# Patient Record
Sex: MALE | Race: WHITE | NOT HISPANIC OR LATINO | Employment: FULL TIME | URBAN - METROPOLITAN AREA
[De-identification: names, ages, dates, MRNs, and addresses within clinical notes are randomized per-mention and may not be internally consistent; named-entity substitution may affect disease eponyms.]

---

## 2018-10-23 ENCOUNTER — APPOINTMENT (OUTPATIENT)
Dept: LAB | Facility: HOSPITAL | Age: 53
End: 2018-10-23
Attending: INTERNAL MEDICINE
Payer: COMMERCIAL

## 2018-10-23 ENCOUNTER — TRANSCRIBE ORDERS (OUTPATIENT)
Dept: ADMINISTRATIVE | Facility: HOSPITAL | Age: 53
End: 2018-10-23

## 2018-10-23 DIAGNOSIS — R60.9 EDEMA, UNSPECIFIED TYPE: ICD-10-CM

## 2018-10-23 DIAGNOSIS — R60.9 EDEMA, UNSPECIFIED TYPE: Primary | ICD-10-CM

## 2018-10-23 LAB
ALBUMIN SERPL BCP-MCNC: 3.9 G/DL (ref 3.5–5.7)
ALP SERPL-CCNC: 47 U/L (ref 40–150)
ALT SERPL W P-5'-P-CCNC: 37 U/L (ref 7–52)
ANION GAP SERPL CALCULATED.3IONS-SCNC: 8 MMOL/L (ref 4–13)
AST SERPL W P-5'-P-CCNC: 22 U/L (ref 13–39)
BACTERIA UR QL AUTO: ABNORMAL /HPF
BILIRUB SERPL-MCNC: 0.6 MG/DL (ref 0.2–1)
BILIRUB UR QL STRIP: NEGATIVE
BUN SERPL-MCNC: 17 MG/DL (ref 7–25)
CALCIUM SERPL-MCNC: 9.2 MG/DL (ref 8.6–10.5)
CHLORIDE SERPL-SCNC: 97 MMOL/L (ref 98–107)
CLARITY UR: CLEAR
CO2 SERPL-SCNC: 32 MMOL/L (ref 21–31)
COLOR UR: YELLOW
CREAT SERPL-MCNC: 1.34 MG/DL (ref 0.7–1.3)
CREAT UR-MCNC: 294 MG/DL
CREAT UR-MCNC: 294 MG/DL
GFR SERPL CREATININE-BSD FRML MDRD: 59 ML/MIN/1.73SQ M
GLUCOSE P FAST SERPL-MCNC: 107 MG/DL (ref 65–99)
GLUCOSE UR STRIP-MCNC: NEGATIVE MG/DL
HGB UR QL STRIP.AUTO: NEGATIVE
KETONES UR STRIP-MCNC: NEGATIVE MG/DL
LEUKOCYTE ESTERASE UR QL STRIP: NEGATIVE
MAGNESIUM SERPL-MCNC: 2 MG/DL (ref 1.9–2.7)
MICROALBUMIN UR-MCNC: 38.4 MG/L (ref 0–20)
MICROALBUMIN/CREAT 24H UR: 13 MG/G CREATININE (ref 0–30)
MUCOUS THREADS UR QL AUTO: ABNORMAL
NITRITE UR QL STRIP: NEGATIVE
NON-SQ EPI CELLS URNS QL MICRO: ABNORMAL /HPF
PH UR STRIP.AUTO: 5.5 [PH] (ref 5–8)
POTASSIUM SERPL-SCNC: 3.3 MMOL/L (ref 3.5–5.5)
PROT SERPL-MCNC: 6.7 G/DL (ref 6.4–8.9)
PROT UR STRIP-MCNC: NEGATIVE MG/DL
PROT UR-MCNC: 15 MG/DL
PROT/CREAT UR: 0.05 MG/G{CREAT} (ref 0–0.1)
RBC #/AREA URNS AUTO: ABNORMAL /HPF
SODIUM SERPL-SCNC: 137 MMOL/L (ref 134–143)
SP GR UR STRIP.AUTO: >=1.03 (ref 1–1.03)
UROBILINOGEN UR QL STRIP.AUTO: 0.2 E.U./DL
WBC #/AREA URNS AUTO: ABNORMAL /HPF

## 2018-10-23 PROCEDURE — 84156 ASSAY OF PROTEIN URINE: CPT | Performed by: INTERNAL MEDICINE

## 2018-10-23 PROCEDURE — 82570 ASSAY OF URINE CREATININE: CPT | Performed by: INTERNAL MEDICINE

## 2018-10-23 PROCEDURE — 80053 COMPREHEN METABOLIC PANEL: CPT

## 2018-10-23 PROCEDURE — 82043 UR ALBUMIN QUANTITATIVE: CPT | Performed by: INTERNAL MEDICINE

## 2018-10-23 PROCEDURE — 81001 URINALYSIS AUTO W/SCOPE: CPT | Performed by: INTERNAL MEDICINE

## 2018-10-23 PROCEDURE — 36415 COLL VENOUS BLD VENIPUNCTURE: CPT

## 2018-10-23 PROCEDURE — 83735 ASSAY OF MAGNESIUM: CPT

## 2019-03-30 ENCOUNTER — TRANSCRIBE ORDERS (OUTPATIENT)
Dept: ADMINISTRATIVE | Facility: HOSPITAL | Age: 54
End: 2019-03-30

## 2019-03-30 ENCOUNTER — APPOINTMENT (OUTPATIENT)
Dept: LAB | Facility: HOSPITAL | Age: 54
End: 2019-03-30
Attending: INTERNAL MEDICINE
Payer: COMMERCIAL

## 2019-03-30 DIAGNOSIS — N18.30 CHRONIC KIDNEY DISEASE, STAGE III (MODERATE) (HCC): Primary | ICD-10-CM

## 2019-03-30 DIAGNOSIS — R60.9 EDEMA, UNSPECIFIED TYPE: ICD-10-CM

## 2019-03-30 DIAGNOSIS — N18.30 CHRONIC KIDNEY DISEASE, STAGE III (MODERATE) (HCC): ICD-10-CM

## 2019-03-30 DIAGNOSIS — I12.9 PARENCHYMAL RENAL HYPERTENSION, STAGE 1 THROUGH STAGE 4 OR UNSPECIFIED CHRONIC KIDNEY DISEASE: ICD-10-CM

## 2019-03-30 LAB
ALBUMIN SERPL BCP-MCNC: 3.8 G/DL (ref 3.5–5.7)
ALP SERPL-CCNC: 46 U/L (ref 40–150)
ALT SERPL W P-5'-P-CCNC: 28 U/L (ref 7–52)
ANION GAP SERPL CALCULATED.3IONS-SCNC: 7 MMOL/L (ref 4–13)
AST SERPL W P-5'-P-CCNC: 19 U/L (ref 13–39)
BILIRUB SERPL-MCNC: 0.7 MG/DL (ref 0.2–1)
BILIRUB UR QL STRIP: NEGATIVE
BUN SERPL-MCNC: 13 MG/DL (ref 7–25)
CALCIUM SERPL-MCNC: 9.1 MG/DL (ref 8.6–10.5)
CHLORIDE SERPL-SCNC: 96 MMOL/L (ref 98–107)
CLARITY UR: CLEAR
CO2 SERPL-SCNC: 33 MMOL/L (ref 21–31)
COLOR UR: YELLOW
CREAT SERPL-MCNC: 1.2 MG/DL (ref 0.7–1.3)
CREAT UR-MCNC: 237 MG/DL
CREAT UR-MCNC: 237 MG/DL
GFR SERPL CREATININE-BSD FRML MDRD: 68 ML/MIN/1.73SQ M
GLUCOSE P FAST SERPL-MCNC: 127 MG/DL (ref 65–99)
GLUCOSE UR STRIP-MCNC: NEGATIVE MG/DL
HGB UR QL STRIP.AUTO: NEGATIVE
KETONES UR STRIP-MCNC: NEGATIVE MG/DL
LEUKOCYTE ESTERASE UR QL STRIP: NEGATIVE
MAGNESIUM SERPL-MCNC: 2.1 MG/DL (ref 1.9–2.7)
MICROALBUMIN UR-MCNC: 24.1 MG/L (ref 0–20)
MICROALBUMIN/CREAT 24H UR: 10 MG/G CREATININE (ref 0–30)
NITRITE UR QL STRIP: NEGATIVE
PH UR STRIP.AUTO: 6 [PH]
POTASSIUM SERPL-SCNC: 3.9 MMOL/L (ref 3.5–5.5)
PROT SERPL-MCNC: 6.8 G/DL (ref 6.4–8.9)
PROT UR STRIP-MCNC: NEGATIVE MG/DL
PROT UR-MCNC: 14 MG/DL
PROT/CREAT UR: 0.06 MG/G{CREAT} (ref 0–0.1)
SODIUM SERPL-SCNC: 136 MMOL/L (ref 134–143)
SP GR UR STRIP.AUTO: >=1.03 (ref 1–1.03)
UROBILINOGEN UR QL STRIP.AUTO: 0.2 E.U./DL

## 2019-03-30 PROCEDURE — 82570 ASSAY OF URINE CREATININE: CPT | Performed by: INTERNAL MEDICINE

## 2019-03-30 PROCEDURE — 36415 COLL VENOUS BLD VENIPUNCTURE: CPT

## 2019-03-30 PROCEDURE — 82043 UR ALBUMIN QUANTITATIVE: CPT | Performed by: INTERNAL MEDICINE

## 2019-03-30 PROCEDURE — 84156 ASSAY OF PROTEIN URINE: CPT | Performed by: INTERNAL MEDICINE

## 2019-03-30 PROCEDURE — 81003 URINALYSIS AUTO W/O SCOPE: CPT | Performed by: INTERNAL MEDICINE

## 2019-03-30 PROCEDURE — 83735 ASSAY OF MAGNESIUM: CPT

## 2019-03-30 PROCEDURE — 80053 COMPREHEN METABOLIC PANEL: CPT

## 2020-02-08 DIAGNOSIS — I10 ESSENTIAL HYPERTENSION: Primary | ICD-10-CM

## 2020-02-10 RX ORDER — LISINOPRIL AND HYDROCHLOROTHIAZIDE 20; 12.5 MG/1; MG/1
TABLET ORAL
Qty: 180 TABLET | Refills: 3 | Status: SHIPPED | OUTPATIENT
Start: 2020-02-10 | End: 2021-02-02

## 2020-03-07 DIAGNOSIS — I10 ESSENTIAL HYPERTENSION: Primary | ICD-10-CM

## 2020-03-09 RX ORDER — CARVEDILOL 12.5 MG/1
TABLET ORAL
Qty: 60 TABLET | Refills: 1 | Status: SHIPPED | OUTPATIENT
Start: 2020-03-09 | End: 2020-05-04

## 2020-05-04 ENCOUNTER — OFFICE VISIT (OUTPATIENT)
Dept: URGENT CARE | Facility: CLINIC | Age: 55
End: 2020-05-04

## 2020-05-04 VITALS
TEMPERATURE: 98.9 F | RESPIRATION RATE: 20 BRPM | SYSTOLIC BLOOD PRESSURE: 148 MMHG | BODY MASS INDEX: 42.66 KG/M2 | HEIGHT: 72 IN | HEART RATE: 89 BPM | DIASTOLIC BLOOD PRESSURE: 85 MMHG | WEIGHT: 315 LBS | OXYGEN SATURATION: 96 %

## 2020-05-04 DIAGNOSIS — M54.42 ACUTE BILATERAL LOW BACK PAIN WITH LEFT-SIDED SCIATICA: Primary | ICD-10-CM

## 2020-05-04 DIAGNOSIS — I10 ESSENTIAL HYPERTENSION: ICD-10-CM

## 2020-05-04 PROCEDURE — 99203 OFFICE O/P NEW LOW 30 MIN: CPT | Performed by: PHYSICIAN ASSISTANT

## 2020-05-04 RX ORDER — METHOCARBAMOL 500 MG/1
500 TABLET, FILM COATED ORAL 3 TIMES DAILY
Qty: 20 TABLET | Refills: 0 | Status: SHIPPED | OUTPATIENT
Start: 2020-05-04 | End: 2021-06-09

## 2020-05-04 RX ORDER — FEXOFENADINE HCL 180 MG/1
180 TABLET ORAL DAILY
COMMUNITY

## 2020-05-04 RX ORDER — PREDNISONE 10 MG/1
TABLET ORAL
Qty: 21 TABLET | Refills: 0 | Status: SHIPPED | OUTPATIENT
Start: 2020-05-04 | End: 2021-06-09

## 2020-05-04 RX ORDER — CARVEDILOL 12.5 MG/1
TABLET ORAL
Qty: 60 TABLET | Refills: 1 | Status: SHIPPED | OUTPATIENT
Start: 2020-05-04 | End: 2020-07-11

## 2020-05-05 ENCOUNTER — TELEPHONE (OUTPATIENT)
Dept: PHYSICAL THERAPY | Facility: OTHER | Age: 55
End: 2020-05-05

## 2020-05-08 ENCOUNTER — TELEPHONE (OUTPATIENT)
Dept: PHYSICAL THERAPY | Facility: OTHER | Age: 55
End: 2020-05-08

## 2020-07-11 DIAGNOSIS — I10 ESSENTIAL HYPERTENSION: ICD-10-CM

## 2020-07-11 RX ORDER — CARVEDILOL 12.5 MG/1
TABLET ORAL
Qty: 60 TABLET | Refills: 1 | Status: SHIPPED | OUTPATIENT
Start: 2020-07-11 | End: 2020-09-08

## 2020-09-07 DIAGNOSIS — I10 ESSENTIAL HYPERTENSION: ICD-10-CM

## 2020-09-08 RX ORDER — CARVEDILOL 12.5 MG/1
TABLET ORAL
Qty: 60 TABLET | Refills: 1 | Status: SHIPPED | OUTPATIENT
Start: 2020-09-08 | End: 2020-11-09

## 2020-11-08 DIAGNOSIS — I10 ESSENTIAL HYPERTENSION: ICD-10-CM

## 2020-11-09 RX ORDER — CARVEDILOL 12.5 MG/1
TABLET ORAL
Qty: 60 TABLET | Refills: 1 | Status: SHIPPED | OUTPATIENT
Start: 2020-11-09 | End: 2021-01-04

## 2021-01-04 DIAGNOSIS — I10 ESSENTIAL HYPERTENSION: ICD-10-CM

## 2021-01-04 RX ORDER — CARVEDILOL 12.5 MG/1
TABLET ORAL
Qty: 60 TABLET | Refills: 1 | Status: SHIPPED | OUTPATIENT
Start: 2021-01-04 | End: 2021-03-12

## 2021-02-02 DIAGNOSIS — I10 ESSENTIAL HYPERTENSION: ICD-10-CM

## 2021-02-02 RX ORDER — LISINOPRIL AND HYDROCHLOROTHIAZIDE 20; 12.5 MG/1; MG/1
TABLET ORAL
Qty: 180 TABLET | Refills: 3 | Status: SHIPPED | OUTPATIENT
Start: 2021-02-02 | End: 2021-10-05

## 2021-03-11 DIAGNOSIS — I10 ESSENTIAL HYPERTENSION: ICD-10-CM

## 2021-03-12 RX ORDER — CARVEDILOL 12.5 MG/1
TABLET ORAL
Qty: 60 TABLET | Refills: 1 | Status: SHIPPED | OUTPATIENT
Start: 2021-03-12 | End: 2021-05-26

## 2021-04-08 DIAGNOSIS — Z23 ENCOUNTER FOR IMMUNIZATION: ICD-10-CM

## 2021-05-01 DIAGNOSIS — I10 ESSENTIAL HYPERTENSION: ICD-10-CM

## 2021-05-04 RX ORDER — CARVEDILOL 12.5 MG/1
TABLET ORAL
Qty: 60 TABLET | Refills: 1 | OUTPATIENT
Start: 2021-05-04

## 2021-05-26 DIAGNOSIS — I10 ESSENTIAL HYPERTENSION: ICD-10-CM

## 2021-05-26 RX ORDER — CARVEDILOL 12.5 MG/1
TABLET ORAL
Qty: 60 TABLET | Refills: 1 | Status: SHIPPED | OUTPATIENT
Start: 2021-05-26 | End: 2021-07-03

## 2021-06-07 PROBLEM — Z76.89 ESTABLISHING CARE WITH NEW DOCTOR, ENCOUNTER FOR: Status: ACTIVE | Noted: 2021-06-07

## 2021-06-07 PROBLEM — Z00.00 ANNUAL PHYSICAL EXAM: Status: ACTIVE | Noted: 2021-06-07

## 2021-06-08 NOTE — PROGRESS NOTES
[facility]    NAME: Jeannette Palencia  AGE: 60 y o  SEX: adult  : 1965     DATE: 2021     Assessment and Plan:     Problem List Items Addressed This Visit        Cardiovascular and Mediastinum    Essential hypertension     · Continue coreg and lisinopril-HCTZ            Genitourinary    Stage 3a chronic kidney disease ([facility] [address] )     Lab Results   Component Value Date    EGFR 68 2019    EGFR 59 10/23/2018    CREATININE 1 20 2019    CREATININE 1 34 (H) 10/23/2018 ·   avoid nephrotoxic medications             Other    Annual physical exam - Primary     · Lifestyle modification, diet and exercise discussed  · Medications discussed  · Labs discussed  · Hepatitis C screening discussed  · Depression screening discussed  · HIV screening discussed   · BMI discussed  · Colorectal cancer screening discussed          Relevant Orders    CBC and differential    Comprehensive metabolic panel    Establishing care with new doctor, encounter for     · Lifestyle modification, diet and exercise discussed  · Medications discussed  · Labs discussed          Class 3 severe obesity due to excess calories without serious comorbidity with body mass index (BMI) of 45 0 to 49 9 in adult (HCC)     · Lifestyle modification, diet and exercise discussed         Relevant Orders    HEMOGLOBIN A1C W/ EAG ESTIMATION    Lipid panel    Microalbumin / creatinine urine ratio    Seasonal allergies     · Continue allegra         Generalized muscle ache     · Tylenol twice a day  · Continue vitamin b12         Relevant Orders    Vitamin D 25 hydroxy    Magnesium    Phosphorus      Other Visit Diagnoses     Encounter for screening colonoscopy for non-high-risk patient        Relevant Orders    Ambulatory referral for colonoscopy          Immunizations and preventive care screenings were discussed with patient today   Appropriate education was printed on patient's after visit summary  Counseling:  Alcohol/drug use: discussed moderation in alcohol intake, the recommendations for healthy alcohol use, and avoidance of illicit drug use  Dental Health: discussed importance of regular tooth brushing, flossing, and dental visits  Injury prevention: discussed safety/seat belts, safety helmets, smoke detectors, carbon dioxide detectors, and smoking near bedding or upholstery  Sexual health: discussed sexually transmitted diseases, partner selection, use of condoms, avoidance of unintended pregnancy, and contraceptive alternatives  · Exercise: the importance of regular exercise/physical activity was discussed  Recommend exercise 3-5 times per week for at least 30 minutes  BMI Counseling: Body mass index is 45 3 kg/m²  The BMI is above normal  Nutrition recommendations include decreasing portion sizes, encouraging healthy choices of fruits and vegetables, decreasing fast food intake, consuming healthier snacks, limiting drinks that contain sugar, moderation in carbohydrate intake, increasing intake of lean protein, reducing intake of saturated and trans fat and reducing intake of cholesterol  Exercise recommendations include exercising 3-5 times per week  No pharmacotherapy was ordered  Depression Screening and Follow-up Plan: Patient's depression screening was positive with a PHQ-2 score of 0  Clincally patient does not have depression  No treatment is required  Return in about 6 months (around 12/9/2021) for Recheck  Chief Complaint:     Chief Complaint   Patient presents with   [address]     pt here for a new pt appt, he has htn and takes meds, has not seen a doctor in awhile,       History of Present Illness:     Adult Annual Physical   Patient here for a comprehensive physical exam  The patient reports no problems  Diet and Physical Activity  · Diet/Nutrition: well balanced diet  · Exercise: 1-2 times a week on average        Depression Screening  PHQ-9 Depression Screening    PHQ-9:   Frequency of the following problems over the past two weeks:      Little interest or pleasure in doing things: 0 - not at all  Feeling down, depressed, or hopeless: 0 - not at all  PHQ-2 Score: 0       General Health  · Sleep: gets 7-8 hours of sleep on average  · Hearing: normal - bilateral   · Vision: wears glasses  Dental: regular dental visits  Is due for appointment  [facility]  · Symptoms include: none     Review of Systems:     Review of Systems   Constitutional: Negative for activity change, chills, fatigue and fever  HENT: Negative for rhinorrhea and sore throat  Eyes: Negative for pain  Respiratory: Negative for cough and shortness of breath  Cardiovascular: Negative for chest pain, palpitations and leg swelling  Gastrointestinal: Negative for abdominal pain, constipation, diarrhea, nausea and vomiting  Genitourinary: Negative for difficulty urinating, flank pain, frequency and urgency  Musculoskeletal: Positive for arthralgias  Negative for gait problem, joint swelling and myalgias  Skin: Negative for color change  Neurological: Negative for dizziness, weakness, light-headedness and headaches  Psychiatric/Behavioral: Negative for sleep disturbance  The patient is not nervous/anxious  All other systems reviewed and are negative       Past Medical History:     Past Medical History:   Diagnosis Date    Benign arteriolar nephrosclerosis     Essential hypertension     Hypercholesterolemia     Hyperparathyroidism due to renal insufficiency (HCC)     Legionnaire's disease ([facility] [address] )     Morbid obesity with body mass index of 40 0-44 9 in [facility])     Pneumonia     s/p VDRF       Past Surgical History:     Past Surgical History:   Procedure Laterality Date    OTHER SURGICAL HISTORY      Dialysis catheter       Family History:     Family History   Problem Relation Age of Onset    Hyperlipidemia Mother     Lung cancer Father     Stomach cancer Father     Cancer Father       Social History:        Social History     Socioeconomic History    Marital status: /Civil Union     Spouse name: None    Number of children: None    Years of education: None    Highest education level: None   Occupational History    Occupation:     Social Needs    Financial resource strain: None    Food insecurity     Worry: None     Inability: None    Transportation needs     Medical: None     Non-medical: None   Tobacco Use    Smoking status: Never Smoker    Smokeless tobacco: Never Used   Substance and Sexual Activity    Alcohol use: None     Comment: Denies alcohol use - As per [employer] Drug use: None    Sexual activity: None   Lifestyle    Physical activity     Days per week: None     Minutes per session: None    Stress: None   Relationships    Social connections     Talks on phone: None     Gets together: None     Attends Ten[employer] service: None     Active member of club or organization: None     Attends meetings of clubs or organizations: None     Relationship status: None    Intimate partner violence     Fear of current or ex partner: None     Emotionally abused: None     Physically abused: None     Forced sexual activity: None   Other Topics Concern    None   Social History Narrative    Consumes on average 3 cups of regular coffee per day       Current Medications:     Current Outpatient Medications   Medication Sig Dispense Refill    acetaminophen (TYLENOL) 325 mg tablet Take 650 mg by mouth 2 (two) times a day      carvedilol (COREG) 12 5 mg tablet take 1 tablet by mouth twice a day 60 tablet 1    fexofenadine (ALLEGRA) 180 MG tablet Take 180 mg by mouth daily      lisinopril-hydrochlorothiazide (PRINZIDE,ZESTORETIC) 20-12 5 MG per tablet take 1 tablet by mouth twice a day 180 tablet 3    Multiple Vitamins-Minerals (multivitamin with minerals) tablet Take 1 tablet by mouth daily      Turmeric 500 MG CAPS Take 500 mg by mouth daily      vitamin B-12 (VITAMIN B-12) 1,000 mcg tablet Take 2,000 mcg by mouth daily       No current facility-administered medications for this visit  Allergies:     No Known Allergies   Physical Exam:     /80   Pulse 82   Temp 97 9 °F (36 6 °C)   Ht 6' (1 829 m)   Wt (!) 152 kg (334 lb)   SpO2 92%   BMI 45 30 kg/m²     Physical Exam  Vitals signs and nursing note reviewed  Constitutional:       General: She is awake  Appearance: Normal appearance  She is well-developed and overweight  HENT:      Head: Normocephalic and atraumatic  Nose: Nose normal       Mouth/Throat:      Mouth: Mucous membranes are moist    Eyes:      Conjunctiva/sclera: Conjunctivae normal    Neck:      Musculoskeletal: Neck supple  Cardiovascular:      Rate and Rhythm: Normal rate and regular rhythm  Pulses: Normal pulses  Heart sounds: Normal heart sounds  No murmur  Pulmonary:      Effort: Pulmonary effort is normal  No respiratory distress  Breath sounds: Normal breath sounds  Abdominal:      General: Bowel sounds are normal       Palpations: Abdomen is soft  Tenderness: There is no abdominal tenderness  Musculoskeletal: Normal range of motion  Right lower leg: No edema  Left lower leg: No edema  Comments: Generalized aches and pains  Skin:     General: Skin is warm and dry  Neurological:      Mental Status: She is alert and oriented to person, place, and time  Psychiatric:         Attention and Perception: Attention normal          Mood and Affect: Mood normal          Speech: Speech normal          Behavior: Behavior normal  Behavior is cooperative  Thought Content:  Thought content normal          Cognition and Memory: Cognition normal          Judgment: Judgment normal           LAURA Hua  Formerly [facility]

## 2021-06-08 NOTE — ASSESSMENT & PLAN NOTE
· Lifestyle modification, diet and exercise discussed  · Medications discussed  · Labs discussed  · Hepatitis C screening discussed  · Depression screening discussed  · HIV screening discussed   · BMI discussed  · Colorectal cancer screening discussed

## 2021-06-08 NOTE — ASSESSMENT & PLAN NOTE
Lab Results   Component Value Date    EGFR 68 03/30/2019    EGFR 59 10/23/2018    CREATININE 1 20 03/30/2019    CREATININE 1 34 (H) 10/23/2018   · avoid nephrotoxic medications

## 2021-06-09 ENCOUNTER — OFFICE VISIT (OUTPATIENT)
Dept: INTERNAL MEDICINE CLINIC | Facility: CLINIC | Age: 56
End: 2021-06-09
Payer: COMMERCIAL

## 2021-06-09 VITALS
TEMPERATURE: 97.9 F | OXYGEN SATURATION: 92 % | HEIGHT: 72 IN | SYSTOLIC BLOOD PRESSURE: 120 MMHG | HEART RATE: 82 BPM | BODY MASS INDEX: 42.66 KG/M2 | DIASTOLIC BLOOD PRESSURE: 80 MMHG | WEIGHT: 315 LBS

## 2021-06-09 DIAGNOSIS — I10 ESSENTIAL HYPERTENSION: ICD-10-CM

## 2021-06-09 DIAGNOSIS — N18.31 STAGE 3A CHRONIC KIDNEY DISEASE (HCC): ICD-10-CM

## 2021-06-09 DIAGNOSIS — E66.01 CLASS 3 SEVERE OBESITY DUE TO EXCESS CALORIES WITHOUT SERIOUS COMORBIDITY WITH BODY MASS INDEX (BMI) OF 45.0 TO 49.9 IN ADULT (HCC): ICD-10-CM

## 2021-06-09 DIAGNOSIS — M79.10 GENERALIZED MUSCLE ACHE: ICD-10-CM

## 2021-06-09 DIAGNOSIS — Z76.89 ESTABLISHING CARE WITH NEW DOCTOR, ENCOUNTER FOR: ICD-10-CM

## 2021-06-09 DIAGNOSIS — Z12.11 ENCOUNTER FOR SCREENING COLONOSCOPY FOR NON-HIGH-RISK PATIENT: ICD-10-CM

## 2021-06-09 DIAGNOSIS — J30.2 SEASONAL ALLERGIES: ICD-10-CM

## 2021-06-09 DIAGNOSIS — Z00.00 ANNUAL PHYSICAL EXAM: Primary | ICD-10-CM

## 2021-06-09 PROCEDURE — 3725F SCREEN DEPRESSION PERFORMED: CPT | Performed by: NURSE PRACTITIONER

## 2021-06-09 PROCEDURE — 99386 PREV VISIT NEW AGE 40-64: CPT | Performed by: NURSE PRACTITIONER

## 2021-06-09 PROCEDURE — 3008F BODY MASS INDEX DOCD: CPT | Performed by: NURSE PRACTITIONER

## 2021-06-09 PROCEDURE — 1036F TOBACCO NON-USER: CPT | Performed by: NURSE PRACTITIONER

## 2021-06-09 RX ORDER — PROPRANOLOL/HYDROCHLOROTHIAZID 40 MG-25MG
500 TABLET ORAL DAILY
COMMUNITY
End: 2021-09-21

## 2021-06-09 RX ORDER — LANOLIN ALCOHOL/MO/W.PET/CERES
2000 CREAM (GRAM) TOPICAL DAILY
COMMUNITY

## 2021-06-09 RX ORDER — ACETAMINOPHEN 325 MG/1
650 TABLET ORAL 2 TIMES DAILY
COMMUNITY
End: 2021-12-13

## 2021-06-09 RX ORDER — MULTIVIT-MIN/IRON FUM/FOLIC AC 7.5 MG-4
1 TABLET ORAL DAILY
COMMUNITY

## 2021-06-09 NOTE — PATIENT INSTRUCTIONS
Heart Healthy Diet   WHAT YOU NEED TO KNOW:   A heart healthy diet is an eating plan low in unhealthy fats and sodium (salt)  The plan is high in healthy fats and fiber  A heart healthy diet helps improve your cholesterol levels and lowers your risk for heart disease and stroke  A dietitian will teach you how to read and understand food labels  DISCHARGE INSTRUCTIONS:   Heart healthy diet guidelines to follow:   · Choose foods that contain healthy fats  ? Unsaturated fats  include monounsaturated and polyunsaturated fats  Unsaturated fat is found in foods such as soybean, canola, olive, corn, and safflower oils  It is also found in soft tub margarine that is made with liquid vegetable oil  ? Omega-3 fat  is found in certain fish, such as salmon, tuna, and trout, and in walnuts and flaxseed  Eat fish high in omega-3 fats at least 2 times a week  · Get 20 to 30 grams of fiber each day  Fruits, vegetables, whole-grain foods, and legumes (cooked beans) are good sources of fiber  · Limit or do not have unhealthy fats  ? Cholesterol  is found in animal foods, such as eggs and lobster, and in dairy products made from whole milk  Limit cholesterol to less than 200 mg each day  ? Saturated fat  is found in meats, such as herring and hamburger  It is also found in chicken or turkey skin, whole milk, and butter  Limit saturated fat to less than 7% of your total daily calories  ? Trans fat  is found in packaged foods, such as potato chips and cookies  It is also in hard margarine, some fried foods, and shortening  Do not eat foods that contain trans fats  · Limit sodium as directed  You may be told to limit sodium to 2,000 to 2,300 mg each day  Choose low-sodium or no-salt-added foods  Add little or no salt to food you prepare  Use herbs and spices in place of salt         Include the following in your heart healthy plan:  Ask your dietitian or healthcare provider how many servings to have from each of the following food groups:  · Grains:      ? Whole-wheat breads, cereals, and pastas, and brown rice    ? Low-fat, low-sodium crackers and chips    · Vegetables:      ? Broccoli, green beans, green peas, and spinach    ? Collards, kale, and lima beans    ? Carrots, sweet potatoes, tomatoes, and peppers    ? Canned vegetables with no salt added    · Fruits:      ? Bananas, peaches, pears, and pineapple    ? Grapes, raisins, and dates    ? Oranges, tangerines, grapefruit, orange juice, and grapefruit juice    ? Apricots, mangoes, melons, and papaya    ? Raspberries and strawberries    ? Canned fruit with no added sugar    · Low-fat dairy:      ? Nonfat (skim) milk, 1% milk, and low-fat almond, cashew, or soy milks fortified with calcium    ? Low-fat cheese, regular or frozen yogurt, and cottage cheese    · Meats and proteins:      ? Lean cuts of beef and pork (loin, leg, round), skinless chicken and turkey    ? Legumes, soy products, egg whites, or nuts    Limit or do not include the following in your heart healthy plan:   · Unhealthy fats and oils:      ? Whole or 2% milk, cream cheese, sour cream, or cheese    ? High-fat cuts of beef (T-bone steaks, ribs), chicken or turkey with skin, and organ meats such as liver    ? Butter, stick margarine, shortening, and cooking oils such as coconut or palm oil    · Foods and liquids high in sodium:      ? Packaged foods, such as frozen dinners, cookies, macaroni and cheese, and cereals with more than 300 mg of sodium per serving    ? Vegetables with added sodium, such as instant potatoes, vegetables with added sauces, or regular canned vegetables    ? Cured or smoked meats, such as hot dogs, herring, and sausage    ? High-sodium ketchup, barbecue sauce, salad dressing, pickles, olives, soy sauce, or miso    · Foods and liquids high in sugar:      ? Candy, cake, cookies, pies, or doughnuts    ? Soft drinks (soda), sports drinks, or sweetened tea    ?  Canned or dry mixes for cakes, soups, sauces, or gravies    Other healthy heart guidelines:   · Do not smoke  Nicotine and other chemicals in cigarettes and cigars can cause lung and heart damage  Ask your healthcare provider for information if you currently smoke and need help to quit  E-cigarettes or smokeless tobacco still contain nicotine  Talk to your healthcare provider before you use these products  · Limit or do not drink alcohol as directed  Alcohol can damage your heart and raise your blood pressure  Your healthcare provider may give you specific daily and weekly limits  The general recommended limit is 1 drink a day for women 21 or older and for men 72 or older  Do not have more than 3 drinks in a day or 7 in a week  The recommended limit is 2 drinks a day for men 24to 59years of age  Do not have more than 4 drinks in a day or 14 in a week  A drink of alcohol is 12 ounces of beer, 5 ounces of wine, or 1½ ounces of liquor  · Exercise regularly  Exercise can help you maintain a healthy weight and improve your blood pressure and cholesterol levels  Regular exercise can also decrease your risk for heart problems  Ask your healthcare provider about the best exercise plan for you  Do not start an exercise program without asking your healthcare provider  Follow up with your doctor or cardiologist as directed:  Write down your questions so you remember to ask them during your visits  © Copyright [address] Information is for End User's use only and may not be sold, redistributed or otherwise used for commercial purposes  All illustrations and images included in CareNotes® are the copyrighted property of A D A M [employer]  or [address] [place] [address]  The above information is an  only  It is not intended as medical advice for individual conditions or treatments  Talk to your doctor, nurse or pharmacist before following any medical regimen to see if it is safe and effective for you

## 2021-06-18 ENCOUNTER — TELEPHONE (OUTPATIENT)
Dept: GASTROENTEROLOGY | Facility: CLINIC | Age: 56
End: 2021-06-18

## 2021-06-18 NOTE — TELEPHONE ENCOUNTER
LVM for pt to call back to schedule a consult in [place] office   With dr Adithya Haro or [facility]

## 2021-09-05 ENCOUNTER — OFFICE VISIT (OUTPATIENT)
Dept: URGENT CARE | Facility: CLINIC | Age: 56
End: 2021-09-05
Payer: COMMERCIAL

## 2021-09-05 VITALS
SYSTOLIC BLOOD PRESSURE: 132 MMHG | RESPIRATION RATE: 18 BRPM | TEMPERATURE: 98 F | WEIGHT: 315 LBS | HEIGHT: 72 IN | HEART RATE: 68 BPM | BODY MASS INDEX: 42.66 KG/M2 | OXYGEN SATURATION: 97 % | DIASTOLIC BLOOD PRESSURE: 84 MMHG

## 2021-09-05 DIAGNOSIS — M54.42 ACUTE BILATERAL LOW BACK PAIN WITH BILATERAL SCIATICA: Primary | ICD-10-CM

## 2021-09-05 DIAGNOSIS — M54.41 ACUTE BILATERAL LOW BACK PAIN WITH BILATERAL SCIATICA: Primary | ICD-10-CM

## 2021-09-05 PROCEDURE — 99214 OFFICE O/P EST MOD 30 MIN: CPT | Performed by: PHYSICIAN ASSISTANT

## 2021-09-05 RX ORDER — PREDNISONE 10 MG/1
TABLET ORAL
Qty: 21 TABLET | Refills: 0 | Status: SHIPPED | OUTPATIENT
Start: 2021-09-05 | End: 2021-09-21

## 2021-09-05 NOTE — LETTER
September 5, 2021     Patient: Marietta Khan   YOB: 1965   Date of Visit: 9/5/2021       To Whom It May Concern:    Please excuse Serafin Christine from work on 9/7/2021  If you have any questions or concerns, please don't hesitate to call  Sincerely,        Eyal Aparicio PA-C    CC: Jaspal Mcbride

## 2021-09-05 NOTE — PATIENT INSTRUCTIONS
Prednisone as prescribed (take first thing in the morning with food)  You may take tylenol for breakthrough pain  Rest (for no longer than 24 hours)  Stretching exercises  Alternate ice and heat  Follow up with comprehensive [facility]  Follow up with PCP in 3-5 days  Proceed to  ER if symptoms worsen  Sciatica   WHAT YOU NEED TO KNOW:   Sciatica is a condition that causes pain along your sciatic nerve  The sciatic nerve runs from your spine through both sides of your buttocks  It then runs down the back of your thigh, into your lower leg and foot  Your sciatic nerve may be compressed, inflamed, irritated, or stretched  DISCHARGE INSTRUCTIONS:   Medicines:   · NSAIDs:  These medicines decrease swelling and pain  NSAIDs are available without a doctor's order  Ask your healthcare provider which medicine is right for you  Ask how much to take and when to take it  Take as directed  NSAIDs can cause stomach bleeding or kidney problems if not taken correctly  · Acetaminophen: This medicine decreases pain  Acetaminophen is available without a doctor's order  Ask how much to take and when to take it  Follow directions  Acetaminophen can cause liver damage if not taken correctly  · Muscle relaxers  help decrease pain and muscle spasms  · Take your medicine as directed  Contact your healthcare provider if you think your medicine is not helping or if you have side effects  Tell him of her if you are allergic to any medicine  Keep a list of the medicines, vitamins, and herbs you take  Include the amounts, and when and why you take them  Bring the list or the pill bottles to follow-up visits  Carry your medicine list with you in case of an emergency  Follow up with your healthcare provider as directed:  Write down your questions so you remember to ask them during your visits  Manage your symptoms:   · Activity:  Decrease your activity  Do not lift heavy objects or twist your back for at least 6 weeks   Slowly return to your usual activity  · Ice:  Ice helps decrease swelling and pain  Ice may also help prevent tissue damage  Use an ice pack, or put crushed ice in a plastic bag  Cover it with a towel and place it on your low back or leg for 15 to 20 minutes every hour or as directed  · Heat:  Heat helps decrease pain and muscle spasms  Apply heat on the area for 20 to 30 minutes every 2 hours for as many days as directed  · Physical therapy:  You may need to see physical therapist to teach you exercises to help improve movement and strength, and to decrease pain  An occupational therapist teaches you skills to help with your daily activities  · Use assistive devices if directed: You may need to wear back support, such as a back brace  You may need crutches, a cane, or a walker to decrease stress on your lower back and leg muscles  Ask your healthcare provider for more information about assistive devices and how to use them correctly  Self-care:   · Avoid pressure on your back and legs:  Do not  lift heavy objects, or stand or sit for long periods of time  · Lift objects safely:  Keep your back straight and bend your knees when you  an object  Do not bend or twist your back when you lift  · Maintain a healthy weight:  Ask your healthcare provider how much you should weigh  Ask him to help you create a weight loss plan if you are overweight  · Exercise:  Ask your healthcare provider about the best stretching, warmup, and exercise plan for you  Contact your healthcare provider if:   · You have pain in your lower back at night or when resting  · You have pain in your lower back with numbness below the knee  · You have weakness in one leg only  · You have questions or concerns about your condition or care  Return to the emergency department if:   · You have trouble holding back your urine or bowel movements  · You have weakness in both legs      · You have numbness in your groin or buttocks  © Copyright [employer] 2021 Information is for End User's use only and may not be sold, redistributed or otherwise used for commercial purposes  All illustrations and images included in CareNotes® are the copyrighted property of A D A M [employer]  or Jenniffer Gandhi  The above information is an  only  It is not intended as medical advice for individual conditions or treatments  Talk to your doctor, nurse or pharmacist before following any medical regimen to see if it is safe and effective for you  Lower Back Exercises   WHAT YOU NEED TO KNOW:   Lower back exercises help heal and strengthen your back muscles to prevent another injury  Ask your healthcare provider if you need to see a physical therapist for more advanced exercises  DISCHARGE INSTRUCTIONS:   Return to the emergency department if:   · You have severe pain that prevents you from moving  Contact your healthcare provider if:   · Your pain becomes worse  · You have new pain  · You have questions or concerns about your condition or care  Do lower back exercises safely:   · Do the exercises on a mat or firm surface  (not on a bed) to support your spine and prevent low back pain  · Move slowly and smoothly  Avoid fast or jerky motions  · Breathe normally  Do not hold your breath  · Stop if you feel pain  It is normal to feel some discomfort at first  Regular exercise will help decrease your discomfort over time  Lower back exercises: Your healthcare provider may recommend that you do back exercises 10 to 30 minutes each day  He may also recommend that you do exercises 1 to 3 times each day  Ask your healthcare provider which exercises are best for you and how often to do them  · Ankle pumps:  Lie on your back  Move your foot up (with your toes pointing toward your head)  Then, move your foot down (with your toes pointing away from you)  Repeat this exercise 10 times on each side  · Heel slides:  Lie on your back  Slowly bend one leg and then straighten it  Next, bend the other leg and then straighten it  Repeat 10 times on each side  · Pelvic tilt:  Lie on your back with your knees bent and feet flat on the floor  Place your arms in a relaxed position beside your body  Tighten the muscles of your abdomen and flatten your back against the floor  Hold for 5 seconds  Repeat 5 times  · Back stretch:  Lie on your back with your hands behind your head  Bend your knees and turn the lower half of your body to one side  Hold this position for 10 seconds  Repeat 3 times on each side  · Straight leg raises:  Lie on your back with one leg straight  Bend the other knee  Tighten your abdomen and then slowly lift the straight leg up about 6 to 12 inches off the floor  Hold for 1 to 5 seconds  Lower your leg slowly  Repeat 10 times on each leg  · Knee-to-chest:  Lie on your back with your knees bent and feet flat on the floor  Pull one of your knees toward your chest and hold it there for 5 seconds  Return your leg to the starting position  Lift the other knee toward your chest and hold for 5 seconds  Do this 5 times on each side  · Cat and camel:  Place your hands and knees on the floor  Arch your back upward toward the ceiling and lower your head  Round out your spine as much as you can  Hold for 5 seconds  Lift your head upward and push your chest downward toward the floor  Hold for 5 seconds  Do 3 sets or as directed  · Wall squats:  Stand with your back against a wall  Tighten the muscles of your abdomen  Slowly lower your body until your knees are bent at a 45 degree angle  Hold this position for 5 seconds  Slowly move back up to a standing position  Repeat 10 times  · Curl up:  Lie on your back with your knees bent and feet flat on the floor  Place your hands, palms down, underneath the curve in your lower back   Next, with your elbows on the floor, lift your shoulders and chest 2 to 3 inches  Keep your head in line with your shoulders  Hold this position for 5 seconds  When you can do this exercise without pain for 10 to 15 seconds, you may add a rotation  While your shoulders and chest are lifted off the ground, turn slightly to the left and hold  Repeat on the other side  · Bird dog:  Place your hands and knees on the floor  Keep your wrists directly below your shoulders and your knees directly below your hips  Pull your belly button in toward your spine  Do not flatten or arch your back  Tighten your abdominal muscles  Raise one arm straight out so that it is aligned with your head  Next, raise the leg opposite your arm  Hold this position for 15 seconds  Lower your arm and leg slowly and change sides  Do 5 sets  © Copyright [employer] 2021 Information is for End User's use only and may not be sold, redistributed or otherwise used for commercial purposes  All illustrations and images included in CareNotes® are the copyrighted property of A D A M , Inc  or [facility] Pritesh Pederson   The above information is an  only  It is not intended as medical advice for individual conditions or treatments  Talk to your doctor, nurse or pharmacist before following any medical regimen to see if it is safe and effective for you

## 2021-09-09 ENCOUNTER — TELEPHONE (OUTPATIENT)
Dept: PHYSICAL THERAPY | Facility: OTHER | Age: 56
End: 2021-09-09

## 2021-09-13 ENCOUNTER — TELEPHONE (OUTPATIENT)
Dept: PHYSICAL THERAPY | Facility: OTHER | Age: 56
End: 2021-09-13

## 2021-09-13 NOTE — TELEPHONE ENCOUNTER
Called patient per VM left today  Voice message left for patient to call back  Phone number and hours of business provided

## 2021-09-15 ENCOUNTER — NURSE TRIAGE (OUTPATIENT)
Dept: PHYSICAL THERAPY | Facility: OTHER | Age: 56
End: 2021-09-15

## 2021-09-15 DIAGNOSIS — G89.29 ACUTE EXACERBATION OF CHRONIC LOW BACK PAIN: Primary | ICD-10-CM

## 2021-09-15 DIAGNOSIS — M54.50 ACUTE EXACERBATION OF CHRONIC LOW BACK PAIN: Primary | ICD-10-CM

## 2021-09-15 NOTE — TELEPHONE ENCOUNTER
Additional Information   Negative: Is this related to a work injury?  Negative: Is this related to an MVA?  Negative: Are you currently recieving homecare services?  Negative: Has the patient had unexplained weight loss?  Negative: Is the patient experiencing blood in sputum?  Negative: Does the patient have a fever?  Negative: Is the patient experiencing urine retention?  Negative: Is the patient experiencing acute drop foot or paralysis?  Negative: Has the patient experienced major trauma? (fall from height, high speed collision, direct blow to spine) and is also experiencing nausea, light-headedness, or loss of consciousness?  Negative: Is this a chronic condition? Background - Initial Assessment  Clinical complaint: bilateral lower back pain L > R  States it will radiate down the left leg at times  States he has had back for about 3 yrs  States it has gotten worse over 1-2 weeks ago  States he has not seen anyone for the back pain in the past   Date of onset: 1-2 weeks  Frequency of pain: constant  Quality of pain: aching and stabbing    Protocols used: [facility] AMB COMPREHENSIVE SPINE PROGRAM PROTOCOL    This RN did review in detail the [facility] and what we can provide for their back pain  Patient is agreeable to being triaged by this RN and would like to proceed with Physical Therapy  Referral was placed for Physical Therapy at the [place] site  Patients information was sent to the  to make evaluation appointment  Patient made aware that the PT office  will be calling to schedule the appointment  Patient was provided with the phone number to the PT office  No further questions and/or concerns were voiced by the patient at this time  Patient states understanding of the referral that was placed

## 2021-09-16 ENCOUNTER — OFFICE VISIT (OUTPATIENT)
Dept: INTERNAL MEDICINE CLINIC | Facility: CLINIC | Age: 56
End: 2021-09-16
Payer: COMMERCIAL

## 2021-09-16 ENCOUNTER — APPOINTMENT (OUTPATIENT)
Dept: RADIOLOGY | Facility: CLINIC | Age: 56
End: 2021-09-16
Payer: COMMERCIAL

## 2021-09-16 VITALS
SYSTOLIC BLOOD PRESSURE: 130 MMHG | BODY MASS INDEX: 42.66 KG/M2 | RESPIRATION RATE: 14 BRPM | HEIGHT: 72 IN | TEMPERATURE: 99.4 F | OXYGEN SATURATION: 94 % | WEIGHT: 315 LBS | HEART RATE: 86 BPM | DIASTOLIC BLOOD PRESSURE: 80 MMHG

## 2021-09-16 DIAGNOSIS — Z23 FLU VACCINE NEED: ICD-10-CM

## 2021-09-16 DIAGNOSIS — M54.42 ACUTE BILATERAL LOW BACK PAIN WITH LEFT-SIDED SCIATICA: Primary | ICD-10-CM

## 2021-09-16 DIAGNOSIS — Z12.11 COLON CANCER SCREENING: ICD-10-CM

## 2021-09-16 DIAGNOSIS — M54.42 ACUTE BILATERAL LOW BACK PAIN WITH LEFT-SIDED SCIATICA: ICD-10-CM

## 2021-09-16 DIAGNOSIS — Z23 INFLUENZA VACCINE ADMINISTERED: ICD-10-CM

## 2021-09-16 PROBLEM — M54.50 ACUTE BILATERAL LOW BACK PAIN WITHOUT SCIATICA: Status: ACTIVE | Noted: 2021-09-16

## 2021-09-16 PROCEDURE — 99214 OFFICE O/P EST MOD 30 MIN: CPT | Performed by: NURSE PRACTITIONER

## 2021-09-16 PROCEDURE — 90682 RIV4 VACC RECOMBINANT DNA IM: CPT | Performed by: NURSE PRACTITIONER

## 2021-09-16 PROCEDURE — 90471 IMMUNIZATION ADMIN: CPT | Performed by: NURSE PRACTITIONER

## 2021-09-16 PROCEDURE — 72110 X-RAY EXAM L-2 SPINE 4/>VWS: CPT

## 2021-09-16 RX ORDER — METHOCARBAMOL 500 MG/1
500 TABLET, FILM COATED ORAL 4 TIMES DAILY
Qty: 120 TABLET | Refills: 0 | Status: SHIPPED | OUTPATIENT
Start: 2021-09-16 | End: 2021-10-19

## 2021-09-16 RX ORDER — PREDNISONE 10 MG/1
TABLET ORAL
Qty: 63 TABLET | Refills: 0 | Status: SHIPPED | OUTPATIENT
Start: 2021-09-16 | End: 2021-10-01

## 2021-09-16 NOTE — LETTER
September 16, 2021     Patient: Yobany Sandoval   YOB: 1965   Date of Visit: 9/16/2021       To Whom it May Concern:    Altaf Mar is under my professional care  He was seen in my office on 9/16/2021  He may return to work on 9/21/2021 if after his virtual visit on 9/20/2021 he has improved  He is also waiting to hear back from the [facility] providers  If he has not improved, I will issue another note for work  If you have any questions or concerns, please don't hesitate to call           Sincerely,          LAURA Adams        CC: No Recipients

## 2021-09-16 NOTE — ASSESSMENT & PLAN NOTE
· 9/5/2021 to [facility] with Severe [facility]   · This is a chronic problem  Episode onset: 2 days worsening  The pain is present in the lumbar spine  The quality of the pain is described as shooting  Radiates to: thighs and across back  The pain is moderate  The symptoms are aggravated by position (sitting, lifting legs)  Pertinent negatives include no abdominal pain, bladder incontinence, bowel incontinence, chest pain, dysuria, fever, headaches, numbness, tingling or weakness  Treatments tried: tylenol  · Patient stated left hip involvement  · Patient stated began on 9/3/2021 and became progressively worse  · Strength test: 5/5  · Rx for Prednisone   · Referral to [facility]   · Called 9/11/2021 w/ c/o severe back pain - appointment made w/ PCP  · [facility] Contacted patient:   · Clinical complaint: bilateral lower back pain L > R  States it will radiate down the left leg at times  States he has had back for about 3 yrs  States it has gotten worse over 1-2 weeks ago  States he has not seen anyone for the back pain in the past   Date of onset: 1-2 weeks  Frequency of pain: constant  Quality of pain: aching and stabbing  · Protocols used: [facility] AMB COMPREHENSIVE SPINE PROGRAM PROTOCOL  · This RN did review in detail the [facility] and what we can provide for their back pain  · Patient is agreeable to being triaged by this RN and would like to proceed with Physical Therapy  · Referral was placed for Physical Therapy at the [facility]  Patients information was sent to the  to make evaluation appointment  Patient made aware that the PT office  will be calling to schedule the appointment  · Patient was provided with the phone number to the PT office  · No further questions and/or concerns were voiced by the patient at this time  Patient states understanding of the referral that was placed    · 9/16/2021:   · On vacation with increased walking at [facility] · 2 weeks ago sitting at football game when he stood up the pain was awful - the next day could hardly stand up and walk  · nothing will now take the pain away fully, Tylenol will just take the edge off  · Both Ice and Heat do help some  · Doing exercises from PT - 9/10/2021: doing side to side motion with legs, when he went to the left side he heard something crack and had subsequent tingling sensation that resolved - he stopped due to the pain  · Unable to bend, squat, turn, twist, bend, reach  · Has issues with sitting long periods, standing long periods, sleeping (sleeps on his side with a pillow between his legs - some relief)  · Has issues with standing long periods  · Has not done any exercises since 9/10/2021  · Was working ()  · Last day working 9/14/2021 - will give note   · Due to chronicity will order lumbar spine xray and a Lumbar MRI  · May be having some bowel and bladder incontinence issues - having urgency   · When these urgent issues arrive, his back pain is somewhat relieved    · Had 6 days of prednisone from 9/10/2021 to 9/16/2021  · Has severe muscle spasms of his lower back  · No spasms down into his left hip and left thigh - this usually just constant pains  · Will order prednisone  · Will order robaxin

## 2021-09-16 NOTE — PROGRESS NOTES
Assessment/Plan:    Problem List Items Addressed This Visit        Nervous and Auditory    Acute bilateral low back pain with left-sided sciatica - Primary     · [facility]   · This is a chronic problem  Episode onset: 2 days worsening  The pain is present in the lumbar spine  The quality of the pain is described as shooting  Radiates to: thighs and across back  The pain is moderate  The symptoms are aggravated by position (sitting, lifting legs)  Pertinent negatives include no abdominal pain, bladder incontinence, bowel incontinence, chest pain, dysuria, fever, headaches, numbness, tingling or weakness  Treatments tried: tylenol  · Patient stated left hip involvement  · Patient stated began on 9/3/2021 and became progressively worse  · Strength test: 5/5  · Rx for Prednisone   · [facility]   · Called 9/11/2021 w/ c/o severe back pain - appointment made w/ PCP  · [facility] Contacted patient:   · Clinical complaint: bilateral lower back pain L > R  States it will radiate down the left leg at times  States he has had back for about 3 yrs  States it has gotten worse over 1-2 weeks ago  States he has not seen anyone for the back pain in the past   Date of onset: 1-2 weeks  Frequency of pain: constant  Quality of pain: aching and stabbing  · Protocols used: SL AMB COMPREHENSIVE SPINE PROGRAM PROTOCOL  · This RN did review in detail the [facility] and what we can provide for their back pain  · Patient is agreeable to being triaged by this RN and would like to proceed with Physical Therapy  · Referral was placed for Physical Therapy at the [facility]  Patients information was sent to the  to make evaluation appointment  Patient made aware that the PT office  will be calling to schedule the appointment  · Patient was provided with the phone number to the PT office    · No further questions and/or concerns were voiced by the patient at this time  Patient states understanding of the referral that was placed  · 9/16/2021:   · On vacation with increased walking at [facility]   · 2 weeks ago sitting at football game when he stood up the pain was awful - the next day could hardly stand up and walk  · nothing will now take the pain away fully, Tylenol will just take the edge off  · Both Ice and Heat do help some  · Doing exercises from PT - 9/10/2021: doing side to side motion with legs, when he went to the left side he heard something crack and had subsequent tingling sensation that resolved - he stopped due to the pain  · Unable to bend, squat, turn, twist, bend, reach  · Has issues with sitting long periods, standing long periods, sleeping (sleeps on his side with a pillow between his legs - some relief)  · Has issues with standing long periods  · Has not done any exercises since 9/10/2021  · Was working ()  · Last day working 9/14/2021 - will give note   · Due to chronicity will order lumbar spine xray and a Lumbar MRI            Other    Influenza vaccine administered    Colon cancer screening    Flu vaccine need         Diagnoses and all orders for this visit:    Acute bilateral low back pain with left-sided sciatica  -     XR spine lumbar minimum 4 views non injury; Future    Colon cancer screening  -     Ambulatory referral for colonoscopy; Future    Flu vaccine need  -     influenza vaccine, quadrivalent, recombinant, PF, 0 5 mL, for patients 18 yr+ (FLUBLOK)    Influenza vaccine administered     Acute bilateral low back pain with left-sided sciatica  · [facility]   · This is a chronic problem  Episode onset: 2 days worsening  The pain is present in the lumbar spine  The quality of the pain is described as shooting  Radiates to: thighs and across back  The pain is moderate  The symptoms are aggravated by position (sitting, lifting legs)   Pertinent negatives include no abdominal pain, bladder incontinence, bowel incontinence, chest pain, dysuria, fever, headaches, numbness, tingling or weakness  Treatments tried: tylenol  · Patient stated left hip involvement  · Patient stated began on 9/3/2021 and became progressively worse  · Strength test: 5/5  · Rx for Prednisone   · [facility] to [facility]   · Called 9/11/2021 w/ c/o severe back pain - appointment made w/ PCP  · [facility] Contacted patient:   · Clinical complaint: bilateral lower back pain L > R  States it will radiate down the left leg at times  States he has had back for about 3 yrs  States it has gotten worse over 1-2 weeks ago  States he has not seen anyone for the back pain in the past   Date of onset: 1-2 weeks  Frequency of pain: constant  Quality of pain: aching and stabbing  · Protocols used: SL AMB COMPREHENSIVE SPINE PROGRAM PROTOCOL  · This RN did review in detail the [facility] and what we can provide for their back pain  · Patient is agreeable to being triaged by this RN and would like to proceed with Physical Therapy  · Referral was placed for Physical Therapy at the [facility]  Patients information was sent to the  to make evaluation appointment  Patient made aware that the PT office  will be calling to schedule the appointment  · Patient was provided with the phone number to the PT office  · No further questions and/or concerns were voiced by the patient at this time  Patient states understanding of the referral that was placed  · 9/16/2021:   · On vacation with increased walking at [facility]   · 2 weeks ago sitting at football game when he stood up the pain was awful - the next day could hardly stand up and walk  · nothing will now take the pain away fully, Tylenol will just take the edge off    · Both Ice and Heat do help some  · Doing exercises from PT - 9/10/2021: doing side to side motion with legs, when he went to the left side he heard something crack and had subsequent tingling sensation that resolved - he stopped due to the pain  · Unable to bend, squat, turn, twist, bend, reach  · Has issues with sitting long periods, standing long periods, sleeping (sleeps on his side with a pillow between his legs - some relief)  · Has issues with standing long periods  · Has not done any exercises since 9/10/2021  · Was working ()  · Last day working 9/14/2021 - will give note   · Due to chronicity will order lumbar spine xray and a Lumbar MRI      Subjective:      Patient ID: Ramon Jimenez is a 60 y o  adult  The patient is here to have his acute on chronic low back pain assessed  Patient presents for evaluation of low back problems  Symptoms have been present for several years, but the last 2-3 weeks with worsening issues and include: Unable to bend, squat, turn, twist, bend, reach  Has issues with sitting long periods, standing long periods, sleeping (sleeps on his side with a pillow between his legs - some relief)  Has issues with standing long periods  Initial inciting event:On vacation with increased walking at [facility]  2 weeks ago sitting at football game when he stood up the pain was awful - the next day could hardly stand up and walk, nothing will now take the pain away fully, Tylenol will just take the edge off, Doing exercises from PT - 9/10/2021: doing side to side motion with legs, when he went to the left side he heard something crack and had subsequent tingling sensation that resolved - he stopped due to the pain  Symptoms are worse all day long  Alleviating factors identifiable by the patient are none  Treatments initiated by the patient: none  Previous lower back problems: chronic  Previous work up: none  Previous treatments: none      Last day working 9/14/2021 - will give note   Due to chronicity will order lumbar spine xray and a Lumbar MRI  May be having some bowel and bladder incontinence issues - having urgency When these urgent issues arrive, his back pain is somewhat relieved  Had 6 days of prednisone from 9/10/2021 to 9/16/2021  Has severe muscle spasms of his lower back  No spasms down into his left hip and left thigh - this usually just constant pains  Will order prednisone  Will order robaxin        The following portions of the patient's history were reviewed and updated as appropriate:   Past Medical History:  She has a past medical history of Benign arteriolar nephrosclerosis, Essential hypertension, Hypercholesterolemia, Hyperparathyroidism due to renal insufficiency ([facility] [address] ), [facility] Morbid obesity with body mass index of 40 0-44 9 in [facility]), and Pneumonia  ,  _______________________________________________________________________  Medical Problems:  does not have any pertinent problems on file ,  _______________________________________________________________________  Past Surgical History:   has a past surgical history that includes Other surgical history  ,  _______________________________________________________________________  Family History:  family history includes Cancer in her father; Hyperlipidemia in her mother; Lung cancer in her father; Stomach cancer in her father ,  _______________________________________________________________________  Social History:   reports that she has never smoked  She has never used smokeless tobacco  No history on file for alcohol use and drug use ,  _______________________________________________________________________  Allergies:  has No Known Allergies     _______________________________________________________________________  Current Outpatient Medications   Medication Sig Dispense Refill    acetaminophen (TYLENOL) 325 mg tablet Take 650 mg by mouth 2 (two) times a day      carvedilol (COREG) 12 5 mg tablet take 1 tablet by mouth twice a day 60 tablet 3    fexofenadine (ALLEGRA) 180 MG tablet Take 180 mg by mouth daily      lisinopril-hydrochlorothiazide (PRINZIDE,ZESTORETIC) 20-12 5 MG per tablet take 1 tablet by mouth twice a day 180 tablet 3    Multiple Vitamins-Minerals (multivitamin with minerals) tablet Take 1 tablet by mouth daily      vitamin B-12 (VITAMIN B-12) 1,000 mcg tablet Take 2,000 mcg by mouth daily      predniSONE 10 mg tablet Take 6 pills day one, 5 pills day 2, 4 pills day 3, 3 pills day 4, 2 pills day 5, and 1 pill day 6  (Patient not taking: Reported on 9/16/2021) 21 tablet 0    Turmeric 500 MG CAPS Take 500 mg by mouth daily (Patient not taking: Reported on 9/16/2021)       No current facility-administered medications for this visit      _______________________________________________________________________  Review of Systems   Constitutional: Negative for activity change, chills, fatigue and fever  HENT: Negative for rhinorrhea and sore throat  Eyes: Negative for pain  Respiratory: Negative for cough and shortness of breath  Cardiovascular: Negative for chest pain, palpitations and leg swelling  Gastrointestinal: Negative for abdominal pain, constipation, diarrhea, nausea and vomiting  Genitourinary: Negative for difficulty urinating, flank pain, frequency and urgency  Musculoskeletal: Positive for back pain and gait problem  Negative for joint swelling and myalgias  Skin: Negative for color change  Neurological: Negative for dizziness, weakness, light-headedness and headaches  Psychiatric/Behavioral: Negative for sleep disturbance  The patient is not nervous/anxious  All other systems reviewed and are negative  Objective:  Vitals:    09/16/21 0809   BP: 130/80   BP Location: Left arm   Patient Position: Sitting   Cuff Size: Standard   Pulse: 86   Resp: 14   Temp: 99 4 °F (37 4 °C)   SpO2: 94%   Weight: (!) 153 kg (336 lb 9 6 oz)   Height: 6' (1 829 m)     Body mass index is 45 65 kg/m²  Physical Exam  Vitals reviewed  Constitutional:       General: She is awake  Appearance: Normal appearance  She is well-developed and normal weight  HENT:      Head: Normocephalic and atraumatic  Nose: Nose normal       Mouth/Throat:      Mouth: Mucous membranes are moist    Eyes:      Extraocular Movements: Extraocular movements intact  Cardiovascular:      Rate and Rhythm: Normal rate and regular rhythm  Pulses: Normal pulses  Heart sounds: Normal heart sounds  Pulmonary:      Effort: Pulmonary effort is normal       Breath sounds: Normal breath sounds  Abdominal:      General: Bowel sounds are normal       Palpations: Abdomen is soft  Musculoskeletal:      Cervical back: Normal range of motion  Lumbar back: Tenderness present  Decreased range of motion  Right lower leg: No edema  Left lower leg: No edema  Skin:     General: Skin is warm and dry  Neurological:      Mental Status: She is alert and oriented to person, place, and time  Psychiatric:         Attention and Perception: Attention normal          Mood and Affect: Mood normal          Speech: Speech normal          Behavior: Behavior normal  Behavior is cooperative             PHQ-9 Depression Screening    PHQ-9:   Frequency of the following problems over the past two weeks:

## 2021-09-19 NOTE — ASSESSMENT & PLAN NOTE
· 9/5/2021 to [facility] with Severe [facility]   ? This is a chronic problem  Episode onset: 2 days worsening  The pain is present in the lumbar spine  The quality of the pain is described as shooting  Radiates to: thighs and across back  The pain is moderate  The symptoms are aggravated by position (sitting, lifting legs)  Pertinent negatives include no abdominal pain, bladder incontinence, bowel incontinence, chest pain, dysuria, fever, headaches, numbness, tingling or weakness  Treatments tried: tylenol    ? Patient stated left hip involvement  ? Patient stated began on 9/3/2021 and became progressively worse  ? Strength test: 5/5  ? Rx for Prednisone   ? [facility] to [facility]   · Called 9/11/2021 w/ c/o severe back pain - appointment made w/ PCP  · [facility] Contacted patient:   ? Clinical complaint: bilateral lower back pain L > R  States it will radiate down the left leg at times  States he has had back for about 3 yrs  States it has gotten worse over 1-2 weeks ago  States he has not seen anyone for the back pain in the past   Date of onset: 1-2 weeks  Frequency of pain: constant  Quality of pain: aching and stabbing  ? Protocols used: SL AMB COMPREHENSIVE SPINE PROGRAM PROTOCOL  ? This RN did review in detail the [facility] and what we can provide for their back pain  ? Patient is agreeable to being triaged by this RN and would like to proceed with Physical Therapy  ? Referral was placed for Physical Therapy at the [facility]  Patients information was sent to the  to make evaluation appointment  Patient made aware that the PT office  will be calling to schedule the appointment  ? Patient was provided with the phone number to the PT office  ? No further questions and/or concerns were voiced by the patient at this time  Patient states understanding of the referral that was placed    · 9/16/2021:   · On vacation with increased walking at [facility] · 2 weeks ago sitting at football game when he stood up the pain was awful - the next day could hardly stand up and walk  · nothing will now take the pain away fully, Tylenol will just take the edge off  · Both Ice and Heat do help some  · Doing exercises from PT - 9/10/2021: doing side to side motion with legs, when he went to the left side he heard something crack and had subsequent tingling sensation that resolved - he stopped due to the pain  · Unable to bend, squat, turn, twist, bend, reach  · Has issues with sitting long periods, standing long periods, sleeping (sleeps on his side with a pillow between his legs - some relief)  · Has issues with standing long periods  · Has not done any exercises since 9/10/2021  · Was working ()  · Last day working 9/14/2021 - will give note   · Due to chronicity will order lumbar spine xray and a Lumbar MRI  · May be having some bowel and bladder incontinence issues - having urgency   · When these urgent issues arrive, his back pain is somewhat relieved    · Had 6 days of prednisone from 9/10/2021 to 9/16/2021  · Has severe muscle spasms of his lower back  · No spasms down into his left hip and left thigh - this usually just constant pains  · Will order prednisone  · Will order robaxin  · 9/21/2021  · Patient states that he has been relaxing more in the last 5 days and has less muscle spasm than previously  · His muscle spasms used to actually tighten up and grabbed his back muscles as he was sitting up to stand  · He has tried both the Robaxin and the prednisone without any relief  · He does admit to sleeping in different positions - which he believes may cause some discomfort when he wakes  · He does state he still has continued bilateral low back pain that does radiate into his left hip and left thigh  · He has seen physical therapy in the past, but since doing 1 exercise to help him stretch that caused a popping sensation in his left lower back and caused his left hip and left thigh numbness at that time, he has stopped doing those exercises  · He did have a lumbar x-ray on 09/16/2021, however there is no final read on that film, we will call and have that read  · He does have an MRI scheduled for 09/30/2021  · During our phone conversation we did discuss different pain medications for his back  · After I am able to have the final read of his lumbar x-ray, I will call the patient back with a decision for pain medications  · He was called by the nurse through [facility] and provided all of his intake information, he has not received a follow-up call  · I will write a note for this patient to continue to be off of work, it will be available to him in his my chart

## 2021-09-19 NOTE — PROGRESS NOTES
Virtual Regular Visit    Verification of patient location:    Patient is located in the following state in which I hold an active license [place]      Assessment/Plan:    Problem List Items Addressed This Visit        Nervous and Auditory    Acute bilateral low back pain with left-sided sciatica - Primary     · [facility] to [facility] with [facility]   ? This is a chronic problem  Episode onset: 2 days worsening  The pain is present in the lumbar spine  The quality of the pain is described as shooting  Radiates to: thighs and across back  The pain is moderate  The symptoms are aggravated by position (sitting, lifting legs)  Pertinent negatives include no abdominal pain, bladder incontinence, bowel incontinence, chest pain, dysuria, fever, headaches, numbness, tingling or weakness  Treatments tried: tylenol    ? Patient stated left hip involvement  ? Patient stated began on 9/3/2021 and became progressively worse  ? Strength test: 5/5  ? Rx for Prednisone   ? Referral to [facility]   · Called 9/11/2021 w/ c/o severe back pain - appointment made w/ PCP  · [facility] Contacted patient:   ? Clinical complaint: bilateral lower back pain L > R  States it will radiate down the left leg at times  States he has had back for about 3 yrs  States it has gotten worse over 1-2 weeks ago  States he has not seen anyone for the back pain in the past   Date of onset: 1-2 weeks  Frequency of pain: constant  Quality of pain: aching and stabbing  ? Protocols used: [facility] PRO[facility] PROTOCOL  ? This RN did review in detail the [facility] and what we can provide for their back pain  ? Patient is agreeable to being triaged by this RN and would like to proceed with Physical Therapy  ? Referral was placed for Physical Therapy at the [facility]  Patients information was sent to the  to make evaluation appointment   Patient made aware that the PT office  will be calling to schedule the appointment  ? Patient was provided with the phone number to the PT office  ? No further questions and/or concerns were voiced by the patient at this time  Patient states understanding of the referral that was placed  · 9/16/2021:   · On vacation with increased walking at [facility]   · 2 weeks ago sitting at football game when he stood up the pain was awful - the next day could hardly stand up and walk  · nothing will now take the pain away fully, Tylenol will just take the edge off  · Both Ice and Heat do help some  · Doing exercises from PT - 9/10/2021: doing side to side motion with legs, when he went to the left side he heard something crack and had subsequent tingling sensation that resolved - he stopped due to the pain  · Unable to bend, squat, turn, twist, bend, reach  · Has issues with sitting long periods, standing long periods, sleeping (sleeps on his side with a pillow between his legs - some relief)  · Has issues with standing long periods  · Has not done any exercises since 9/10/2021  · Was working ()  · Last day working 9/14/2021 - will give note   · Due to chronicity will order lumbar spine xray and a Lumbar MRI  · May be having some bowel and bladder incontinence issues - having urgency   · When these urgent issues arrive, his back pain is somewhat relieved    · Had 6 days of prednisone from 9/10/2021 to 9/16/2021  · Has severe muscle spasms of his lower back  · No spasms down into his left hip and left thigh - this usually just constant pains  · Will order prednisone  · Will order robaxin  · 9/21/2021  · Patient states that he has been relaxing more in the last 5 days and has less muscle spasm than previously  · His muscle spasms used to actually tighten up and grabbed his back muscles as he was sitting up to stand  · He has tried both the Robaxin and the prednisone without any relief  · He does admit to sleeping in different positions - which he believes may cause some discomfort when he wakes  · He does state he still has continued bilateral low back pain that does radiate into his left hip and left thigh  · He has seen physical therapy in the past, but since doing 1 exercise to help him stretch that caused a popping sensation in his left lower back and caused his left hip and left thigh numbness at that time, he has stopped doing those exercises  · He did have a lumbar x-ray on 09/16/2021, however there is no final read on that film, we will call and have that read  · He does have an MRI scheduled for 09/30/2021  · During our phone conversation we did discuss different pain medications for his back  · After I am able to have the final read of his lumbar x-ray, I will call the patient back with a decision for pain medications  · He was called by the nurse through [facility] and provided all of his intake information, he has not received a follow-up call  Reason for visit is   Chief Complaint   Patient presents with    Follow-up     phone call [phone]       5 day f/u for pain in lower back c/o still having the pain not sharp pain  review xray if results are in        Encounter provider Ami Vincent, [facility]    Provider located at [address] [address]      Recent Visits  Date Type Provider Dept   09/16/21 Office Visit Ami Vincent [address]  [facility]  recent visits within past 7 days and meeting all other requirements  Today's Visits  Date Type Provider Dept   09/21/21 Telemedicine LAURA Espino Pg today's visits and meeting all other requirements  Future Appointments  No visits were found meeting these conditions  Showing future appointments within next 150 days and meeting all other requirements       The patient was identified by name and date of birth   Jaqueline Spears was informed that this is a telemedicine visit and that the visit is being conducted through [employer] and patient was informed that this is not a secure, HIPAA-compliant platform  He agrees to proceed     My office door was closed  No one else was in the room  He acknowledged consent and understanding of privacy and security of the video platform  The patient has agreed to participate and understands they can discontinue the visit at any time  Patient is aware this is a billable service  Subjective  Marietta Khan is a 60 y o  male     The patent returns for a virtual visit for his low back pain  He is still having discomfort  9/16/2021:   The patient is here to have his acute on chronic low back pain assessed  Patient presents for evaluation of low back problems  Symptoms have been present for several years, but the last 2-3 weeks with worsening issues and include: Unable to bend, squat, turn, twist, bend, reach  Has issues with sitting long periods, standing long periods, sleeping (sleeps on his side with a pillow between his legs - some relief)  Has issues with standing long periods  Initial inciting event:On vacation with increased walking at [facility]  2 weeks ago sitting at football game when he stood up the pain was awful - the next day could hardly stand up and walk, nothing will now take the pain away fully, Tylenol will just take the edge off, Doing exercises from PT - 9/10/2021: doing side to side motion with legs, when he went to the left side he heard something crack and had subsequent tingling sensation that resolved - he stopped due to the pain  Symptoms are worse all day long  Alleviating factors identifiable by the patient are none  Treatments initiated by the patient: none  Previous lower back problems: chronic  Previous work up: none   Previous treatments: none      Last day working 9/14/2021 - will give note   Due to chronicity will order lumbar spine xray and a Lumbar MRI  May be having some bowel and bladder incontinence issues - having urgency   When these urgent issues arrive, his back pain is somewhat relieved  Had 6 days of prednisone from 9/10/2021 to 9/16/2021  Has severe muscle spasms of his lower back  No spasms down into his left hip and left thigh - this usually just constant pains  Will order prednisone  Will order robaxin    9/21/2021  Patient states that he has been relaxing more in the last 5 days and has less muscle spasm than previously  His muscle spasms used to actually tighten up and grabbed his back muscles as he was sitting up to stand  He has tried both the Robaxin and the prednisone without any relief  He does admit to sleeping in different positions - which he believes may cause some discomfort when he wakes  He does state he still has continued bilateral low back pain that does radiate into his left hip and left thigh  He has seen physical therapy in the past, but since doing 1 exercise to help him stretch that caused a popping sensation in his left lower back and caused his left hip and left thigh numbness at that time, he has stopped doing those exercises  He did have a lumbar x-ray on 09/16/2021, however there is no final read on that film, we will call and have that read  He does have an MRI scheduled for 09/30/2021  During our phone conversation we did discuss different pain medications for his back  After I am able to have the final read of his lumbar x-ray, I will call the patient back with a decision for pain medications  He was called by the nurse through [facility] and provided all of his intake information, he has not received a follow-up call  I will write a note for this patient to continue to be off of work, it will be available to him in his my chart           Past Medical History:   Diagnosis Date    Benign arteriolar nephrosclerosis     Essential hypertension     Hypercholesterolemia     Hyperparathyroidism due to renal insufficiency (HCC)     [facility]'s disease [facility] )     Morbid obesity with body mass index of 40 0-44 9 in adult ([facility] )     Pneumonia     s/p VDRF        Past Surgical History:   Procedure Laterality Date    OTHER SURGICAL HISTORY      Dialysis catheter        Current Outpatient Medications   Medication Sig Dispense Refill    acetaminophen (TYLENOL) 325 mg tablet Take 650 mg by mouth 2 (two) times a day      carvedilol (COREG) 12 5 mg tablet take 1 tablet by mouth twice a day 60 tablet 3    fexofenadine (ALLEGRA) 180 MG tablet Take 180 mg by mouth daily      lisinopril-hydrochlorothiazide (PRINZIDE,ZESTORETIC) 20-12 5 MG per tablet take 1 tablet by mouth twice a day 180 tablet 3    methocarbamol (ROBAXIN) 500 mg tablet Take 1 tablet (500 mg total) by mouth 4 (four) times a day 120 tablet 0    Multiple Vitamins-Minerals (multivitamin with minerals) tablet Take 1 tablet by mouth daily      predniSONE 10 mg tablet Take 4 tablets (40 mg total) by mouth 2 (two) times a day with meals for 3 days, THEN 3 tablets (30 mg total) 2 (two) times a day with meals for 3 days, THEN 2 tablets (20 mg total) 2 (two) times a day with meals for 3 days, THEN 1 tablet (10 mg total) 2 (two) times a day with meals for 3 days, THEN 1 tablet (10 mg total) daily for 3 days  63 tablet 0    vitamin B-12 (VITAMIN B-12) 1,000 mcg tablet Take 2,000 mcg by mouth daily       No current facility-administered medications for this visit  No Known Allergies    Review of Systems   Constitutional: Negative for activity change, appetite change, chills, fatigue and fever  HENT: Negative for congestion, ear pain, postnasal drip, rhinorrhea, sinus pressure, sinus pain, sore throat and trouble swallowing  Eyes: Negative for pain  Respiratory: Negative for cough, chest tightness and shortness of breath  Cardiovascular: Negative for chest pain, palpitations and leg swelling     Gastrointestinal: Negative for abdominal pain, constipation, diarrhea, nausea and vomiting  Genitourinary: Negative for difficulty urinating, flank pain, frequency and urgency  Musculoskeletal: Positive for arthralgias, back pain, gait problem and myalgias  Negative for joint swelling  Skin: Negative for color change  Neurological: Negative for dizziness, weakness, light-headedness and headaches  Psychiatric/Behavioral: Negative for sleep disturbance  The patient is not nervous/anxious  Video Exam    Vitals:    09/21/21 0837   Weight: (!) 152 kg (336 lb)   Height: 6' (1 829 m)       Physical Exam  Constitutional:       General: He is awake  Appearance: Normal appearance  He is well-developed  HENT:      Head: Normocephalic  Nose: Nose normal       Mouth/Throat:      Mouth: Mucous membranes are moist    Eyes:      Extraocular Movements: Extraocular movements intact  Cardiovascular:      Rate and Rhythm: Normal rate  Pulmonary:      Effort: Pulmonary effort is normal    Abdominal:      Palpations: Abdomen is soft  Musculoskeletal:      Cervical back: Normal range of motion  Lumbar back: Spasms and tenderness present  Decreased range of motion  Left lower leg: Tenderness present  Comments: Patient complains of bilateral lower back pain with radiation   Skin:     General: Skin is warm and dry  Neurological:      Mental Status: He is alert and oriented to person, place, and time  Psychiatric:         Attention and Perception: Attention normal          Mood and Affect: Mood normal          Speech: Speech normal          Behavior: Behavior normal  Behavior is cooperative  I spent 20 minutes with patient today in which greater than 50% of the time was spent in counseling/coordination of care regarding Current signs and symptoms of his disease process, follow-up care, medications, x-rays, follow-up radiology procedures  VIRTUAL VISIT DISCLAIMER      Ramon Jimenez verbally agrees to participate in [employer]   Pt is aware that Virtual Care Services could be limited without vital signs or the ability to perform a full hands-on physical Regine Pulse understands he or the provider may request at any time to terminate the video visit and request the patient to seek care or treatment in person

## 2021-09-21 ENCOUNTER — TELEMEDICINE (OUTPATIENT)
Dept: INTERNAL MEDICINE CLINIC | Facility: CLINIC | Age: 56
End: 2021-09-21
Payer: COMMERCIAL

## 2021-09-21 VITALS — HEIGHT: 72 IN | WEIGHT: 315 LBS | BODY MASS INDEX: 42.66 KG/M2

## 2021-09-21 DIAGNOSIS — M54.42 ACUTE BILATERAL LOW BACK PAIN WITH LEFT-SIDED SCIATICA: Primary | ICD-10-CM

## 2021-09-21 PROCEDURE — 1036F TOBACCO NON-USER: CPT | Performed by: NURSE PRACTITIONER

## 2021-09-21 PROCEDURE — 3008F BODY MASS INDEX DOCD: CPT | Performed by: NURSE PRACTITIONER

## 2021-09-21 PROCEDURE — 99214 OFFICE O/P EST MOD 30 MIN: CPT | Performed by: NURSE PRACTITIONER

## 2021-09-21 NOTE — PATIENT INSTRUCTIONS
Back Pain   WHAT YOU NEED TO KNOW:   What do I need to know about back pain? Back pain is common  You may have back pain and muscle spasms  You may feel sore or stiff on one or both sides of your back  The pain may spread to your lower body  Conditions that affect the spine, joints, or muscles can cause back pain  These may include arthritis, spinal stenosis (narrowing of the spinal column), muscle tension, or breakdown of the spinal discs  What increases my risk for back pain? · Repeated bending, lifting, or twisting, or lifting heavy items    · Injury from a fall or accident    · Lack of regular physical activity     · Obesity or pregnancy     · Smoking    · Aging    · Driving, sitting, or standing for long periods    · Bad posture while sitting or standing    How is back pain diagnosed? Your healthcare provider will ask if you have any medical conditions  He or she may ask if you have a history of back pain and how it started  He or she may watch you stand and walk, and check your range of motion  Show him or her where you feel pain and what makes it better or worse  Describe the pain, how bad it is, and how long it lasts  Tell your provider if your pain worsens at night or when you lie on your back  How is back pain treated? · Medicines:      ? NSAIDs , such as ibuprofen, help decrease swelling, pain, and fever  This medicine is available with or without a doctor's order  NSAIDs may be given as a pill or as a cream that is applied to your back  NSAIDs can cause stomach bleeding or kidney problems in certain people  If you take blood thinner medicine, always ask your healthcare provider if NSAIDs are safe for you  Always read the medicine label and follow directions  ? Acetaminophen  decreases pain and fever  It is available without a doctor's order  Ask how much to take and how often to take it  Follow directions   Read the labels of all other medicines you are using to see if they also contain acetaminophen, or ask your doctor or pharmacist  Acetaminophen can cause liver damage if not taken correctly  Do not use more than 4 grams (4,000 milligrams) total of acetaminophen in one day  ? Muscle relaxers  help decrease muscle spasms and back pain  · Acupressure  may be recommended to decrease pain and improve movement  Acupressure is pressure or localized massage to the area of your back pain  · A transcutaneous electrical nerve stimulation (TENS) unit  is a portable, pocket-sized, battery-powered device that attaches to your skin  It is usually placed over the area of pain  It uses mild, safe electrical signals to help control pain  How do I manage my back pain? · Apply ice  on your back for 15 to 20 minutes every hour or as directed  Use an ice pack, or put crushed ice in a plastic bag  Cover it with a towel before you apply it to your skin  Ice helps prevent tissue damage and decreases pain  · Apply heat  on your back for 20 to 30 minutes every 2 hours for as many days as directed  Heat helps decrease pain and muscle spasms  · Stay active  as much as you can without causing more pain  Bed rest could make your back pain worse  Avoid heavy lifting until your pain is gone  · Go to physical therapy as directed  A physical therapist can teach you exercises to help improve movement and strength, and to decrease pain  Call your local emergency number (911 in the [facility] [facility]) if:   · You have severe back pain with chest pain  · You cannot control your urine or bowel movements  · Your pain becomes so severe that you cannot walk  When should I seek immediate care? · You have pain, numbness, or weakness in one or both legs  · You have severe back pain, nausea, and vomiting  · You have severe back pain that spreads to your side or genital area  When should I call my doctor? · You have back pain that does not get better with rest and pain medicine      · You have a fever     · You have pain that worsens when you are on your back or when you rest     · You have pain that worsens when you cough or sneeze  · You lose weight without trying  · You have questions or concerns about your condition or care  CARE AGREEMENT:   You have the right to help plan your care  Learn about your health condition and how it may be treated  Discuss treatment options with your healthcare providers to decide what care you want to receive  You always have the right to refuse treatment  The above information is an  only  It is not intended as medical advice for individual conditions or treatments  Talk to your doctor, nurse or pharmacist before following any medical regimen to see if it is safe and effective for you  © Copyright [employer] 2021 Information is for End User's use only and may not be sold, redistributed or otherwise used for commercial purposes   All illustrations and images included in CareNotes® are the copyrighted property of [employer]  or [address] [place], [place] [address]

## 2021-09-21 NOTE — LETTER
September 21, 2021     Patient: Jaqueline Spears   YOB: 1965   Date of Visit: 9/21/2021       To Whom it May Concern:    Fawn Olson is under my professional care  He was seen as a virtual appointment in my office on 9/21/2021  He is continuing to have issues secondary to his disease process  We are awaiting verification via Radiology exam that has been performed  Once we have the radiology documentation, I will be able to verify when patient would be able to return to work  He is also waiting for a return phone call from the [facility] provider as he has already provided all of the intake information  He also does have an MRI of his lumbar spine ordered and scheduled for 09/30/2021  Therefore, at this time patient will remain out of work, with the potential return to work on 09/27/2021  If you have any questions or concerns, please don't hesitate to call           Sincerely,          LAURA Jansen        CC: No Recipients

## 2021-09-22 ENCOUNTER — APPOINTMENT (OUTPATIENT)
Dept: RADIOLOGY | Facility: CLINIC | Age: 56
End: 2021-09-22
Payer: COMMERCIAL

## 2021-09-22 DIAGNOSIS — M54.42 ACUTE BILATERAL LOW BACK PAIN WITH LEFT-SIDED SCIATICA: Primary | ICD-10-CM

## 2021-09-22 DIAGNOSIS — M54.42 ACUTE BILATERAL LOW BACK PAIN WITH LEFT-SIDED SCIATICA: ICD-10-CM

## 2021-09-22 PROCEDURE — 73523 X-RAY EXAM HIPS BI 5/> VIEWS: CPT

## 2021-10-05 ENCOUNTER — HOSPITAL ENCOUNTER (OUTPATIENT)
Dept: MRI IMAGING | Facility: HOSPITAL | Age: 56
Discharge: HOME | End: 2021-10-05
Payer: COMMERCIAL

## 2021-10-05 DIAGNOSIS — I10 ESSENTIAL HYPERTENSION: ICD-10-CM

## 2021-10-05 DIAGNOSIS — M54.42 ACUTE BILATERAL LOW BACK PAIN WITH LEFT-SIDED SCIATICA: ICD-10-CM

## 2021-10-05 PROCEDURE — G1004 CDSM NDSC: HCPCS

## 2021-10-05 PROCEDURE — 3066F NEPHROPATHY DOC TX: CPT | Performed by: NURSE PRACTITIONER

## 2021-10-05 PROCEDURE — 72148 MRI LUMBAR SPINE W/O DYE: CPT

## 2021-10-05 RX ORDER — LISINOPRIL AND HYDROCHLOROTHIAZIDE 20; 12.5 MG/1; MG/1
TABLET ORAL
Qty: 180 TABLET | Refills: 3 | Status: SHIPPED | OUTPATIENT
Start: 2021-10-05

## 2021-10-07 DIAGNOSIS — M54.42 ACUTE BILATERAL LOW BACK PAIN WITH LEFT-SIDED SCIATICA: Primary | ICD-10-CM

## 2021-10-11 ENCOUNTER — TELEPHONE (OUTPATIENT)
Dept: INTERNAL MEDICINE CLINIC | Facility: CLINIC | Age: 56
End: 2021-10-11

## 2021-10-12 ENCOUNTER — CONSULT (OUTPATIENT)
Dept: NEUROSURGERY | Facility: CLINIC | Age: 56
End: 2021-10-12
Payer: COMMERCIAL

## 2021-10-12 VITALS
DIASTOLIC BLOOD PRESSURE: 90 MMHG | BODY MASS INDEX: 42.66 KG/M2 | HEIGHT: 72 IN | SYSTOLIC BLOOD PRESSURE: 140 MMHG | WEIGHT: 315 LBS

## 2021-10-12 DIAGNOSIS — M54.42 ACUTE BILATERAL LOW BACK PAIN WITH LEFT-SIDED SCIATICA: ICD-10-CM

## 2021-10-12 PROCEDURE — 99243 OFF/OP CNSLTJ NEW/EST LOW 30: CPT | Performed by: STUDENT IN AN ORGANIZED HEALTH CARE EDUCATION/TRAINING PROGRAM

## 2021-10-14 RX ORDER — CEFAZOLIN SODIUM 2 G/50ML
2000 SOLUTION INTRAVENOUS ONCE
Status: CANCELLED | OUTPATIENT
Start: 2021-10-14 | End: 2021-10-14

## 2021-10-14 RX ORDER — SODIUM CHLORIDE 9 MG/ML
125 INJECTION, SOLUTION INTRAVENOUS CONTINUOUS
Status: CANCELLED | OUTPATIENT
Start: 2021-10-14

## 2021-10-16 ENCOUNTER — APPOINTMENT (OUTPATIENT)
Dept: LAB | Facility: HOSPITAL | Age: 56
End: 2021-10-16
Attending: STUDENT IN AN ORGANIZED HEALTH CARE EDUCATION/TRAINING PROGRAM
Payer: COMMERCIAL

## 2021-10-16 DIAGNOSIS — M54.42 ACUTE BILATERAL LOW BACK PAIN WITH LEFT-SIDED SCIATICA: ICD-10-CM

## 2021-10-16 LAB
ALBUMIN SERPL BCP-MCNC: 4.2 G/DL (ref 3.5–5.7)
ALP SERPL-CCNC: 44 U/L (ref 40–150)
ALT SERPL W P-5'-P-CCNC: 51 U/L (ref 7–52)
ANION GAP SERPL CALCULATED.3IONS-SCNC: 10 MMOL/L (ref 4–13)
APTT PPP: 26 SECONDS (ref 23–37)
AST SERPL W P-5'-P-CCNC: 35 U/L (ref 13–39)
BASOPHILS # BLD AUTO: 0.1 THOUSANDS/ΜL (ref 0–0.1)
BASOPHILS NFR BLD AUTO: 1 % (ref 0–2)
BILIRUB SERPL-MCNC: 0.7 MG/DL (ref 0.2–1)
BILIRUB UR QL STRIP: NEGATIVE
BUN SERPL-MCNC: 22 MG/DL (ref 7–25)
CALCIUM SERPL-MCNC: 9.3 MG/DL (ref 8.6–10.5)
CHLORIDE SERPL-SCNC: 93 MMOL/L (ref 98–107)
CLARITY UR: CLEAR
CO2 SERPL-SCNC: 32 MMOL/L (ref 21–31)
COLOR UR: YELLOW
CREAT SERPL-MCNC: 1.21 MG/DL (ref 0.7–1.3)
EOSINOPHIL # BLD AUTO: 0.3 THOUSAND/ΜL (ref 0–0.61)
EOSINOPHIL NFR BLD AUTO: 4 % (ref 0–5)
ERYTHROCYTE [DISTWIDTH] IN BLOOD BY AUTOMATED COUNT: 13.8 % (ref 11.5–14.5)
EST. AVERAGE GLUCOSE BLD GHB EST-MCNC: 183 MG/DL
GFR SERPL CREATININE-BSD FRML MDRD: 66 ML/MIN/1.73SQ M
GLUCOSE P FAST SERPL-MCNC: 139 MG/DL (ref 65–99)
GLUCOSE UR STRIP-MCNC: NEGATIVE MG/DL
HBA1C MFR BLD: 8 %
HCT VFR BLD AUTO: 44.2 % (ref 42–47)
HGB BLD-MCNC: 14.9 G/DL (ref 14–18)
HGB UR QL STRIP.AUTO: NEGATIVE
INR PPP: 0.91 (ref 0.84–1.19)
KETONES UR STRIP-MCNC: NEGATIVE MG/DL
LEUKOCYTE ESTERASE UR QL STRIP: NEGATIVE
LYMPHOCYTES # BLD AUTO: 2.5 THOUSANDS/ΜL (ref 0.6–4.47)
LYMPHOCYTES NFR BLD AUTO: 32 % (ref 21–51)
MCH RBC QN AUTO: 29.1 PG (ref 26–34)
MCHC RBC AUTO-ENTMCNC: 33.6 G/DL (ref 31–37)
MCV RBC AUTO: 87 FL (ref 81–99)
MONOCYTES # BLD AUTO: 0.6 THOUSAND/ΜL (ref 0.17–1.22)
MONOCYTES NFR BLD AUTO: 7 % (ref 2–12)
NEUTROPHILS # BLD AUTO: 4.4 THOUSANDS/ΜL (ref 1.4–6.5)
NEUTS SEG NFR BLD AUTO: 57 % (ref 42–75)
NITRITE UR QL STRIP: NEGATIVE
PH UR STRIP.AUTO: 5.5 [PH]
PLATELET # BLD AUTO: 277 THOUSANDS/UL (ref 149–390)
PMV BLD AUTO: 7.6 FL (ref 8.6–11.7)
POTASSIUM SERPL-SCNC: 3.8 MMOL/L (ref 3.5–5.5)
PROT SERPL-MCNC: 7.2 G/DL (ref 6.4–8.9)
PROT UR STRIP-MCNC: NEGATIVE MG/DL
PROTHROMBIN TIME: 12.4 SECONDS (ref 11.6–14.5)
RBC # BLD AUTO: 5.11 MILLION/UL (ref 4.3–5.9)
SODIUM SERPL-SCNC: 135 MMOL/L (ref 134–143)
SP GR UR STRIP.AUTO: 1.02 (ref 1–1.03)
UROBILINOGEN UR QL STRIP.AUTO: 0.2 E.U./DL
WBC # BLD AUTO: 7.7 THOUSAND/UL (ref 4.8–10.8)

## 2021-10-16 PROCEDURE — 83036 HEMOGLOBIN GLYCOSYLATED A1C: CPT

## 2021-10-16 PROCEDURE — 80053 COMPREHEN METABOLIC PANEL: CPT

## 2021-10-16 PROCEDURE — 85025 COMPLETE CBC W/AUTO DIFF WBC: CPT

## 2021-10-16 PROCEDURE — 85730 THROMBOPLASTIN TIME PARTIAL: CPT

## 2021-10-16 PROCEDURE — 81003 URINALYSIS AUTO W/O SCOPE: CPT | Performed by: STUDENT IN AN ORGANIZED HEALTH CARE EDUCATION/TRAINING PROGRAM

## 2021-10-16 PROCEDURE — 36415 COLL VENOUS BLD VENIPUNCTURE: CPT

## 2021-10-16 PROCEDURE — 3052F HG A1C>EQUAL 8.0%<EQUAL 9.0%: CPT | Performed by: NURSE PRACTITIONER

## 2021-10-16 PROCEDURE — 85610 PROTHROMBIN TIME: CPT

## 2021-10-19 ENCOUNTER — CONSULT (OUTPATIENT)
Dept: INTERNAL MEDICINE CLINIC | Facility: CLINIC | Age: 56
End: 2021-10-19
Payer: COMMERCIAL

## 2021-10-19 ENCOUNTER — APPOINTMENT (OUTPATIENT)
Dept: PREADMISSION TESTING | Facility: HOSPITAL | Age: 56
End: 2021-10-19
Payer: COMMERCIAL

## 2021-10-19 VITALS
RESPIRATION RATE: 14 BRPM | BODY MASS INDEX: 42.66 KG/M2 | TEMPERATURE: 99.3 F | OXYGEN SATURATION: 94 % | SYSTOLIC BLOOD PRESSURE: 115 MMHG | DIASTOLIC BLOOD PRESSURE: 76 MMHG | HEART RATE: 84 BPM | WEIGHT: 315 LBS | HEIGHT: 72 IN

## 2021-10-19 DIAGNOSIS — N18.2 STAGE 2 CHRONIC KIDNEY DISEASE: ICD-10-CM

## 2021-10-19 DIAGNOSIS — M51.26 HERNIATION OF INTERVERTEBRAL DISC OF LUMBAR SPINE DUE TO DEGENERATION: ICD-10-CM

## 2021-10-19 DIAGNOSIS — Z01.818 PRE-OP EXAMINATION: Primary | ICD-10-CM

## 2021-10-19 DIAGNOSIS — E11.22 TYPE 2 DIABETES MELLITUS WITH STAGE 2 CHRONIC KIDNEY DISEASE, WITHOUT LONG-TERM CURRENT USE OF INSULIN (HCC): ICD-10-CM

## 2021-10-19 DIAGNOSIS — I10 ESSENTIAL HYPERTENSION: ICD-10-CM

## 2021-10-19 DIAGNOSIS — M54.42 ACUTE BILATERAL LOW BACK PAIN WITH LEFT-SIDED SCIATICA: ICD-10-CM

## 2021-10-19 DIAGNOSIS — E66.01 CLASS 3 SEVERE OBESITY DUE TO EXCESS CALORIES WITHOUT SERIOUS COMORBIDITY WITH BODY MASS INDEX (BMI) OF 45.0 TO 49.9 IN ADULT (HCC): ICD-10-CM

## 2021-10-19 DIAGNOSIS — N18.2 TYPE 2 DIABETES MELLITUS WITH STAGE 2 CHRONIC KIDNEY DISEASE, WITHOUT LONG-TERM CURRENT USE OF INSULIN (HCC): ICD-10-CM

## 2021-10-19 DIAGNOSIS — M51.36 HERNIATION OF INTERVERTEBRAL DISC OF LUMBAR SPINE DUE TO DEGENERATION: ICD-10-CM

## 2021-10-19 PROBLEM — M51.369 HERNIATION OF INTERVERTEBRAL DISC OF LUMBAR SPINE DUE TO DEGENERATION: Status: ACTIVE | Noted: 2021-10-19

## 2021-10-19 PROBLEM — M54.41 CHRONIC BILATERAL LOW BACK PAIN WITH SCIATICA: Status: ACTIVE | Noted: 2021-10-19

## 2021-10-19 PROBLEM — G89.29 CHRONIC BILATERAL LOW BACK PAIN WITH SCIATICA: Status: ACTIVE | Noted: 2021-10-19

## 2021-10-19 PROBLEM — M54.40 CHRONIC BILATERAL LOW BACK PAIN WITH SCIATICA: Status: ACTIVE | Noted: 2021-10-19

## 2021-10-19 PROCEDURE — 99243 OFF/OP CNSLTJ NEW/EST LOW 30: CPT | Performed by: NURSE PRACTITIONER

## 2021-10-19 PROCEDURE — 3008F BODY MASS INDEX DOCD: CPT | Performed by: NURSE PRACTITIONER

## 2021-10-19 PROCEDURE — 1036F TOBACCO NON-USER: CPT | Performed by: NURSE PRACTITIONER

## 2021-10-19 PROCEDURE — 3074F SYST BP LT 130 MM HG: CPT | Performed by: NURSE PRACTITIONER

## 2021-10-19 PROCEDURE — 3078F DIAST BP <80 MM HG: CPT | Performed by: NURSE PRACTITIONER

## 2021-10-19 RX ORDER — METHOCARBAMOL 500 MG/1
500 TABLET, FILM COATED ORAL 4 TIMES DAILY
Qty: 120 TABLET | Refills: 0 | Status: SHIPPED | OUTPATIENT
Start: 2021-10-19 | End: 2021-12-13

## 2021-11-01 ENCOUNTER — CONSULT (OUTPATIENT)
Dept: ENDOCRINOLOGY | Facility: CLINIC | Age: 56
End: 2021-11-01
Payer: COMMERCIAL

## 2021-11-01 VITALS
HEIGHT: 72 IN | HEART RATE: 85 BPM | SYSTOLIC BLOOD PRESSURE: 140 MMHG | WEIGHT: 315 LBS | BODY MASS INDEX: 42.66 KG/M2 | DIASTOLIC BLOOD PRESSURE: 96 MMHG

## 2021-11-01 DIAGNOSIS — N18.2 STAGE 2 CHRONIC KIDNEY DISEASE: ICD-10-CM

## 2021-11-01 DIAGNOSIS — E11.22 TYPE 2 DIABETES MELLITUS WITH STAGE 2 CHRONIC KIDNEY DISEASE, WITHOUT LONG-TERM CURRENT USE OF INSULIN (HCC): ICD-10-CM

## 2021-11-01 DIAGNOSIS — E66.01 CLASS 3 SEVERE OBESITY DUE TO EXCESS CALORIES WITHOUT SERIOUS COMORBIDITY WITH BODY MASS INDEX (BMI) OF 45.0 TO 49.9 IN ADULT (HCC): ICD-10-CM

## 2021-11-01 DIAGNOSIS — N18.2 TYPE 2 DIABETES MELLITUS WITH STAGE 2 CHRONIC KIDNEY DISEASE, WITHOUT LONG-TERM CURRENT USE OF INSULIN (HCC): ICD-10-CM

## 2021-11-01 DIAGNOSIS — E66.01 CLASS 3 SEVERE OBESITY DUE TO EXCESS CALORIES WITH SERIOUS COMORBIDITY AND BODY MASS INDEX (BMI) OF 45.0 TO 49.9 IN ADULT (HCC): Primary | ICD-10-CM

## 2021-11-01 PROCEDURE — 99244 OFF/OP CNSLTJ NEW/EST MOD 40: CPT | Performed by: STUDENT IN AN ORGANIZED HEALTH CARE EDUCATION/TRAINING PROGRAM

## 2021-11-01 PROCEDURE — 3077F SYST BP >= 140 MM HG: CPT | Performed by: STUDENT IN AN ORGANIZED HEALTH CARE EDUCATION/TRAINING PROGRAM

## 2021-11-01 PROCEDURE — 1036F TOBACCO NON-USER: CPT | Performed by: STUDENT IN AN ORGANIZED HEALTH CARE EDUCATION/TRAINING PROGRAM

## 2021-11-01 PROCEDURE — 3066F NEPHROPATHY DOC TX: CPT | Performed by: STUDENT IN AN ORGANIZED HEALTH CARE EDUCATION/TRAINING PROGRAM

## 2021-11-01 PROCEDURE — 3080F DIAST BP >= 90 MM HG: CPT | Performed by: STUDENT IN AN ORGANIZED HEALTH CARE EDUCATION/TRAINING PROGRAM

## 2021-11-01 RX ORDER — METFORMIN HYDROCHLORIDE 500 MG/1
500 TABLET, EXTENDED RELEASE ORAL 2 TIMES DAILY WITH MEALS
Qty: 60 TABLET | Refills: 1 | Status: SHIPPED | OUTPATIENT
Start: 2021-11-01 | End: 2021-12-13

## 2021-11-02 DIAGNOSIS — E11.22 TYPE 2 DIABETES MELLITUS WITH STAGE 2 CHRONIC KIDNEY DISEASE, WITHOUT LONG-TERM CURRENT USE OF INSULIN (HCC): Primary | ICD-10-CM

## 2021-11-02 DIAGNOSIS — N18.2 TYPE 2 DIABETES MELLITUS WITH STAGE 2 CHRONIC KIDNEY DISEASE, WITHOUT LONG-TERM CURRENT USE OF INSULIN (HCC): Primary | ICD-10-CM

## 2021-11-02 RX ORDER — LANCETS
EACH MISCELLANEOUS
Qty: 120 EACH | Refills: 2 | Status: SHIPPED | OUTPATIENT
Start: 2021-11-02 | End: 2021-12-13

## 2021-11-02 RX ORDER — BLOOD-GLUCOSE METER
EACH MISCELLANEOUS
Qty: 1 KIT | Refills: 1 | Status: SHIPPED | OUTPATIENT
Start: 2021-11-02 | End: 2021-12-13

## 2021-11-02 RX ORDER — BLOOD SUGAR DIAGNOSTIC
STRIP MISCELLANEOUS
Qty: 100 EACH | Refills: 3 | Status: SHIPPED | OUTPATIENT
Start: 2021-11-02 | End: 2021-12-13

## 2021-11-07 DIAGNOSIS — I10 ESSENTIAL HYPERTENSION: ICD-10-CM

## 2021-11-08 ENCOUNTER — ANESTHESIA EVENT (OUTPATIENT)
Dept: PERIOP | Facility: HOSPITAL | Age: 56
End: 2021-11-08
Payer: COMMERCIAL

## 2021-11-08 RX ORDER — CARVEDILOL 12.5 MG/1
TABLET ORAL
Qty: 60 TABLET | Refills: 3 | Status: SHIPPED | OUTPATIENT
Start: 2021-11-08 | End: 2022-03-13

## 2021-11-12 ENCOUNTER — DOCUMENTATION (OUTPATIENT)
Dept: NEUROSURGERY | Facility: CLINIC | Age: 56
End: 2021-11-12

## 2021-11-15 ENCOUNTER — ANESTHESIA (OUTPATIENT)
Dept: PERIOP | Facility: HOSPITAL | Age: 56
End: 2021-11-15
Payer: COMMERCIAL

## 2021-11-15 ENCOUNTER — HOSPITAL ENCOUNTER (OUTPATIENT)
Facility: HOSPITAL | Age: 56
Setting detail: OUTPATIENT SURGERY
Discharge: HOME | End: 2021-11-15
Attending: STUDENT IN AN ORGANIZED HEALTH CARE EDUCATION/TRAINING PROGRAM | Admitting: STUDENT IN AN ORGANIZED HEALTH CARE EDUCATION/TRAINING PROGRAM
Payer: COMMERCIAL

## 2021-11-15 ENCOUNTER — APPOINTMENT (OUTPATIENT)
Dept: RADIOLOGY | Facility: HOSPITAL | Age: 56
End: 2021-11-15
Payer: COMMERCIAL

## 2021-11-15 VITALS
TEMPERATURE: 98.5 F | SYSTOLIC BLOOD PRESSURE: 147 MMHG | HEIGHT: 72 IN | RESPIRATION RATE: 18 BRPM | DIASTOLIC BLOOD PRESSURE: 82 MMHG | BODY MASS INDEX: 42.66 KG/M2 | WEIGHT: 315 LBS | HEART RATE: 93 BPM | OXYGEN SATURATION: 94 %

## 2021-11-15 DIAGNOSIS — N18.2 TYPE 2 DIABETES MELLITUS WITH STAGE 2 CHRONIC KIDNEY DISEASE, WITHOUT LONG-TERM CURRENT USE OF INSULIN (HCC): Primary | ICD-10-CM

## 2021-11-15 DIAGNOSIS — M51.26 HERNIATION OF INTERVERTEBRAL DISC OF LUMBAR SPINE DUE TO DEGENERATION: ICD-10-CM

## 2021-11-15 DIAGNOSIS — E11.22 TYPE 2 DIABETES MELLITUS WITH STAGE 2 CHRONIC KIDNEY DISEASE, WITHOUT LONG-TERM CURRENT USE OF INSULIN (HCC): Primary | ICD-10-CM

## 2021-11-15 DIAGNOSIS — M51.36 HERNIATION OF INTERVERTEBRAL DISC OF LUMBAR SPINE DUE TO DEGENERATION: ICD-10-CM

## 2021-11-15 LAB
GLUCOSE SERPL-MCNC: 158 MG/DL (ref 65–140)
GLUCOSE SERPL-MCNC: 159 MG/DL (ref 65–140)

## 2021-11-15 PROCEDURE — 63030 LAMOT DCMPRN NRV RT 1 LMBR: CPT | Performed by: PHYSICIAN ASSISTANT

## 2021-11-15 PROCEDURE — NC001 PR NO CHARGE: Performed by: STUDENT IN AN ORGANIZED HEALTH CARE EDUCATION/TRAINING PROGRAM

## 2021-11-15 PROCEDURE — 93005 ELECTROCARDIOGRAM TRACING: CPT

## 2021-11-15 PROCEDURE — 63030 LAMOT DCMPRN NRV RT 1 LMBR: CPT | Performed by: STUDENT IN AN ORGANIZED HEALTH CARE EDUCATION/TRAINING PROGRAM

## 2021-11-15 PROCEDURE — 72020 X-RAY EXAM OF SPINE 1 VIEW: CPT

## 2021-11-15 PROCEDURE — 82948 REAGENT STRIP/BLOOD GLUCOSE: CPT

## 2021-11-15 RX ORDER — ROCURONIUM BROMIDE 10 MG/ML
INJECTION, SOLUTION INTRAVENOUS AS NEEDED
Status: DISCONTINUED | OUTPATIENT
Start: 2021-11-15 | End: 2021-11-15

## 2021-11-15 RX ORDER — LIDOCAINE HYDROCHLORIDE 10 MG/ML
0.5 INJECTION, SOLUTION EPIDURAL; INFILTRATION; INTRACAUDAL; PERINEURAL ONCE AS NEEDED
Status: DISCONTINUED | OUTPATIENT
Start: 2021-11-15 | End: 2021-11-15 | Stop reason: HOSPADM

## 2021-11-15 RX ORDER — OXYCODONE HYDROCHLORIDE 5 MG/1
5 TABLET ORAL EVERY 4 HOURS PRN
Status: COMPLETED | OUTPATIENT
Start: 2021-11-15 | End: 2021-11-15

## 2021-11-15 RX ORDER — CEFAZOLIN SODIUM 2 G/50ML
2000 SOLUTION INTRAVENOUS ONCE
Status: COMPLETED | OUTPATIENT
Start: 2021-11-15 | End: 2021-11-15

## 2021-11-15 RX ORDER — MIDAZOLAM HYDROCHLORIDE 2 MG/2ML
INJECTION, SOLUTION INTRAMUSCULAR; INTRAVENOUS AS NEEDED
Status: DISCONTINUED | OUTPATIENT
Start: 2021-11-15 | End: 2021-11-15

## 2021-11-15 RX ORDER — PROPOFOL 10 MG/ML
INJECTION, EMULSION INTRAVENOUS AS NEEDED
Status: DISCONTINUED | OUTPATIENT
Start: 2021-11-15 | End: 2021-11-15

## 2021-11-15 RX ORDER — SUCCINYLCHOLINE/SOD CL,ISO/PF 100 MG/5ML
SYRINGE (ML) INTRAVENOUS AS NEEDED
Status: DISCONTINUED | OUTPATIENT
Start: 2021-11-15 | End: 2021-11-15

## 2021-11-15 RX ORDER — ONDANSETRON 2 MG/ML
4 INJECTION INTRAMUSCULAR; INTRAVENOUS ONCE AS NEEDED
Status: DISCONTINUED | OUTPATIENT
Start: 2021-11-15 | End: 2021-11-15 | Stop reason: HOSPADM

## 2021-11-15 RX ORDER — BUPIVACAINE HYDROCHLORIDE 2.5 MG/ML
INJECTION, SOLUTION EPIDURAL; INFILTRATION; INTRACAUDAL AS NEEDED
Status: DISCONTINUED | OUTPATIENT
Start: 2021-11-15 | End: 2021-11-15 | Stop reason: HOSPADM

## 2021-11-15 RX ORDER — DEXAMETHASONE SODIUM PHOSPHATE 10 MG/ML
INJECTION, SOLUTION INTRAMUSCULAR; INTRAVENOUS AS NEEDED
Status: DISCONTINUED | OUTPATIENT
Start: 2021-11-15 | End: 2021-11-15

## 2021-11-15 RX ORDER — LIDOCAINE HYDROCHLORIDE 10 MG/ML
INJECTION, SOLUTION EPIDURAL; INFILTRATION; INTRACAUDAL; PERINEURAL AS NEEDED
Status: DISCONTINUED | OUTPATIENT
Start: 2021-11-15 | End: 2021-11-15

## 2021-11-15 RX ORDER — SODIUM CHLORIDE 9 MG/ML
125 INJECTION, SOLUTION INTRAVENOUS CONTINUOUS
Status: DISCONTINUED | OUTPATIENT
Start: 2021-11-15 | End: 2021-11-15 | Stop reason: HOSPADM

## 2021-11-15 RX ORDER — LIDOCAINE HYDROCHLORIDE AND EPINEPHRINE 10; 10 MG/ML; UG/ML
INJECTION, SOLUTION INFILTRATION; PERINEURAL AS NEEDED
Status: DISCONTINUED | OUTPATIENT
Start: 2021-11-15 | End: 2021-11-15 | Stop reason: HOSPADM

## 2021-11-15 RX ORDER — SODIUM CHLORIDE, SODIUM LACTATE, POTASSIUM CHLORIDE, CALCIUM CHLORIDE 600; 310; 30; 20 MG/100ML; MG/100ML; MG/100ML; MG/100ML
125 INJECTION, SOLUTION INTRAVENOUS CONTINUOUS
Status: DISCONTINUED | OUTPATIENT
Start: 2021-11-15 | End: 2021-11-15 | Stop reason: HOSPADM

## 2021-11-15 RX ORDER — FENTANYL CITRATE/PF 50 MCG/ML
25 SYRINGE (ML) INJECTION
Status: DISCONTINUED | OUTPATIENT
Start: 2021-11-15 | End: 2021-11-15 | Stop reason: HOSPADM

## 2021-11-15 RX ORDER — ONDANSETRON 2 MG/ML
INJECTION INTRAMUSCULAR; INTRAVENOUS AS NEEDED
Status: DISCONTINUED | OUTPATIENT
Start: 2021-11-15 | End: 2021-11-15

## 2021-11-15 RX ORDER — FENTANYL CITRATE 50 UG/ML
INJECTION, SOLUTION INTRAMUSCULAR; INTRAVENOUS AS NEEDED
Status: DISCONTINUED | OUTPATIENT
Start: 2021-11-15 | End: 2021-11-15

## 2021-11-15 RX ORDER — METHYLPREDNISOLONE ACETATE 40 MG/ML
INJECTION, SUSPENSION INTRA-ARTICULAR; INTRALESIONAL; INTRAMUSCULAR; SOFT TISSUE AS NEEDED
Status: DISCONTINUED | OUTPATIENT
Start: 2021-11-15 | End: 2021-11-15 | Stop reason: HOSPADM

## 2021-11-15 RX ORDER — OXYCODONE HYDROCHLORIDE 5 MG/1
5 TABLET ORAL EVERY 6 HOURS PRN
Qty: 15 TABLET | Refills: 0 | Status: SHIPPED | OUTPATIENT
Start: 2021-11-15 | End: 2021-11-25

## 2021-11-15 RX ORDER — CHLORHEXIDINE GLUCONATE 0.12 MG/ML
15 RINSE ORAL ONCE
Status: COMPLETED | OUTPATIENT
Start: 2021-11-15 | End: 2021-11-15

## 2021-11-15 RX ADMIN — SUGAMMADEX 200 MG: 100 INJECTION, SOLUTION INTRAVENOUS at 10:29

## 2021-11-15 RX ADMIN — PHENYLEPHRINE HYDROCHLORIDE 40 MCG/MIN: 10 INJECTION INTRAVENOUS at 09:14

## 2021-11-15 RX ADMIN — PROPOFOL 300 MG: 10 INJECTION, EMULSION INTRAVENOUS at 08:31

## 2021-11-15 RX ADMIN — MIDAZOLAM 2 MG: 1 INJECTION INTRAMUSCULAR; INTRAVENOUS at 08:25

## 2021-11-15 RX ADMIN — FENTANYL CITRATE 50 MCG: 50 INJECTION, SOLUTION INTRAMUSCULAR; INTRAVENOUS at 09:59

## 2021-11-15 RX ADMIN — ONDANSETRON 4 MG: 2 INJECTION INTRAMUSCULAR; INTRAVENOUS at 10:12

## 2021-11-15 RX ADMIN — SODIUM CHLORIDE, SODIUM LACTATE, POTASSIUM CHLORIDE, AND CALCIUM CHLORIDE 125 ML/HR: .6; .31; .03; .02 INJECTION, SOLUTION INTRAVENOUS at 07:26

## 2021-11-15 RX ADMIN — LIDOCAINE HYDROCHLORIDE 50 MG: 10 INJECTION, SOLUTION EPIDURAL; INFILTRATION; INTRACAUDAL; PERINEURAL at 08:31

## 2021-11-15 RX ADMIN — CHLORHEXIDINE GLUCONATE 0.12% ORAL RINSE 15 ML: 1.2 LIQUID ORAL at 07:25

## 2021-11-15 RX ADMIN — DEXAMETHASONE SODIUM PHOSPHATE 4 MG: 10 INJECTION, SOLUTION INTRAMUSCULAR; INTRAVENOUS at 08:31

## 2021-11-15 RX ADMIN — SODIUM CHLORIDE, SODIUM LACTATE, POTASSIUM CHLORIDE, AND CALCIUM CHLORIDE: .6; .31; .03; .02 INJECTION, SOLUTION INTRAVENOUS at 09:59

## 2021-11-15 RX ADMIN — OXYCODONE HYDROCHLORIDE 5 MG: 5 TABLET ORAL at 11:47

## 2021-11-15 RX ADMIN — FENTANYL CITRATE 50 MCG: 50 INJECTION, SOLUTION INTRAMUSCULAR; INTRAVENOUS at 09:06

## 2021-11-15 RX ADMIN — Medication 150 MG: at 08:31

## 2021-11-15 RX ADMIN — ROCURONIUM BROMIDE 50 MG: 10 INJECTION, SOLUTION INTRAVENOUS at 08:32

## 2021-11-15 RX ADMIN — CEFAZOLIN SODIUM 1000 MG: 2 SOLUTION INTRAVENOUS at 08:36

## 2021-11-15 RX ADMIN — CEFAZOLIN SODIUM 2000 MG: 2 SOLUTION INTRAVENOUS at 08:25

## 2021-11-15 RX ADMIN — ROCURONIUM BROMIDE 50 MG: 10 INJECTION, SOLUTION INTRAVENOUS at 08:36

## 2021-11-16 ENCOUNTER — TELEPHONE (OUTPATIENT)
Dept: NEUROSURGERY | Facility: CLINIC | Age: 56
End: 2021-11-16

## 2021-11-16 LAB
ATRIAL RATE: 85 BPM
P AXIS: 22 DEGREES
PR INTERVAL: 174 MS
QRS AXIS: -27 DEGREES
QRSD INTERVAL: 98 MS
QT INTERVAL: 388 MS
QTC INTERVAL: 461 MS
T WAVE AXIS: 28 DEGREES
VENTRICULAR RATE: 85 BPM

## 2021-11-16 PROCEDURE — 93010 ELECTROCARDIOGRAM REPORT: CPT | Performed by: INTERNAL MEDICINE

## 2021-11-18 ENCOUNTER — TELEPHONE (OUTPATIENT)
Dept: DIABETES SERVICES | Facility: CLINIC | Age: 56
End: 2021-11-18

## 2021-11-19 ENCOUNTER — TELEPHONE (OUTPATIENT)
Dept: DIABETES SERVICES | Facility: CLINIC | Age: 56
End: 2021-11-19

## 2021-11-22 ENCOUNTER — TELEPHONE (OUTPATIENT)
Dept: DIABETES SERVICES | Facility: CLINIC | Age: 56
End: 2021-11-22

## 2021-11-29 ENCOUNTER — CLINICAL SUPPORT (OUTPATIENT)
Dept: NEUROSURGERY | Facility: CLINIC | Age: 56
End: 2021-11-29

## 2021-11-29 VITALS
SYSTOLIC BLOOD PRESSURE: 132 MMHG | HEIGHT: 72 IN | WEIGHT: 315 LBS | BODY MASS INDEX: 42.66 KG/M2 | DIASTOLIC BLOOD PRESSURE: 86 MMHG | TEMPERATURE: 98.6 F

## 2021-11-29 DIAGNOSIS — Z48.89 AFTERCARE FOLLOWING SURGERY FOR INJURY AND TRAUMA: Primary | ICD-10-CM

## 2021-11-29 PROCEDURE — 3008F BODY MASS INDEX DOCD: CPT | Performed by: STUDENT IN AN ORGANIZED HEALTH CARE EDUCATION/TRAINING PROGRAM

## 2021-11-29 PROCEDURE — 99024 POSTOP FOLLOW-UP VISIT: CPT

## 2021-12-13 ENCOUNTER — OFFICE VISIT (OUTPATIENT)
Dept: INTERNAL MEDICINE CLINIC | Facility: CLINIC | Age: 56
End: 2021-12-13
Payer: COMMERCIAL

## 2021-12-13 VITALS
SYSTOLIC BLOOD PRESSURE: 130 MMHG | DIASTOLIC BLOOD PRESSURE: 82 MMHG | TEMPERATURE: 99.7 F | RESPIRATION RATE: 14 BRPM | HEIGHT: 72 IN | HEART RATE: 89 BPM | BODY MASS INDEX: 42.66 KG/M2 | OXYGEN SATURATION: 98 % | WEIGHT: 315 LBS

## 2021-12-13 DIAGNOSIS — E11.22 TYPE 2 DIABETES MELLITUS WITH STAGE 2 CHRONIC KIDNEY DISEASE, WITHOUT LONG-TERM CURRENT USE OF INSULIN (HCC): ICD-10-CM

## 2021-12-13 DIAGNOSIS — E11.9 DIABETIC EYE EXAM (HCC): ICD-10-CM

## 2021-12-13 DIAGNOSIS — Z01.00 DIABETIC EYE EXAM (HCC): ICD-10-CM

## 2021-12-13 DIAGNOSIS — M54.42 ACUTE BILATERAL LOW BACK PAIN WITH LEFT-SIDED SCIATICA: Primary | ICD-10-CM

## 2021-12-13 DIAGNOSIS — N18.2 TYPE 2 DIABETES MELLITUS WITH STAGE 2 CHRONIC KIDNEY DISEASE, WITHOUT LONG-TERM CURRENT USE OF INSULIN (HCC): ICD-10-CM

## 2021-12-13 PROCEDURE — 99214 OFFICE O/P EST MOD 30 MIN: CPT | Performed by: NURSE PRACTITIONER

## 2021-12-13 PROCEDURE — 3066F NEPHROPATHY DOC TX: CPT | Performed by: NURSE PRACTITIONER

## 2021-12-13 PROCEDURE — 1036F TOBACCO NON-USER: CPT | Performed by: NURSE PRACTITIONER

## 2021-12-29 ENCOUNTER — OFFICE VISIT (OUTPATIENT)
Dept: NEUROSURGERY | Facility: CLINIC | Age: 56
End: 2021-12-29

## 2021-12-29 VITALS
HEART RATE: 78 BPM | WEIGHT: 315 LBS | SYSTOLIC BLOOD PRESSURE: 146 MMHG | RESPIRATION RATE: 18 BRPM | HEIGHT: 72 IN | TEMPERATURE: 97.5 F | BODY MASS INDEX: 42.66 KG/M2 | DIASTOLIC BLOOD PRESSURE: 92 MMHG

## 2021-12-29 DIAGNOSIS — Z09 FOLLOW-UP EXAMINATION, FOLLOWING OTHER SURGERY: ICD-10-CM

## 2021-12-29 DIAGNOSIS — M54.16 LUMBAR RADICULOPATHY: Primary | ICD-10-CM

## 2021-12-29 PROCEDURE — 99024 POSTOP FOLLOW-UP VISIT: CPT | Performed by: STUDENT IN AN ORGANIZED HEALTH CARE EDUCATION/TRAINING PROGRAM

## 2021-12-29 PROCEDURE — 3008F BODY MASS INDEX DOCD: CPT | Performed by: NURSE PRACTITIONER

## 2022-03-13 DIAGNOSIS — I10 ESSENTIAL HYPERTENSION: ICD-10-CM

## 2022-03-13 RX ORDER — CARVEDILOL 12.5 MG/1
TABLET ORAL
Qty: 60 TABLET | Refills: 3 | Status: SHIPPED | OUTPATIENT
Start: 2022-03-13 | End: 2022-03-14 | Stop reason: SDUPTHER

## 2022-03-14 DIAGNOSIS — I10 ESSENTIAL HYPERTENSION: ICD-10-CM

## 2022-03-14 RX ORDER — CARVEDILOL 12.5 MG/1
12.5 TABLET ORAL 2 TIMES DAILY
Qty: 180 TABLET | Refills: 1 | Status: SHIPPED | OUTPATIENT
Start: 2022-03-14 | End: 2022-05-28 | Stop reason: SDUPTHER

## 2022-05-28 DIAGNOSIS — I10 ESSENTIAL HYPERTENSION: ICD-10-CM

## 2022-05-28 RX ORDER — CARVEDILOL 12.5 MG/1
12.5 TABLET ORAL 2 TIMES DAILY
Qty: 180 TABLET | Refills: 1 | Status: SHIPPED | OUTPATIENT
Start: 2022-05-28 | End: 2022-08-26

## 2022-08-23 ENCOUNTER — OFFICE VISIT (OUTPATIENT)
Dept: INTERNAL MEDICINE CLINIC | Facility: CLINIC | Age: 57
End: 2022-08-23
Payer: COMMERCIAL

## 2022-08-23 VITALS
TEMPERATURE: 98.9 F | HEART RATE: 82 BPM | DIASTOLIC BLOOD PRESSURE: 88 MMHG | WEIGHT: 315 LBS | BODY MASS INDEX: 42.66 KG/M2 | HEIGHT: 72 IN | SYSTOLIC BLOOD PRESSURE: 138 MMHG | OXYGEN SATURATION: 94 %

## 2022-08-23 DIAGNOSIS — E66.01 CLASS 3 SEVERE OBESITY DUE TO EXCESS CALORIES WITHOUT SERIOUS COMORBIDITY WITH BODY MASS INDEX (BMI) OF 45.0 TO 49.9 IN ADULT (HCC): ICD-10-CM

## 2022-08-23 DIAGNOSIS — M51.26 HERNIATION OF INTERVERTEBRAL DISC OF LUMBAR SPINE DUE TO DEGENERATION: ICD-10-CM

## 2022-08-23 DIAGNOSIS — N18.2 TYPE 2 DIABETES MELLITUS WITH STAGE 2 CHRONIC KIDNEY DISEASE, WITHOUT LONG-TERM CURRENT USE OF INSULIN (HCC): ICD-10-CM

## 2022-08-23 DIAGNOSIS — I10 ESSENTIAL HYPERTENSION: ICD-10-CM

## 2022-08-23 DIAGNOSIS — Z13.21 ENCOUNTER FOR VITAMIN DEFICIENCY SCREENING: ICD-10-CM

## 2022-08-23 DIAGNOSIS — M51.36 HERNIATION OF INTERVERTEBRAL DISC OF LUMBAR SPINE DUE TO DEGENERATION: ICD-10-CM

## 2022-08-23 DIAGNOSIS — Z12.11 COLON CANCER SCREENING: ICD-10-CM

## 2022-08-23 DIAGNOSIS — Z00.00 ANNUAL PHYSICAL EXAM: Primary | ICD-10-CM

## 2022-08-23 DIAGNOSIS — E11.22 TYPE 2 DIABETES MELLITUS WITH STAGE 2 CHRONIC KIDNEY DISEASE, WITHOUT LONG-TERM CURRENT USE OF INSULIN (HCC): ICD-10-CM

## 2022-08-23 DIAGNOSIS — N18.2 STAGE 2 CHRONIC KIDNEY DISEASE: ICD-10-CM

## 2022-08-23 DIAGNOSIS — Z13.220 SCREENING FOR HYPERLIPIDEMIA: ICD-10-CM

## 2022-08-23 PROBLEM — Z76.89 ESTABLISHING CARE WITH NEW DOCTOR, ENCOUNTER FOR: Status: RESOLVED | Noted: 2021-06-07 | Resolved: 2022-08-23

## 2022-08-23 LAB — SL AMB POCT HEMOGLOBIN AIC: 8.2 (ref ?–6.5)

## 2022-08-23 PROCEDURE — 3052F HG A1C>EQUAL 8.0%<EQUAL 9.0%: CPT | Performed by: NURSE PRACTITIONER

## 2022-08-23 PROCEDURE — 83036 HEMOGLOBIN GLYCOSYLATED A1C: CPT | Performed by: NURSE PRACTITIONER

## 2022-08-23 PROCEDURE — 99396 PREV VISIT EST AGE 40-64: CPT | Performed by: NURSE PRACTITIONER

## 2022-08-23 PROCEDURE — 3079F DIAST BP 80-89 MM HG: CPT | Performed by: NURSE PRACTITIONER

## 2022-08-23 PROCEDURE — 3725F SCREEN DEPRESSION PERFORMED: CPT | Performed by: NURSE PRACTITIONER

## 2022-08-23 PROCEDURE — 3075F SYST BP GE 130 - 139MM HG: CPT | Performed by: NURSE PRACTITIONER

## 2022-08-23 PROCEDURE — 3066F NEPHROPATHY DOC TX: CPT | Performed by: NURSE PRACTITIONER

## 2022-08-23 RX ORDER — GLIPIZIDE 5 MG/1
5 TABLET ORAL
Qty: 90 TABLET | Refills: 3 | Status: SHIPPED | OUTPATIENT
Start: 2022-08-23 | End: 2022-10-18

## 2022-08-23 NOTE — ASSESSMENT & PLAN NOTE
· Lifestyle modification, diet and exercise discussed   · Will start the patient on ozempic  · Will refer the patient to physical therapy

## 2022-08-23 NOTE — PATIENT INSTRUCTIONS
Problem List Items Addressed This Visit          Endocrine    Type 2 diabetes mellitus with stage 2 chronic kidney disease, without long-term current use of insulin ([facility] [address] )       Lab Results   Component Value Date    HGBA1C 8 0 (H) 10/16/2021 ·   in office HgBA1c: 8 2  · microalbumin  · 12/13/2021  · Patient will continue to check his blood sugars twice a day  · Currently not on any metformin secondary to the GI side effects  · Patient is following with endocrinology - last appointment was in 2/2022   · With increased hemoglobin A1c, and discussion with the patient, we will proceed with subcutaneous insulin  · Will attempt to have ozempic approved  · Will continue before meals and before bedtime blood sugars  · Will start the patient glipizide and monitor  · He may benefit with invokana            Relevant Medications    glipiZIDE (GLUCOTROL) 5 mg tablet    semaglutide, 1 mg/dose, (Ozempic) 4 MG/3ML SOPN injection pen    Other Relevant Orders    POCT hemoglobin A1c    Microalbumin / creatinine urine ratio       Cardiovascular and Mediastinum    Essential hypertension     · Continue coreg, lisinopril-HCTZ              Musculoskeletal and Integument    Herniation of intervertebral disc of lumbar spine due to degeneration     The patient is scheduled on 10/26/2021 for a Left sided L1-2 microdiscectomy (Left Spine Lumbar) procedure scheduled with Xiao Sommers MD  The patient is medically stable to have this procedure as planned  · 10/12/2021: Neurosurgery: This is a 68-year-old male with history of morbid obesity presenting with primarily left leg pain  MRI of his lumbar spine shows large left-sided paracentral disc herniation at L1-2 with thecal sac and nerve root compression  It appears the patient's conus ends just above the L1-2 disc space and is not affected by the disc although it is close in proximity  There is also slight segmental kyphosis at the L1-2 level with reversal of lumbar lordosis  Had a long discussion with patient about his symptoms well as imaging findings  Given the location of his herniation and how debilitated his symptoms are, I believe surgical intervention in the form of a micro disc is warranted for pain relief and also to prevent conus medullary syndrome  The issue was the patient's BMI is 45 and this places him at risk for complications perioperatively  I sat down with the patient and his wife and had an extensive discussion about the procedure including the details of the procedure as well as it's risks and benefits  Risks of the procedure include but are not limited to bleeding, infection, nerve injury which can lead to extremity weakness, numbness, tingling, paralysis, and/or bowel/bladder dysfunction  There is also risk of CSF leak which may require additional procedures to repair  There is risk of injury to the great retroperitoneal vessels which may require vascular surgery intervention  Major procedure related risks include adjacent segment disease, hardware failure, and the need for further future surgeries  There is also anesthesia related risks which include blood clots in the legs/lungs, heart attack, stroke, coma and death  The patient has changed his diet by cutting out all sweets and starches  He will need close follow up to maintain this lifestyle  · 11/15/2021 Surgery: This is a 61-year-old male with history of morbid obesity presenting with primarily left leg pain  MRI of his lumbar spine shows large left-sided paracentral disc herniation at L1-2 with thecal sac and nerve root compression  It appears the patient's conus ends just above the L1-2 disc space and is not affected by the disc although it is close in proximity  There is also slight segmental kyphosis at the L1-2 level with reversal of lumbar lordosis  Had a long discussion with patient about his symptoms well as imaging findings    Given the location of his herniation and how debilitated his symptoms are, I believe surgical intervention in the form of a micro disc is warranted for pain relief and also to prevent conus medullary syndrome  The issue was the patient's BMI is 45 and this places him at risk for complications perioperatively  I sat down with the patient and his wife and had an extensive discussion about the procedure including the details of the procedure as well as it's risks and benefits  Risks of the procedure include but are not limited to bleeding, infection, nerve injury which can lead to extremity weakness, numbness, tingling, paralysis, and/or bowel/bladder dysfunction  There is also risk of CSF leak which may require additional procedures to repair  There is risk of injury to the great retroperitoneal vessels which may require vascular surgery intervention  Major procedure related risks include adjacent segment disease, hardware failure, and the need for further future surgeries  There is also anesthesia related risks which include blood clots in the legs/lungs, heart attack, stroke, coma and death  The patient provided verbal consent to the surgical procedure and signed the consent form  Expected postoperative course, including activity restrictions, expected pain and postoperative medication were reviewed  In the meantime, the patient will obtain medical clearance from their primary care physician  The patient is to call the office with any questions  · History, physical examination and diagnostic tests were reviewed and questions answered  Diagnosis, care plan and treatment options were discussed  The patient and spouse understand instructions and will follow up as directed  Follow-up:  Scheduled for surgery    · 8/23/2022  · Patient with continued left > right low back pain and hip pains  · Will refer the patient to physical therapy  · This may help with weight loss and increasing his ability to proceed with normal activities  Relevant Orders    Ambulatory Referral to Physical Therapy       Genitourinary    Stage 2 chronic kidney disease     Lab Results   Component Value Date    EGFR 66 10/16/2021    EGFR 68 03/30/2019    EGFR 59 10/23/2018    CREATININE 1 21 10/16/2021    CREATININE 1 20 03/30/2019    CREATININE 1 34 (H) 10/23/2018 ·   will check labs             Other    Annual physical exam - Primary      Lifestyle modification, diet and exercise discussed   Medications discussed and refilled appropriately   Labs discussed and ordered    Hepatitis C screening discussed   HIV screening discussed   Depression screening performed   Anxiety screening performed   Urinary Incontinence screening performed    BMI discussed   DM foot exam performed   DM eye exam ordered   Colorectal cancer screening discussed and ordered    Fall screening performed           Relevant Orders    CBC and differential    Comprehensive metabolic panel    Class 3 severe obesity due to excess calories without serious comorbidity with body mass index (BMI) of 45 0 to 49 9 in adult (HCC)     · Lifestyle modification, diet and exercise discussed   · Will start the patient on ozempic  · Will refer the patient to physical therapy            Colon cancer screening    Relevant Orders    Ambulatory referral for colonoscopy    Encounter for vitamin deficiency screening     · Will check vitamin D and B12  · Continue supplementations           Relevant Orders    Vitamin D 25 hydroxy    Vitamin B12    Screening for hyperlipidemia     · Will check labs  · Patient should be on medication due to diabetes           Relevant Orders    Lipid panel           Type 2 Diabetes in Adults: New Diagnosis   AMBULATORY CARE:   Type 2 diabetes  is a disease that affects how your body uses glucose (sugar)  Normally, when the blood sugar level increases, the pancreas makes more insulin  Insulin helps move sugar out of the blood so it can be used for energy   Type 2 diabetes develops because either the body cannot make enough insulin, or it cannot use the insulin correctly  Type 2 diabetes can be controlled to prevent damage to your heart, blood vessels, and other organs  Common symptoms include the following:   · Numbness in your fingers or toes    · Blurred vision    · Frequent urination    · More hunger or thirst than usual    Have someone call your local emergency number (911 in the [address] [facility]) if:   · You have any of the following signs of a stroke:      ? Numbness or drooping on one side of your face     ? Weakness in an arm or leg    ? Confusion or difficulty speaking    ? Dizziness, a severe headache, or vision loss    · You have any of the following signs of a heart attack:      ? Squeezing, pressure, or pain in your chest    ? You may  also have any of the following:     § Discomfort or pain in your back, neck, jaw, stomach, or arm    § Shortness of breath    § Nausea or vomiting    § Lightheadedness or a sudden cold sweat    · You have trouble breathing  Seek care immediately if:   · You have severe abdominal pain  · You vomit for more than 2 hours  · You have trouble staying awake or focusing  · You are shaking or sweating  · You feel weak or more tired than usual     · You have blurred or double vision  · Your breath has a fruity, sweet smell  Call your doctor or diabetes care team if:   · Your arms and legs are swollen  · You have an upset stomach and cannot eat the foods on your meal plan  · You feel dizzy, have headaches, or are easily irritated  · Your skin is red, warm, dry, or swollen  · You have a wound that does not heal     · You have numbness in your arms or legs  · You have trouble coping with your illness, or you feel anxious or depressed  · You have questions or concerns about your condition or care  Treatment for type 2 diabetes  helps prevent or delay complications, including heart and kidney disease   You must eat healthy meals and do regular physical activity  You may  need any of the following:  · Diabetes medicines or insulin  may be given to decrease the amount of sugar in your blood  · Blood pressure medicine  may be given to lower your blood pressure  · Cholesterol-lowering medicine  may be given to prevent heart disease  · Antiplatelets , such as aspirin, help prevent blood clots  Take your antiplatelet medicine exactly as directed  These medicines make it more likely for you to bleed or bruise  If you are told to take aspirin, do not take acetaminophen or ibuprofen instead  · Take your medicine as directed  Contact your healthcare provider if you think your medicine is not helping or if you have side effects  Tell him or her if you are allergic to any medicine  Keep a list of the medicines, vitamins, and herbs you take  Include the amounts, and when and why you take them  Bring the list or the pill bottles to follow-up visits  Carry your medicine list with you in case of an emergency  Diabetes education:  Diabetes education will start right away  Diabetes education may also happen later to refresh your memory  Your diabetes care team may include physicians, nurse practitioners, and physician assistants  It may also include nurses, dietitians, exercise specialists, pharmacists, dentists, and podiatrists  Family members, or others who are close to you, may also be part of the team  You and your team will make goals and plans to manage diabetes and other health problems  The plans and goals will be specific to your needs  Members of your diabetes care team teach you the following:  · About nutrition:  A dietitian will help you make a meal plan to keep your blood sugar level steady  You will learn how food affects your blood sugar levels  You will also learn to keep track of sugar and starchy foods (carbohydrates)  Do not skip meals   Your blood sugar level may drop too low if you have taken diabetes medicine and do not eat  You may be taught to use the plate method for portion control  With the plate method, ½ of your plate contains vegetables  The other half is divided so that ¼ contains protein or meat, and ¼ contains starches, such as potatoes  · About physical activity and diabetes: You will learn why physical activity, such as walking, is important  You and your care team provider will make a plan for your activity  Your care team provider will tell you what a healthy weight is for you  He or she will help you make a plan to get to that weight and stay there  Maintain a healthy weight to help delay or prevent complications of diabetes  · About your blood sugar level: You will learn what your blood sugar level should be  You will be given information on when and how to check your blood sugar level  You may need to check by testing a drop of blood in a glucose monitor  You may instead be given a continuous glucose monitoring (CGM) device  A sensor is placed in your abdomen or on your arm  You put a transmitter on the sensor to get a reading that shows up on the monitor  You will learn what to do if your level is too high or too low  Write down the times of your checks and your levels  Take them to all follow-up appointments  · About diabetes medicine: You may need oral diabetes medicine, insulin, or both to help control your blood sugar levels  Your healthcare provider will teach you how and when to take oral diabetes medicine  You will also be taught about side effects oral diabetes medicine can cause  Insulin may be added if oral diabetes medicine becomes less effective over time  Insulin may be injected, or given through a pump or pen  You and your care team will discuss which method is best for you  ? An insulin pump  is an implanted device that gives your insulin 24 hours a day  An insulin pump prevents the need for multiple insulin injections in a day  ?  An insulin pen  is a device prefilled with the right amount of insulin  ? You and your family members will be taught how to draw up and give insulin  if this is the best method for you  Your education team will also teach you how to dispose of needles and syringes  ? You will learn how much insulin you need  and when to give it  You will be taught when not to give insulin  You will also be taught what to do if your blood sugar level drops too low  This may happen if you take insulin and do not eat the right amount of carbohydrates  Other ways to help manage type 2 diabetes:   · Talk to your care team if you have increased stress about your diagnosis  When you feel stressed about your diagnosis, it can cause you not to take care of yourself properly  Your care team can help by offering tips about self-care  Your care team may suggest you talk to a mental health provider  The provider can listen and offer help with self-care issues  · Check your feet each day for sores  Wear shoes and socks that fit correctly  Do not trim your toenails  Go to a podiatrist  Ask your care team for more information about foot care  · Do not smoke  Nicotine and other chemicals in cigarettes and cigars can cause lung damage and make diabetes harder to manage  Ask your care team provider for information if you currently smoke and need help to quit  E-cigarettes or smokeless tobacco still contain nicotine  Talk to your care team provider before you use these products  · Drink water instead of sugary drinks such as soft drinks and fruit juices  Sugary drinks cause high blood sugar and cause an increase in your weight  Your healthcare provider may tell you that diet drinks do not help with weight loss  · Know the risks if you choose to drink alcohol  Alcohol can cause your blood sugar levels to be low if you use insulin  Alcohol can cause high blood sugar levels and weight gain if you drink too much   A drink of alcohol is 12 ounces of beer, 5 ounces of wine, or 1½ ounces of liquor  Your care team can tell you how many drinks are okay to have in 24 hours and in 1 week  · Check your blood pressure as directed  If you have high blood pressure (BP), talk to your care team about your BP goals  Together you can create a plan to lower your BP if needed and keep it in a healthy range  The plan may include lifestyle changes or medicines  A normal BP is 119/79 or lower  A normal blood pressure can help prevent or delay certain complications from diabetes  Examples include retinopathy (eye damage) and kidney damage  · Wear medical alert identification  Wear medical alert jewelry or carry a card that says you have diabetes  Ask your care team provider where to get these items  · Ask about vaccines you may need  You have a higher risk for serious illness if you get the flu, pneumonia, COVID-19, or hepatitis  Ask your provider if you should get a flu, pneumonia, or hepatitis B vaccine, and when to get the COVID-19 vaccine  Risks of type 2 diabetes: It is important to learn to keep your diabetes in control  Uncontrolled diabetes can damage your nerves, veins, and arteries  Your risk for dementia increases faster the longer your diabetes is not controlled  High blood sugar levels may damage other body tissues and organs over time  Damage to arteries may increase your risk for heart attack and stroke  Nerve damage may also lead to other heart, stomach, and nerve problems  Diabetes can become life-threatening if it is not controlled  Follow up with your care team providers as directed: You may need to return to have your A1c checked every 3 months  You will need to have your feet checked during at least 1 visit each year  You will need an eye exam 1 time each year to check for retinopathy  You will also need urine tests every year to check for kidney problems   You may need tests to monitor for heart disease, such as an EKG, stress test, blood pressure monitoring, and blood tests  Write down your questions so you remember to ask them during your visits  © Copyright [employer] 2022 Information is for End User's use only and may not be sold, redistributed or otherwise used for commercial purposes  All illustrations and images included in CareNotes® are the copyrighted property of [employer]  or Jenniffer Gandhi  The above information is an  only  It is not intended as medical advice for individual conditions or treatments  Talk to your doctor, nurse or pharmacist before following any medical regimen to see if it is safe and effective for you

## 2022-08-23 NOTE — ASSESSMENT & PLAN NOTE
 Lifestyle modification, diet and exercise discussed   Medications discussed and refilled appropriately   Labs discussed and ordered    Hepatitis C screening discussed   HIV screening discussed   Depression screening performed   Anxiety screening performed   Urinary Incontinence screening performed    BMI discussed   DM foot exam performed   DM eye exam ordered   Colorectal cancer screening discussed and ordered    Fall screening performed

## 2022-08-23 NOTE — PROGRESS NOTES
[facility]    NAME: Marty Lewis  AGE: 59 y o  SEX: male  : 1965     DATE: 2022     Assessment and Plan:     Problem List Items Addressed This Visit        Endocrine    Type 2 diabetes mellitus with stage 2 chronic kidney disease, without long-term current use of insulin ([facility] [address] )       Lab Results   Component Value Date    HGBA1C 8 0 (H) 10/16/2021   in office HgBA1c: 8 2  microalbumin  2021  Patient will continue to check his blood sugars twice a day  Currently not on any metformin secondary to the GI side effects  Patient is following with endocrinology - last appointment was in 2022   With increased hemoglobin A1c, and discussion with the patient, we will proceed with subcutaneous insulin  Will attempt to have ozempic approved  Will continue before meals and before bedtime blood sugars  Will start the patient glipizide and monitor  He may benefit with invokana            Relevant Medications    glipiZIDE (GLUCOTROL) 5 mg tablet    semaglutide, 1 mg/dose, (Ozempic) 4 MG/3ML SOPN injection pen    Other Relevant Orders    POCT hemoglobin A1c    Microalbumin / creatinine urine ratio       Cardiovascular and Mediastinum    Essential hypertension     Continue coreg, lisinopril-HCTZ            Musculoskeletal and Integument    Herniation of intervertebral disc of lumbar spine due to degeneration     The patient is scheduled on 10/26/2021 for a Left sided L1-2 microdiscectomy (Left Spine Lumbar) procedure scheduled with Melba Kaufman MD  The patient is medically stable to have this procedure as planned       10/12/2021: Neurosurgery: This is a 49-year-old male with history of morbid obesity presenting with primarily left leg pain  MRI of his lumbar spine shows large left-sided paracentral disc herniation at L1-2 with thecal sac and nerve root compression    It appears the patient's conus ends just above the L1-2 disc space and is not affected by the disc although it is close in proximity  There is also slight segmental kyphosis at the L1-2 level with reversal of lumbar lordosis  Had a long discussion with patient about his symptoms well as imaging findings  Given the location of his herniation and how debilitated his symptoms are, I believe surgical intervention in the form of a micro disc is warranted for pain relief and also to prevent conus medullary syndrome  The issue was the patient's BMI is 45 and this places him at risk for complications perioperatively  I sat down with the patient and his wife and had an extensive discussion about the procedure including the details of the procedure as well as it's risks and benefits  Risks of the procedure include but are not limited to bleeding, infection, nerve injury which can lead to extremity weakness, numbness, tingling, paralysis, and/or bowel/bladder dysfunction  There is also risk of CSF leak which may require additional procedures to repair  There is risk of injury to the great retroperitoneal vessels which may require vascular surgery intervention  Major procedure related risks include adjacent segment disease, hardware failure, and the need for further future surgeries  There is also anesthesia related risks which include blood clots in the legs/lungs, heart attack, stroke, coma and death      The patient has changed his diet by cutting out all sweets and starches  He will need close follow up to maintain this lifestyle  11/15/2021 Surgery: This is a 42-year-old male with history of morbid obesity presenting with primarily left leg pain  MRI of his lumbar spine shows large left-sided paracentral disc herniation at L1-2 with thecal sac and nerve root compression  It appears the patient's conus ends just above the L1-2 disc space and is not affected by the disc although it is close in proximity    There is also slight segmental kyphosis at the L1-2 level with reversal of lumbar lordosis  Had a long discussion with patient about his symptoms well as imaging findings  Given the location of his herniation and how debilitated his symptoms are, I believe surgical intervention in the form of a micro disc is warranted for pain relief and also to prevent conus medullary syndrome  The issue was the patient's BMI is 39 and this places him at risk for complications perioperatively      I sat down with the patient and his wife and had an extensive discussion about the procedure including the details of the procedure as well as it's risks and benefits      Risks of the procedure include but are not limited to bleeding, infection, nerve injury which can lead to extremity weakness, numbness, tingling, paralysis, and/or bowel/bladder dysfunction  There is also risk of CSF leak which may require additional procedures to repair  There is risk of injury to the great retroperitoneal vessels which may require vascular surgery intervention  Major procedure related risks include adjacent segment disease, hardware failure, and the need for further future surgeries  There is also anesthesia related risks which include blood clots in the legs/lungs, heart attack, stroke, coma and death      The patient provided verbal consent to the surgical procedure and signed the consent form       Expected postoperative course, including activity restrictions, expected pain and postoperative medication were reviewed      In the meantime, the patient will obtain medical clearance from their primary care physician  The patient is to call the office with any questions         History, physical examination and diagnostic tests were reviewed and questions answered  Diagnosis, care plan and treatment options were discussed   The patient and spouse understand instructions and will follow up as directed      Follow-up:  Scheduled for surgery    8/23/2022  Patient with continued left > right low back pain and hip pains  Will refer the patient to physical therapy  This may help with weight loss and increasing his ability to proceed with normal activities  Relevant Orders    Ambulatory Referral to Physical Therapy       Genitourinary    Stage 2 chronic kidney disease     Lab Results   Component Value Date    EGFR 66 10/16/2021    EGFR 68 03/30/2019    EGFR 59 10/23/2018    CREATININE 1 21 10/16/2021    CREATININE 1 20 03/30/2019    CREATININE 1 34 (H) 10/23/2018   will check labs             Other    Annual physical exam - Primary     Lifestyle modification, diet and exercise discussed  Medications discussed and refilled appropriately  Labs discussed and ordered   Hepatitis C screening discussed  HIV screening discussed  Depression screening performed  Anxiety screening performed  Urinary Incontinence screening performed   BMI discussed  DM foot exam performed  DM eye exam ordered  Colorectal cancer screening discussed and ordered   Fall screening performed         Relevant Orders    CBC and differential    Comprehensive metabolic panel    Class 3 severe obesity due to excess calories without serious comorbidity with body mass index (BMI) of 45 0 to 49 9 in adult ([facility] [address] )     Lifestyle modification, diet and exercise discussed   Will start the patient on ozempic  Will refer the patient to physical therapy          Colon cancer screening    Relevant Orders    Ambulatory referral for colonoscopy    Encounter for vitamin deficiency screening     Will check vitamin D and B12  Continue supplementations         Relevant Orders    Vitamin D 25 hydroxy    Vitamin B12    Screening for hyperlipidemia     Will check labs  Patient should be on medication due to diabetes         Relevant Orders    Lipid panel          Immunizations and preventive care screenings were discussed with patient today  Appropriate education was printed on patient's after visit summary      Counseling:  Alcohol/drug use: discussed moderation in alcohol intake, the recommendations for healthy alcohol use, and avoidance of illicit drug use  Dental Health: discussed importance of regular tooth brushing, flossing, and dental visits  Injury prevention: discussed safety/seat belts, safety helmets, smoke detectors, carbon dioxide detectors, and smoking near bedding or upholstery  Sexual health: discussed sexually transmitted diseases, partner selection, use of condoms, avoidance of unintended pregnancy, and contraceptive alternatives  Exercise: the importance of regular exercise/physical activity was discussed  Recommend exercise 3-5 times per week for at least 30 minutes  BMI Counseling: Body mass index is 46 22 kg/m²  The BMI is above normal  Nutrition recommendations include decreasing portion sizes, encouraging healthy choices of fruits and vegetables, decreasing fast food intake, consuming healthier snacks, limiting drinks that contain sugar, moderation in carbohydrate intake, increasing intake of lean protein, reducing intake of saturated and trans fat and reducing intake of cholesterol  Exercise recommendations include exercising 3-5 times per week  No pharmacotherapy was ordered  Rationale for BMI follow-up plan is due to patient being overweight or obese  Depression Screening and Follow-up Plan: Patient was screened for depression during today's encounter  They screened negative with a PHQ-2 score of 0  Return in about 4 weeks (around 9/20/2022) for Recheck medications, treatment plan - 40 min  Chief Complaint:     Chief Complaint   Patient presents with    Annual Exam     Colonoscopy, DS, rtn labs, a1c in office, microalbumin,   Pt c/o stilling having trouble/pain in hips,  had back surge last sept,  wants to discuss weight loss,       History of Present Illness:     Adult Annual Physical   Patient here for a comprehensive physical exam  The patient reports problems - as discussed       Diet and Physical Activity  Diet/Nutrition: well balanced diet and diabetic diet  Exercise: no formal exercise  Depression Screening  PHQ-2/9 Depression Screening    Little interest or pleasure in doing things: 0 - not at all  Feeling down, depressed, or hopeless: 0 - not at all  PHQ-2 Score: 0  PHQ-2 Interpretation: Negative depression screen       General Health  Sleep: sleeps well  Hearing: normal - bilateral   Vision: wears glasses  Dental: regular dental visits   Health  Symptoms include: none     Review of Systems:     Review of Systems   Constitutional: Negative for activity change, chills, fatigue and fever  HENT: Negative for rhinorrhea and sore throat  Eyes: Negative for pain  Respiratory: Negative for cough and shortness of breath  Cardiovascular: Negative for chest pain, palpitations and leg swelling  Gastrointestinal: Negative for abdominal pain, constipation, diarrhea, nausea and vomiting  Genitourinary: Negative for difficulty urinating, flank pain, frequency and urgency  Musculoskeletal: Positive for arthralgias, back pain, gait problem and myalgias  Negative for joint swelling  Skin: Negative for color change  Neurological: Negative for dizziness, weakness, light-headedness and headaches  Psychiatric/Behavioral: Negative for sleep disturbance  The patient is not nervous/anxious  All other systems reviewed and are negative       Past Medical History:     Past Medical History:   Diagnosis Date    Benign arteriolar nephrosclerosis     Essential hypertension     History of transfusion 2013    Hyperparathyroidism due to renal insufficiency ([facility] [address] )     Legionnaire's disease ([facility] [address] )     Pneumonia     s/p VDRF       Past Surgical History:     Past Surgical History:   Procedure Laterality Date    COLONOSCOPY      OTHER SURGICAL HISTORY      Dialysis catheter     WA LAMNOTMY INCL W/DCMPRSN NRV ROOT 1 INTRSPC LUMBR Left 11/15/2021    Procedure: Left sided L1-2 microdiscectomy;  Surgeon: Willian Sanchez MD; Location:  MAIN OR;  Service: Neurosurgery      Family History:     Family History   Problem Relation Age of Onset    Hyperlipidemia Mother     Lung cancer Father     Stomach cancer Father     Cancer Father     Diabetes type II Father     Diabetes type II Brother       Social History:     Social History     Socioeconomic History    Marital status: /Civil Union     Spouse name: None    Number of children: None    Years of education: None    Highest education level: None   Occupational History    Occupation:     Tobacco Use    Smoking status: Never Smoker    Smokeless tobacco: Never Used   Vaping Use    Vaping Use: Never used   Substance and Sexual Activity    Alcohol use: Not Currently     Comment: Denies alcohol use - As per [employer] Drug use: Never    Sexual activity: None   Other Topics Concern    None   Social History Narrative    Consumes on average 3 cups of regular coffee per day      Social Determinants of Health     Financial Resource Strain: Not on file   Food Insecurity: Not on file   Transportation Needs: Not on file   Physical Activity: Not on file   Stress: Not on file   Social Connections: Not on file   Intimate Partner Violence: Not on file   Housing Stability: Not on file      Current Medications:     Current Outpatient Medications   Medication Sig Dispense Refill    carvedilol (COREG) 12 5 mg tablet Take 1 tablet (12 5 mg total) by mouth 2 (two) times a day 180 tablet 1    fexofenadine (ALLEGRA) 180 MG tablet Take 180 mg by mouth daily      glipiZIDE (GLUCOTROL) 5 mg tablet Take 1 tablet (5 mg total) by mouth daily with breakfast 90 tablet 3    lisinopril-hydrochlorothiazide (PRINZIDE,ZESTORETIC) 20-12 5 MG per tablet take 1 tablet by mouth twice a day 180 tablet 3    Multiple Vitamins-Minerals (multivitamin with minerals) tablet Take 1 tablet by mouth daily      semaglutide, 1 mg/dose, (Ozempic) 4 MG/3ML SOPN injection pen Inject 0 75 mL (1 mg total) under the skin once a week 15 mL 3     No current facility-administered medications for this visit  Allergies:     No Known Allergies   Physical Exam:     /88 (BP Location: Left arm, Patient Position: Sitting, Cuff Size: Standard)   Pulse 82   Temp 98 9 °F (37 2 °C)   Ht 6' (1 829 m)   Wt (!) 155 kg (340 lb 12 8 oz)   SpO2 94%   BMI 46 22 kg/m²     Physical Exam  Vitals and nursing note reviewed  Constitutional:       General: He is awake  Appearance: Normal appearance  He is well-developed and overweight  HENT:      Head: Normocephalic and atraumatic  Nose: Nose normal       Mouth/Throat:      Mouth: Mucous membranes are moist    Eyes:      Conjunctiva/sclera: Conjunctivae normal    Cardiovascular:      Rate and Rhythm: Normal rate and regular rhythm  Pulses: Normal pulses  Heart sounds: Normal heart sounds  No murmur heard  Pulmonary:      Effort: Pulmonary effort is normal  No respiratory distress  Breath sounds: Normal breath sounds  Abdominal:      General: Bowel sounds are normal       Palpations: Abdomen is soft  Tenderness: There is no abdominal tenderness  Musculoskeletal:      Cervical back: Neck supple  Right lower leg: No edema  Left lower leg: No edema  Comments: Chronic back and hip pain, left > right   Skin:     General: Skin is warm and dry  Neurological:      Mental Status: He is alert and oriented to person, place, and time  Psychiatric:         Attention and Perception: Attention normal          Mood and Affect: Mood normal          Speech: Speech normal          Behavior: Behavior normal  Behavior is cooperative            Carilyn Holstein, [address] [facility]

## 2022-08-23 NOTE — ASSESSMENT & PLAN NOTE
Lab Results   Component Value Date    EGFR 66 10/16/2021    EGFR 68 03/30/2019    EGFR 59 10/23/2018    CREATININE 1 21 10/16/2021    CREATININE 1 20 03/30/2019    CREATININE 1 34 (H) 10/23/2018   · will check labs

## 2022-08-23 NOTE — ASSESSMENT & PLAN NOTE
Lab Results   Component Value Date    HGBA1C 8 0 (H) 10/16/2021   · in office HgBA1c: 8 2  · microalbumin  · 12/13/2021  · Patient will continue to check his blood sugars twice a day  · Currently not on any metformin secondary to the GI side effects  · Patient is following with endocrinology - last appointment was in 2/2022   · With increased hemoglobin A1c, and discussion with the patient, we will proceed with subcutaneous insulin  · Will attempt to have ozempic approved  · Will continue before meals and before bedtime blood sugars     · Will start the patient glipizide and monitor  · He may benefit with invokana

## 2022-08-23 NOTE — ASSESSMENT & PLAN NOTE
The patient is scheduled on 10/26/2021 for a Left sided L1-2 microdiscectomy (Left Spine Lumbar) procedure scheduled with Twyla Delgado MD  The patient is medically stable to have this procedure as planned       · 10/12/2021: Neurosurgery: This is a 47-year-old male with history of morbid obesity presenting with primarily left leg pain  MRI of his lumbar spine shows large left-sided paracentral disc herniation at L1-2 with thecal sac and nerve root compression  It appears the patient's conus ends just above the L1-2 disc space and is not affected by the disc although it is close in proximity  There is also slight segmental kyphosis at the L1-2 level with reversal of lumbar lordosis  Had a long discussion with patient about his symptoms well as imaging findings  Given the location of his herniation and how debilitated his symptoms are, I believe surgical intervention in the form of a micro disc is warranted for pain relief and also to prevent conus medullary syndrome  The issue was the patient's BMI is 45 and this places him at risk for complications perioperatively  I sat down with the patient and his wife and had an extensive discussion about the procedure including the details of the procedure as well as it's risks and benefits  Risks of the procedure include but are not limited to bleeding, infection, nerve injury which can lead to extremity weakness, numbness, tingling, paralysis, and/or bowel/bladder dysfunction  There is also risk of CSF leak which may require additional procedures to repair  There is risk of injury to the great retroperitoneal vessels which may require vascular surgery intervention  Major procedure related risks include adjacent segment disease, hardware failure, and the need for further future surgeries   There is also anesthesia related risks which include blood clots in the legs/lungs, heart attack, stroke, coma and death      The patient has changed his diet by cutting out all sweets and starches  He will need close follow up to maintain this lifestyle  · 11/15/2021 Surgery: This is a 58-year-old male with history of morbid obesity presenting with primarily left leg pain  MRI of his lumbar spine shows large left-sided paracentral disc herniation at L1-2 with thecal sac and nerve root compression  It appears the patient's conus ends just above the L1-2 disc space and is not affected by the disc although it is close in proximity  There is also slight segmental kyphosis at the L1-2 level with reversal of lumbar lordosis  Had a long discussion with patient about his symptoms well as imaging findings  Given the location of his herniation and how debilitated his symptoms are, I believe surgical intervention in the form of a micro disc is warranted for pain relief and also to prevent conus medullary syndrome  The issue was the patient's BMI is 39 and this places him at risk for complications perioperatively      I sat down with the patient and his wife and had an extensive discussion about the procedure including the details of the procedure as well as it's risks and benefits      Risks of the procedure include but are not limited to bleeding, infection, nerve injury which can lead to extremity weakness, numbness, tingling, paralysis, and/or bowel/bladder dysfunction  There is also risk of CSF leak which may require additional procedures to repair  There is risk of injury to the great retroperitoneal vessels which may require vascular surgery intervention  Major procedure related risks include adjacent segment disease, hardware failure, and the need for further future surgeries   There is also anesthesia related risks which include blood clots in the legs/lungs, heart attack, stroke, coma and death      The patient provided verbal consent to the surgical procedure and signed the consent form       Expected postoperative course, including activity restrictions, expected pain and postoperative medication were reviewed      In the meantime, the patient will obtain medical clearance from their primary care physician  The patient is to call the office with any questions         · History, physical examination and diagnostic tests were reviewed and questions answered  Diagnosis, care plan and treatment options were discussed  The patient and spouse understand instructions and will follow up as directed      Follow-up:  Scheduled for surgery    · 8/23/2022  · Patient with continued left > right low back pain and hip pains  · Will refer the patient to physical therapy  · This may help with weight loss and increasing his ability to proceed with normal activities

## 2022-08-24 ENCOUNTER — APPOINTMENT (OUTPATIENT)
Dept: LAB | Facility: CLINIC | Age: 57
End: 2022-08-24
Payer: COMMERCIAL

## 2022-08-24 DIAGNOSIS — Z00.00 ANNUAL PHYSICAL EXAM: ICD-10-CM

## 2022-08-24 DIAGNOSIS — Z13.21 ENCOUNTER FOR VITAMIN DEFICIENCY SCREENING: ICD-10-CM

## 2022-08-24 DIAGNOSIS — Z13.220 SCREENING FOR HYPERLIPIDEMIA: ICD-10-CM

## 2022-08-24 LAB
ALBUMIN SERPL BCP-MCNC: 3.6 G/DL (ref 3.5–5)
ALP SERPL-CCNC: 60 U/L (ref 46–116)
ALT SERPL W P-5'-P-CCNC: 60 U/L (ref 12–78)
ANION GAP SERPL CALCULATED.3IONS-SCNC: 6 MMOL/L (ref 4–13)
AST SERPL W P-5'-P-CCNC: 30 U/L (ref 5–45)
BASOPHILS # BLD AUTO: 0.06 THOUSANDS/ΜL (ref 0–0.1)
BASOPHILS NFR BLD AUTO: 1 % (ref 0–1)
BILIRUB SERPL-MCNC: 0.85 MG/DL (ref 0.2–1)
BUN SERPL-MCNC: 17 MG/DL (ref 5–25)
CALCIUM SERPL-MCNC: 9.4 MG/DL (ref 8.3–10.1)
CHLORIDE SERPL-SCNC: 96 MMOL/L (ref 96–108)
CHOLEST SERPL-MCNC: 287 MG/DL
CO2 SERPL-SCNC: 32 MMOL/L (ref 21–32)
CREAT SERPL-MCNC: 1.18 MG/DL (ref 0.6–1.3)
CREAT UR-MCNC: 198 MG/DL
EOSINOPHIL # BLD AUTO: 0.3 THOUSAND/ΜL (ref 0–0.61)
EOSINOPHIL NFR BLD AUTO: 3 % (ref 0–6)
ERYTHROCYTE [DISTWIDTH] IN BLOOD BY AUTOMATED COUNT: 13.4 % (ref 11.6–15.1)
GFR SERPL CREATININE-BSD FRML MDRD: 67 ML/MIN/1.73SQ M
GLUCOSE P FAST SERPL-MCNC: 167 MG/DL (ref 65–99)
HCT VFR BLD AUTO: 46.6 % (ref 36.5–49.3)
HDLC SERPL-MCNC: 34 MG/DL
HGB BLD-MCNC: 15.4 G/DL (ref 12–17)
IMM GRANULOCYTES # BLD AUTO: 0.05 THOUSAND/UL (ref 0–0.2)
IMM GRANULOCYTES NFR BLD AUTO: 1 % (ref 0–2)
LDLC SERPL CALC-MCNC: 176 MG/DL (ref 0–100)
LYMPHOCYTES # BLD AUTO: 2.19 THOUSANDS/ΜL (ref 0.6–4.47)
LYMPHOCYTES NFR BLD AUTO: 24 % (ref 14–44)
MCH RBC QN AUTO: 29 PG (ref 26.8–34.3)
MCHC RBC AUTO-ENTMCNC: 33 G/DL (ref 31.4–37.4)
MCV RBC AUTO: 88 FL (ref 82–98)
MICROALBUMIN UR-MCNC: 71 MG/L (ref 0–20)
MICROALBUMIN/CREAT 24H UR: 36 MG/G CREATININE (ref 0–30)
MONOCYTES # BLD AUTO: 0.6 THOUSAND/ΜL (ref 0.17–1.22)
MONOCYTES NFR BLD AUTO: 7 % (ref 4–12)
NEUTROPHILS # BLD AUTO: 5.88 THOUSANDS/ΜL (ref 1.85–7.62)
NEUTS SEG NFR BLD AUTO: 64 % (ref 43–75)
NONHDLC SERPL-MCNC: 253 MG/DL
NRBC BLD AUTO-RTO: 0 /100 WBCS
PLATELET # BLD AUTO: 282 THOUSANDS/UL (ref 149–390)
PMV BLD AUTO: 9.6 FL (ref 8.9–12.7)
POTASSIUM SERPL-SCNC: 4.2 MMOL/L (ref 3.5–5.3)
PROT SERPL-MCNC: 7.6 G/DL (ref 6.4–8.4)
RBC # BLD AUTO: 5.31 MILLION/UL (ref 3.88–5.62)
SODIUM SERPL-SCNC: 134 MMOL/L (ref 135–147)
TRIGL SERPL-MCNC: 387 MG/DL
VIT B12 SERPL-MCNC: 1042 PG/ML (ref 100–900)
WBC # BLD AUTO: 9.08 THOUSAND/UL (ref 4.31–10.16)

## 2022-08-24 PROCEDURE — 82043 UR ALBUMIN QUANTITATIVE: CPT | Performed by: NURSE PRACTITIONER

## 2022-08-24 PROCEDURE — 85025 COMPLETE CBC W/AUTO DIFF WBC: CPT

## 2022-08-24 PROCEDURE — 36415 COLL VENOUS BLD VENIPUNCTURE: CPT

## 2022-08-24 PROCEDURE — 82607 VITAMIN B-12: CPT

## 2022-08-24 PROCEDURE — 82306 VITAMIN D 25 HYDROXY: CPT

## 2022-08-24 PROCEDURE — 3060F POS MICROALBUMINURIA REV: CPT | Performed by: NURSE PRACTITIONER

## 2022-08-24 PROCEDURE — 80061 LIPID PANEL: CPT

## 2022-08-24 PROCEDURE — 82570 ASSAY OF URINE CREATININE: CPT | Performed by: NURSE PRACTITIONER

## 2022-08-24 PROCEDURE — 80053 COMPREHEN METABOLIC PANEL: CPT

## 2022-08-25 LAB — 25(OH)D3 SERPL-MCNC: 28.2 NG/ML (ref 30–100)

## 2022-09-28 ENCOUNTER — RA CDI HCC (OUTPATIENT)
Dept: OTHER | Facility: HOSPITAL | Age: 57
End: 2022-09-28

## 2022-09-28 NOTE — PROGRESS NOTES
[facility]  coding [facility]          Chart Reviewed number of suggestions sent to Provider: 1   [id number]    Patients Insurance        Commercial Insurance: [employer]

## 2022-10-05 ENCOUNTER — APPOINTMENT (EMERGENCY)
Dept: CT IMAGING | Facility: HOSPITAL | Age: 57
DRG: 393 | End: 2022-10-05
Payer: COMMERCIAL

## 2022-10-05 ENCOUNTER — HOSPITAL ENCOUNTER (INPATIENT)
Facility: HOSPITAL | Age: 57
LOS: 2 days | Discharge: HOME | DRG: 393 | End: 2022-10-07
Attending: EMERGENCY MEDICINE | Admitting: INTERNAL MEDICINE
Payer: COMMERCIAL

## 2022-10-05 ENCOUNTER — OFFICE VISIT (OUTPATIENT)
Dept: INTERNAL MEDICINE CLINIC | Facility: CLINIC | Age: 57
End: 2022-10-05
Payer: COMMERCIAL

## 2022-10-05 VITALS
OXYGEN SATURATION: 95 % | SYSTOLIC BLOOD PRESSURE: 116 MMHG | DIASTOLIC BLOOD PRESSURE: 72 MMHG | BODY MASS INDEX: 42.39 KG/M2 | HEART RATE: 99 BPM | WEIGHT: 313 LBS | TEMPERATURE: 97.2 F | HEIGHT: 72 IN

## 2022-10-05 DIAGNOSIS — N17.9 AKI (ACUTE KIDNEY INJURY) (HCC): ICD-10-CM

## 2022-10-05 DIAGNOSIS — K56.609 SBO (SMALL BOWEL OBSTRUCTION) (HCC): ICD-10-CM

## 2022-10-05 DIAGNOSIS — R19.7 DIARRHEA OF PRESUMED INFECTIOUS ORIGIN: Primary | ICD-10-CM

## 2022-10-05 DIAGNOSIS — E87.8 ELECTROLYTE ABNORMALITY: ICD-10-CM

## 2022-10-05 PROBLEM — K59.1 FUNCTIONAL DIARRHEA: Status: ACTIVE | Noted: 2022-10-05

## 2022-10-05 LAB
2HR DELTA HS TROPONIN: 1 NG/L
4HR DELTA HS TROPONIN: -1 NG/L
ALBUMIN SERPL BCP-MCNC: 4.4 G/DL (ref 3.5–5)
ALP SERPL-CCNC: 68 U/L (ref 34–104)
ALT SERPL W P-5'-P-CCNC: 31 U/L (ref 7–52)
ANION GAP SERPL CALCULATED.3IONS-SCNC: 19 MMOL/L (ref 4–13)
APTT PPP: 26 SECONDS (ref 23–37)
AST SERPL W P-5'-P-CCNC: 20 U/L (ref 13–39)
ATRIAL RATE: 90 BPM
ATRIAL RATE: 91 BPM
BASOPHILS # BLD AUTO: 0.05 THOUSANDS/ÂΜL (ref 0–0.1)
BASOPHILS NFR BLD AUTO: 0 % (ref 0–1)
BILIRUB SERPL-MCNC: 1.03 MG/DL (ref 0.2–1)
BUN SERPL-MCNC: 42 MG/DL (ref 5–25)
CALCIUM SERPL-MCNC: 9.1 MG/DL (ref 8.4–10.2)
CARDIAC TROPONIN I PNL SERPL HS: 5 NG/L
CARDIAC TROPONIN I PNL SERPL HS: 6 NG/L
CARDIAC TROPONIN I PNL SERPL HS: 7 NG/L
CHLORIDE SERPL-SCNC: 91 MMOL/L (ref 96–108)
CO2 SERPL-SCNC: 20 MMOL/L (ref 21–32)
CREAT SERPL-MCNC: 4.44 MG/DL (ref 0.6–1.3)
EOSINOPHIL # BLD AUTO: 0.04 THOUSAND/ÂΜL (ref 0–0.61)
EOSINOPHIL NFR BLD AUTO: 0 % (ref 0–6)
ERYTHROCYTE [DISTWIDTH] IN BLOOD BY AUTOMATED COUNT: 12.8 % (ref 11.6–15.1)
GFR SERPL CREATININE-BSD FRML MDRD: 13 ML/MIN/1.73SQ M
GLUCOSE SERPL-MCNC: 169 MG/DL (ref 65–140)
HCT VFR BLD AUTO: 47.7 % (ref 36.5–49.3)
HGB BLD-MCNC: 16.3 G/DL (ref 12–17)
IMM GRANULOCYTES # BLD AUTO: 0.09 THOUSAND/UL (ref 0–0.2)
IMM GRANULOCYTES NFR BLD AUTO: 1 % (ref 0–2)
INR PPP: 1.05 (ref 0.84–1.19)
LACTATE SERPL-SCNC: 1.6 MMOL/L (ref 0.5–2)
LIPASE SERPL-CCNC: 61 U/L (ref 11–82)
LYMPHOCYTES # BLD AUTO: 1.83 THOUSANDS/ÂΜL (ref 0.6–4.47)
LYMPHOCYTES NFR BLD AUTO: 11 % (ref 14–44)
MAGNESIUM SERPL-MCNC: 1.9 MG/DL (ref 1.9–2.7)
MCH RBC QN AUTO: 29.2 PG (ref 26.8–34.3)
MCHC RBC AUTO-ENTMCNC: 34.2 G/DL (ref 31.4–37.4)
MCV RBC AUTO: 85 FL (ref 82–98)
MONOCYTES # BLD AUTO: 1.02 THOUSAND/ÂΜL (ref 0.17–1.22)
MONOCYTES NFR BLD AUTO: 6 % (ref 4–12)
NEUTROPHILS # BLD AUTO: 13.14 THOUSANDS/ÂΜL (ref 1.85–7.62)
NEUTS SEG NFR BLD AUTO: 82 % (ref 43–75)
NRBC BLD AUTO-RTO: 0 /100 WBCS
P AXIS: 47 DEGREES
P AXIS: 57 DEGREES
PLATELET # BLD AUTO: 432 THOUSANDS/UL (ref 149–390)
PMV BLD AUTO: 9.2 FL (ref 8.9–12.7)
POTASSIUM SERPL-SCNC: 2.9 MMOL/L (ref 3.5–5.3)
PR INTERVAL: 166 MS
PR INTERVAL: 176 MS
PROCALCITONIN SERPL-MCNC: 0.57 NG/ML
PROT SERPL-MCNC: 8 G/DL (ref 6.4–8.4)
PROTHROMBIN TIME: 13.7 SECONDS (ref 11.6–14.5)
QRS AXIS: -33 DEGREES
QRS AXIS: -47 DEGREES
QRSD INTERVAL: 92 MS
QRSD INTERVAL: 96 MS
QT INTERVAL: 406 MS
QT INTERVAL: 410 MS
QTC INTERVAL: 496 MS
QTC INTERVAL: 504 MS
RBC # BLD AUTO: 5.59 MILLION/UL (ref 3.88–5.62)
SODIUM SERPL-SCNC: 130 MMOL/L (ref 135–147)
T WAVE AXIS: 43 DEGREES
T WAVE AXIS: 58 DEGREES
VENTRICULAR RATE: 90 BPM
VENTRICULAR RATE: 91 BPM
WBC # BLD AUTO: 16.17 THOUSAND/UL (ref 4.31–10.16)

## 2022-10-05 PROCEDURE — 99291 CRITICAL CARE FIRST HOUR: CPT | Performed by: EMERGENCY MEDICINE

## 2022-10-05 PROCEDURE — 85730 THROMBOPLASTIN TIME PARTIAL: CPT | Performed by: EMERGENCY MEDICINE

## 2022-10-05 PROCEDURE — 84484 ASSAY OF TROPONIN QUANT: CPT | Performed by: EMERGENCY MEDICINE

## 2022-10-05 PROCEDURE — 85610 PROTHROMBIN TIME: CPT | Performed by: EMERGENCY MEDICINE

## 2022-10-05 PROCEDURE — 83690 ASSAY OF LIPASE: CPT | Performed by: EMERGENCY MEDICINE

## 2022-10-05 PROCEDURE — 87205 SMEAR GRAM STAIN: CPT | Performed by: EMERGENCY MEDICINE

## 2022-10-05 PROCEDURE — 99214 OFFICE O/P EST MOD 30 MIN: CPT | Performed by: NURSE PRACTITIONER

## 2022-10-05 PROCEDURE — 96365 THER/PROPH/DIAG IV INF INIT: CPT

## 2022-10-05 PROCEDURE — 93010 ELECTROCARDIOGRAM REPORT: CPT | Performed by: INTERNAL MEDICINE

## 2022-10-05 PROCEDURE — 99222 1ST HOSP IP/OBS MODERATE 55: CPT

## 2022-10-05 PROCEDURE — 85025 COMPLETE CBC W/AUTO DIFF WBC: CPT | Performed by: EMERGENCY MEDICINE

## 2022-10-05 PROCEDURE — 83605 ASSAY OF LACTIC ACID: CPT | Performed by: EMERGENCY MEDICINE

## 2022-10-05 PROCEDURE — 99223 1ST HOSP IP/OBS HIGH 75: CPT | Performed by: INTERNAL MEDICINE

## 2022-10-05 PROCEDURE — 87209 SMEAR COMPLEX STAIN: CPT | Performed by: EMERGENCY MEDICINE

## 2022-10-05 PROCEDURE — 84145 PROCALCITONIN (PCT): CPT | Performed by: EMERGENCY MEDICINE

## 2022-10-05 PROCEDURE — 36415 COLL VENOUS BLD VENIPUNCTURE: CPT | Performed by: EMERGENCY MEDICINE

## 2022-10-05 PROCEDURE — 83930 ASSAY OF BLOOD OSMOLALITY: CPT | Performed by: EMERGENCY MEDICINE

## 2022-10-05 PROCEDURE — 87040 BLOOD CULTURE FOR BACTERIA: CPT | Performed by: EMERGENCY MEDICINE

## 2022-10-05 PROCEDURE — 83735 ASSAY OF MAGNESIUM: CPT | Performed by: EMERGENCY MEDICINE

## 2022-10-05 PROCEDURE — 93005 ELECTROCARDIOGRAM TRACING: CPT

## 2022-10-05 PROCEDURE — 99284 EMERGENCY DEPT VISIT MOD MDM: CPT

## 2022-10-05 PROCEDURE — 74176 CT ABD & PELVIS W/O CONTRAST: CPT

## 2022-10-05 PROCEDURE — 87505 NFCT AGENT DETECTION GI: CPT | Performed by: EMERGENCY MEDICINE

## 2022-10-05 PROCEDURE — 87493 C DIFF AMPLIFIED PROBE: CPT | Performed by: INTERNAL MEDICINE

## 2022-10-05 PROCEDURE — 84484 ASSAY OF TROPONIN QUANT: CPT | Performed by: INTERNAL MEDICINE

## 2022-10-05 PROCEDURE — 87329 GIARDIA AG IA: CPT | Performed by: EMERGENCY MEDICINE

## 2022-10-05 PROCEDURE — 87177 OVA AND PARASITES SMEARS: CPT | Performed by: EMERGENCY MEDICINE

## 2022-10-05 PROCEDURE — 80053 COMPREHEN METABOLIC PANEL: CPT | Performed by: EMERGENCY MEDICINE

## 2022-10-05 PROCEDURE — 87425 ROTAVIRUS AG IA: CPT | Performed by: INTERNAL MEDICINE

## 2022-10-05 PROCEDURE — G1004 CDSM NDSC: HCPCS

## 2022-10-05 RX ORDER — SODIUM CHLORIDE, SODIUM GLUCONATE, SODIUM ACETATE, POTASSIUM CHLORIDE, MAGNESIUM CHLORIDE, SODIUM PHOSPHATE, DIBASIC, AND POTASSIUM PHOSPHATE .53; .5; .37; .037; .03; .012; .00082 G/100ML; G/100ML; G/100ML; G/100ML; G/100ML; G/100ML; G/100ML
125 INJECTION, SOLUTION INTRAVENOUS CONTINUOUS
Status: DISCONTINUED | OUTPATIENT
Start: 2022-10-05 | End: 2022-10-07

## 2022-10-05 RX ORDER — HEPARIN SODIUM 5000 [USP'U]/ML
7500 INJECTION, SOLUTION INTRAVENOUS; SUBCUTANEOUS EVERY 8 HOURS SCHEDULED
Status: DISCONTINUED | OUTPATIENT
Start: 2022-10-05 | End: 2022-10-07 | Stop reason: HOSPADM

## 2022-10-05 RX ORDER — LOPERAMIDE HYDROCHLORIDE 2 MG/1
2 CAPSULE ORAL 4 TIMES DAILY PRN
Status: DISCONTINUED | OUTPATIENT
Start: 2022-10-05 | End: 2022-10-07 | Stop reason: HOSPADM

## 2022-10-05 RX ORDER — POTASSIUM CHLORIDE 14.9 MG/ML
20 INJECTION INTRAVENOUS ONCE
Status: COMPLETED | OUTPATIENT
Start: 2022-10-05 | End: 2022-10-05

## 2022-10-05 RX ORDER — POTASSIUM CHLORIDE 20 MEQ/1
40 TABLET, EXTENDED RELEASE ORAL ONCE
Status: DISCONTINUED | OUTPATIENT
Start: 2022-10-05 | End: 2022-10-05

## 2022-10-05 RX ORDER — CARVEDILOL 12.5 MG/1
12.5 TABLET ORAL 2 TIMES DAILY
Status: DISCONTINUED | OUTPATIENT
Start: 2022-10-05 | End: 2022-10-07 | Stop reason: HOSPADM

## 2022-10-05 RX ORDER — CIPROFLOXACIN 2 MG/ML
400 INJECTION, SOLUTION INTRAVENOUS ONCE
Status: COMPLETED | OUTPATIENT
Start: 2022-10-05 | End: 2022-10-05

## 2022-10-05 RX ORDER — ONDANSETRON 2 MG/ML
4 INJECTION INTRAMUSCULAR; INTRAVENOUS EVERY 6 HOURS PRN
Status: DISCONTINUED | OUTPATIENT
Start: 2022-10-05 | End: 2022-10-07 | Stop reason: HOSPADM

## 2022-10-05 RX ORDER — ACETAMINOPHEN 325 MG/1
650 TABLET ORAL EVERY 4 HOURS PRN
Status: DISCONTINUED | OUTPATIENT
Start: 2022-10-05 | End: 2022-10-07 | Stop reason: HOSPADM

## 2022-10-05 RX ORDER — METRONIDAZOLE 500 MG/1
500 TABLET ORAL EVERY 8 HOURS SCHEDULED
Qty: 60 TABLET | Refills: 0 | Status: SHIPPED | OUTPATIENT
Start: 2022-10-05 | End: 2022-10-07

## 2022-10-05 RX ORDER — SODIUM CHLORIDE, SODIUM GLUCONATE, SODIUM ACETATE, POTASSIUM CHLORIDE, MAGNESIUM CHLORIDE, SODIUM PHOSPHATE, DIBASIC, AND POTASSIUM PHOSPHATE .53; .5; .37; .037; .03; .012; .00082 G/100ML; G/100ML; G/100ML; G/100ML; G/100ML; G/100ML; G/100ML
1000 INJECTION, SOLUTION INTRAVENOUS ONCE
Status: COMPLETED | OUTPATIENT
Start: 2022-10-05 | End: 2022-10-05

## 2022-10-05 RX ORDER — HEPARIN SODIUM 5000 [USP'U]/ML
5000 INJECTION, SOLUTION INTRAVENOUS; SUBCUTANEOUS EVERY 8 HOURS SCHEDULED
Status: DISCONTINUED | OUTPATIENT
Start: 2022-10-05 | End: 2022-10-05

## 2022-10-05 RX ORDER — METRONIDAZOLE 500 MG/100ML
500 INJECTION, SOLUTION INTRAVENOUS EVERY 8 HOURS
Status: DISCONTINUED | OUTPATIENT
Start: 2022-10-05 | End: 2022-10-06

## 2022-10-05 RX ADMIN — METRONIDAZOLE 500 MG: 500 INJECTION, SOLUTION INTRAVENOUS at 15:58

## 2022-10-05 RX ADMIN — POTASSIUM CHLORIDE 20 MEQ: 14.9 INJECTION, SOLUTION INTRAVENOUS at 16:39

## 2022-10-05 RX ADMIN — HEPARIN SODIUM 7500 UNITS: 5000 INJECTION INTRAVENOUS; SUBCUTANEOUS at 20:57

## 2022-10-05 RX ADMIN — SODIUM CHLORIDE, SODIUM GLUCONATE, SODIUM ACETATE, POTASSIUM CHLORIDE AND MAGNESIUM CHLORIDE 125 ML/HR: 526; 502; 368; 37; 30 INJECTION, SOLUTION INTRAVENOUS at 18:20

## 2022-10-05 RX ADMIN — SODIUM CHLORIDE, SODIUM GLUCONATE, SODIUM ACETATE, POTASSIUM CHLORIDE, MAGNESIUM CHLORIDE, SODIUM PHOSPHATE, DIBASIC, AND POTASSIUM PHOSPHATE 1000 ML: .53; .5; .37; .037; .03; .012; .00082 INJECTION, SOLUTION INTRAVENOUS at 16:39

## 2022-10-05 RX ADMIN — METRONIDAZOLE 500 MG: 500 INJECTION, SOLUTION INTRAVENOUS at 20:57

## 2022-10-05 RX ADMIN — SODIUM CHLORIDE, SODIUM GLUCONATE, SODIUM ACETATE, POTASSIUM CHLORIDE, MAGNESIUM CHLORIDE, SODIUM PHOSPHATE, DIBASIC, AND POTASSIUM PHOSPHATE 1000 ML: .53; .5; .37; .037; .03; .012; .00082 INJECTION, SOLUTION INTRAVENOUS at 12:25

## 2022-10-05 RX ADMIN — POTASSIUM CHLORIDE 20 MEQ: 14.9 INJECTION, SOLUTION INTRAVENOUS at 14:35

## 2022-10-05 RX ADMIN — CIPROFLOXACIN 400 MG: 2 INJECTION, SOLUTION INTRAVENOUS at 16:39

## 2022-10-05 NOTE — ED NOTES
Cleared by attending buddy for patient to get NSS with Potassium       Delphine Prieto RN  10/05/22 [id number]

## 2022-10-05 NOTE — ASSESSMENT & PLAN NOTE
· 10/5/2022  · Had a head cold, sneezing and constant post nasal drip  · Since 10/2/2022  · No fevers  · No one else in the household are sick  · He states at least 10 plus episodes daily - up to 20 episodes  · He took imodium and it was found in the toilet within minutes  · He has lost approximately 30 pounds since 8/23/2022  · He has vomited twice and it was all undigested food  · Blood sugar 10/4/2022 - was 160  · He has not taken any of his medications since Monday  · Will order stool samples  · Will refer to GI   · Patient has not urinated in 24 hours  · Will send the patient to the Emergency Department for at minimum IV fluids  · I will call to the ER and let them know he is going to him

## 2022-10-05 NOTE — ASSESSMENT & PLAN NOTE
· Target blood sugar for the hospital is 140-180  ·  Oral hypoglycemics remain on hold  · Continue Accu-Cheks AC and HS with sliding scale coverage  · Will add basal Lantus if needed

## 2022-10-05 NOTE — CONSULTS
Consultation - General Surgery   Gladis Butt 59 y o  male MRN: [id number]  Unit/Bed#: ED 13 Encounter: [id number]    Assessment:  59year-old male with past medical history significant for HTN, DM2, hyperparathyroidism, remote history of legionnaire's presenting with two day history of diarrhea  Associated with nausea and two episodes of emesis  Vitals signs stable on arrival to ED  Leukocytosis of 16 17  CTAP with findings of early/partial SBO vs ileus/eneteritis  -AVSS on room air   -Leukocytosis, WBC 16 17  -Hgb stable at 16 3, likely some component of concentration secondary to dehydration   -Cr 4 44, previous baseline August 2022 1 18  -CTAP 10/05/2022 with " Findings suggesting early/partial small bowel obstruction, although ileus and enteritis are also possible "  -benign abdominal exam     Plan:  No surgical intervention necessary at this time, appears to be more enteritis than SBO  Admitted to internal medicine service   Serial abdominal exams  General surgery will continue to follow   Discussed with attending     History of Present Illness   Chief Complaint: diarrhea    HPI:  Gladis Butt is a 59 y o  male with past medical history significant for HTN, DM2, hyperparathyroidism, remote history of legionnaire's who initially presented to [facility] ED on 10/05/2022 with complaints of two day history of diarrhea  Increased frequency of episodes in the last day  Stool is watery, nonbloody, and foul smelling  Associated with nausea and two episodes of emesis  Unable to tolerate oral diet  Has not had anything to eat/drink since last night  Of note, patient also has not voided since yesterday afternoon  Upon arrival to ED, vitals stable but with leukocytosis of 16 17 and Cr 4 44  CT with evidence of early  partial SBO vs ileus/enteritis  General surgery consulted for evaluation of possible SBO  Patient otherwise denies fever, chills  Denies chest pain, palpitations, shortness of breath   Notes improvement in lower extremity edema even without taking Lasix  Past surgical history significant for dialysis catheter palcement, and L1-2 microdiscectomy  Deneis reaction to anesthesia  Review of Systems   Constitutional: Positive for appetite change  Negative for activity change, chills and fever  HENT: Negative  Respiratory: Negative for cough and shortness of breath  Cardiovascular: Negative for chest pain, palpitations and leg swelling  Gastrointestinal: Positive for diarrhea, nausea and vomiting  Negative for abdominal distention and abdominal pain  Endocrine: Negative for polydipsia, polyphagia and polyuria  Genitourinary: Positive for decreased urine volume  Negative for difficulty urinating, dysuria, frequency and urgency  Musculoskeletal: Negative for arthralgias and myalgias  Skin: Negative for color change, rash and wound  Neurological: Negative for dizziness, facial asymmetry, weakness, light-headedness and headaches         Historical Information   Past Medical History:   Diagnosis Date   • Benign arteriolar nephrosclerosis    • Essential hypertension    • History of transfusion 2013   • Hyperparathyroidism due to renal insufficiency (HCC)    • Legionnaire's disease ([facility] [address] )    • Pneumonia     s/p VDRF      Past Surgical History:   Procedure Laterality Date   • COLONOSCOPY     • OTHER SURGICAL HISTORY      Dialysis catheter    • NH LAMNOTMY INCL W/DCMPRSN NRV ROOT 1 INTRSPC LUMBR Left 11/15/2021    Procedure: Left sided L1-2 microdiscectomy;  Surgeon: Xiao Sommers MD;  Location: [facility];  Service: Neurosurgery     Social History   Social History     Substance and Sexual Activity   Alcohol Use Not Currently    Comment: Denies alcohol use - As per [employer]      Social History     Substance and Sexual Activity   Drug Use Never     E-Cigarette/Vaping   • E-Cigarette Use Never User      E-Cigarette/Vaping Substances   • Nicotine No    • THC No    • CBD No    • Flavoring No    • Other No    • Unknown No      Social History     Tobacco Use   Smoking Status Never Smoker   Smokeless Tobacco Never Used     Family History:   Family History   Problem Relation Age of Onset   • Hyperlipidemia Mother    • Lung cancer Father    • Stomach cancer Father    • Cancer Father    • Diabetes type II Father    • Diabetes type II Brother        Meds/Allergies   all current active meds have been reviewed and PTA meds:   Prior to Admission Medications   Prescriptions Last Dose Informant Patient Reported? Taking?    Multiple Vitamins-Minerals (multivitamin with minerals) tablet   Yes No   Sig: Take 1 tablet by mouth daily   carvedilol (COREG) 12 5 mg tablet   No No   Sig: Take 1 tablet (12 5 mg total) by mouth 2 (two) times a day   fexofenadine (ALLEGRA) 180 MG tablet  Self Yes No   Sig: Take 180 mg by mouth daily   glipiZIDE (GLUCOTROL) 5 mg tablet   No No   Sig: Take 1 tablet (5 mg total) by mouth daily with breakfast   lisinopril-hydrochlorothiazide (PRINZIDE,ZESTORETIC) 20-12 5 MG per tablet   No No   Sig: take 1 tablet by mouth twice a day   metroNIDAZOLE (FLAGYL) 500 mg tablet   No No   Sig: Take 1 tablet (500 mg total) by mouth every 8 (eight) hours for 20 days   semaglutide, 1 mg/dose, (Ozempic) 4 MG/3ML SOPN injection pen   No No   Sig: Inject 0 75 mL (1 mg total) under the skin once a week      Facility-Administered Medications: None     No Known Allergies    Objective   First Vitals:   Blood Pressure: 123/72 (10/05/22 1145)  Pulse: 101 (10/05/22 1145)  Temperature: 97 6 °F (36 4 °C) (10/05/22 1145)  Temp Source: Tympanic (10/05/22 1145)  Respirations: 18 (10/05/22 1145)  Height: 6' (182 9 cm) (10/05/22 1145)  Weight - Scale: (!) 142 kg (313 lb) (10/05/22 1145)  SpO2: 95 % (10/05/22 1145)    Current Vitals:   Blood Pressure: 123/72 (10/05/22 1145)  Pulse: 101 (10/05/22 1145)  Temperature: 97 6 °F (36 4 °C) (10/05/22 1145)  Temp Source: Tympanic (10/05/22 1145)  Respirations: 18 (10/05/22 1145)  Height: 6' (182 9 cm) (10/05/22 1145)  Weight - Scale: (!) 142 kg (313 lb) (10/05/22 1145)  SpO2: 95 % (10/05/22 1145)      Intake/Output Summary (Last 24 hours) at 10/5/2022 1412  Last data filed at 10/5/2022 1325  Gross per 24 hour   Intake 1000 ml   Output --   Net 1000 ml       Invasive Devices  Report    Peripheral Intravenous Line  Duration           Peripheral IV 10/05/22 Left Forearm <1 day                Physical Exam  Vitals and nursing note reviewed  Constitutional:       General: He is not in acute distress  Appearance: Normal appearance  He is obese  He is not ill-appearing or toxic-appearing  HENT:      Head: Normocephalic and atraumatic  Cardiovascular:      Rate and Rhythm: Normal rate and regular rhythm  Pulses: Normal pulses  Heart sounds: Normal heart sounds  No murmur heard  No friction rub  No gallop  Pulmonary:      Effort: Pulmonary effort is normal  No respiratory distress  Breath sounds: Normal breath sounds  No wheezing, rhonchi or rales  Abdominal:      General: Abdomen is protuberant  Bowel sounds are normal  There is no distension  Palpations: Abdomen is soft  Tenderness: There is no abdominal tenderness  There is no guarding or rebound  Musculoskeletal:         General: Normal range of motion  Right lower leg: No edema  Left lower leg: No edema  Skin:     General: Skin is warm and dry  Capillary Refill: Capillary refill takes less than 2 seconds  Neurological:      General: No focal deficit present  Mental Status: He is alert and oriented to person, place, and time  Psychiatric:         Mood and Affect: Mood normal          Behavior: Behavior normal  Behavior is cooperative  Thought Content: Thought content normal        Lab Results:   I have personally reviewed pertinent lab results      CBC:   Lab Results   Component Value Date    WBC 16 17 (H) 10/05/2022    HGB 16 3 10/05/2022    HCT 47 7 10/05/2022    MCV 85 10/05/2022    PLT 432 (H) 10/05/2022    MCH 29 2 10/05/2022    MCHC 34 2 10/05/2022    RDW 12 8 10/05/2022    MPV 9 2 10/05/2022    NRBC 0 10/05/2022     CMP:   Lab Results   Component Value Date    SODIUM 130 (L) 10/05/2022    K 2 9 (L) 10/05/2022    CL 91 (L) 10/05/2022    CO2 20 (L) 10/05/2022    BUN 42 (H) 10/05/2022    CREATININE 4 44 (H) 10/05/2022    CALCIUM 9 1 10/05/2022    AST 20 10/05/2022    ALT 31 10/05/2022    ALKPHOS 68 10/05/2022    EGFR 13 10/05/2022     Lipase:   Lab Results   Component Value Date    LIPASE 61 10/05/2022     Imaging: I have personally reviewed pertinent reports  EKG, Pathology, and Other Studies: I have personally reviewed pertinent reports  Code Status: Level 1 - Full Code  Advance Directive and Living Will:      Power of :    POLST:      Counseling / Coordination of Care  Total floor / unit time spent today 25 minutes  Greater than 50% of total time was spent with the patient and / or family counseling and / or coordination of care  A description of the counseling / coordination of care: reviewing eprtinetn diagnsotic images and laboratory studies, evaluation of patient, discussion with attending       Cassidy López PA-C

## 2022-10-05 NOTE — LETTER
October 5, 2022     Patient: Jodi Newell  YOB: 1965  Date of Visit: 10/5/2022      To Whom it May Concern:    Renetta Massey is under my professional care  Sunday Brenner was seen in my office on 10/5/2022  Sunday Elidia will be required to proceed directly to the Emergency Department for continued treatment  He will be accompanied by his wife Michela Rahman  If you have any questions or concerns, please don't hesitate to call           Sincerely,          LAURA Valdez        CC: No Recipients

## 2022-10-05 NOTE — ED PROVIDER NOTES
History  Chief Complaint   Patient presents with   • Diarrhea     Since Sunday  Vomited 2x since Sunday  • Abdominal Pain     Mid-abdominal pain with nausea  68-year-old male presents to the ED for approximately 3-4 days of severe watery diarrhea with 10-20 episodes daily along with a few episodes of vomiting yesterday  Patient states that decreased appetite just because it immediately goes right through him  States he is hungry he just does not want to eat  Denies fevers, sore throat, cough, chest pain, shortness of breath, dysuria, hematuria  Patient does state that approximately 910 years ago he was admitted to [facility] crest ICU and placed on ECMO and dialysis for legionnaires disease  Tried Imodium last night with no relief  Evaluated by his primary care doctor earlier today who started him on Flagyl however patient did not take the dose and came here at recommendation of his primary care doctor prior to picking up the medication  No obvious sick contacts  Prior to Admission Medications   Prescriptions Last Dose Informant Patient Reported? Taking?    Multiple Vitamins-Minerals (multivitamin with minerals) tablet 10/4/2022 at Unknown time  Yes Yes   Sig: Take 1 tablet by mouth daily   carvedilol (COREG) 12 5 mg tablet 10/4/2022 at Unknown time  No Yes   Sig: Take 1 tablet (12 5 mg total) by mouth 2 (two) times a day   fexofenadine (ALLEGRA) 180 MG tablet 10/4/2022 at Unknown time Self Yes Yes   Sig: Take 180 mg by mouth daily   glipiZIDE (GLUCOTROL) 5 mg tablet 10/4/2022 at Unknown time  No Yes   Sig: Take 1 tablet (5 mg total) by mouth daily with breakfast   lisinopril-hydrochlorothiazide (PRINZIDE,ZESTORETIC) 20-12 5 MG per tablet 10/4/2022 at Unknown time  No Yes   Sig: take 1 tablet by mouth twice a day   metroNIDAZOLE (FLAGYL) 500 mg tablet Not Taking at Unknown time  No No   Sig: Take 1 tablet (500 mg total) by mouth every 8 (eight) hours for 20 days   Patient not taking: Reported on 10/5/2022   semaglutide, 1 mg/dose, (Ozempic) 4 MG/3ML SOPN injection pen Past Week at Unknown time  No Yes   Sig: Inject 0 75 mL (1 mg total) under the skin once a week      Facility-Administered Medications: None       Past Medical History:   Diagnosis Date   • Benign arteriolar nephrosclerosis    • Essential hypertension    • History of transfusion 2013   • Hyperparathyroidism due to renal insufficiency (HCC)    • Legionnaire's disease ([facility] [address] )    • Pneumonia     s/p VDRF        Past Surgical History:   Procedure Laterality Date   • COLONOSCOPY     • OTHER SURGICAL HISTORY      Dialysis catheter    • MO LAMNOTMY INCL W/DCMPRSN NRV ROOT 1 INTRSPC LUMBR Left 11/15/2021    Procedure: Left sided L1-2 microdiscectomy;  Surgeon: Craig Alva MD;  Location:  MAIN OR;  Service: Neurosurgery       Family History   Problem Relation Age of Onset   • Hyperlipidemia Mother    • Lung cancer Father    • Stomach cancer Father    • Cancer Father    • Diabetes type II Father    • Diabetes type II Brother      I have reviewed and agree with the history as documented  E-Cigarette/Vaping   • E-Cigarette Use Never User      E-Cigarette/Vaping Substances   • Nicotine No    • THC No    • CBD No    • Flavoring No    • Other No    • Unknown No      Social History     Tobacco Use   • Smoking status: Never Smoker   • Smokeless tobacco: Never Used   Vaping Use   • Vaping Use: Never used   Substance Use Topics   • Alcohol use: Not Currently     Comment: Denies alcohol use - As per [employer]    • Drug use: Never       Review of Systems   Constitutional: Positive for fatigue  Gastrointestinal: Positive for diarrhea and nausea  Neurological: Positive for weakness  All other systems reviewed and are negative  Physical Exam  Physical Exam  Vitals and nursing note reviewed  Constitutional:       General: He is not in acute distress  Appearance: He is well-developed   He is not ill-appearing, toxic-appearing or diaphoretic  HENT:      Head: Normocephalic and atraumatic  Right Ear: External ear normal       Left Ear: External ear normal       Nose: Nose normal    Eyes:      General: No scleral icterus  Right eye: No discharge  Left eye: No discharge  Conjunctiva/sclera: Conjunctivae normal    Cardiovascular:      Rate and Rhythm: Normal rate and regular rhythm  Heart sounds: Normal heart sounds  No murmur heard  No friction rub  No gallop  Pulmonary:      Effort: Pulmonary effort is normal  No respiratory distress  Breath sounds: Normal breath sounds  No wheezing or rales  Abdominal:      General: Bowel sounds are normal  There is no distension  Palpations: Abdomen is soft  There is no mass  Tenderness: There is no abdominal tenderness  There is no right CVA tenderness, left CVA tenderness, guarding or rebound  Negative signs include Amaro's sign, Rovsing's sign and McBurney's sign  Musculoskeletal:         General: No tenderness or deformity  Normal range of motion  Cervical back: Normal range of motion and neck supple  Skin:     General: Skin is warm and dry  Coloration: Skin is not pale  Findings: No erythema or rash  Neurological:      Mental Status: He is alert and oriented to person, place, and time  Psychiatric:         Behavior: Behavior normal          Thought Content:  Thought content normal          Judgment: Judgment normal          Vital Signs  ED Triage Vitals [10/05/22 1145]   Temperature Pulse Respirations Blood Pressure SpO2   97 6 °F (36 4 °C) 101 18 123/72 95 %      Temp Source Heart Rate Source Patient Position - Orthostatic VS BP Location FiO2 (%)   Tympanic Monitor Sitting Left arm --      Pain Score       4           Vitals:    10/05/22 1145   BP: 123/72   Pulse: 101   Patient Position - Orthostatic VS: Sitting         Visual Acuity      ED Medications  Medications   ciprofloxacin (CIPRO) IVPB (premix in 5% dextrose) 400 mg 200 mL (has no administration in time range)   metroNIDAZOLE (FLAGYL) IVPB (premix) 500 mg 100 mL (has no administration in time range)   multi-electrolyte (ISOLYTE-S PH 7 4) bolus 1,000 mL (has no administration in time range)   potassium chloride 20 mEq IVPB (premix) (20 mEq Intravenous New Bag 10/5/22 1435)     Followed by   potassium chloride 20 mEq IVPB (premix) (has no administration in time range)   carvedilol (COREG) tablet 12 5 mg (has no administration in time range)   acetaminophen (TYLENOL) tablet 650 mg (650 mg Oral Not Given 10/5/22 1437)   ondansetron (ZOFRAN) injection 4 mg (4 mg Intravenous Not Given 10/5/22 1436)   multi-electrolyte (PLASMALYTE-A/ISOLYTE-S PH 7 4) IV solution (has no administration in time range)   loperamide (IMODIUM) capsule 2 mg (2 mg Oral Not Given 10/5/22 1437)   heparin (porcine) subcutaneous injection 7,500 Units (has no administration in time range)   multi-electrolyte (ISOLYTE-S PH 7 4) bolus 1,000 mL (0 mL Intravenous Stopped 10/5/22 1325)       Diagnostic Studies  Results Reviewed     Procedure Component Value Units Date/Time    Rotavirus antigen, stool [[id number]] Collected: 10/05/22 1405    Lab Status: In process Specimen: Stool from Rectum Updated: 10/05/22 1450    Procalcitonin [[id number]]  (Abnormal) Collected: 10/05/22 1401    Lab Status: Final result Specimen: Blood from Arm, Right Updated: 10/05/22 1441     Procalcitonin 0 57 ng/ml     Lactic acid [[id number]]  (Normal) Collected: 10/05/22 1401    Lab Status: Final result Specimen: Blood from Arm, Right Updated: 10/05/22 1431     LACTIC ACID 1 6 mmol/L     Narrative:      Result may be elevated if tourniquet was used during collection      APTT [[id number]]  (Normal) Collected: 10/05/22 1401    Lab Status: Final result Specimen: Blood from Arm, Right Updated: 10/05/22 1426     PTT 26 seconds     Protime-INR [[id number]]  (Normal) Collected: 10/05/22 1401    Lab Status: Final result Specimen: Blood from Arm, Right Updated: 10/05/22 1426     Protime 13 7 seconds      INR 1 05    HS Troponin I 4hr [[id number]]     Lab Status: No result Specimen: Blood     Clostridium difficile toxin by PCR with EIA [[id number]] Collected: 10/05/22 1405    Lab Status: In process Specimen: Stool from Per Rectum Updated: 10/05/22 1419    Giardia lamblia, EIA and Ova and Parasites Examination [[id number]] Collected: 10/05/22 1401    Lab Status: In process Specimen: Stool from Rectum Updated: 10/05/22 1419    Stool Enteric Bacterial Panel by PCR [[id number]] Collected: 10/05/22 1401    Lab Status: In process Specimen: Stool from Rectum Updated: 10/05/22 1419    White Blood Cells, Stool by Gram Stain [[id number]] Collected: 10/05/22 1401    Lab Status: In process Specimen: Stool from Rectum Updated: 10/05/22 1419    Platelet count [[id number]]     Lab Status: No result Specimen: Blood     Osmolality-"If this is regarding a toxic alcohol, please STOP and consult medical  for further guidance " [[id number]] Collected: 10/05/22 1401    Lab Status: In process Specimen: Blood from Arm, Right Updated: 10/05/22 1411    Blood culture #2 [[id number]] Collected: 10/05/22 1401    Lab Status: In process Specimen: Blood from Arm, Right Updated: 10/05/22 1410    Blood culture #1 [[id number]] Collected: 10/05/22 1401    Lab Status:  In process Specimen: Blood from Arm, Left Updated: 10/05/22 1410    Legionella culture [[id number]]     Lab Status: No result     Yersinia culture, stool [[id number]]     Lab Status: No result Specimen: Stool     Vibrio culture, stool [[id number]]     Lab Status: No result Specimen: Stool     Osmolality, urine [[id number]]     Lab Status: No result Specimen: Urine     Creatinine, urine, random [[id number]]     Lab Status: No result Specimen: Urine     Sodium, urine, random [[id number]]     Lab Status: No result Specimen: Urine     HS Troponin 0hr (reflex protocol) [[id number]]  (Normal) Collected: 10/05/22 1220    Lab Status: Final result Specimen: Blood from Arm, Left Updated: 10/05/22 1252     hs TnI 0hr 6 ng/L     HS Troponin I 2hr [[id number]]     Lab Status: No result Specimen: Blood     Comprehensive metabolic panel [[id number]]  (Abnormal) Collected: 10/05/22 1220    Lab Status: Final result Specimen: Blood from Arm, Left Updated: 10/05/22 1245     Sodium 130 mmol/L      Potassium 2 9 mmol/L      Chloride 91 mmol/L      CO2 20 mmol/L      ANION GAP 19 mmol/L      BUN 42 mg/dL      Creatinine 4 44 mg/dL      Glucose 169 mg/dL      Calcium 9 1 mg/dL      AST 20 U/L      ALT 31 U/L      Alkaline Phosphatase 68 U/L      Total Protein 8 0 g/dL      Albumin 4 4 g/dL      Total Bilirubin 1 03 mg/dL      eGFR 13 ml/min/1 73sq m     Narrative:      [place] guidelines for Chronic Kidney Disease (CKD):   •  Stage 1 with normal or high GFR (GFR > 90 mL/min/1 73 square meters)  •  Stage 2 Mild CKD (GFR = 60-89 mL/min/1 73 square meters)  •  Stage 3A Moderate CKD (GFR = 45-59 mL/min/1 73 square meters)  •  Stage 3B Moderate CKD (GFR = 30-44 mL/min/1 73 square meters)  •  Stage 4 Severe CKD (GFR = 15-29 mL/min/1 73 square meters)  •  Stage 5 End Stage CKD (GFR <15 mL/min/1 73 square meters)  Note: GFR calculation is accurate only with a steady state creatinine    Lipase [[id number]]  (Normal) Collected: 10/05/22 1220    Lab Status: Final result Specimen: Blood from Arm, Left Updated: 10/05/22 1245     Lipase 61 u/L     Magnesium [[id number]]  (Normal) Collected: 10/05/22 1220    Lab Status: Final result Specimen: Blood from Arm, Left Updated: 10/05/22 1244     Magnesium 1 9 mg/dL     CBC and differential [[id number]]  (Abnormal) Collected: 10/05/22 1220    Lab Status: Final result Specimen: Blood from Arm, Left Updated: 10/05/22 1228     WBC 16 17 Thousand/uL      RBC 5 59 Million/uL      Hemoglobin 16 3 g/dL      Hematocrit 47 7 %      MCV 85 fL      MCH 29 2 pg      MCHC 34 2 g/dL      RDW 12 8 %      MPV 9 2 fL      Platelets 780 Thousands/uL      nRBC 0 /100 WBCs      Neutrophils Relative 82 %      Immat GRANS % 1 %      Lymphocytes Relative 11 %      Monocytes Relative 6 %      Eosinophils Relative 0 %      Basophils Relative 0 %      Neutrophils Absolute 13 14 Thousands/µL      Immature Grans Absolute 0 09 Thousand/uL      Lymphocytes Absolute 1 83 Thousands/µL      Monocytes Absolute 1 02 Thousand/µL      Eosinophils Absolute 0 04 Thousand/µL      Basophils Absolute 0 05 Thousands/µL                  CT abdomen pelvis wo contrast   Final Result by Coleman Boles MD (10/05 1328)      Findings suggesting early/partial small bowel obstruction, although ileus and enteritis are also possible  The study was marked in [facility] for immediate notification        Workstation performed: [id number]                    Procedures  ECG 12 Lead Documentation Only    Date/Time: 10/5/2022 3:38 PM  Performed by: Kelly Paulino DO  Authorized by: Kelly Paulino DO     Interpretation:     Interpretation: abnormal    Rate:     ECG rate:  91    ECG rate assessment: normal    Rhythm:     Rhythm: sinus rhythm    Ectopy:     Ectopy: PVCs      PVCs:  Infrequent and unifocal  QRS:     QRS axis:  Left    QRS intervals:  Normal  Conduction:     Conduction: normal    ST segments:     ST segments:  Normal  T waves:     T waves: normal    CriticalCare Time  Performed by: Kelly Paulino DO  Authorized by: Kelly Paulino DO     Critical care provider statement:     Critical care time (minutes):  45    Critical care time was exclusive of:  Separately billable procedures and treating other patients    Critical care was necessary to treat or prevent imminent or life-threatening deterioration of the following conditions:  Renal failure, dehydration and sepsis    Critical care was time spent personally by me on the following activities:  Blood draw for specimens, obtaining history from patient or surrogate, development of treatment plan with patient or surrogate, discussions with consultants, evaluation of patient's response to treatment, examination of patient, review of old charts, re-evaluation of patient's condition, ordering and review of radiographic studies, ordering and review of laboratory studies, ordering and performing treatments and interventions and interpretation of cardiac output measurements    I assumed direction of critical care for this patient from another provider in my specialty: no               ED Course             HEART Risk Score    Flowsheet Row Most Recent Value   Heart Score Risk Calculator    History --   ECG 0 Filed at: 10/05/2022 1541   Age 1 Filed at: 10/05/2022 1541   Risk Factors 2 Filed at: 10/05/2022 1541   Troponin 0 Filed at: 10/05/2022 1541   HEART Score --                                      MDM  Number of Diagnoses or Management Options  Diagnosis management comments: 70-year-old male presenting for persistent diarrhea with no obvious sick contacts  With also having generalized weakness and fatigue  Initial blood work showing significant LIAM with concern for acute renal failure  Discussed case with Nephrology and administered IV fluids  Sent stool cultures  Replete electrolytes and started patient on Cipro Flagyl        Disposition  Final diagnoses:   Diarrhea of presumed infectious origin   SBO (small bowel obstruction) ([facility] [address] )     Time reflects when diagnosis was documented in both MDM as applicable and the Disposition within this note     Time User Action Codes Description Comment    10/5/2022  2:04 PM Chris Banks [R19 [id number]] Diarrhea of presumed infectious origin     10/5/2022  2:12 PM Chris Banks [[id number]] SBO (small bowel obstruction) [facility])       ED Disposition     None      Follow-up Information    None         Current Discharge Medication List      CONTINUE these medications which have NOT CHANGED    Details   carvedilol (COREG) 12 5 mg tablet Take 1 tablet (12 5 mg total) by mouth 2 (two) times a day  Qty: 180 tablet, Refills: 1    Associated Diagnoses: Essential hypertension      fexofenadine (ALLEGRA) 180 MG tablet Take 180 mg by mouth daily      glipiZIDE (GLUCOTROL) 5 mg tablet Take 1 tablet (5 mg total) by mouth daily with breakfast  Qty: 90 tablet, Refills: 3    Associated Diagnoses: Type 2 diabetes mellitus with stage 2 chronic kidney disease, without long-term current use of insulin ([facility])      lisinopril-hydrochlorothiazide (PRINZIDE,ZESTORETIC) 20-12 5 MG per tablet take 1 tablet by mouth twice a day  Qty: 180 tablet, Refills: 3    Associated Diagnoses: Essential hypertension      Multiple Vitamins-Minerals (multivitamin with minerals) tablet Take 1 tablet by mouth daily      semaglutide, 1 mg/dose, (Ozempic) 4 MG/3ML SOPN injection pen Inject 0 75 mL (1 mg total) under the skin once a week  Qty: 15 mL, Refills: 3    Associated Diagnoses: Type 2 diabetes mellitus with stage 2 chronic kidney disease, without long-term current use of insulin ([facility])      metroNIDAZOLE (FLAGYL) 500 mg tablet Take 1 tablet (500 mg total) by mouth every 8 (eight) hours for 20 days  Qty: 60 tablet, Refills: 0    Associated Diagnoses: Diarrhea of presumed infectious origin             No discharge procedures on file      PDMP Review     None          ED Provider  Electronically Signed by           Nora Steele DO  10/05/22 0990

## 2022-10-05 NOTE — ASSESSMENT & PLAN NOTE
· Currently resolved  · Patient has had no recurrent bowel movements for over 12 hours  · Testing for rotavirus, Clostridium difficile, Salmonella, Shigella, Campylobacter, and shiga toxin is negative  Fecal leukocytes are negative    Giardia lamblia, and ova and parasites examination is pending  · Blood cultures x2 sets are pending  · Appreciate GI input  · Mild bump in procalcitonin noted, however will hold off on further antibiotics  · White blood cell count has improved, 16 17 at time of arrival, currently 12 91  · Continue close monitoring

## 2022-10-05 NOTE — PROGRESS NOTES
Assessment/Plan:     Problem List Items Addressed This Visit        Digestive    Diarrhea of presumed infectious origin - Primary     10/5/2022  Had a head cold, sneezing and constant post nasal drip  Since 10/2/2022  No fevers  No one else in the household are sick  He states at least 10 plus episodes daily - up to 20 episodes  He took imodium and it was found in the toilet within minutes  He has lost approximately 30 pounds since 8/23/2022  He has vomited twice and it was all undigested food  Blood sugar 10/4/2022 - was 160  He has not taken any of his medications since Monday  Will order stool samples  Will refer to GI   Patient has not urinated in 24 hours  Will send the patient to the Emergency Department for at minimum IV fluids  I will call to the ER and let them know he is going to him  Relevant Medications    metroNIDAZOLE (FLAGYL) 500 mg tablet    Other Relevant Orders    Stool Enteric Bacterial Panel by PCR    Stool culture    White Blood Cells, Stool by Gram Stain    Clostridium difficile toxin by PCR    Giardia antigen    H  pylori antigen, stool    Rotavirus antigen, stool    Vibrio culture, stool    Yersinia culture, stool    Camylobacter Culture    CBC and differential    Comprehensive metabolic panel    Giardia, EIA, Ova/Parasite    Ambulatory Referral to Gastroenterology          Subjective:      Patient ID: Marty Lewis is a 59 y o  male  The patient is here today to discuss his diarrhea  Please continue to the [facility] section of the note for details of today's visit          The following portions of the patient's history were reviewed and updated as appropriate:     Past Medical History:  He has a past medical history of Benign arteriolar nephrosclerosis, Essential hypertension, History of transfusion (2013), Hyperparathyroidism due to renal insufficiency ([facility] [address] Legionnaire's disease ([facility] [address] ), and Pneumonia  ,  _______________________________________________________________________  Medical Problems:  does not have any pertinent problems on file ,  _______________________________________________________________________  Past Surgical History:   has a past surgical history that includes Other surgical history; Colonoscopy; and pr lamnotmy incl w/dcmprsn nrv root 1 intrspc lumbr (Left, 11/15/2021)  ,  _______________________________________________________________________  Family History:  family history includes Cancer in his father; Diabetes type II in his brother and father; Hyperlipidemia in his mother; Lung cancer in his father; Stomach cancer in his father ,  _______________________________________________________________________  Social History:   reports that he has never smoked  He has never used smokeless tobacco  He reports previous alcohol use  He reports that he does not use drugs  ,  _______________________________________________________________________  Allergies:  has No Known Allergies     _______________________________________________________________________  Current Outpatient Medications   Medication Sig Dispense Refill    carvedilol (COREG) 12 5 mg tablet Take 1 tablet (12 5 mg total) by mouth 2 (two) times a day 180 tablet 1    fexofenadine (ALLEGRA) 180 MG tablet Take 180 mg by mouth daily      glipiZIDE (GLUCOTROL) 5 mg tablet Take 1 tablet (5 mg total) by mouth daily with breakfast 90 tablet 3    lisinopril-hydrochlorothiazide (PRINZIDE,ZESTORETIC) 20-12 5 MG per tablet take 1 tablet by mouth twice a day 180 tablet 3    metroNIDAZOLE (FLAGYL) 500 mg tablet Take 1 tablet (500 mg total) by mouth every 8 (eight) hours for 20 days 60 tablet 0    Multiple Vitamins-Minerals (multivitamin with minerals) tablet Take 1 tablet by mouth daily      semaglutide, 1 mg/dose, (Ozempic) 4 MG/3ML SOPN injection pen Inject 0 75 mL (1 mg total) under the skin once a week 15 mL 3     No current facility-administered medications for this visit      _______________________________________________________________________      Review of Systems   Gastrointestinal: Positive for diarrhea  Objective:  Vitals:    10/05/22 1031   BP: 116/72   Pulse: 99   Temp: (!) 97 2 °F (36 2 °C)   SpO2: 95%   Weight: (!) 142 kg (313 lb)   Height: 6' (1 829 m)     Body mass index is 42 45 kg/m²  Physical Exam  Abdominal:      General: Bowel sounds are increased        Comments: Severe diarrhea

## 2022-10-05 NOTE — ASSESSMENT & PLAN NOTE
· Acute kidney injury was present on admission   · Baseline creatinine approximately 1 2  · Arrival creatinine 4 44, status post treatment with IV fluids, current creatinine 4 28  · Probably multifactorial, pre renal in the setting of losing a lot of volume because of extensive history of recent diarrhea, as well as the possibility of an acute tubular necrosis in the setting of having infectious enteritis  · Continue IV fluids - Plasmalyte 125 cc an hour  · Await Nephrology input  · Will check a renal ultrasound  · Hyponatremia-most likely a hypovolemic hyponatremia which is improving with IV fluids, continue the same  · Hypokalemia:-has improved, will further replete IV and recheck  · Repeat BMP in the a m   used

## 2022-10-05 NOTE — H&P
[address]  H&P- Edilson Arriaga 1965, 59 y o  male MRN: [id number]  Unit/Bed#: ED 13 Encounter: [id number]  Primary Care Provider: LAURA Marcelino   Date and time admitted to hospital: 10/5/2022 11:48 AM    * LIAM (acute kidney injury) [facility])  Assessment & Plan  · Present on admission as evidenced by creatinine of 4 44 up from baseline of approximately 1 2  · Likely secondary to profuse diarrhea resulting in prerenal azotemia  · IV fluid hydration with Isolyte at 125 cc/hour  · Status post 2 boluses of IV fluids in the ED  · Nephrology consult ; appreciate recommendations  · Trend BMP  · Avoid nephrotoxic agents  · Urinary retention protocol  · Strict intake and output  · Daily standing weights    Diarrhea of presumed infectious origin  Assessment & Plan  · Has been experiencing severe diarrhea of up to 10 episodes daily  · Has a history of legionnaires disease with diffuse diarrhea  · Resulting in acute kidney injury and dehydration  · Started on ciprofloxacin and Flagyl in the ED  · Stool studies pending  · No other sick contacts  · Imodium seems to be ineffective however will trial while inpatient  · Will consult Gastroenterology    Essential hypertension  Assessment & Plan  · Blood pressures are stable  · Continue home carvedilol  · Hold home HCTZ and lisinopril in the setting of acute kidney injury  · Monitor blood pressures    Type 2 diabetes mellitus with stage 2 chronic kidney disease, without long-term current use of insulin ([facility] )  Assessment & Plan  · Fairly well controlled on oral hypoglycemic agents  · Sliding scale insulin while inpatient with Accu-Cheks  · Diabetic diet    Electrolyte abnormality  Assessment & Plan  · Present on admission likely secondary to profuse diarrhea  · Trend and replete electrolytes as needed    SBO (small bowel obstruction) ([facility] )  Assessment & Plan  · Possible partial small-bowel obstruction seen on imaging  · General surgery consultation ; appreciate recommendations    VTE Prophylaxis:  Heparin  Code Status:  Level 1 full code    Anticipated Length of Stay:  Patient will be admitted on an Inpatient basis with an anticipated length of stay of  2 midnights  Justification for Hospital Stay: LIAM    Total Time for Visit, including Counseling / Coordination of Care: 60 minutes  Greater than 50% of this total time spent on direct patient counseling and coordination of care  Chief Complaint:  Diarrhea      History of Present Illness:    Tate Traylor is a 59 y o  male with a past medical history significant for hypertension, type 2 diabetes mellitus, chronic diarrhea who presents with complaints of persistent nonbloody diarrhea  He was seen earlier today in the outpatient setting and was strongly encouraged to present to the ED for further evaluation  In the ED, the patient was found to have an acute kidney injury of 4 4 for up from a baseline of approximately 1 2  He is otherwise hemodynamically stable and saturating well on room air  Review of Systems:    Review of Systems   Constitutional: Negative for chills and fever  HENT: Negative for ear pain and sore throat  Eyes: Negative for pain and visual disturbance  Respiratory: Negative for cough and shortness of breath  Cardiovascular: Negative for chest pain and palpitations  Gastrointestinal: Positive for diarrhea  Negative for abdominal pain and vomiting  Genitourinary: Negative for dysuria and hematuria  Musculoskeletal: Negative for arthralgias and back pain  Skin: Negative for color change and rash  Neurological: Negative for seizures and syncope  All other systems reviewed and are negative        Past Medical and Surgical History:     Past Medical History:   Diagnosis Date   • Benign arteriolar nephrosclerosis    • Essential hypertension    • History of transfusion 2013   • Hyperparathyroidism due to renal insufficiency ([facility] )    • Legionnaire's disease ([facility] )    • Pneumonia     s/p VDRF        Past Surgical History:   Procedure Laterality Date   • COLONOSCOPY     • OTHER SURGICAL HISTORY      Dialysis catheter    • AL LAMNOTMY INCL W/DCMPRSN NRV ROOT 1 INTRSPC LUMBR Left 11/15/2021    Procedure: Left sided L1-2 microdiscectomy;  Surgeon: Lexie Osman MD;  Location:  MAIN OR;  Service: Neurosurgery       Meds/Allergies:    Prior to Admission medications    Medication Sig Start Date End Date Taking?  Authorizing Provider   carvedilol (COREG) 12 5 mg tablet Take 1 tablet (12 5 mg total) by mouth 2 (two) times a day 5/28/22 10/5/22  LAURA Chau   fexofenadine (ALLEGRA) 180 MG tablet Take 180 mg by mouth daily    Historical Provider, MD   glipiZIDE (GLUCOTROL) 5 mg tablet Take 1 tablet (5 mg total) by mouth daily with breakfast 8/23/22   LAURA Low   lisinopril-hydrochlorothiazide (PRINZIDE,ZESTORETIC) 20-12 5 MG per tablet take 1 tablet by mouth twice a day 10/5/21   Patel Ramirez DO   metroNIDAZOLE (FLAGYL) 500 mg tablet Take 1 tablet (500 mg total) by mouth every 8 (eight) hours for 20 days 10/5/22 10/25/22  LAURA Heath   Multiple Vitamins-Minerals (multivitamin with minerals) tablet Take 1 tablet by mouth daily    Historical Provider, MD   semaglutide, 1 mg/dose, (Ozempic) 4 MG/3ML SOPN injection pen Inject 0 75 mL (1 mg total) under the skin once a week 8/23/22   LAURA Low       Allergies: No Known Allergies    Social History:     Marital Status: /Civil Union   Substance Use History:   Social History     Substance and Sexual Activity   Alcohol Use Not Currently    Comment: Denies alcohol use - As per [employer]      Social History     Tobacco Use   Smoking Status Never Smoker   Smokeless Tobacco Never Used     Social History     Substance and Sexual Activity   Drug Use Never       Family History:    Pertinent family history reviewed    Physical Exam:     Vitals:   Blood Pressure: 123/72 (10/05/22 1145)  Pulse: 101 (10/05/22 1145)  Temperature: 97 6 °F (36 4 °C) (10/05/22 1145)  Temp Source: Tympanic (10/05/22 1145)  Respirations: 18 (10/05/22 1145)  Height: 6' (182 9 cm) (10/05/22 1145)  Weight - Scale: (!) 142 kg (313 lb) (10/05/22 1145)  SpO2: 95 % (10/05/22 1145)    Physical Exam  Vitals and nursing note reviewed  Constitutional:       Appearance: He is well-developed  He is obese  HENT:      Head: Normocephalic and atraumatic  Eyes:      Conjunctiva/sclera: Conjunctivae normal    Cardiovascular:      Rate and Rhythm: Normal rate and regular rhythm  Heart sounds: No murmur heard  Pulmonary:      Effort: Pulmonary effort is normal  No respiratory distress  Breath sounds: Normal breath sounds  Abdominal:      Palpations: Abdomen is soft  Tenderness: There is no abdominal tenderness  Musculoskeletal:      Cervical back: Neck supple  Skin:     General: Skin is warm and dry  Neurological:      Mental Status: He is alert  Additional Data:     Lab Results: I have reviewed pertinent results     Results from last 7 days   Lab Units 10/05/22  1220   WBC Thousand/uL 16 17*   HEMOGLOBIN g/dL 16 3   HEMATOCRIT % 47 7   PLATELETS Thousands/uL 432*   NEUTROS PCT % 82*   LYMPHS PCT % 11*   MONOS PCT % 6   EOS PCT % 0     Results from last 7 days   Lab Units 10/05/22  1220   SODIUM mmol/L 130*   POTASSIUM mmol/L 2 9*   CHLORIDE mmol/L 91*   CO2 mmol/L 20*   BUN mg/dL 42*   CREATININE mg/dL 4 44*   ANION GAP mmol/L 19*   CALCIUM mg/dL 9 1   ALBUMIN g/dL 4 4   TOTAL BILIRUBIN mg/dL 1 03*   ALK PHOS U/L 68   ALT U/L 31   AST U/L 20   GLUCOSE RANDOM mg/dL 169*                       Imaging: I have reviewed pertinent imaging     CT abdomen pelvis wo contrast   Final Result by Parish Potter MD (10/05 1328)      Findings suggesting early/partial small bowel obstruction, although ileus and enteritis are also possible  The study was marked in [facility] for immediate notification        Workstation performed: [id number]             EKG, Pathology, and Other Studies Reviewed on Admission:   · EKG: Reviewed     Allscripts / Epic Records Reviewed    ** Please Note: This note has been constructed using a voice recognition system   **

## 2022-10-05 NOTE — ASSESSMENT & PLAN NOTE
· Present on admission likely secondary to profuse diarrhea  · Trend and replete electrolytes as needed

## 2022-10-05 NOTE — ASSESSMENT & PLAN NOTE
· Blood pressures are stable  · Continue home carvedilol  · Hold home HCTZ and lisinopril in the setting of acute kidney injury  · Monitor blood pressures

## 2022-10-05 NOTE — ASSESSMENT & PLAN NOTE
· Fairly well controlled on oral hypoglycemic agents  · Sliding scale insulin while inpatient with Accu-Cheks  · Diabetic diet

## 2022-10-05 NOTE — PATIENT INSTRUCTIONS
Problem List Items Addressed This Visit          Digestive    Diarrhea of presumed infectious origin - Primary     10/5/2022  Had a head cold, sneezing and constant post nasal drip  Since 10/2/2022  No fevers  No one else in the household are sick  He states at least 10 plus episodes daily - up to 20 episodes  He took imodium and it was found in the toilet within minutes  He has lost approximately 30 pounds since 8/23/2022  He has vomited twice and it was all undigested food  Blood sugar 10/4/2022 - was 160  He has not taken any of his medications since Monday  Will order stool samples  Will refer to GI   Patient has not urinated in 24 hours  Will send the patient to the Emergency Department for at minimum IV fluids  I will call to the ER and let them know he is going to him

## 2022-10-05 NOTE — ASSESSMENT & PLAN NOTE
· Status post a general surgical consultation  · Patient has been cleared from this standpoint  · General surgery has signed off  · Continue close monitoring

## 2022-10-05 NOTE — NURSING NOTE
Pt admitted to room 212 from the er, new iv site started, antibiotics given, fluid bolus given, pt in bed at this time in no acute distress watching tv

## 2022-10-05 NOTE — ASSESSMENT & PLAN NOTE
· Present on admission as evidenced by creatinine of 4 44 up from baseline of approximately 1 2  · Likely secondary to profuse diarrhea resulting in prerenal azotemia  · IV fluid hydration with Isolyte at 125 cc/hour  · Status post 2 boluses of IV fluids in the ED  · Nephrology consult ; appreciate recommendations  · Trend BMP  · Avoid nephrotoxic agents  · Urinary retention protocol  · Strict intake and output  · Daily standing weights

## 2022-10-05 NOTE — ASSESSMENT & PLAN NOTE
· Blood pressures are stable  · Continue home carvedilol, lisinopril, hydrochlorothiazide  · Monitor blood pressures

## 2022-10-05 NOTE — ASSESSMENT & PLAN NOTE
· Has been experiencing severe diarrhea of up to 10 episodes daily  · Has a history of legionnaires disease with diffuse diarrhea  · Resulting in acute kidney injury and dehydration  · Started on ciprofloxacin and Flagyl in the ED  · Stool studies pending  · No other sick contacts  · Imodium seems to be ineffective however will trial while inpatient  · Will consult Gastroenterology

## 2022-10-05 NOTE — PROGRESS NOTES
Attempted to call to the [place] [id number] Emergency Department, but my phone call was never answered

## 2022-10-06 ENCOUNTER — APPOINTMENT (OUTPATIENT)
Dept: ULTRASOUND IMAGING | Facility: HOSPITAL | Age: 57
DRG: 393 | End: 2022-10-06
Payer: COMMERCIAL

## 2022-10-06 LAB
ANION GAP SERPL CALCULATED.3IONS-SCNC: 15 MMOL/L (ref 4–13)
ATRIAL RATE: 95 BPM
ATRIAL RATE: 96 BPM
BASOPHILS # BLD AUTO: 0.04 THOUSANDS/ÂΜL (ref 0–0.1)
BASOPHILS NFR BLD AUTO: 0 % (ref 0–1)
BUN SERPL-MCNC: 52 MG/DL (ref 5–25)
C DIFF TOX GENS STL QL NAA+PROBE: NEGATIVE
CALCIUM SERPL-MCNC: 7.7 MG/DL (ref 8.4–10.2)
CAMPYLOBACTER DNA SPEC NAA+PROBE: NORMAL
CHLORIDE SERPL-SCNC: 95 MMOL/L (ref 96–108)
CO2 SERPL-SCNC: 23 MMOL/L (ref 21–32)
CREAT SERPL-MCNC: 4.28 MG/DL (ref 0.6–1.3)
CREAT UR-MCNC: 348 MG/DL
EOSINOPHIL # BLD AUTO: 0.13 THOUSAND/ÂΜL (ref 0–0.61)
EOSINOPHIL NFR BLD AUTO: 1 % (ref 0–6)
ERYTHROCYTE [DISTWIDTH] IN BLOOD BY AUTOMATED COUNT: 13 % (ref 11.6–15.1)
GFR SERPL CREATININE-BSD FRML MDRD: 14 ML/MIN/1.73SQ M
GLUCOSE SERPL-MCNC: 113 MG/DL (ref 65–140)
GLUCOSE SERPL-MCNC: 117 MG/DL (ref 65–140)
GLUCOSE SERPL-MCNC: 126 MG/DL (ref 65–140)
HCT VFR BLD AUTO: 40.9 % (ref 36.5–49.3)
HGB BLD-MCNC: 13.9 G/DL (ref 12–17)
IMM GRANULOCYTES # BLD AUTO: 0.06 THOUSAND/UL (ref 0–0.2)
IMM GRANULOCYTES NFR BLD AUTO: 1 % (ref 0–2)
LYMPHOCYTES # BLD AUTO: 2.12 THOUSANDS/ÂΜL (ref 0.6–4.47)
LYMPHOCYTES NFR BLD AUTO: 16 % (ref 14–44)
MCH RBC QN AUTO: 29.3 PG (ref 26.8–34.3)
MCHC RBC AUTO-ENTMCNC: 34 G/DL (ref 31.4–37.4)
MCV RBC AUTO: 86 FL (ref 82–98)
MONOCYTES # BLD AUTO: 0.91 THOUSAND/ÂΜL (ref 0.17–1.22)
MONOCYTES NFR BLD AUTO: 7 % (ref 4–12)
NEUTROPHILS # BLD AUTO: 9.65 THOUSANDS/ÂΜL (ref 1.85–7.62)
NEUTS SEG NFR BLD AUTO: 75 % (ref 43–75)
NRBC BLD AUTO-RTO: 0 /100 WBCS
OSMOLALITY UR/SERPL-RTO: 295 MMOL/KG (ref 282–298)
OSMOLALITY UR: 372 MMOL/KG
P AXIS: 49 DEGREES
P AXIS: 50 DEGREES
PLATELET # BLD AUTO: 314 THOUSANDS/UL (ref 149–390)
PMV BLD AUTO: 9 FL (ref 8.9–12.7)
POTASSIUM SERPL-SCNC: 3 MMOL/L (ref 3.5–5.3)
PR INTERVAL: 168 MS
PR INTERVAL: 172 MS
QRS AXIS: 114 DEGREES
QRS AXIS: 118 DEGREES
QRSD INTERVAL: 92 MS
QRSD INTERVAL: 94 MS
QT INTERVAL: 380 MS
QT INTERVAL: 402 MS
QTC INTERVAL: 480 MS
QTC INTERVAL: 505 MS
RBC # BLD AUTO: 4.75 MILLION/UL (ref 3.88–5.62)
RV AG STL QL: NEGATIVE
SALMONELLA DNA SPEC QL NAA+PROBE: NORMAL
SHIGA TOXIN STX GENE SPEC NAA+PROBE: NORMAL
SHIGELLA DNA SPEC QL NAA+PROBE: NORMAL
SODIUM 24H UR-SCNC: 9 MOL/L
SODIUM SERPL-SCNC: 133 MMOL/L (ref 135–147)
T WAVE AXIS: -2 DEGREES
T WAVE AXIS: 2 DEGREES
VENTRICULAR RATE: 95 BPM
VENTRICULAR RATE: 96 BPM
WBC # BLD AUTO: 12.91 THOUSAND/UL (ref 4.31–10.16)
WBC STL QL MICRO: NORMAL

## 2022-10-06 PROCEDURE — 99232 SBSQ HOSP IP/OBS MODERATE 35: CPT | Performed by: HOSPITALIST

## 2022-10-06 PROCEDURE — 99232 SBSQ HOSP IP/OBS MODERATE 35: CPT | Performed by: SURGERY

## 2022-10-06 PROCEDURE — 80048 BASIC METABOLIC PNL TOTAL CA: CPT | Performed by: INTERNAL MEDICINE

## 2022-10-06 PROCEDURE — 99222 1ST HOSP IP/OBS MODERATE 55: CPT | Performed by: INTERNAL MEDICINE

## 2022-10-06 PROCEDURE — 85025 COMPLETE CBC W/AUTO DIFF WBC: CPT | Performed by: INTERNAL MEDICINE

## 2022-10-06 PROCEDURE — 83935 ASSAY OF URINE OSMOLALITY: CPT | Performed by: INTERNAL MEDICINE

## 2022-10-06 PROCEDURE — 84300 ASSAY OF URINE SODIUM: CPT | Performed by: INTERNAL MEDICINE

## 2022-10-06 PROCEDURE — 82570 ASSAY OF URINE CREATININE: CPT | Performed by: INTERNAL MEDICINE

## 2022-10-06 PROCEDURE — 93010 ELECTROCARDIOGRAM REPORT: CPT | Performed by: INTERNAL MEDICINE

## 2022-10-06 PROCEDURE — 82948 REAGENT STRIP/BLOOD GLUCOSE: CPT

## 2022-10-06 PROCEDURE — NC001 PR NO CHARGE: Performed by: INTERNAL MEDICINE

## 2022-10-06 PROCEDURE — 76775 US EXAM ABDO BACK WALL LIM: CPT

## 2022-10-06 RX ORDER — INSULIN LISPRO 100 [IU]/ML
2-12 INJECTION, SOLUTION INTRAVENOUS; SUBCUTANEOUS
Status: DISCONTINUED | OUTPATIENT
Start: 2022-10-06 | End: 2022-10-07 | Stop reason: HOSPADM

## 2022-10-06 RX ORDER — POTASSIUM CHLORIDE 14.9 MG/ML
20 INJECTION INTRAVENOUS
Status: COMPLETED | OUTPATIENT
Start: 2022-10-06 | End: 2022-10-07

## 2022-10-06 RX ADMIN — SODIUM CHLORIDE, SODIUM GLUCONATE, SODIUM ACETATE, POTASSIUM CHLORIDE AND MAGNESIUM CHLORIDE 125 ML/HR: 526; 502; 368; 37; 30 INJECTION, SOLUTION INTRAVENOUS at 19:57

## 2022-10-06 RX ADMIN — HEPARIN SODIUM 7500 UNITS: 5000 INJECTION INTRAVENOUS; SUBCUTANEOUS at 05:52

## 2022-10-06 RX ADMIN — POTASSIUM CHLORIDE 20 MEQ: 14.9 INJECTION, SOLUTION INTRAVENOUS at 16:02

## 2022-10-06 RX ADMIN — SODIUM CHLORIDE, SODIUM GLUCONATE, SODIUM ACETATE, POTASSIUM CHLORIDE AND MAGNESIUM CHLORIDE 125 ML/HR: 526; 502; 368; 37; 30 INJECTION, SOLUTION INTRAVENOUS at 12:26

## 2022-10-06 RX ADMIN — HEPARIN SODIUM 7500 UNITS: 5000 INJECTION INTRAVENOUS; SUBCUTANEOUS at 14:33

## 2022-10-06 RX ADMIN — HEPARIN SODIUM 7500 UNITS: 5000 INJECTION INTRAVENOUS; SUBCUTANEOUS at 21:41

## 2022-10-06 RX ADMIN — METRONIDAZOLE 500 MG: 500 INJECTION, SOLUTION INTRAVENOUS at 14:34

## 2022-10-06 RX ADMIN — METRONIDAZOLE 500 MG: 500 INJECTION, SOLUTION INTRAVENOUS at 05:52

## 2022-10-06 RX ADMIN — POTASSIUM CHLORIDE 20 MEQ: 14.9 INJECTION, SOLUTION INTRAVENOUS at 18:16

## 2022-10-06 NOTE — CONSULTS
Consultation - [facility]  Marty Lewis 59 y o  male MRN: [id number]  Unit/Bed#: -01 Encounter: [id number]        Inpatient consult to gastroenterology  Consult performed by: Nahed Vivar PA-C  Consult ordered by: Clyde Gutierrez DO        Reason for Consult / Principal Problem:     "diarrhea presumed infectious origin"    ASSESSMENT AND PLAN:      60 y/o M w/ pmhx sig for DM2, CKD, HTN, obesity, who presented to ER with acute onset of n/v/d  Non-bloody in nature  Found to have leukocytosis on admit (WBC 16 17) as well as LIAM  Hyponatremic and hypokalemic on admission  Non-con CT commented on scattered dilated and fluid filled small bowel loops  Pt was started on IV fluids, anti-emetics, and empirically started on cipro/flagyl  Acute diarrheal illness, suspected infectious etiol    · Given the nature and acuity of presentation, as well as objective findings on admission to include leukocytosis, LIAM/dehydration, and suspected enteritis on CT scan, suspect patient has developed an acute infectious gastroenteritis  · Clinically at the time of my examination, there is no evidence of small-bowel obstruction  · Agree with checking infectious stool studies, they are in process and will follow-up on the results  Follow up on blood cultures  · Agree with continued IV fluid hydration, electrolyte repletion and management of metabolic derangements, as well as continuing with supportive care to include antiemetics as needed  · Would recommend holding off on antidiarrheal medications until stool studies have returned  · No strong indication for empiric antibiotic therapy from a GI standpoint  · Okay for diet as is tolerated, would start with low residue/bland diet  Personal history of colon polyps     · Discussed at bedside that patient should have eventual outpatient screening colonoscopy given his personal history  GI will continue to follow at this time  ______________________________________________________________________    HPI: Patient is a 59 y o  male with past medical history significant for DM2, CKD, HTN, obesity, seasonal allergies, remote hx of legionnaires' dz in 2013  Patient presented to the emergency department at the recommendation of his PCP, after developing intractable diarrhea for several days  He shares that he was in his usual state the early hours of 10/03/2022, when he was awoken from sleep with urgency and watery diarrhea  He initially had nausea and nonbloody emesis that time as well  Continued to have innumerable amounts of urgent watery diarrhea throughout the day  He endorses episodic nocturnal awakening to have a bowel movement  No melena or hematochezia  No fever  Had few bouts of nausea and nonbloody emesis, last episode of emesis in the evening of 10/05/2022  He is tolerating liquids  He is unsure of any precipitant, he denies any recent travel, antibiotics, change in medications (ozempic approx 5 weeks ago which he believes caused diarrhea), exposure to sick contacts, eating undercooked or ill prepared foods  He does have well water  He shares he has lost approximately 30 lb since starting on the Ozempic  At baseline he is moving his bowels daily, tends more toward constipation if anything  Patient denies any chronic heartburn issues, no dysphagia or odynophagia  No issues with chronic nausea/vomiting  He does have a history of colon polyps, and maternal grandfather had colorectal cancer  He believes his last colonoscopy was at age 44 and is overdue for repeat  On admission to the hospital, he was noted to have a leukocytosis with a WBC count of 16 17, with a neutrophil predominance, platelets 755, he was noted to have several electrolyte derangements including Na 130, K 2 9, as well as an LIAM with a BUN of 42, creatinine 4 44, AST 20, ALT 31, ALP 68, albumin 4 4, T bili 1 03, lipase 61   Blood cultures drawn  He had a non contrasted CT scan completed which commented on scattered mildly dilated fluid and air-filled loops of small bowel with air-fluid levels, relatively under distended distal small bowel loops, and liquid stool throughout the colon  Endoscopic history:  EGD: none   Colon: approx 8 years ago, pt notes personal hx of colon polyps     Review of Systems   Constitutional: Positive for appetite change, fatigue and unexpected weight change  Negative for chills and fever  Respiratory: Negative for cough and shortness of breath  Gastrointestinal: Positive for diarrhea, nausea and vomiting  Negative for abdominal distention, abdominal pain, blood in stool and constipation     Otherwise Per HPI    Historical Information   Past Medical History:   Diagnosis Date   • Benign arteriolar nephrosclerosis    • Essential hypertension    • History of transfusion 2013   • Hyperparathyroidism due to renal insufficiency (HCC)    • Legionnaire's disease ([facility] )    • Pneumonia     s/p VDRF      Past Surgical History:   Procedure Laterality Date   • COLONOSCOPY     • OTHER SURGICAL HISTORY      Dialysis catheter    • DC LAMNOTMY INCL W/DCMPRSN NRV ROOT 1 INTRSPC LUMBR Left 11/15/2021    Procedure: Left sided L1-2 microdiscectomy;  Surgeon: Kathia Christianson MD;  Location: [facility] OR;  Service: Neurosurgery     Social History   Social History     Substance and Sexual Activity   Alcohol Use Not Currently    Comment: Denies alcohol use - As per [employer]      Social History     Substance and Sexual Activity   Drug Use Never     Social History     Tobacco Use   Smoking Status Never Smoker   Smokeless Tobacco Never Used     Family History   Problem Relation Age of Onset   • Hyperlipidemia Mother    • Lung cancer Father    • Stomach cancer Father    • Cancer Father    • Diabetes type II Father    • Diabetes type II Brother        Meds/Allergies     Medications Prior to Admission   Medication   • carvedilol (COREG) 12 5 mg tablet   • fexofenadine (ALLEGRA) 180 MG tablet   • glipiZIDE (GLUCOTROL) 5 mg tablet   • lisinopril-hydrochlorothiazide (PRINZIDE,ZESTORETIC) 20-12 5 MG per tablet   • Multiple Vitamins-Minerals (multivitamin with minerals) tablet   • semaglutide, 1 mg/dose, (Ozempic) 4 MG/3ML SOPN injection pen   • metroNIDAZOLE (FLAGYL) 500 mg tablet     Current Facility-Administered Medications   Medication Dose Route Frequency   • acetaminophen (TYLENOL) tablet 650 mg  650 mg Oral Q4H PRN   • carvedilol (COREG) tablet 12 5 mg  12 5 mg Oral BID   • heparin (porcine) subcutaneous injection 7,500 Units  7,500 Units Subcutaneous Q8H [address]   • influenza vaccine, recombinant, quadrivalent (FLUBLOK) IM injection 0 5 mL  0 5 mL Intramuscular Once   • loperamide (IMODIUM) capsule 2 mg  2 mg Oral 4x Daily PRN   • metroNIDAZOLE (FLAGYL) IVPB (premix) 500 mg 100 mL  500 mg Intravenous Q8H   • multi-electrolyte (PLASMALYTE-A/ISOLYTE-S PH 7 4) IV solution  125 mL/hr Intravenous Continuous   • ondansetron (ZOFRAN) injection 4 mg  4 mg Intravenous Q6H PRN       No Known Allergies        Objective     Blood pressure 113/69, pulse 84, temperature 98 1 °F (36 7 °C), resp  rate 20, height 6' (1 829 m), weight (!) 142 kg (313 lb), SpO2 92 %  Body mass index is 42 45 kg/m²  Intake/Output Summary (Last 24 hours) at 10/6/2022 0815  Last data filed at 10/5/2022 1914  Gross per 24 hour   Intake 2500 ml   Output --   Net 2500 ml         Physical Exam  Vitals and nursing note reviewed  Constitutional:       General: He is not in acute distress  Appearance: Normal appearance  He is obese  He is not ill-appearing  HENT:      Head: Normocephalic and atraumatic  Eyes:      General: No scleral icterus  Conjunctiva/sclera: Conjunctivae normal    Cardiovascular:      Rate and Rhythm: Normal rate  Pulses: Normal pulses  Pulmonary:      Effort: Pulmonary effort is normal  No respiratory distress     Abdominal:      General: Abdomen is protuberant  Bowel sounds are decreased  There is no distension  Palpations: Abdomen is soft  Tenderness: There is no abdominal tenderness  There is no guarding or rebound  Skin:     General: Skin is warm and dry  Coloration: Skin is not jaundiced  Neurological:      General: No focal deficit present  Mental Status: He is oriented to person, place, and time     Psychiatric:         Mood and Affect: Mood normal          Behavior: Behavior normal         Lab Results:   Admission on 10/05/2022   Component Date Value   • WBC 10/05/2022 16 17 (A)   • RBC 10/05/2022 5 59    • Hemoglobin 10/05/2022 16 3    • Hematocrit 10/05/2022 47 7    • MCV 10/05/2022 85    • MCH 10/05/2022 29 2    • MCHC 10/05/2022 34 2    • RDW 10/05/2022 12 8    • MPV 10/05/2022 9 2    • Platelets 00/00/0797 432 (A)   • nRBC 10/05/2022 0    • Neutrophils Relative 10/05/2022 82 (A)   • Immat GRANS % 10/05/2022 1    • Lymphocytes Relative 10/05/2022 11 (A)   • Monocytes Relative 10/05/2022 6    • Eosinophils Relative 10/05/2022 0    • Basophils Relative 10/05/2022 0    • Neutrophils Absolute 10/05/2022 13 14 (A)   • Immature Grans Absolute 10/05/2022 0 09    • Lymphocytes Absolute 10/05/2022 1 83    • Monocytes Absolute 10/05/2022 1 02    • Eosinophils Absolute 10/05/2022 0 04    • Basophils Absolute 10/05/2022 0 05    • Sodium 10/05/2022 130 (A)   • Potassium 10/05/2022 2 9 (A)   • Chloride 10/05/2022 91 (A)   • CO2 10/05/2022 20 (A)   • ANION GAP 10/05/2022 19 (A)   • BUN 10/05/2022 42 (A)   • Creatinine 10/05/2022 4 44 (A)   • Glucose 10/05/2022 169 (A)   • Calcium 10/05/2022 9 1    • AST 10/05/2022 20    • ALT 10/05/2022 31    • Alkaline Phosphatase 10/05/2022 68    • Total Protein 10/05/2022 8 0    • Albumin 10/05/2022 4 4    • Total Bilirubin 10/05/2022 1 03 (A)   • eGFR 10/05/2022 13    • Lipase 10/05/2022 61    • Magnesium 10/05/2022 1 9    • hs TnI 0hr 10/05/2022 6    • hs TnI 2hr 10/05/2022 7    • Delta 2hr hsTnI 10/05/2022 1    • Osmolality Serum 10/05/2022 295    • Fecal Leukocytes 10/05/2022 No WBC's    • Blood Culture 10/05/2022 Received in Microbiology Lab  Culture in Progress  • Blood Culture 10/05/2022 Received in Microbiology Lab  Culture in Progress      • LACTIC ACID 10/05/2022 1 6    • Procalcitonin 10/05/2022 0 57 (A)   • PTT 10/05/2022 26    • Protime 10/05/2022 13 7    • INR 10/05/2022 1 05    • hs TnI 4hr 10/05/2022 5    • Delta 4hr hsTnI 10/05/2022 -1    • Ventricular Rate 10/05/2022 90    • Atrial Rate 10/05/2022 90    • MO Interval 10/05/2022 166    • QRSD Interval 10/05/2022 96    • QT Interval 10/05/2022 406    • QTC Interval 10/05/2022 496    • P Axis 10/05/2022 57    • QRS Axis 10/05/2022 -33    • T Wave Axis 10/05/2022 58    • Ventricular Rate 10/05/2022 91    • Atrial Rate 10/05/2022 91    • MO Interval 10/05/2022 176    • QRSD Interval 10/05/2022 92    • QT Interval 10/05/2022 410    • QTC Interval 10/05/2022 504    • P Axis 10/05/2022 47    • QRS Axis 10/05/2022 -47    • T Wave Axis 10/05/2022 43    • Sodium 10/06/2022 133 (A)   • Potassium 10/06/2022 3 0 (A)   • Chloride 10/06/2022 95 (A)   • CO2 10/06/2022 23    • ANION GAP 10/06/2022 15 (A)   • BUN 10/06/2022 52 (A)   • Creatinine 10/06/2022 4 28 (A)   • Glucose 10/06/2022 117    • Calcium 10/06/2022 7 7 (A)   • eGFR 10/06/2022 14    • WBC 10/06/2022 12 91 (A)   • RBC 10/06/2022 4 75    • Hemoglobin 10/06/2022 13 9    • Hematocrit 10/06/2022 40 9    • MCV 10/06/2022 86    • MCH 10/06/2022 29 3    • MCHC 10/06/2022 34 0    • RDW 10/06/2022 13 0    • MPV 10/06/2022 9 0    • Platelets 63/94/4933 314    • nRBC 10/06/2022 0    • Neutrophils Relative 10/06/2022 75    • Immat GRANS % 10/06/2022 1    • Lymphocytes Relative 10/06/2022 16    • Monocytes Relative 10/06/2022 7    • Eosinophils Relative 10/06/2022 1    • Basophils Relative 10/06/2022 0    • Neutrophils Absolute 10/06/2022 9 65 (A)   • Immature Grans Absolute 10/06/2022 0 06    • Lymphocytes Absolute 10/06/2022 2 12    • Monocytes Absolute 10/06/2022 0 91    • Eosinophils Absolute 10/06/2022 0 13    • Basophils Absolute 10/06/2022 0 04        Imaging Studies: I have personally reviewed pertinent imaging studies  [place]    **Please note:  Dictation voice to text software may have been used in the creation of this record  Occasional wrong word or “sound alike” substitutions may have occurred due to the inherent limitations of voice recognition software  Read the chart carefully and recognize, using context, where substitutions have occurred  **

## 2022-10-06 NOTE — PROGRESS NOTES
Progress Note - General Surgery   Elfida Curling 59 y o  male MRN: [id number]  Unit/Bed#: -01 Encounter: [id number]    Assessment:  59year-old male with past medical history significant for HTN, DM2, hyperparathyroidism, remote history of legionnaire's presenting with two day history of diarrhea  -AVSS on room air  - cc, patient reports 1 unmeasured occurrence last evening  -leukocytosis, WBC 12 91  -CR 4 28, BUN 52  -hyponatremic, hypokalemic, hypocalcemic  -patient reports continued bowel function with last loose stool at approximately 3:00 a m , does also report 1 episode of emesis last evening    Plan:  Surgically stable, no need for surgical intervention at this time  General surgery will likely sign off  Regular diet as tolerated, monitor for increase in abdominal pain, nausea, vomiting  IVF  IV Flagyl, s/p IV Cipro x1  GI consult pending, appreciate recommendations  Medical management per primary, appreciate recommendations  To be evaluated by attending    Subjective/Objective   Subjective:  No acute overnight events  Patient overall feels well this morning  Last episode of diarrhea this morning at approximately 3:00 a m   One additional episode of emesis last evening  Continues to deny abdominal pain  Tolerated breakfast this morning without increase in abdominal pain, nausea, vomiting  Denies chest pain, palpitations, shortness of breath, calf tenderness  Denies fever, chills  Objective:   Blood pressure 113/69, pulse 84, temperature 98 1 °F (36 7 °C), resp  rate 20, height 6' (1 829 m), weight (!) 142 kg (313 lb), SpO2 92 %  ,Body mass index is 42 45 kg/m²        Intake/Output Summary (Last 24 hours) at 10/6/2022 0831  Last data filed at 10/6/2022 0725  Gross per 24 hour   Intake 2500 ml   Output 200 ml   Net 2300 ml       Invasive Devices  Report    Peripheral Intravenous Line  Duration           Peripheral IV 10/05/22 Left Forearm <1 day    Peripheral IV 10/05/22 Left;Proximal;Ventral (anterior) Forearm <1 day              Physical Exam  Vitals and nursing note reviewed  Constitutional:       General: He is not in acute distress  Appearance: Normal appearance  He is obese  He is not ill-appearing or toxic-appearing  HENT:      Head: Normocephalic and atraumatic  Cardiovascular:      Rate and Rhythm: Normal rate and regular rhythm  Pulses: Normal pulses  Heart sounds: Normal heart sounds  No murmur heard  No friction rub  No gallop  Pulmonary:      Effort: Pulmonary effort is normal  No respiratory distress  Breath sounds: Normal breath sounds  No wheezing, rhonchi or rales  Abdominal:      General: Bowel sounds are decreased  There is no distension  Palpations: Abdomen is soft  Tenderness: There is no abdominal tenderness  There is no guarding or rebound  Musculoskeletal:         General: Normal range of motion  Right lower leg: No edema  Left lower leg: No edema  Skin:     General: Skin is warm and dry  Neurological:      General: No focal deficit present  Mental Status: He is alert and oriented to person, place, and time  Psychiatric:         Mood and Affect: Mood normal          Behavior: Behavior normal  Behavior is cooperative  Thought Content: Thought content normal        Lab, Imaging and other studies:  I have personally reviewed pertinent lab results      CBC:   Lab Results   Component Value Date    WBC 12 91 (H) 10/06/2022    HGB 13 9 10/06/2022    HCT 40 9 10/06/2022    MCV 86 10/06/2022     10/06/2022    MCH 29 3 10/06/2022    MCHC 34 0 10/06/2022    RDW 13 0 10/06/2022    MPV 9 0 10/06/2022    NRBC 0 10/06/2022     CMP:   Lab Results   Component Value Date    SODIUM 133 (L) 10/06/2022    K 3 0 (L) 10/06/2022    CL 95 (L) 10/06/2022    CO2 23 10/06/2022    BUN 52 (H) 10/06/2022    CREATININE 4 28 (H) 10/06/2022    CALCIUM 7 7 (L) 10/06/2022    AST 20 10/05/2022    ALT 31 10/05/2022    ALKPHOS 68 10/05/2022 EGFR 14 10/06/2022     VTE Pharmacologic Prophylaxis: Heparin  VTE Mechanical Prophylaxis: sequential compression device    Jenny Chase

## 2022-10-06 NOTE — CASE MANAGEMENT
Case Management Assessment & Discharge Planning Note    Patient name Holger Fernández  Location /-01 MRN [id number]  : 1965 Date 10/6/2022       Current Admission Date: 10/5/2022  Current Admission Diagnosis:LIAM (acute kidney injury) [facility])   Patient Active Problem List    Diagnosis Date Noted   • Diarrhea of presumed infectious origin 10/05/2022   • LIAM (acute kidney injury) ([place] [address] ) 10/05/2022   • Electrolyte abnormality 10/05/2022   • SBO (small bowel obstruction) ([place] [address] ) 10/05/2022   • Encounter for vitamin deficiency screening 2022   • Screening for hyperlipidemia 2022   • Diabetic eye exam ([place] [address] ) 2021   • Chronic bilateral low back pain with sciatica 10/19/2021   • Pre-op examination 10/19/2021   • Herniation of intervertebral disc of lumbar spine due to degeneration 10/19/2021   • Type 2 diabetes mellitus with stage 2 chronic kidney disease, without long-term current use of insulin ([place] [address] ) 10/19/2021   • Acute bilateral low back pain with left-sided sciatica 2021   • Influenza vaccine administered 2021   • Colon cancer screening 2021   • Flu vaccine need 2021   • Seasonal allergies 2021   • Generalized muscle ache 2021   • Annual physical exam 2021   • Essential hypertension    • Stage 2 chronic kidney disease    • Class 3 severe obesity due to excess calories without serious comorbidity with body mass index (BMI) of 45 0 to 49 9 in adult ([place] [address] )       LOS (days): 1  Geometric Mean LOS (GMLOS) (days):   Days to GMLOS:     OBJECTIVE:    Risk of Unplanned Readmission Score: 12 8         Current admission status: Inpatient  Referral Reason: Other (Assess for needs)    Preferred Pharmacy:   [address] [place] [address] [address], [address] [place] [address]  [address]  [address] [place] [address] Drive   DR Carlos POPE [address]  Phone: [phone] Fax: [phone]    Primary Care Provider: LAURA Winston    Primary Insurance: [facility] SHIELD  Secondary Insurance:     ASSESSMENT:  Active Health Care Proxies    There are no active Health Care Proxies on file  Advance Directives  Does patient have a [address]?: No  Was patient offered paperwork?: Yes (Declines)  Does patient currently have a Health Care decision maker?: No  Does patient have Advance Directives?: No  Was patient offered paperwork?: Yes (Declines)  Primary Contact: Melani Snyder (Spouse)   [phone] (Mobile)    Readmission Root Cause  30 Day Readmission: No    Patient Information  Admitted from[de-identified] Home  Mental Status: Alert  During Assessment patient was accompanied by: Not accompanied during assessment  Assessment information provided by[de-identified] Patient  Primary Caregiver: Self  Support Systems: Spouse/significant other  [place] of Residence: [address] do you live in?: [address] Property Manager entry access options   Select all that apply : Stairs  Number of steps to enter home : 2  Do the steps have railings?: Yes  Type of Current Residence: [place]  In the last 12 months, was there a time when you were not able to pay the mortgage or rent on time?: No  In the last 12 months, how many places have you lived?: 1  In the last 12 months, was there a time when you did not have a steady place to sleep or slept in a shelter (including now)?: No  Homeless/housing insecurity resource given?: N/A  Living Arrangements: Lives w/ Spouse/significant other  Is patient a ?: No    Activities of Daily Living Prior to Admission  Functional Status: Independent  Completes ADLs independently?: Yes  Ambulates independently?: Yes  Does patient use assisted devices?: No  Does patient currently own DME?: No  Does patient have a history of Outpatient Therapy (PT/OT)?: No  Does the patient have a history of Short-Term Rehab?: No  Does patient have a history of HHC?: No  Does patient currently have [facility]?: No    Patient Information Continued  Income Source: Employed  Does patient have prescription coverage?: Yes  Within the past 12 months, you worried that your food would run out before you got the money to buy more : Never true  Within the past 12 months, the food you bought just didn't last and you didn't have money to get more : Never true  Food insecurity resource given?: N/A  Does patient receive dialysis treatments?: No  Does patient have a history of substance abuse?: No  Does patient have a history of Mental Health Diagnosis?: No    Means of Transportation  Means of Transport to Appts[de-identified] Drives Self  In the past 12 months, has lack of transportation kept you from medical appointments or from getting medications?: No  In the past 12 months, has lack of transportation kept you from meetings, work, or from getting things needed for daily living?: No  Was application for public transport provided?: N/A    DISCHARGE DETAILS:    Discharge planning discussed with[de-identified] Patient  Freedom of Choice: Yes     CM contacted family/caregiver?: No- see comments (declines)    Contacts  Patient Contacts: Peyton Hinojosa (Spouse)   [phone] (Mobile)  Relationship to Patient[de-identified] Family  Contact Method: Phone  Phone Number: Peyton Hinojosa (Spouse)   [phone] (Mobile)  Reason/Outcome: Emergency [address]         Is the patient interested in [facility] at discharge?: No    DME Referral Provided  Referral made for DME?: No    Other Referral/Resources/Interventions Provided:  Interventions: None Indicated    Treatment Team Recommendation: Home     Additional Comments: Met with patient at bedside  Patient IPTA and works  No DC needs identified

## 2022-10-06 NOTE — UTILIZATION REVIEW
Initial Clinical Review    Admission: Date/Time/Statement:   Admission Orders (From admission, onward)     Ordered        10/05/22 1359  INPATIENT ADMISSION  Once                      Orders Placed This Encounter   Procedures   • INPATIENT ADMISSION     Standing Status:   Standing     Number of Occurrences:   1     Order Specific Question:   Level of Care     Answer:   Med Surg [16]     Order Specific Question:   Estimated length of stay     Answer:   More than 2 Midnights     Order Specific Question:   Certification     Answer:   I certify that inpatient services are medically necessary for this patient for a duration of greater than two midnights  See H&P and MD Progress Notes for additional information about the patient's course of treatment  ED Arrival Information     Expected   -    Arrival   10/5/2022 11:38    Acuity   Urgent            Means of arrival   Walk-In    Escorted by   Spouse    Service   Hospitalist    Admission type   Emergency            Arrival complaint   diarrhea           Chief Complaint   Patient presents with   • Diarrhea     Since Sunday  Vomited 2x since Sunday  • Abdominal Pain     Mid-abdominal pain with nausea  Initial Presentation: 59 y o  male to the ED from PCP with complaints of diarrhea, abdominal pain, nausea for 3-4 days  Admitted to inpatient for LIAM, diarrhea, htn  H/O  HTN, DMII, chronic diarrhea  On arrival, tachycardic  CT a/p shows possible early SBO  Gen/surg consult  Creat 4 44 on arrival with baseline 1 2  IV fluids started  S/P 2liters IV fluids in the ED  Trend BMP  Has been having about 10 episodes of diarrhea daily  Check stool studies  GI Consult  HOld Home HCTZ, lisinopril  WBCs elevated  Gen/surg: No surgical intervention at this time  Appears more to be enteritis than SBO  Abdomen soft,non tender  Date: 10/6   Day 2: Creat minimally improved  Continue with IV fluids and monitor  STool studies pending  Diarrhea improved  Gen/surg: NO need for surgical intervention at this time  REgular diet as tolerated  Continue with IV fluids, IV abx  Continue loose stool  GI consult:   Has elevated WBCS  Creat remains elevated, mildly decreased  Presentation most suspicious for infectious enteritis  Has not had diarrhea in several hours  Follow up stool studies  No abdominal tenderness  ED Triage Vitals [10/05/22 1145]   Temperature Pulse Respirations Blood Pressure SpO2   97 6 °F (36 4 °C) 101 18 123/72 95 %      Temp Source Heart Rate Source Patient Position - Orthostatic VS BP Location FiO2 (%)   Tympanic Monitor Sitting Left arm --      Pain Score       4          Wt Readings from Last 1 Encounters:   10/05/22 (!) 142 kg (313 lb)     Additional Vital Signs:   Date/Time Temp Pulse Resp BP MAP (mmHg) SpO2 O2 Device Patient Position - Orthostatic VS   10/06/22 07:25:22 98 1 °F (36 7 °C) 84 20 113/69 84 92 % -- --   10/05/22 22:44:04 97 5 °F (36 4 °C) 90 20 100/66 77 91 % None (Room air) Lying   10/05/22 2055 -- 92 -- 91/60 -- -- -- --   10/05/22 16:06:41 97 7 °F (36 5 °C) 92 20 93/65 74 92 % None (Room air) Lying   10/05/22 1145 97 6 °F (36 4 °C) 101 18 123/72 -- 95 % None (Room air)        Pertinent Labs/Diagnostic Test Results:   10/5 EKG: Sinus rhythm with occasional Premature ventricular complexes  Left axis deviation  Low voltage QRS  Prolonged QT  Abnormal ECG  When compared with ECG of 15-NOV-2021 07:18,  Premature ventricular complexes are now Present  CT abdomen pelvis wo contrast   Final Result by Bradley Cheatham MD (10/05 1328)      Findings suggesting early/partial small bowel obstruction, although ileus and enteritis are also possible  The study was marked in [facility] for immediate notification        Workstation performed: [id number]               Results from last 7 days   Lab Units 10/06/22  0530 10/05/22  1220   WBC Thousand/uL 12 91* 16 17*   HEMOGLOBIN g/dL 13 9 16 3   HEMATOCRIT % 40 9 47 7   PLATELETS Thousands/uL 314 432*   NEUTROS ABS Thousands/µL 9 65* 13 14*         Results from last 7 days   Lab Units 10/06/22  0530 10/05/22  1220   SODIUM mmol/L 133* 130*   POTASSIUM mmol/L 3 0* 2 9*   CHLORIDE mmol/L 95* 91*   CO2 mmol/L 23 20*   ANION GAP mmol/L 15* 19*   BUN mg/dL 52* 42*   CREATININE mg/dL 4 28* 4 44*   EGFR ml/min/1 73sq m 14 13   CALCIUM mg/dL 7 7* 9 1   MAGNESIUM mg/dL  --  1 9     Results from last 7 days   Lab Units 10/05/22  1220   AST U/L 20   ALT U/L 31   ALK PHOS U/L 68   TOTAL PROTEIN g/dL 8 0   ALBUMIN g/dL 4 4   TOTAL BILIRUBIN mg/dL 1 03*         Results from last 7 days   Lab Units 10/06/22  0530 10/05/22  1220   GLUCOSE RANDOM mg/dL 117 169*     Results from last 7 days   Lab Units 10/05/22  1401   OSMOLALITY, SERUM mmol/       Results from last 7 days   Lab Units 10/05/22  1732 10/05/22  1547 10/05/22  1220   HS TNI 0HR ng/L  --   --  6   HS TNI 2HR ng/L  --  7  --    HSTNI D2 ng/L  --  1  --    HS TNI 4HR ng/L 5  --   --    HSTNI D4 ng/L -1  --   --          Results from last 7 days   Lab Units 10/05/22  1401   PROTIME seconds 13 7   INR  1 05   PTT seconds 26         Results from last 7 days   Lab Units 10/05/22  1401   PROCALCITONIN ng/ml 0 57*     Results from last 7 days   Lab Units 10/05/22  1401   LACTIC ACID mmol/L 1 6       Results from last 7 days   Lab Units 10/05/22  1220   LIPASE u/L 61             Results from last 7 days   Lab Units 10/05/22  1401   OSMOLALITY, SERUM mmol/       Results from last 7 days   Lab Units 10/05/22  1401   BLOOD CULTURE  Received in Microbiology Lab  Culture in Progress  Received in Microbiology Lab  Culture in Progress         ED Treatment:   Medication Administration from 10/05/2022 1138 to 10/05/2022 1527       Date/Time Order Dose Route Action Comments     10/05/2022 1225 multi-electrolyte (ISOLYTE-S PH 7 4) bolus 1,000 mL 1,000 mL Intravenous New Bag      10/05/2022 1435 potassium chloride 20 mEq IVPB (premix) 20 mEq Intravenous New Bag         Past Medical History:   Diagnosis Date   • Benign arteriolar nephrosclerosis    • Essential hypertension    • History of transfusion 2013   • Hyperparathyroidism due to renal insufficiency ([facility])    • Legionnaire's disease ([facility] )    • Pneumonia     s/p VDRF      Present on Admission:  • [facility] )  • Diarrhea of presumed infectious origin  • Essential hypertension  • Type 2 diabetes mellitus with stage 2 chronic kidney disease, without long-term current use of insulin ([facility])  • Electrolyte abnormality  • SBO (small bowel obstruction) ([facility])      Admitting Diagnosis: Diarrhea of presumed infectious origin [R19 7]  Diarrhea [R19 7]  SBO (small bowel obstruction) ([facility]) [K56 609]  Abdominal pain [R10 9]  Age/Sex: 59 y o  male  Admission Orders:  Scheduled Medications:  carvedilol, 12 5 mg, Oral, BID  heparin (porcine), 7,500 Units, Subcutaneous, Q8H [facility]  influenza vaccine, 0 5 mL, Intramuscular, Once  metroNIDAZOLE, 500 mg, Intravenous, Q8H      Continuous IV Infusions:  multi-electrolyte, 125 mL/hr, Intravenous, Continuous      PRN Meds:  acetaminophen, 650 mg, Oral, Q4H PRN  loperamide, 2 mg, Oral, 4x Daily PRN  ondansetron, 4 mg, Intravenous, Q6H PRN        IP CONSULT TO CASE MANAGEMENT  IP CONSULT TO GASTROENTEROLOGY  IP CONSULT TO ACUTE CARE SURGERY    Network Utilization Review Department  ATTENTION: Please call with any questions or concerns to [phone] and carefully listen to the prompts so that you are directed to the right person  All voicemails are confidential   Brisa Doing all requests for admission clinical reviews, approved or denied determinations and any other requests to dedicated fax number below belonging to the campus where the patient is receiving treatment   List of dedicated fax numbers for the Facilities:  [address] DENIALS (Administrative/Medical Necessity) [phone]   [address] (Maternity/NICU/Pediatrics) [phone] [address] [phone]   [address] [place] [address] [address] [address] Tomp[address] 28 [phone] [address] [facility] [place] [address] [place] [phone]

## 2022-10-06 NOTE — PLAN OF CARE
Problem: Nutrition/Hydration-ADULT  Goal: Nutrient/Hydration intake appropriate for improving, restoring or maintaining nutritional needs  Description: Monitor and assess patient's nutrition/hydration status for malnutrition  Collaborate with interdisciplinary team and initiate plan and interventions as ordered  Monitor patient's weight and dietary intake as ordered or per policy  Utilize nutrition screening tool and intervene as necessary  Determine patient's food preferences and provide high-protein, high-caloric foods as appropriate       INTERVENTIONS:  - Monitor oral intake, urinary output, labs, and treatment plans  - Assess nutrition and hydration status and recommend course of action  - Evaluate amount of meals eaten  - Assist patient with eating if necessary   - Allow adequate time for meals  - Recommend/ encourage appropriate diets, oral nutritional supplements, and vitamin/mineral supplements  - Order, calculate, and assess calorie counts as needed  - Recommend, monitor, and adjust tube feedings and TPN/PPN based on assessed needs  - Assess need for intravenous fluids  - Provide specific nutrition/hydration education as appropriate  - Include patient/family/caregiver in decisions related to nutrition  Outcome: Progressing     Problem: PAIN - ADULT  Goal: Verbalizes/displays adequate comfort level or baseline comfort level  Description: Interventions:  - Encourage patient to monitor pain and request assistance  - Assess pain using appropriate pain scale  - Administer analgesics based on type and severity of pain and evaluate response  - Implement non-pharmacological measures as appropriate and evaluate response  - Consider cultural and social influences on pain and pain management  - Notify physician/advanced practitioner if interventions unsuccessful or patient reports new pain  Outcome: Progressing     Problem: INFECTION - ADULT  Goal: Absence or prevention of progression during hospitalization  Description: INTERVENTIONS:  - Assess and monitor for signs and symptoms of infection  - Monitor lab/diagnostic results  - Monitor all insertion sites, i e  indwelling lines, tubes, and drains  - Monitor endotracheal if appropriate and nasal secretions for changes in amount and color  - Mongo appropriate cooling/warming therapies per order  - Administer medications as ordered  - Instruct and encourage patient and family to use good hand hygiene technique  - Identify and instruct in appropriate isolation precautions for identified infection/condition  Outcome: Progressing  Goal: Absence of fever/infection during neutropenic period  Description: INTERVENTIONS:  - Monitor WBC    Outcome: Progressing

## 2022-10-06 NOTE — PROGRESS NOTES
[facility] [address]  Progress Note - Deidre Faria 1965, 59 y o  male MRN: [id number]  Unit/Bed#: -01 Encounter: [id number]  Primary Care Provider: LAURA Jordan   Date and time admitted to hospital: 10/5/2022 11:48 AM    * LIAM (acute kidney injury) ([facility] )  Assessment & Plan  · Acute kidney injury was present on admission   · Baseline creatinine approximately 1 2  · Arrival creatinine 4 44, status post treatment with IV fluids, current creatinine 4 28  · Probably multifactorial, pre renal in the setting of losing a lot of volume because of extensive history of recent diarrhea, as well as the possibility of an acute tubular necrosis in the setting of having infectious enteritis  · Continue IV fluids - Plasmalyte 125 cc an hour  · Await Nephrology input  · Will check a renal ultrasound  · Hyponatremia-most likely a hypovolemic hyponatremia which is improving with IV fluids, continue the same  · Hypokalemia:-has improved, will further replete IV and recheck  · Repeat BMP in the a m  Diarrhea of presumed infectious origin  Assessment & Plan  · Currently resolved  · Patient has had no recurrent bowel movements for over 12 hours  · Testing for rotavirus, Clostridium difficile, Salmonella, Shigella, Campylobacter, and shiga toxin is negative  Fecal leukocytes are negative    Giardia lamblia, and ova and parasites examination is pending  · Blood cultures x2 sets are pending  · Appreciate GI input  · Mild bump in procalcitonin noted, however will hold off on further antibiotics  · White blood cell count has improved, 16 17 at time of arrival, currently 12 91  · Continue close monitoring    SBO (small bowel obstruction) ([facility] )  Assessment & Plan  · Status post a general surgical consultation  · Patient has been cleared from this standpoint  · General surgery has signed off  · Continue close monitoring    Electrolyte abnormality  Assessment & Plan  · As outlined above    Essential hypertension  Assessment & Plan  · Blood pressures are stable  · Continue home carvedilol  · Hold home HCTZ and lisinopril in the setting of acute kidney injury  · Monitor blood pressures    Type 2 diabetes mellitus with stage 2 chronic kidney disease, without long-term current use of insulin (HCC)  Assessment & Plan  · Target blood sugar for the hospital is 140-180  ·  Oral hypoglycemics remain on hold  · Continue Accu-Cheks AC and HS with sliding scale coverage  · Will add basal Lantus if needed        VTE Prophylaxis:  Heparin    Patient Centered Rounds: I have performed bedside rounds with nursing staff today  Discussions with Specialists or Other Care Team Provider:  General surgery, GI, Nephrology, nursing, case management, pharmacy  Education and Discussions with Family / Patient:  Patient was brought up to par, he did not ask and or want me to call anyone    Current Length of Stay: 1 day(s)    Current Patient Status: Inpatient   Certification Statement: The patient will continue to require additional inpatient hospital stay due to The need for continued IV fluids for LIAM resolution    Discharge Plan:  Discharge planning once his kidney function is better    Code Status: Level 1 - Full Code    Subjective:   Patient seen and examined, resting in bed, reports feeling a lot better since prior to coming into the hospital    Objective:     Vitals:   Temp (24hrs), Av 9 °F (36 6 °C), Min:97 5 °F (36 4 °C), Max:98 3 °F (36 8 °C)    Temp:  [97 5 °F (36 4 °C)-98 3 °F (36 8 °C)] 98 3 °F (36 8 °C)  HR:  [84-92] 91  Resp:  [20] 20  BP: ()/(60-71) 123/71  SpO2:  [91 %-92 %] 91 %  Body mass index is 42 45 kg/m²  Input and Output Summary (last 24 hours): Intake/Output Summary (Last 24 hours) at 10/6/2022 1518  Last data filed at 10/6/2022 1448  Gross per 24 hour   Intake 1620 ml   Output 400 ml   Net 1220 ml       Physical Exam:   Physical Exam  Vitals and nursing note reviewed     Constitutional: General: He is not in acute distress  Appearance: Normal appearance  He is not ill-appearing  HENT:      Head: Normocephalic and atraumatic  Nose: Nose normal    Eyes:      Extraocular Movements: Extraocular movements intact  Pupils: Pupils are equal, round, and reactive to light  Cardiovascular:      Rate and Rhythm: Normal rate and regular rhythm  Pulses: Normal pulses  Heart sounds: Normal heart sounds  No murmur heard  No friction rub  No gallop  Pulmonary:      Effort: Pulmonary effort is normal       Breath sounds: Normal breath sounds  Abdominal:      General: There is no distension  Palpations: Abdomen is soft  There is no mass  Tenderness: There is no abdominal tenderness  There is no guarding or rebound  Musculoskeletal:         General: No swelling or tenderness  Normal range of motion  Cervical back: Normal range of motion and neck supple  No rigidity  No muscular tenderness  Right lower leg: No edema  Left lower leg: No edema  Skin:     General: Skin is warm  Capillary Refill: Capillary refill takes less than 2 seconds  Findings: No erythema or rash  Neurological:      General: No focal deficit present  Mental Status: He is alert and oriented to person, place, and time  Mental status is at baseline     Psychiatric:         Mood and Affect: Mood normal          Behavior: Behavior normal          Additional Data:     Labs:    Results from last 7 days   Lab Units 10/06/22  0530   WBC Thousand/uL 12 91*   HEMOGLOBIN g/dL 13 9   HEMATOCRIT % 40 9   PLATELETS Thousands/uL 314   NEUTROS PCT % 75   LYMPHS PCT % 16   MONOS PCT % 7   EOS PCT % 1     Results from last 7 days   Lab Units 10/06/22  0530 10/05/22  1220   SODIUM mmol/L 133* 130*   POTASSIUM mmol/L 3 0* 2 9*   CHLORIDE mmol/L 95* 91*   CO2 mmol/L 23 20*   BUN mg/dL 52* 42*   CREATININE mg/dL 4 28* 4 44*   CALCIUM mg/dL 7 7* 9 1   ALK PHOS U/L  --  68   ALT U/L  --  31   AST U/L  --  20     Results from last 7 days   Lab Units 10/05/22  1401   INR  1 05               * I Have Reviewed All Lab Data Listed Above  * Additional Pertinent Lab Tests Reviewed: [facility] Admission  Reviewed    Imaging:  Imaging Reports Reviewed Today Include:  None    Recent Cultures (last 7 days):     Results from last 7 days   Lab Units 10/05/22  1405 10/05/22  1401   BLOOD CULTURE   --  Received in Microbiology Lab  Culture in Progress  Received in Microbiology Lab  Culture in Progress  C DIFF TOXIN B BY PCR  Negative  --        Last 24 Hours Medication List:   Current Facility-Administered Medications   Medication Dose Route Frequency Provider Last Rate   • acetaminophen  650 mg Oral Q4H PRN Marval Hudson, DO     • carvedilol  12 5 mg Oral BID Marval Hudson, DO     • heparin (porcine)  7,500 Units Subcutaneous [facility] Marval Hudson, DO     • influenza vaccine  0 5 mL Intramuscular Once Marval Hudson, DO     • insulin lispro  2-12 Units Subcutaneous TID [facility] Angela Hollis MD     • insulin lispro  2-12 Units Subcutaneous HS Angela Hollis MD     • loperamide  2 mg Oral 4x Daily PRN Marval Hudson, DO     • multi-electrolyte  125 mL/hr Intravenous Continuous Marval Hudson,  mL/hr (10/06/22 1226)   • ondansetron  4 mg Intravenous Q6H PRN Marval Hudson, DO     • potassium chloride  40 mEq Intravenous Once Angela Hollis MD          Today, Patient Was Seen By: Angela Hollis    ** Please Note: Dictation voice to text software may have been used in the creation of this document   **

## 2022-10-07 ENCOUNTER — TELEPHONE (OUTPATIENT)
Dept: OTHER | Facility: HOSPITAL | Age: 57
End: 2022-10-07

## 2022-10-07 VITALS
SYSTOLIC BLOOD PRESSURE: 96 MMHG | HEIGHT: 72 IN | DIASTOLIC BLOOD PRESSURE: 65 MMHG | OXYGEN SATURATION: 95 % | TEMPERATURE: 98.4 F | RESPIRATION RATE: 18 BRPM | HEART RATE: 79 BPM | WEIGHT: 313 LBS | BODY MASS INDEX: 42.39 KG/M2

## 2022-10-07 LAB
ALBUMIN SERPL BCP-MCNC: 3.5 G/DL (ref 3.5–5)
ALP SERPL-CCNC: 52 U/L (ref 34–104)
ALT SERPL W P-5'-P-CCNC: 23 U/L (ref 7–52)
ANION GAP SERPL CALCULATED.3IONS-SCNC: 10 MMOL/L (ref 4–13)
ANION GAP SERPL CALCULATED.3IONS-SCNC: 8 MMOL/L (ref 4–13)
AST SERPL W P-5'-P-CCNC: 21 U/L (ref 13–39)
BASOPHILS # BLD AUTO: 0.05 THOUSANDS/ÂΜL (ref 0–0.1)
BASOPHILS NFR BLD AUTO: 1 % (ref 0–1)
BILIRUB SERPL-MCNC: 0.65 MG/DL (ref 0.2–1)
BUN SERPL-MCNC: 23 MG/DL (ref 5–25)
BUN SERPL-MCNC: 34 MG/DL (ref 5–25)
CA-I BLD-SCNC: 1 MMOL/L (ref 1.12–1.32)
CALCIUM SERPL-MCNC: 7.8 MG/DL (ref 8.4–10.2)
CALCIUM SERPL-MCNC: 8 MG/DL (ref 8.4–10.2)
CHLORIDE SERPL-SCNC: 100 MMOL/L (ref 96–108)
CHLORIDE SERPL-SCNC: 98 MMOL/L (ref 96–108)
CO2 SERPL-SCNC: 26 MMOL/L (ref 21–32)
CO2 SERPL-SCNC: 30 MMOL/L (ref 21–32)
CREAT SERPL-MCNC: 1.41 MG/DL (ref 0.6–1.3)
CREAT SERPL-MCNC: 1.56 MG/DL (ref 0.6–1.3)
EOSINOPHIL # BLD AUTO: 0.38 THOUSAND/ÂΜL (ref 0–0.61)
EOSINOPHIL NFR BLD AUTO: 4 % (ref 0–6)
ERYTHROCYTE [DISTWIDTH] IN BLOOD BY AUTOMATED COUNT: 13 % (ref 11.6–15.1)
GFR SERPL CREATININE-BSD FRML MDRD: 47 ML/MIN/1.73SQ M
GFR SERPL CREATININE-BSD FRML MDRD: 54 ML/MIN/1.73SQ M
GLUCOSE SERPL-MCNC: 106 MG/DL (ref 65–140)
GLUCOSE SERPL-MCNC: 107 MG/DL (ref 65–140)
GLUCOSE SERPL-MCNC: 113 MG/DL (ref 65–140)
GLUCOSE SERPL-MCNC: 120 MG/DL (ref 65–140)
GLUCOSE SERPL-MCNC: 124 MG/DL (ref 65–140)
HCT VFR BLD AUTO: 39.7 % (ref 36.5–49.3)
HGB BLD-MCNC: 13.2 G/DL (ref 12–17)
IMM GRANULOCYTES # BLD AUTO: 0.1 THOUSAND/UL (ref 0–0.2)
IMM GRANULOCYTES NFR BLD AUTO: 1 % (ref 0–2)
LYMPHOCYTES # BLD AUTO: 1.97 THOUSANDS/ÂΜL (ref 0.6–4.47)
LYMPHOCYTES NFR BLD AUTO: 20 % (ref 14–44)
MCH RBC QN AUTO: 28.8 PG (ref 26.8–34.3)
MCHC RBC AUTO-ENTMCNC: 33.2 G/DL (ref 31.4–37.4)
MCV RBC AUTO: 87 FL (ref 82–98)
MONOCYTES # BLD AUTO: 0.67 THOUSAND/ÂΜL (ref 0.17–1.22)
MONOCYTES NFR BLD AUTO: 7 % (ref 4–12)
NEUTROPHILS # BLD AUTO: 6.69 THOUSANDS/ÂΜL (ref 1.85–7.62)
NEUTS SEG NFR BLD AUTO: 67 % (ref 43–75)
NRBC BLD AUTO-RTO: 0 /100 WBCS
PHOSPHATE SERPL-MCNC: 2.3 MG/DL (ref 2.7–4.5)
PLATELET # BLD AUTO: 290 THOUSANDS/UL (ref 149–390)
PMV BLD AUTO: 8.9 FL (ref 8.9–12.7)
POTASSIUM SERPL-SCNC: 2.8 MMOL/L (ref 3.5–5.3)
POTASSIUM SERPL-SCNC: 3.1 MMOL/L (ref 3.5–5.3)
PROT SERPL-MCNC: 5.9 G/DL (ref 6.4–8.4)
RBC # BLD AUTO: 4.58 MILLION/UL (ref 3.88–5.62)
SODIUM SERPL-SCNC: 136 MMOL/L (ref 135–147)
SODIUM SERPL-SCNC: 136 MMOL/L (ref 135–147)
WBC # BLD AUTO: 9.86 THOUSAND/UL (ref 4.31–10.16)

## 2022-10-07 PROCEDURE — 99223 1ST HOSP IP/OBS HIGH 75: CPT | Performed by: INTERNAL MEDICINE

## 2022-10-07 PROCEDURE — 99232 SBSQ HOSP IP/OBS MODERATE 35: CPT | Performed by: INTERNAL MEDICINE

## 2022-10-07 PROCEDURE — 80053 COMPREHEN METABOLIC PANEL: CPT | Performed by: HOSPITALIST

## 2022-10-07 PROCEDURE — 82948 REAGENT STRIP/BLOOD GLUCOSE: CPT

## 2022-10-07 PROCEDURE — 99239 HOSP IP/OBS DSCHRG MGMT >30: CPT | Performed by: HOSPITALIST

## 2022-10-07 PROCEDURE — 80048 BASIC METABOLIC PNL TOTAL CA: CPT | Performed by: INTERNAL MEDICINE

## 2022-10-07 PROCEDURE — 84100 ASSAY OF PHOSPHORUS: CPT | Performed by: INTERNAL MEDICINE

## 2022-10-07 PROCEDURE — 85025 COMPLETE CBC W/AUTO DIFF WBC: CPT | Performed by: HOSPITALIST

## 2022-10-07 PROCEDURE — 82330 ASSAY OF CALCIUM: CPT | Performed by: INTERNAL MEDICINE

## 2022-10-07 RX ORDER — POTASSIUM CHLORIDE 20 MEQ/1
20 TABLET, EXTENDED RELEASE ORAL DAILY
Qty: 6 TABLET | Refills: 0 | Status: SHIPPED | OUTPATIENT
Start: 2022-10-07 | End: 2022-10-26

## 2022-10-07 RX ORDER — POTASSIUM CHLORIDE 20 MEQ/1
40 TABLET, EXTENDED RELEASE ORAL 2 TIMES DAILY
Status: DISCONTINUED | OUTPATIENT
Start: 2022-10-07 | End: 2022-10-07

## 2022-10-07 RX ORDER — POTASSIUM CHLORIDE 14.9 MG/ML
20 INJECTION INTRAVENOUS ONCE
Status: DISCONTINUED | OUTPATIENT
Start: 2022-10-07 | End: 2022-10-07

## 2022-10-07 RX ORDER — POTASSIUM CHLORIDE 29.8 MG/ML
40 INJECTION INTRAVENOUS ONCE
Status: DISCONTINUED | OUTPATIENT
Start: 2022-10-07 | End: 2022-10-07

## 2022-10-07 RX ORDER — POTASSIUM CHLORIDE 20 MEQ/1
40 TABLET, EXTENDED RELEASE ORAL ONCE
Status: COMPLETED | OUTPATIENT
Start: 2022-10-07 | End: 2022-10-07

## 2022-10-07 RX ORDER — CALCITRIOL 0.25 UG/1
0.25 CAPSULE, LIQUID FILLED ORAL EVERY OTHER DAY
Status: DISCONTINUED | OUTPATIENT
Start: 2022-10-07 | End: 2022-10-07 | Stop reason: HOSPADM

## 2022-10-07 RX ORDER — POTASSIUM CHLORIDE 20 MEQ/1
40 TABLET, EXTENDED RELEASE ORAL DAILY
Qty: 6 TABLET | Refills: 0 | Status: SHIPPED | OUTPATIENT
Start: 2022-10-07 | End: 2022-10-07 | Stop reason: SDUPTHER

## 2022-10-07 RX ADMIN — HEPARIN SODIUM 7500 UNITS: 5000 INJECTION INTRAVENOUS; SUBCUTANEOUS at 05:03

## 2022-10-07 RX ADMIN — CARVEDILOL 12.5 MG: 12.5 TABLET, FILM COATED ORAL at 10:08

## 2022-10-07 RX ADMIN — HEPARIN SODIUM 7500 UNITS: 5000 INJECTION INTRAVENOUS; SUBCUTANEOUS at 13:21

## 2022-10-07 RX ADMIN — CALCITRIOL CAPSULES 0.25 MCG 0.25 MCG: 0.25 CAPSULE ORAL at 10:57

## 2022-10-07 RX ADMIN — SODIUM CHLORIDE, SODIUM GLUCONATE, SODIUM ACETATE, POTASSIUM CHLORIDE AND MAGNESIUM CHLORIDE 125 ML/HR: 526; 502; 368; 37; 30 INJECTION, SOLUTION INTRAVENOUS at 03:23

## 2022-10-07 RX ADMIN — POTASSIUM CHLORIDE 40 MEQ: 1500 TABLET, EXTENDED RELEASE ORAL at 16:43

## 2022-10-07 RX ADMIN — POTASSIUM CHLORIDE 40 MEQ: 1500 TABLET, EXTENDED RELEASE ORAL at 13:20

## 2022-10-07 NOTE — ASSESSMENT & PLAN NOTE
· Blood pressures are stable  · Continue home carvedilol  · No further HCTZ and her lisinopril until cleared by Nephrology in the outpatient setting

## 2022-10-07 NOTE — DISCHARGE SUMMARY
[facility] 43  Discharge- Jhony Neff 1965, 59 y o  male MRN: [id number]  Unit/Bed#: -Mary Encounter: [id number]  Primary Care Provider: LAURA Mixon   Date and time admitted to hospital: 10/5/2022 11:48 AM    * LIAM (acute kidney injury) [facility])  Assessment & Plan  · Acute kidney injury was present on admission   · Baseline creatinine approximately 1 2  · Arrival creatinine 4 44, status post treatment with IV fluids, discharge creatinine 1 56  · Probably multifactorial, pre renal in the setting of losing a lot of volume because of extensive history of recent diarrhea, as well as the possibility of an acute tubular necrosis in the setting of having infectious enteritis  · Status post treatment with IV fluids  · Renal ultrasound is grossly within normal limits  · Hypokalemia:-patient refusing IV potassium, will receive a total of 80 mEq of p o  potassium today, followed by a prescription for 40 mg of p o  potassium daily for 3 days  Patient will be seen by Nephrology in the near future, and they will be ordering repeat blood work for the outpatient setting for early next week  · DC home today    Diarrhea of presumed infectious origin  Assessment & Plan  · Currently resolved  · Patient has had no recurrent bowel movements for over 24 hours  · Testing for rotavirus, Clostridium difficile, Salmonella, Shigella, Campylobacter, and shiga toxin is negative  Fecal leukocytes are negative  Giardia lamblia, and ova and parasites examination is pending  · Blood cultures x2 sets are negative today  · Appreciate GI input  · Mild bump in procalcitonin noted, however will hold off on further antibiotics  · Leukocytosis has resolved  · In retrospect, the diarrhea might be secondary to the recent initiation of Ozempic in the outpatient setting, patient himself feels that this is the cause of his diarrhea    Have recommended that [facility] be discontinued at this time, and that the patient will follow-up with his PCP for alternative diabetic medications  SBO (small bowel obstruction) ([facility])  Assessment & Plan  · Status post a general surgical consultation  · Patient has been cleared from this standpoint  · General surgery has signed off  · Okay for DC    Electrolyte abnormality  Assessment & Plan  · As outlined above    Essential hypertension  Assessment & Plan  · Blood pressures are stable  · Continue home carvedilol  · No further HCTZ and her lisinopril until cleared by Nephrology in the outpatient setting    Type 2 diabetes mellitus with stage 2 chronic kidney disease, without long-term current use of insulin ([facility] )  Assessment & Plan  · Target blood sugar for the hospital is 140-180  · DC home on pre-admit meds at pre-admit doses minus the Ozempic      Morbid obesity-BMI 42 45, dietary, weight loss, and lifestyle modification counseling was provided prior to discharge    Discharging Physician / Practitioner: Fuad Nava  PCP: Giovanny Santillan, [address] [place] [address]  Admission Date:   Admission Orders (From admission, onward)     Ordered        10/05/22 1359  INPATIENT ADMISSION  Once                      Discharge Date: 10/07/22    Medical Problems             Resolved Problems  Date Reviewed: 10/5/2022   None                 Consultations During Hospital Stay:  · Gastroenterology  · General surgery  · Nephrology    Procedures Performed:   · None    Significant Findings / Test Results:   · Ultrasound kidney bladder-unremarkable exam  · CT abdomen pelvis-Findings suggesting early/partial small bowel obstruction, although ileus and enteritis are also possible  Incidental Findings:   · None     Test Results Pending at Discharge (will require follow up):    · None     Outpatient Tests Requested:  · Repeat BMP testing via Nephrology for early in the week of 88/55/3029    Complications:    • None    Reason for Admission:  Acute renal failure, diarrhea    Hospital Course:     Jamia Jones is a 59 y o  male patient who originally presented to the hospital on 10/5/2022 due to diarrhea  Please refer to the initial history and physical examination completed by marcy Wheeler for the initial presenting features and complaints  In brief, the patient is a 53-year-old male, who presented to the emergency room with a chief complaint of diarrhea  In the emergency room he was diagnosed with an acute renal failure, and there was also suspicion for an early/partial small-bowel obstruction as evidenced by the CT scan of the abdomen and pelvis  Patient was admitted to [facility]  He was started on IV fluids  A general surgical evaluation was sought for the suspected obstruction, however he was cleared from General surgery standpoint, and did not have an obstruction  Patient had complained of diarrhea prior to arrival   Extensive stool studies were performed, all infectious workup was negative  Patient was found to be in an acute kidney injury state that was present on admission, he was started on IV fluids, and a nephrology consultation was obtained  By day of discharge, his creatinine came back down to 1 56  He was deemed medically stable for discharge  Renal ultrasound testing was also within normal limits  At time of discharge, lisinopril and hydrochlorothiazide were put on hold until the patient has been seen by Nephrology early next week  Nephrology will also be repeating blood work in the outpatient setting  Patient was also told to hold further doses of Ozempic, since he feels that his diarrhea started ever since this medication was initiated in the outpatient setting  He was otherwise stable at time of discharge  Please refer to the assessment/plan portion of this discharge summary as outlined above for the remainder of the details in regard to his stay  Please see above list of diagnoses and related plan for additional information       Condition at Discharge: good     Discharge Day Visit / Exam: Subjective:  Patient seen and examined, no complaints, no distress  Vitals: Blood Pressure: 144/89 (10/07/22 0723)  Pulse: 82 (10/07/22 0723)  Temperature: 98 °F (36 7 °C) (10/07/22 0723)  Temp Source: Temporal (10/06/22 2239)  Respirations: 20 (10/07/22 0723)  Height: 6' (182 9 cm) (10/05/22 1145)  Weight - Scale: (!) 142 kg (313 lb) (10/05/22 1145)  SpO2: 91 % (10/07/22 0723)  Exam:   Physical Exam  Vitals and nursing note reviewed  Constitutional:       General: He is not in acute distress  Appearance: Normal appearance  He is not ill-appearing  HENT:      Head: Normocephalic and atraumatic  Nose: Nose normal    Eyes:      Extraocular Movements: Extraocular movements intact  Pupils: Pupils are equal, round, and reactive to light  Cardiovascular:      Rate and Rhythm: Normal rate and regular rhythm  Pulses: Normal pulses  Heart sounds: Normal heart sounds  No murmur heard  No friction rub  No gallop  Pulmonary:      Effort: Pulmonary effort is normal       Breath sounds: Normal breath sounds  Abdominal:      General: There is no distension  Palpations: Abdomen is soft  There is no mass  Tenderness: There is no abdominal tenderness  There is no guarding or rebound  Musculoskeletal:         General: No swelling or tenderness  Normal range of motion  Cervical back: Normal range of motion and neck supple  No rigidity  No muscular tenderness  Right lower leg: No edema  Left lower leg: No edema  Skin:     General: Skin is warm  Capillary Refill: Capillary refill takes less than 2 seconds  Findings: No erythema or rash  Neurological:      General: No focal deficit present  Mental Status: He is alert and oriented to person, place, and time  Mental status is at baseline     Psychiatric:         Mood and Affect: Mood normal          Behavior: Behavior normal          Discussion with Family:  No family members were present at the time of my discharge evaluation, nor did the patient ask and or want me to call anyone    Discharge instructions/Information to patient and family:   See after visit summary for information provided to patient and family  Provisions for Follow-Up Care:  See after visit summary for information related to follow-up care and any pertinent home health orders  Disposition:     Home      Planned Readmission:   • None     Discharge Statement:  I spent 45 minutes discharging the patient  This time was spent on the day of discharge  I had direct contact with the patient on the day of discharge  Greater than 50% of the total time was spent examining patient, answering all patient questions, arranging and discussing plan of care with patient as well as directly providing post-discharge instructions  Additional time then spent on discharge activities  Discharge Medications:  See after visit summary for reconciled discharge medications provided to patient and family        ** Please Note: This note has been constructed using a voice recognition system **

## 2022-10-07 NOTE — ASSESSMENT & PLAN NOTE
· Acute kidney injury was present on admission   · Baseline creatinine approximately 1 2  · Arrival creatinine 4 44, status post treatment with IV fluids, discharge creatinine 1 56  · Probably multifactorial, pre renal in the setting of losing a lot of volume because of extensive history of recent diarrhea, as well as the possibility of an acute tubular necrosis in the setting of having infectious enteritis  · Status post treatment with IV fluids  · Renal ultrasound is grossly within normal limits  · Hypokalemia:-patient refusing IV potassium, will receive a total of 80 mEq of p o  potassium today, followed by a prescription for 40 mg of p o  potassium daily for 3 days  Patient will be seen by Nephrology in the near future, and they will be ordering repeat blood work for the outpatient setting for early next week    · DC home today

## 2022-10-07 NOTE — ASSESSMENT & PLAN NOTE
· Status post a general surgical consultation  · Patient has been cleared from this standpoint  · General surgery has signed off  · Okay for DC

## 2022-10-07 NOTE — PLAN OF CARE
Problem: Nutrition/Hydration-ADULT  Goal: Nutrient/Hydration intake appropriate for improving, restoring or maintaining nutritional needs  Description: Monitor and assess patient's nutrition/hydration status for malnutrition  Collaborate with interdisciplinary team and initiate plan and interventions as ordered  Monitor patient's weight and dietary intake as ordered or per policy  Utilize nutrition screening tool and intervene as necessary  Determine patient's food preferences and provide high-protein, high-caloric foods as appropriate       INTERVENTIONS:  - Monitor oral intake, urinary output, labs, and treatment plans  - Assess nutrition and hydration status and recommend course of action  - Evaluate amount of meals eaten  - Assist patient with eating if necessary   - Allow adequate time for meals  - Recommend/ encourage appropriate diets, oral nutritional supplements, and vitamin/mineral supplements  - Order, calculate, and assess calorie counts as needed  - Recommend, monitor, and adjust tube feedings and TPN/PPN based on assessed needs  - Assess need for intravenous fluids  - Provide specific nutrition/hydration education as appropriate  - Include patient/family/caregiver in decisions related to nutrition  Outcome: Progressing     Problem: PAIN - ADULT  Goal: Verbalizes/displays adequate comfort level or baseline comfort level  Description: Interventions:  - Encourage patient to monitor pain and request assistance  - Assess pain using appropriate pain scale  - Administer analgesics based on type and severity of pain and evaluate response  - Implement non-pharmacological measures as appropriate and evaluate response  - Consider cultural and social influences on pain and pain management  - Notify physician/advanced practitioner if interventions unsuccessful or patient reports new pain  Outcome: Progressing     Problem: INFECTION - ADULT  Goal: Absence or prevention of progression during hospitalization  Description: INTERVENTIONS:  - Assess and monitor for signs and symptoms of infection  - Monitor lab/diagnostic results  - Monitor all insertion sites, i e  indwelling lines, tubes, and drains  - Monitor endotracheal if appropriate and nasal secretions for changes in amount and color  - [place] appropriate cooling/warming therapies per order  - Administer medications as ordered  - Instruct and encourage patient and family to use good hand hygiene technique  - Identify and instruct in appropriate isolation precautions for identified infection/condition  Outcome: Progressing  Goal: Absence of fever/infection during neutropenic period  Description: INTERVENTIONS:  - Monitor WBC    Outcome: Progressing     Problem: SAFETY ADULT  Goal: Patient will remain free of falls  Description: INTERVENTIONS:  - Educate patient/family on patient safety including physical limitations  - Instruct patient to call for assistance with activity   - Consult OT/PT to assist with strengthening/mobility   - Keep Call bell within reach  - Keep bed low and locked with side rails adjusted as appropriate  - Keep care items and personal belongings within reach  - Initiate and maintain comfort rounds  - Make Fall Risk Sign visible to staff  - Offer Toileting every 2 Hours, in advance of need  - Initiate/Maintain bed alarm  - Obtain necessary fall risk management equipment: alarms, non-skid socks  - Apply yellow socks and bracelet for high fall risk patients  - Consider moving patient to room near nurses station  Outcome: Progressing  Goal: Maintain or return to baseline ADL function  Description: INTERVENTIONS:  -  Assess patient's ability to carry out ADLs; assess patient's baseline for ADL function and identify physical deficits which impact ability to perform ADLs (bathing, care of mouth/teeth, toileting, grooming, dressing, etc )  - Assess/evaluate cause of self-care deficits   - Assess range of motion  - Assess patient's mobility; develop plan if impaired  - Assess patient's need for assistive devices and provide as appropriate  - Encourage maximum independence but intervene and supervise when necessary  - Involve family in performance of ADLs  - Assess for home care needs following discharge   - Consider OT consult to assist with ADL evaluation and planning for discharge  - Provide patient education as appropriate  Outcome: Progressing  Goal: Maintains/Returns to pre admission functional level  Description: INTERVENTIONS:  - Perform BMAT or MOVE assessment daily    - Set and communicate daily mobility goal to care team and patient/family/caregiver  - Collaborate with rehabilitation services on mobility goals if consulted  - Perform Range of Motion  3 times a day  - Reposition patient every 2 hours    - Dangle patient 3 times a day  - Stand patient 3 times a day  - Ambulate patient 3 times a day  - Out of bed to chair 3 times a day   - Out of bed for meals 3 times a day  - Out of bed for toileting  - Record patient progress and toleration of activity level   Outcome: Progressing     Problem: DISCHARGE PLANNING  Goal: Discharge to home or other facility with appropriate resources  Description: INTERVENTIONS:  - Identify barriers to discharge w/patient and caregiver  - Arrange for needed discharge resources and transportation as appropriate  - Identify discharge learning needs (meds, wound care, etc )  - Arrange for interpretive services to assist at discharge as needed  - Refer to Case Management Department for coordinating discharge planning if the patient needs post-hospital services based on physician/advanced practitioner order or complex needs related to functional status, cognitive ability, or social support system  Outcome: Progressing     Problem: Knowledge Deficit  Goal: Patient/family/caregiver demonstrates understanding of disease process, treatment plan, medications, and discharge instructions  Description: Complete learning assessment and assess knowledge base    Interventions:  - Provide teaching at level of understanding  - Provide teaching via preferred learning methods  Outcome: Progressing     Problem: Potential for Falls  Goal: Patient will remain free of falls  Description: INTERVENTIONS:  - Educate patient/family on patient safety including physical limitations  - Instruct patient to call for assistance with activity   - Consult OT/PT to assist with strengthening/mobility   - Keep Call bell within reach  - Keep bed low and locked with side rails adjusted as appropriate  - Keep care items and personal belongings within reach  - Initiate and maintain comfort rounds  - Make Fall Risk Sign visible to staff  - Offer Toileting every 2 Hours, in advance of need  - Initiate/Maintain bed alarm  - Obtain necessary fall risk management equipment: alarms, non-skid socks  - Apply yellow socks and bracelet for high fall risk patients  - Consider moving patient to room near nurses station  Outcome: Progressing     Problem: Prexisting or High Potential for Compromised Skin Integrity  Goal: Skin integrity is maintained or improved  Description: INTERVENTIONS:  - Identify patients at risk for skin breakdown  - Assess and monitor skin integrity  - Assess and monitor nutrition and hydration status  - Monitor labs   - Assess for incontinence   - Turn and reposition patient  - Assist with mobility/ambulation  - Relieve pressure over bony prominences  - Avoid friction and shearing  - Provide appropriate hygiene as needed including keeping skin clean and dry  - Evaluate need for skin moisturizer/barrier cream  - Collaborate with interdisciplinary team   - Patient/family teaching  - Consider wound care consult   Outcome: Progressing

## 2022-10-07 NOTE — CONSULTS
Tosha Waller 59 y o  male MRN: [id number]  Unit/Bed#: -01 Encounter: [id number]        Assessment and Plan:    1  Acute kidney injury, prerenal with > 50% improvement at 48 hr   Etiology secondary to prerenal state with diuretic, diarrhea and vasomotor effect of ACE    He is tolerating his diet this point  Would stop intravenous fluids and encourage p o  intake  Remain off lisinopril and hydrochlorothiazide  He will need a BMP in 1 week at discharge  He will need to see Nephrology in 2-3 weeks at discharge  2  Hypokalemia mild to moderate due to calorie cysts with intravenous fluids and deficit due to diarrhea  Will check magnesium level today  Hold hydrochlorothiazide which can also contribute to potassium wasting  Replenish potassium to goal 3 5-4    3  Diarrhea further workup per primary  4  Hypocalcemia, due to severity of LIAM with elevated PTH likely  Will check magnesium as well and replace this if needed  Start on calcitriol 0 25 mcg every other day for now  Check PTH/ical      5  Essential hypertension, under reasonable control  Remain off lisinopril and hydrochlorothiazide with severe LIAM  Continue Coreg  HPI:    Tate Traylor is a 59 y o  male with history of L nares disease requiring HD for 1 month, presented to the emergency department on 10/05 with diarrhea and no urine output for 1 day  He reports the diarrhea was persistent and nonbloody  He was having difficulty with p o  intake  He denied any hematuria, dysuria  Denies NSAID use  Emergency department his creatinine was found to be 4 4 with a baseline of 1 2  His blood pressure was stable  He has received intravenous fluid over the past 48 hours  Creatinine down to 1 56 today with a BUN of 34 from 4 28 on 10/06  He has been having mild-to-moderate hypokalemia  His magnesium was not checked today  Renal ultrasound on 10/07 showed left kidney 13 7 cm and right kidney 11 4 cm    Normal echogenicity and contour, no masses, no hydronephrosis, no calculi  Bladder is normally distended  It was unremarkable exam   He has CT abdomen pelvis without contrast on 10/05 and was no hydronephrosis or calculi at that time  One or more sharply circumscribed subcentimeter renal hypodensities seen but no further workup was required  He is feeling well today and tolerating diet  He denies any chest pain, shortness breath, nausea, vomiting, diarrhea, lightheadedness, dizziness  He does not follow with a nephrologist at present  He was on dialysis for approximately 1 month due to lesion nurse disease with this was 9 years ago  He is on lisinopril hydrochlorothiazide as well as an outpatient  Reason for Consult: LIAM       Review of Systems:    A complete 10-point review of systems was performed  Aside from what was mentioned in the HPI, it is otherwise negative        Historical Information   Past Medical History:   Diagnosis Date   • Benign arteriolar nephrosclerosis    • Essential hypertension    • History of transfusion 2013   • Hyperparathyroidism due to renal insufficiency (HCC)    • Legionnaire's disease ([facility] [address] )    • Pneumonia     s/p VDRF      Past Surgical History:   Procedure Laterality Date   • COLONOSCOPY     • OTHER SURGICAL HISTORY      Dialysis catheter    • TX LAMNOTMY INCL W/DCMPRSN NRV ROOT 1 INTRSPC LUMBR Left 11/15/2021    Procedure: Left sided L1-2 microdiscectomy;  Surgeon: Kathia Christianson MD;  Location:  MAIN OR;  Service: Neurosurgery     Social History   Social History     Substance and Sexual Activity   Alcohol Use Not Currently    Comment: Denies alcohol use - As per [facility]      Social History     Substance and Sexual Activity   Drug Use Never     Social History     Tobacco Use   Smoking Status Never Smoker   Smokeless Tobacco Never Used       Family History:   Family History   Problem Relation Age of Onset   • Hyperlipidemia Mother    • Lung cancer Father    • Stomach cancer Father    • Cancer Father    • Diabetes type II Father    • Diabetes type II Brother        Medications:  Pertinent medications were reviewed  Current Facility-Administered Medications   Medication Dose Route Frequency Provider Last Rate   • acetaminophen  650 mg Oral Q4H PRN Madison Bronx, DO     • carvedilol  12 5 mg Oral BID Madison Jacky, DO     • heparin (porcine)  7,500 Units Subcutaneous [facility], DO     • influenza vaccine  0 5 mL Intramuscular Once Madison Jacky, DO     • insulin lispro  2-12 Units Subcutaneous TID TRISR [facility] Dion Soriano MD     • insulin lispro  2-12 Units Subcutaneous HS Dion Soriano MD     • loperamide  2 mg Oral 4x Daily PRN Madison Jacky, DO     • multi-electrolyte  125 mL/hr Intravenous Continuous Madison Jacky,  mL/hr (10/07/22 0323)   • ondansetron  4 mg Intravenous Q6H PRN Madison Bronx, DO           No Known Allergies      Vitals:   /89   Pulse 82   Temp 98 °F (36 7 °C)   Resp 20   Ht 6' (1 829 m)   Wt (!) 142 kg (313 lb)   SpO2 91%   BMI 42 45 kg/m²   Body mass index is 42 45 kg/m²    SpO2: 91 %,   SpO2 Activity: At Rest,   O2 Device: None (Room air)      Intake/Output Summary (Last 24 hours) at 10/7/2022 1026  Last data filed at 10/7/2022 5538  Gross per 24 hour   Intake 1380 ml   Output 1200 ml   Net 180 ml     Invasive Devices  Report    Peripheral Intravenous Line  Duration           Peripheral IV 10/05/22 Left Forearm 1 day    Peripheral IV 10/05/22 Left;Proximal;Ventral (anterior) Forearm 1 day                Physical Exam:  General: conscious, cooperative, in no acute distress, obese   Eyes: conjunctivae pink, anicteric sclerae  ENT: lips and mucous membranes moist  Neck: supple, no JVD, no masses  Chest: clear breath sounds bilateral, no crackles, ronchus or wheezings  CVS: distinct S1 & S2, normal rate, regular rhythm  Abdomen: soft, non-tender, non-distended, normoactive bowel sounds  Extremities: no edema of both legs  Skin: no rash  Neuro: awake, alert, oriented      Diagnostic Data:  Lab: I have personally reviewed pertinent lab results  ,   CBC:  Results from last 7 days   Lab Units 10/07/22  0448   WBC Thousand/uL 9 86   HEMOGLOBIN g/dL 13 2   HEMATOCRIT % 39 7   PLATELETS Thousands/uL 290      CMP:   Lab Results   Component Value Date    SODIUM 136 10/07/2022    K 2 8 (L) 10/07/2022     10/07/2022    CO2 26 10/07/2022    BUN 34 (H) 10/07/2022    CREATININE 1 56 (H) 10/07/2022    CALCIUM 7 8 (L) 10/07/2022    AST 21 10/07/2022    ALT 23 10/07/2022    ALKPHOS 52 10/07/2022    EGFR 47 10/07/2022   ,   PT/INR: No results found for: PT, INR,   Magnesium: No components found for: MAG,  Phosphorous: No results found for: PHOS    Microbiology:  @LABNT,(urinecx:7)@        [place]    Portions of the record may have been created with voice recognition software  Occasional wrong word or "sound a like" substitutions may have occurred due to the inherent limitations of voice recognition software  Read the chart carefully and recognize, using context, where substitutions have occurred

## 2022-10-07 NOTE — DISCHARGE INSTR - AVS FIRST PAGE
Dear Jeannette Palencia,     It was our pleasure to care for you here at [facility],  [address]  It is our hope that we were always able to exceed the expected standards for your care during your stay  You were hospitalized due to renal failure secondary to dehydration which was caused by your prolonged diarrheal illness  You were cared for on the medical/surgical floor by Kristopher Davies with the [facility] who covers for your primary care physician (PCP), Perla Caldwell, while you were hospitalized  You were additionally seen by the [facility] associates, and by GI  If you have any questions or concerns related to this hospitalization, you may contact us at [phone]  For follow up as well as any medication refills, we recommend that you follow up with your primary care physician  A registered nurse will reach out to you by phone within a few days after your discharge to answer any additional questions that you may have after going home  However, at this time we provide for you here, the most important instructions / recommendations at discharge:     Notable Medication Adjustments -   Hold further lisinopril and hydrochlorothiazide until cleared by Nephrology  No further Ozempic-suspect that this is the underlying cause of you diarrhea  Testing Required after Discharge -   Repeat blood work within 1 week  Important follow up information -   Please follow-up with providers as outlined in this discharge package  Other Instructions -   Please maintain a healthy diabetic diet  Please review new medication options for diabetes with your primary care provider  Please review this entire after visit summary as additional general instructions including medication list, appointments, activity, diet, any pertinent wound care, and other additional recommendations from your care team that may be provided for you        Sincerely,     Shai Foster [address]

## 2022-10-07 NOTE — PROGRESS NOTES
Chart review  Patient will be seen in consultation tomorrow  Etiology of ARF secondary to prerenal vs atn  Continue fluid trial    Continue to replace potassium cautiously to goal offour  Awaiting results of ultrasound  Follow daily chemistry  No emergency to Hd based on labs from today

## 2022-10-07 NOTE — ASSESSMENT & PLAN NOTE
· Currently resolved  · Patient has had no recurrent bowel movements for over 24 hours  · Testing for rotavirus, Clostridium difficile, Salmonella, Shigella, Campylobacter, and shiga toxin is negative  Fecal leukocytes are negative  Giardia lamblia, and ova and parasites examination is pending  · Blood cultures x2 sets are negative today  · Appreciate GI input  · Mild bump in procalcitonin noted, however will hold off on further antibiotics  · Leukocytosis has resolved  · In retrospect, the diarrhea might be secondary to the recent initiation of Ozempic in the outpatient setting, patient himself feels that this is the cause of his diarrhea  Have recommended that [facility] be discontinued at this time, and that the patient will follow-up with his PCP for alternative diabetic medications

## 2022-10-07 NOTE — PROGRESS NOTES
Progress Note- Salvador Avila 59 y o  male MRN: [id number]    Unit/Bed#: -01 Encounter: [id number]      Assessment and Plan:    60 y/o M w/ pmhx sig for DM2, CKD, HTN, who presented to ER with acute onset of n/v/d  Non-bloody in nature  Found to have leukocytosis on admit (WBC 16 17) as well as LIAM  Hyponatremic and hypokalemic on admission  Non-con CT commented on scattered dilated and fluid filled small bowel loops  Pt was started on IV fluids, anti-emetics, and empirically started on cipro/flagyl  Stool studies negative for c diff and enteric infections thus far  Acute diarrheal illness, infectious etiol suspected  Personal hx of colon polyps     · Pt continues to improve with IV fluids and supportive care  Clinical picture remains most c/w acute infectious enteritis  No evidence of SBO  · His stool output has markedly slowed down as of 10/07/22  Abdominal examination is benign  · Given negative stool studies thus far, okay for anti-diarrheal loperamide as needed for diarrhea  · Will follow up on remainder of stool studies still in process  · Continue with diet as is tolerated  · No clear indication for antibiotics at this time for a GI standpoint  · LIAM improving, Nephrology following and recommendations appreciated  Suspected to be secondary to prerenal state with diuretic, diarrhea, and vasomotor effect of ACE  · We discussed following with pt in the outpatient setting to set up a screening colonoscopy as he is overdue for colonoscopy for cancer surveillance purposes  GI will be available should any new issues arise  Thank you for the consultation  ______________________________________________________________________    Subjective:     Patient is a 59 y o  male w/ CKD, DM2, who presented to ER with several days of nausea, emesis, and non-bloody diarrhea  Noted to have leukocytosis on admission and significant LIAM and electrolyte abnormalities   CT commented on scattered mildly dilated fluid and air filled bowel loops and liquid stool throughout colon  Pt was started on antibiotics per primary team      Pt had stool studies completed which were negative for enteric pathogens, c diff, and rotavirus  Interval events:   No acute GI events overnight  Pt had 2 BMs in past 24 hours, stool is starting to firm up slightly  No BRBPR or melena  No abd pain  No n/v  Tolerating solids without issue  Afebrile within past 24 hours  LIAM improving       Medication Administration - last 24 hours from 10/06/2022 1000 to 10/07/2022 1000       Date/Time Order Dose Route Action Action by     10/06/2022 1434 metroNIDAZOLE (FLAGYL) IVPB (premix) 500 mg 100 mL 500 mg Intravenous New Bag Chandrika Goddard, ALBERTO     10/06/2022 1747 carvedilol (COREG) tablet 12 5 mg 12 5 mg Oral Not Given Jw Thakkar RN     10/07/2022 0323 multi-electrolyte (PLASMALYTE-A/ISOLYTE-S PH 7 4) IV solution 125 mL/hr Intravenous New Bag Jhonatan Medrano RN     10/06/2022 1957 multi-electrolyte (PLASMALYTE-A/ISOLYTE-S PH 7 4) IV solution 125 mL/hr Intravenous New Bag Jhonatan Medrano, ALBERTO     10/06/2022 1226 multi-electrolyte (PLASMALYTE-A/ISOLYTE-S PH 7 4) IV solution 125 mL/hr Intravenous New Bag Chandrika Goddard, ALBERTO     10/07/2022 0503 heparin (porcine) subcutaneous injection 7,500 Units 7,500 Units Subcutaneous Given Jhonatan Medrano RN     10/06/2022 2141 heparin (porcine) subcutaneous injection 7,500 Units 7,500 Units Subcutaneous Given Jhonatan Medrano RN     10/06/2022 1433 heparin (porcine) subcutaneous injection 7,500 Units 7,500 Units Subcutaneous Given Chandrika Goddard RN     10/06/2022 1645 insulin lispro (HumaLOG) 100 units/mL subcutaneous injection 2-12 Units 0 Units Subcutaneous Not Given Chandrika Goddard RN     10/06/2022 2138 insulin lispro (HumaLOG) 100 units/mL subcutaneous injection 2-12 Units 2 Units Subcutaneous Not Given Jhonatan Medrano RN     10/06/2022 1816 potassium chloride 20 mEq IVPB (premix) 20 mEq Intravenous New Bag Chandrika ALBERTO Goddard     10/06/2022 1602 potassium chloride 20 mEq IVPB (premix) 20 mEq Intravenous New Bag Chandrika Goddard RN        Review of Systems   Per HPI    Objective:     Vitals: Blood pressure 144/89, pulse 82, temperature 98 °F (36 7 °C), resp  rate 20, height 6' (1 829 m), weight (!) 142 kg (313 lb), SpO2 91 %  ,Body mass index is 42 45 kg/m²  Intake/Output Summary (Last 24 hours) at 10/7/2022 1000  Last data filed at 10/7/2022 0723  Gross per 24 hour   Intake 1380 ml   Output 1200 ml   Net 180 ml       Physical Exam  Vitals and nursing note reviewed  Constitutional:       General: He is not in acute distress  Appearance: Normal appearance  He is obese  He is not ill-appearing  HENT:      Head: Normocephalic and atraumatic  Eyes:      Conjunctiva/sclera: Conjunctivae normal    Cardiovascular:      Heart sounds: Normal heart sounds  Pulmonary:      Effort: Pulmonary effort is normal    Abdominal:      General: Abdomen is protuberant  Bowel sounds are normal  There is no distension  Palpations: There is no mass  Tenderness: There is no abdominal tenderness  There is no guarding or rebound  Skin:     General: Skin is warm and dry  Neurological:      General: No focal deficit present  Mental Status: He is alert and oriented to person, place, and time     Psychiatric:         Mood and Affect: Mood normal          Behavior: Behavior normal        Invasive Devices  Report    Peripheral Intravenous Line  Duration           Peripheral IV 10/05/22 Left Forearm 1 day    Peripheral IV 10/05/22 Left;Proximal;Ventral (anterior) Forearm 1 day                Lab Results:  Admission on 10/05/2022   Component Date Value   • WBC 10/05/2022 16 17 (A)   • RBC 10/05/2022 5 59    • Hemoglobin 10/05/2022 16 3    • Hematocrit 10/05/2022 47 7    • MCV 10/05/2022 85    • MCH 10/05/2022 29 2    • MCHC 10/05/2022 34 2    • RDW 10/05/2022 12 8    • MPV 10/05/2022 9 2    • Platelets 97/80/4949 432 (A)   • nRBC 10/05/2022 0    • Neutrophils Relative 10/05/2022 82 (A)   • Immat GRANS % 10/05/2022 1    • Lymphocytes Relative 10/05/2022 11 (A)   • Monocytes Relative 10/05/2022 6    • Eosinophils Relative 10/05/2022 0    • Basophils Relative 10/05/2022 0    • Neutrophils Absolute 10/05/2022 13 14 (A)   • Immature Grans Absolute 10/05/2022 0 09    • Lymphocytes Absolute 10/05/2022 1 83    • Monocytes Absolute 10/05/2022 1 02    • Eosinophils Absolute 10/05/2022 0 04    • Basophils Absolute 10/05/2022 0 05    • Sodium 10/05/2022 130 (A)   • Potassium 10/05/2022 2 9 (A)   • Chloride 10/05/2022 91 (A)   • CO2 10/05/2022 20 (A)   • ANION GAP 10/05/2022 19 (A)   • BUN 10/05/2022 42 (A)   • Creatinine 10/05/2022 4 44 (A)   • Glucose 10/05/2022 169 (A)   • Calcium 10/05/2022 9 1    • AST 10/05/2022 20    • ALT 10/05/2022 31    • Alkaline Phosphatase 10/05/2022 68    • Total Protein 10/05/2022 8 0    • Albumin 10/05/2022 4 4    • Total Bilirubin 10/05/2022 1 03 (A)   • eGFR 10/05/2022 13    • Lipase 10/05/2022 61    • Magnesium 10/05/2022 1 9    • hs TnI 0hr 10/05/2022 6    • hs TnI 2hr 10/05/2022 7    • Delta 2hr hsTnI 10/05/2022 1    • Osmolality, Ur 10/06/2022 372    • Creatinine, Ur 10/06/2022 348 0    • Sodium, Ur 10/06/2022 9    • Osmolality Serum 10/05/2022 295    • Salmonella sp PCR 10/05/2022 None Detected    • Shigella sp/Enteroinvasi* 10/05/2022 None Detected    • Campylobacter sp (jejuni* 10/05/2022 None Detected    • Shiga toxin 1/Shiga toxi* 10/05/2022 None Detected    • Rotavirus 10/05/2022 Negative    • C difficile toxin by PCR 10/05/2022 Negative    • Fecal Leukocytes 10/05/2022 No WBC's    • Blood Culture 10/05/2022 No Growth at 24 hrs  • Blood Culture 10/05/2022 No Growth at 24 hrs      • LACTIC ACID 10/05/2022 1 6    • Procalcitonin 10/05/2022 0 57 (A)   • PTT 10/05/2022 26    • Protime 10/05/2022 13 7    • INR 10/05/2022 1 05    • hs TnI 4hr 10/05/2022 5    • Delta 4hr hsTnI 10/05/2022 -1    • Ventricular Rate 10/05/2022 90    • Atrial Rate 10/05/2022 90    • NH Interval 10/05/2022 166    • QRSD Interval 10/05/2022 96    • QT Interval 10/05/2022 406    • QTC Interval 10/05/2022 496    • P Axis 10/05/2022 57    • QRS Axis 10/05/2022 -33    • T Wave Axis 10/05/2022 58    • Ventricular Rate 10/05/2022 91    • Atrial Rate 10/05/2022 91    • NH Interval 10/05/2022 176    • QRSD Interval 10/05/2022 92    • QT Interval 10/05/2022 410    • QTC Interval 10/05/2022 504    • P Axis 10/05/2022 47    • QRS Axis 10/05/2022 -47    • T Wave Axis 10/05/2022 43    • Sodium 10/06/2022 133 (A)   • Potassium 10/06/2022 3 0 (A)   • Chloride 10/06/2022 95 (A)   • CO2 10/06/2022 23    • ANION GAP 10/06/2022 15 (A)   • BUN 10/06/2022 52 (A)   • Creatinine 10/06/2022 4 28 (A)   • Glucose 10/06/2022 117    • Calcium 10/06/2022 7 7 (A)   • eGFR 10/06/2022 14    • WBC 10/06/2022 12 91 (A)   • RBC 10/06/2022 4 75    • Hemoglobin 10/06/2022 13 9    • Hematocrit 10/06/2022 40 9    • MCV 10/06/2022 86    • MCH 10/06/2022 29 3    • MCHC 10/06/2022 34 0    • RDW 10/06/2022 13 0    • MPV 10/06/2022 9 0    • Platelets 68/38/9804 314    • nRBC 10/06/2022 0    • Neutrophils Relative 10/06/2022 75    • Immat GRANS % 10/06/2022 1    • Lymphocytes Relative 10/06/2022 16    • Monocytes Relative 10/06/2022 7    • Eosinophils Relative 10/06/2022 1    • Basophils Relative 10/06/2022 0    • Neutrophils Absolute 10/06/2022 9 65 (A)   • Immature Grans Absolute 10/06/2022 0 06    • Lymphocytes Absolute 10/06/2022 2 12    • Monocytes Absolute 10/06/2022 0 91    • Eosinophils Absolute 10/06/2022 0 13    • Basophils Absolute 10/06/2022 0 04    • Ventricular Rate 10/05/2022 95    • Atrial Rate 10/05/2022 95    • NH Interval 10/05/2022 168    • QRSD Interval 10/05/2022 94    • QT Interval 10/05/2022 402    • QTC Interval 10/05/2022 505    • P Axis 10/05/2022 49    • QRS Axis 10/05/2022 118    • T Wave Axis 10/05/2022 -2    • Ventricular Rate 10/05/2022 96    • Atrial Rate 10/05/2022 96    • VA Interval 10/05/2022 172    • QRSD Interval 10/05/2022 92    • QT Interval 10/05/2022 380    • QTC Interval 10/05/2022 480    • P Axis 10/05/2022 50    • QRS Axis 10/05/2022 114    • T Wave Axis 10/05/2022 2    • POC Glucose 10/06/2022 126    • POC Glucose 10/06/2022 113    • Sodium 10/07/2022 136    • Potassium 10/07/2022 2 8 (A)   • Chloride 10/07/2022 100    • CO2 10/07/2022 26    • ANION GAP 10/07/2022 10    • BUN 10/07/2022 34 (A)   • Creatinine 10/07/2022 1 56 (A)   • Glucose 10/07/2022 120    • Calcium 10/07/2022 7 8 (A)   • AST 10/07/2022 21    • ALT 10/07/2022 23    • Alkaline Phosphatase 10/07/2022 52    • Total Protein 10/07/2022 5 9 (A)   • Albumin 10/07/2022 3 5    • Total Bilirubin 10/07/2022 0 65    • eGFR 10/07/2022 47    • WBC 10/07/2022 9 86    • RBC 10/07/2022 4 58    • Hemoglobin 10/07/2022 13 2    • Hematocrit 10/07/2022 39 7    • MCV 10/07/2022 87    • MCH 10/07/2022 28 8    • MCHC 10/07/2022 33 2    • RDW 10/07/2022 13 0    • MPV 10/07/2022 8 9    • Platelets 12/29/0872 290    • nRBC 10/07/2022 0    • Neutrophils Relative 10/07/2022 67    • Immat GRANS % 10/07/2022 1    • Lymphocytes Relative 10/07/2022 20    • Monocytes Relative 10/07/2022 7    • Eosinophils Relative 10/07/2022 4    • Basophils Relative 10/07/2022 1    • Neutrophils Absolute 10/07/2022 6 69    • Immature Grans Absolute 10/07/2022 0 10    • Lymphocytes Absolute 10/07/2022 1 97    • Monocytes Absolute 10/07/2022 0 67    • Eosinophils Absolute 10/07/2022 0 38    • Basophils Absolute 10/07/2022 0 05    • POC Glucose 10/07/2022 113      Imaging Studies: I have personally reviewed pertinent imaging studies  Shwetha Weiss    **Please note:  Dictation voice to text software may have been used in the creation of this record  Occasional wrong word or “sound alike” substitutions may have occurred due to the inherent limitations of voice recognition software    Read the chart carefully and recognize, using context, where substitutions have occurred  **

## 2022-10-10 ENCOUNTER — TRANSITIONAL CARE MANAGEMENT (OUTPATIENT)
Dept: INTERNAL MEDICINE CLINIC | Facility: CLINIC | Age: 57
End: 2022-10-10

## 2022-10-10 LAB
BACTERIA BLD CULT: NORMAL
BACTERIA BLD CULT: NORMAL
G LAMBLIA AG STL QL IA: NEGATIVE
O+P STL CONC: NORMAL

## 2022-10-10 NOTE — UTILIZATION REVIEW
Notification of Discharge   This is a Notification of Discharge from our facility [address]  Please be advised that this patient has been discharge from our facility  Below you will find the admission and discharge date and time including the patient’s disposition  UTILIZATION REVIEW CONTACT:  Myah Murrell  Utilization   Network Utilization Review Department  Phone: [phone] carefully listen to the prompts  All voicemails are confidential   Email: [email or web address]     PHYSICIAN ADVISORY SERVICES:  FOR AXRS-NL-XAVQ REVIEW - MEDICAL NECESSITY DENIAL  Phone: [phone]  Fax: [phone]  Email: [email or web address]     PRESENTATION DATE: 10/5/2022 11:48 AM  OBERVATION ADMISSION DATE:   INPATIENT ADMISSION DATE: 10/5/22  1:59 PM   DISCHARGE DATE: 10/7/2022  5:52 PM  DISPOSITION: Home/Self Care Home/Self Care      IMPORTANT INFORMATION:  Send all requests for admission clinical reviews, approved or denied determinations and any other requests to dedicated fax number below belonging to the campus where the patient is receiving treatment   List of dedicated fax numbers:  [address] [place] [address] DENIALS (Administrative/Medical Necessity) [phone]   [address] (Maternity/NICU/Pediatrics) [phone]   [facility] [phone]   [place] [address] [phone]   [address] [phone]   [address] [facility] [phone]   [address] [place] [address] [phone]   [address] [place] [address] [phone]   [facility]  [phone] [address] [facility] [phone]   [address] [facility] [address] [facility] [phone]

## 2022-10-12 PROBLEM — Z12.11 COLON CANCER SCREENING: Status: RESOLVED | Noted: 2021-09-16 | Resolved: 2022-10-12

## 2022-10-18 ENCOUNTER — OFFICE VISIT (OUTPATIENT)
Dept: INTERNAL MEDICINE CLINIC | Facility: CLINIC | Age: 57
End: 2022-10-18
Payer: COMMERCIAL

## 2022-10-18 VITALS
SYSTOLIC BLOOD PRESSURE: 160 MMHG | TEMPERATURE: 98 F | OXYGEN SATURATION: 94 % | BODY MASS INDEX: 45.05 KG/M2 | WEIGHT: 315 LBS | DIASTOLIC BLOOD PRESSURE: 100 MMHG | HEART RATE: 78 BPM

## 2022-10-18 DIAGNOSIS — R93.5 ABNORMAL CT OF THE ABDOMEN: ICD-10-CM

## 2022-10-18 DIAGNOSIS — I10 ESSENTIAL HYPERTENSION: ICD-10-CM

## 2022-10-18 DIAGNOSIS — N18.2 TYPE 2 DIABETES MELLITUS WITH STAGE 2 CHRONIC KIDNEY DISEASE, WITHOUT LONG-TERM CURRENT USE OF INSULIN (HCC): ICD-10-CM

## 2022-10-18 DIAGNOSIS — N17.9 AKI (ACUTE KIDNEY INJURY) (HCC): ICD-10-CM

## 2022-10-18 DIAGNOSIS — R19.7 DIARRHEA OF PRESUMED INFECTIOUS ORIGIN: ICD-10-CM

## 2022-10-18 DIAGNOSIS — E87.8 ELECTROLYTE ABNORMALITY: ICD-10-CM

## 2022-10-18 DIAGNOSIS — K56.609 SBO (SMALL BOWEL OBSTRUCTION) (HCC): ICD-10-CM

## 2022-10-18 DIAGNOSIS — Z09 HOSPITAL DISCHARGE FOLLOW-UP: Primary | ICD-10-CM

## 2022-10-18 DIAGNOSIS — E11.22 TYPE 2 DIABETES MELLITUS WITH STAGE 2 CHRONIC KIDNEY DISEASE, WITHOUT LONG-TERM CURRENT USE OF INSULIN (HCC): ICD-10-CM

## 2022-10-18 PROCEDURE — 99495 TRANSJ CARE MGMT MOD F2F 14D: CPT | Performed by: NURSE PRACTITIONER

## 2022-10-18 RX ORDER — HYDRALAZINE HYDROCHLORIDE 10 MG/1
10 TABLET, FILM COATED ORAL 3 TIMES DAILY
Qty: 270 TABLET | Refills: 1 | Status: SHIPPED | OUTPATIENT
Start: 2022-10-18 | End: 2022-10-26 | Stop reason: ALTCHOICE

## 2022-10-18 NOTE — ASSESSMENT & PLAN NOTE
Lab Results   Component Value Date    HGBA1C 8 2 (A) 08/23/2022   · Discharge summary  · Target blood sugar for the hospital is 140-180  · DC home on pre-admit meds at pre-admit doses minus the Ozempic  · 10/18/2022  · Off all medications   Component Ref Range & Units 8/23/22 12:09 PM 10/16/21  8:21 AM   Hemoglobin A1C 6 5 8 2 Abnormal   8 0 High  R      · Will check HgBA1c

## 2022-10-18 NOTE — ASSESSMENT & PLAN NOTE
· Discharge summary:  · Currently resolved  · Patient has had no recurrent bowel movements for over 24 hours  · Testing for rotavirus, Clostridium difficile, Salmonella, Shigella, Campylobacter, and shiga toxin is negative  Fecal leukocytes are negative  Giardia lamblia, and ova and parasites examination is pending  · Blood cultures x2 sets are negative today  · Appreciate GI input  · Mild bump in procalcitonin noted, however will hold off on further antibiotics  · Leukocytosis has resolved  · In retrospect, the diarrhea might be secondary to the recent initiation of Ozempic in the outpatient setting, patient himself feels that this is the cause of his diarrhea  Have recommended that [facility] be discontinued at this time, and that the patient will follow-up with his PCP for alternative diabetic medications

## 2022-10-18 NOTE — PATIENT INSTRUCTIONS
Problem List Items Addressed This Visit          Digestive    Diarrhea of presumed infectious origin     Discharge summary:  Currently resolved  Patient has had no recurrent bowel movements for over 24 hours  Testing for rotavirus, Clostridium difficile, Salmonella, Shigella, Campylobacter, and shiga toxin is negative  Fecal leukocytes are negative  Giardia lamblia, and ova and parasites examination is pending  Blood cultures x2 sets are negative today  Appreciate GI input  Mild bump in procalcitonin noted, however will hold off on further antibiotics  Leukocytosis has resolved  In retrospect, the diarrhea might be secondary to the recent initiation of Ozempic in the outpatient setting, patient himself feels that this is the cause of his diarrhea  Have recommended that [facility] be discontinued at this time, and that the patient will follow-up with his PCP for alternative diabetic medications               SBO (small bowel obstruction) [facility])     Discharge summary  Status post a general surgical consultation  Patient has been cleared from this standpoint  General surgery has signed off  Okay for DC                Endocrine    Type 2 diabetes mellitus with stage 2 chronic kidney disease, without long-term current use of insulin ([facility] [address] )       Lab Results   Component Value Date    HGBA1C 8 2 (A) 08/23/2022   Discharge summary  Target blood sugar for the hospital is 140-180  DC home on pre-admit meds at pre-admit doses minus the Ozempic  10/18/2022  Off all medications   Component Ref Range & Units 8/23/22 12:09 PM 10/16/21  8:21 AM   Hemoglobin A1C 6 5 8 2 Abnormal   8 0 High  R      Will check HgBA1c          Relevant Orders    HEMOGLOBIN A1C W/ EAG ESTIMATION       Cardiovascular and Mediastinum    Essential hypertension     Discharge summary  Blood pressures are stable  Continue home carvedilol  No further HCTZ and her lisinopril until cleared by Nephrology in the outpatient setting  10/18/2022  In office blood pressure: 160/100  Will start him on hydralazine and monitor           Relevant Medications    hydrALAZINE (APRESOLINE) 10 mg tablet       Genitourinary    LIAM (acute kidney injury) ([facility] [address] )     Discharge summary  Acute kidney injury was present on admission   Baseline creatinine approximately 1 2  Arrival creatinine 4 44, status post treatment with IV fluids, discharge creatinine 1 56  Probably multifactorial, pre renal in the setting of losing a lot of volume because of extensive history of recent diarrhea, as well as the possibility of an acute tubular necrosis in the setting of having infectious enteritis  Status post treatment with IV fluids  Renal ultrasound is grossly within normal limits  Hypokalemia:-patient refusing IV potassium, will receive a total of 80 mEq of p o  potassium today, followed by a prescription for 40 mg of p o  potassium daily for 3 days  Patient will be seen by Nephrology in the near future, and they will be ordering repeat blood work for the outpatient setting for early next week  DC home today  10/18/2022  Will check CMP             Relevant Orders    Comprehensive metabolic panel       Other    Electrolyte abnormality    Relevant Orders    Comprehensive metabolic panel    Hospital discharge follow-up - Primary     The patient was hospitalized from 10/5/2022 to 10/7/2022  Hospital Course:   Alejandro Shipley is a 59 y o   male patient who originally presented to the hospital on 10/5/2022 due to diarrhea  Please refer to the initial history and physical examination completed by a Dr Dave Dejesus for the initial presenting features and complaints  In brief, the patient is a 53-year-old male, who presented to the emergency room with a chief complaint of diarrhea  In the emergency room he was diagnosed with an acute renal failure, and there was also suspicion for an early/partial small-bowel obstruction as evidenced by the CT scan of the abdomen and pelvis  Patient [facility]    He was started on IV fluids  A general surgical evaluation was sought for the suspected obstruction, however he was cleared from General surgery standpoint, and did not have an obstruction  Patient had complained of diarrhea prior to arrival   Extensive stool studies were performed, all infectious workup was negative  Patient was found to be in an acute kidney injury state that was present on admission, he was started on IV fluids, and a nephrology consultation was obtained  By day of discharge, his creatinine came back down to 1 56  He was deemed medically stable for discharge  Renal ultrasound testing was also within normal limits  At time of discharge, lisinopril and hydrochlorothiazide were put on hold until the patient has been seen by Nephrology early next week  Nephrology will also be repeating blood work in the outpatient setting  Patient was also told to hold further doses of Ozempic, since he feels that his diarrhea started ever since this medication was initiated in the outpatient setting  He was otherwise stable at time of discharge  Please refer to the assessment/plan portion of this discharge summary as outlined above for the remainder of the details in regard to his stay  Relevant Orders    Comprehensive metabolic panel    Abnormal CT of the abdomen     10/5/2022 CT Scan:  FINDINGS:   ABDOMEN   LOWER CHEST:  No clinically significant abnormality identified in the visualized lower chest    LIVER/BILIARY TREE:  Liver is diffusely decreased in density consistent with fatty change  No CT evidence of suspicious hepatic mass  Normal hepatic contours  No biliary dilatation  GALLBLADDER:  There are gallstone(s) within the gallbladder, without pericholecystic inflammatory changes  SPLEEN:  Unremarkable  PANCREAS:  Unremarkable  ADRENAL GLANDS:  Unremarkable  KIDNEYS/URETERS:  No hydronephrosis or urinary tract calculus    One or more sharply circumscribed subcentimeter renal hypodensities are present, too small to accurately characterize, and statistically most likely benign findings  According to recent literature (Radiology 2019) no further workup of these findings is recommended  STOMACH AND BOWEL:  Scattered mildly dilated fluid and air-filled loops of small bowel with air-fluid levels  No discrete transition point with relatively under distended, but not totally collapsed distal small bowel loops  Liquid stool throughout the colon  APPENDIX:  No findings to suggest appendicitis  ABDOMINOPELVIC CAVITY:  No ascites  No pneumoperitoneum  No lymphadenopathy  VESSELS:  Atherosclerotic changes are present  No evidence of aneurysm  PELVIS   REPRODUCTIVE ORGANS:  Unremarkable for patient's age  URINARY BLADDER:  Unremarkable  ABDOMINAL WALL/INGUINAL REGIONS:  Unremarkable  OSSEOUS STRUCTURES:  No acute fracture or destructive osseous lesion  Spinal degenerative changes are noted  Mild bilateral hip joint osteoarthritis  IMPRESSION:   Findings suggesting early/partial small bowel obstruction, although ileus and enteritis are also possible

## 2022-10-18 NOTE — ASSESSMENT & PLAN NOTE
· Discharge summary  · Blood pressures are stable  · Continue home carvedilol  · No further HCTZ and her lisinopril until cleared by Nephrology in the outpatient setting  · 10/18/2022  · In office blood pressure: 160/100  · Will start him on hydralazine and monitor

## 2022-10-18 NOTE — ASSESSMENT & PLAN NOTE
· The patient was hospitalized from 10/5/2022 to 10/7/2022  · Hospital Course:   · Dea Thompson is a 59 y o  male patient who originally presented to the hospital on 10/5/2022 due to diarrhea  Please refer to the initial history and physical examination completed by marcy Pérez  [facility] for the initial presenting features and complaints  In brief, the patient is a 49-year-old male, who presented to the emergency room with a chief complaint of diarrhea  In the emergency room he was diagnosed with an acute renal failure, and there was also suspicion for an early/partial small-bowel obstruction as evidenced by the CT scan of the abdomen and pelvis  Patient was admitted to [facility]  He was started on IV fluids  A general surgical evaluation was sought for the suspected obstruction, however he was cleared from General surgery standpoint, and did not have an obstruction  Patient had complained of diarrhea prior to arrival   Extensive stool studies were performed, all infectious workup was negative  Patient was found to be in an acute kidney injury state that was present on admission, he was started on IV fluids, and a nephrology consultation was obtained  By day of discharge, his creatinine came back down to 1 56  He was deemed medically stable for discharge  Renal ultrasound testing was also within normal limits  At time of discharge, lisinopril and hydrochlorothiazide were put on hold until the patient has been seen by Nephrology early next week  Nephrology will also be repeating blood work in the outpatient setting  Patient was also told to hold further doses of Ozempic, since he feels that his diarrhea started ever since this medication was initiated in the outpatient setting  He was otherwise stable at time of discharge  Please refer to the assessment/plan portion of this discharge summary as outlined above for the remainder of the details in regard to his stay

## 2022-10-18 NOTE — ASSESSMENT & PLAN NOTE
· Discharge summary  · Acute kidney injury was present on admission   · Baseline creatinine approximately 1 2  · Arrival creatinine 4 44, status post treatment with IV fluids, discharge creatinine 1 56  · Probably multifactorial, pre renal in the setting of losing a lot of volume because of extensive history of recent diarrhea, as well as the possibility of an acute tubular necrosis in the setting of having infectious enteritis  · Status post treatment with IV fluids  · Renal ultrasound is grossly within normal limits  · Hypokalemia:-patient refusing IV potassium, will receive a total of 80 mEq of p o  potassium today, followed by a prescription for 40 mg of p o  potassium daily for 3 days  Patient will be seen by Nephrology in the near future, and they will be ordering repeat blood work for the outpatient setting for early next week    · DC home today  · 10/18/2022  · Will check CMP

## 2022-10-18 NOTE — PROGRESS NOTES
Assessment & Plan     1  Hospital discharge follow-up  Assessment & Plan:  The patient was hospitalized from 10/5/2022 to 10/7/2022  Hospital Course:   Gladis Butt is a 59 y o   male patient who originally presented to the hospital on 10/5/2022 due to diarrhea  Please refer to the initial history and physical examination completed by marcy Isidro for the initial presenting features and complaints  In brief, the patient is a 68-year-old male, who presented to the emergency room with a chief complaint of diarrhea  In the emergency room he was diagnosed with an acute renal failure, and there was also suspicion for an early/partial small-bowel obstruction as evidenced by the CT scan of the abdomen and pelvis  Patient was admitted to [facility]  He was started on IV fluids  A general surgical evaluation was sought for the suspected obstruction, however he was cleared from General surgery standpoint, and did not have an obstruction  Patient had complained of diarrhea prior to arrival   Extensive stool studies were performed, all infectious workup was negative  Patient was found to be in an acute kidney injury state that was present on admission, he was started on IV fluids, and a nephrology consultation was obtained  By day of discharge, his creatinine came back down to 1 56  He was deemed medically stable for discharge  Renal ultrasound testing was also within normal limits  At time of discharge, lisinopril and hydrochlorothiazide were put on hold until the patient has been seen by Nephrology early next week  Nephrology will also be repeating blood work in the outpatient setting  Patient was also told to hold further doses of Ozempic, since he feels that his diarrhea started ever since this medication was initiated in the outpatient setting  He was otherwise stable at time of discharge    Please refer to the assessment/plan portion of this discharge summary as outlined above for the remainder of the details in regard to his stay  Orders:  -     Comprehensive metabolic panel; Future    2  LIAM (acute kidney injury) [facility])  Assessment & Plan:  Discharge summary  Acute kidney injury was present on admission   Baseline creatinine approximately 1 2  Arrival creatinine 4 44, status post treatment with IV fluids, discharge creatinine 1 56  Probably multifactorial, pre renal in the setting of losing a lot of volume because of extensive history of recent diarrhea, as well as the possibility of an acute tubular necrosis in the setting of having infectious enteritis  Status post treatment with IV fluids  Renal ultrasound is grossly within normal limits  Hypokalemia:-patient refusing IV potassium, will receive a total of 80 mEq of p o  potassium today, followed by a prescription for 40 mg of p o  potassium daily for 3 days  Patient will be seen by Nephrology in the near future, and they will be ordering repeat blood work for the outpatient setting for early next week  DC home today  10/18/2022  Will check CMP      Orders:  -     Comprehensive metabolic panel; Future    3  Diarrhea of presumed infectious origin  Assessment & Plan:  Discharge summary:  Currently resolved  Patient has had no recurrent bowel movements for over 24 hours  Testing for rotavirus, Clostridium difficile, Salmonella, Shigella, Campylobacter, and shiga toxin is negative  Fecal leukocytes are negative  Giardia lamblia, and ova and parasites examination is pending  Blood cultures x2 sets are negative today  Appreciate GI input  Mild bump in procalcitonin noted, however will hold off on further antibiotics  Leukocytosis has resolved  In retrospect, the diarrhea might be secondary to the recent initiation of Ozempic in the outpatient setting, patient himself feels that this is the cause of his diarrhea    Have recommended that [facility] be discontinued at this time, and that the patient will follow-up with his PCP for alternative diabetic medications  4  SBO (small bowel obstruction) ([facility] [address] )  Assessment & Plan:  Discharge summary  Status post a general surgical consultation  Patient has been cleared from this standpoint  General surgery has signed off  Okay for DC        5  Essential hypertension  Assessment & Plan:  Discharge summary  Blood pressures are stable  Continue home carvedilol  No further HCTZ and her lisinopril until cleared by Nephrology in the outpatient setting  10/18/2022  In office blood pressure: 160/100  Will start him on hydralazine and monitor    Orders:  -     hydrALAZINE (APRESOLINE) 10 mg tablet; Take 1 tablet (10 mg total) by mouth 3 (three) times a day    6  Type 2 diabetes mellitus with stage 2 chronic kidney disease, without long-term current use of insulin ([facility] [address] )  Assessment & Plan:    Lab Results   Component Value Date    HGBA1C 8 2 (A) 08/23/2022   Discharge summary  Target blood sugar for the hospital is 140-180  DC home on pre-admit meds at pre-admit doses minus the Ozempic  10/18/2022  Off all medications   Component Ref Range & Units 8/23/22 12:09 PM 10/16/21  8:21 AM   Hemoglobin A1C 6 5 8 2 Abnormal   8 0 High  R      Will check HgBA1c     Orders:  -     HEMOGLOBIN A1C W/ EAG ESTIMATION; Future    7  Abnormal CT of the abdomen  Assessment & Plan:  10/5/2022 CT Scan:  FINDINGS:   ABDOMEN   LOWER CHEST:  No clinically significant abnormality identified in the visualized lower chest    LIVER/BILIARY TREE:  Liver is diffusely decreased in density consistent with fatty change  No CT evidence of suspicious hepatic mass  Normal hepatic contours  No biliary dilatation    GALLBLADDER:  There are gallstone(s) within the gallbladder, without pericholecystic inflammatory changes    SPLEEN:  Unremarkable    PANCREAS:  Unremarkable    ADRENAL GLANDS:  Unremarkable    KIDNEYS/URETERS:  No hydronephrosis or urinary tract calculus    One or more sharply circumscribed subcentimeter renal hypodensities are present, too small to accurately characterize, and statistically most likely benign findings  According to recent literature (Radiology 2019) no further workup of these findings is recommended    STOMACH AND BOWEL:  Scattered mildly dilated fluid and air-filled loops of small bowel with air-fluid levels  No discrete transition point with relatively under distended, but not totally collapsed distal small bowel loops  Liquid stool throughout the colon    APPENDIX:  No findings to suggest appendicitis    ABDOMINOPELVIC CAVITY:  No ascites  No pneumoperitoneum  No lymphadenopathy    VESSELS:  Atherosclerotic changes are present  No evidence of aneurysm    PELVIS   REPRODUCTIVE ORGANS:  Unremarkable for patient's age    URINARY BLADDER:  Unremarkable    ABDOMINAL WALL/INGUINAL REGIONS:  Unremarkable    OSSEOUS STRUCTURES:  No acute fracture or destructive osseous lesion  Spinal degenerative changes are noted  Mild bilateral hip joint osteoarthritis    IMPRESSION:   Findings suggesting early/partial small bowel obstruction, although ileus and enteritis are also possible  8  Electrolyte abnormality  -     Comprehensive metabolic panel; Future      BMI Counseling: Body mass index is 45 05 kg/m²  The BMI is above normal  Nutrition recommendations include decreasing portion sizes, encouraging healthy choices of fruits and vegetables, decreasing fast food intake, consuming healthier snacks, limiting drinks that contain sugar, moderation in carbohydrate intake, increasing intake of lean protein, reducing intake of saturated and trans fat and reducing intake of cholesterol  Exercise recommendations include exercising 3-5 times per week  No pharmacotherapy was ordered  Rationale for BMI follow-up plan is due to patient being overweight or obese  Subjective     Transitional Care Management Review:   Jamia Jones is a 59 y o  male here for TCM follow up       During the TCM phone call patient stated:  TCM Call     Date and time call was made  10/10/2022 12:02 PM    Hospital care reviewed  Records reviewed    Patient was hospitialized at  [address]    Date of Admission  10/05/22    Date of discharge  10/07/22    Diagnosis  LIAM    Disposition  Home    Were the patients medications reviewed and updated  Yes    Current Symptoms  None      TCM Call     Post hospital issues  None    Should patient be enrolled in anticoag monitoring? No    Scheduled for follow up? Yes    Patients specialists  Nephrologist; Other (comment)    Nephrologist name  Nevamauri Nahid    Nephrologist contact #  [phone]    Other specialists names  [facility]    Other specialists contcat #  [phone]    Did you obtain your prescribed medications  Yes    Do you need help managing your prescriptions or medications  No    Is transportation to your appointment needed  No    I have advised the patient to call PCP with any new or worsening symptoms  leona frazier    Are you recieving any outpatient services  No    Are you recieving home care services  No    Are you using any community resources  No    Current waiver services  No    Have you fallen in the last 12 months  No    Interperter language line needed  No    Counseling  Patient        The patient is here today to discuss his recent hospitalization  Please continue to the [facility] section of the note for details of today's visit  Review of Systems   Constitutional: Negative for activity change, chills, fatigue and fever  HENT: Negative for rhinorrhea and sore throat  Eyes: Negative for pain  Respiratory: Negative for cough and shortness of breath  Cardiovascular: Negative for chest pain, palpitations and leg swelling  Gastrointestinal: Negative for abdominal pain, constipation, diarrhea, nausea and vomiting  Genitourinary: Negative for difficulty urinating, flank pain, frequency and urgency  Musculoskeletal: Negative for gait problem, joint swelling and myalgias     Skin: Negative for color change  Neurological: Negative for dizziness, weakness, light-headedness and headaches  Psychiatric/Behavioral: Negative for sleep disturbance  The patient is not nervous/anxious  All other systems reviewed and are negative  Objective     /100 (BP Location: Left arm, Patient Position: Sitting, Cuff Size: Standard)   Pulse 78   Temp 98 °F (36 7 °C)   Wt (!) 151 kg (332 lb 3 2 oz)   SpO2 94%   BMI 45 05 kg/m²      Physical Exam  Vitals and nursing note reviewed  Constitutional:       General: He is awake  Appearance: Normal appearance  He is well-developed and normal weight  HENT:      Head: Normocephalic and atraumatic  Nose: Nose normal       Mouth/Throat:      Mouth: Mucous membranes are moist    Eyes:      Conjunctiva/sclera: Conjunctivae normal    Cardiovascular:      Rate and Rhythm: Normal rate and regular rhythm  Pulses: Normal pulses  Heart sounds: Normal heart sounds  No murmur heard  Pulmonary:      Effort: Pulmonary effort is normal  No respiratory distress  Breath sounds: Normal breath sounds  Abdominal:      General: Bowel sounds are normal       Palpations: Abdomen is soft  Tenderness: There is no abdominal tenderness  Musculoskeletal:      Cervical back: Neck supple  Right lower leg: No edema  Left lower leg: No edema  Skin:     General: Skin is warm and dry  Neurological:      Mental Status: He is alert and oriented to person, place, and time  Psychiatric:         Attention and Perception: Attention normal          Mood and Affect: Mood normal          Speech: Speech normal          Behavior: Behavior normal  Behavior is cooperative         LAURA Olguin

## 2022-10-18 NOTE — ASSESSMENT & PLAN NOTE
· Discharge summary  · Status post a general surgical consultation  · Patient has been cleared from this standpoint  · General surgery has signed off  · Okay for DC

## 2022-10-18 NOTE — ASSESSMENT & PLAN NOTE
· 10/5/2022 CT Scan:  · FINDINGS:   ABDOMEN   LOWER CHEST:  No clinically significant abnormality identified in the visualized lower chest    LIVER/BILIARY TREE:  Liver is diffusely decreased in density consistent with fatty change  No CT evidence of suspicious hepatic mass  Normal hepatic contours  No biliary dilatation    GALLBLADDER:  There are gallstone(s) within the gallbladder, without pericholecystic inflammatory changes    SPLEEN:  Unremarkable    PANCREAS:  Unremarkable    ADRENAL GLANDS:  Unremarkable    KIDNEYS/URETERS:  No hydronephrosis or urinary tract calculus  One or more sharply circumscribed subcentimeter renal hypodensities are present, too small to accurately characterize, and statistically most likely benign findings  According to recent literature (Radiology 2019) no further workup of these findings is recommended    STOMACH AND BOWEL:  Scattered mildly dilated fluid and air-filled loops of small bowel with air-fluid levels  No discrete transition point with relatively under distended, but not totally collapsed distal small bowel loops  Liquid stool throughout the colon    APPENDIX:  No findings to suggest appendicitis    ABDOMINOPELVIC CAVITY:  No ascites  No pneumoperitoneum  No lymphadenopathy    VESSELS:  Atherosclerotic changes are present  No evidence of aneurysm    PELVIS   REPRODUCTIVE ORGANS:  Unremarkable for patient's age    URINARY BLADDER:  Unremarkable    ABDOMINAL WALL/INGUINAL REGIONS:  Unremarkable    OSSEOUS STRUCTURES:  No acute fracture or destructive osseous lesion  Spinal degenerative changes are noted  Mild bilateral hip joint osteoarthritis    IMPRESSION:   Findings suggesting early/partial small bowel obstruction, although ileus and enteritis are also possible

## 2022-10-21 ENCOUNTER — APPOINTMENT (OUTPATIENT)
Dept: LAB | Facility: CLINIC | Age: 57
End: 2022-10-21
Payer: COMMERCIAL

## 2022-10-21 DIAGNOSIS — E87.8 ELECTROLYTE ABNORMALITY: ICD-10-CM

## 2022-10-21 DIAGNOSIS — N18.2 TYPE 2 DIABETES MELLITUS WITH STAGE 2 CHRONIC KIDNEY DISEASE, WITHOUT LONG-TERM CURRENT USE OF INSULIN (HCC): ICD-10-CM

## 2022-10-21 DIAGNOSIS — R19.7 DIARRHEA OF PRESUMED INFECTIOUS ORIGIN: ICD-10-CM

## 2022-10-21 DIAGNOSIS — Z09 HOSPITAL DISCHARGE FOLLOW-UP: ICD-10-CM

## 2022-10-21 DIAGNOSIS — E11.22 TYPE 2 DIABETES MELLITUS WITH STAGE 2 CHRONIC KIDNEY DISEASE, WITHOUT LONG-TERM CURRENT USE OF INSULIN (HCC): ICD-10-CM

## 2022-10-21 DIAGNOSIS — N17.9 AKI (ACUTE KIDNEY INJURY) (HCC): ICD-10-CM

## 2022-10-21 LAB
ALBUMIN SERPL BCP-MCNC: 3.1 G/DL (ref 3.5–5)
ALP SERPL-CCNC: 61 U/L (ref 46–116)
ALT SERPL W P-5'-P-CCNC: 35 U/L (ref 12–78)
ANION GAP SERPL CALCULATED.3IONS-SCNC: 5 MMOL/L (ref 4–13)
AST SERPL W P-5'-P-CCNC: 16 U/L (ref 5–45)
BASOPHILS # BLD AUTO: 0.06 THOUSANDS/ÂΜL (ref 0–0.1)
BASOPHILS NFR BLD AUTO: 1 % (ref 0–1)
BILIRUB SERPL-MCNC: 0.75 MG/DL (ref 0.2–1)
BUN SERPL-MCNC: 12 MG/DL (ref 5–25)
CALCIUM ALBUM COR SERPL-MCNC: 9.7 MG/DL (ref 8.3–10.1)
CALCIUM SERPL-MCNC: 9 MG/DL (ref 8.3–10.1)
CHLORIDE SERPL-SCNC: 107 MMOL/L (ref 96–108)
CO2 SERPL-SCNC: 26 MMOL/L (ref 21–32)
CREAT SERPL-MCNC: 1.06 MG/DL (ref 0.6–1.3)
EOSINOPHIL # BLD AUTO: 0.43 THOUSAND/ÂΜL (ref 0–0.61)
EOSINOPHIL NFR BLD AUTO: 5 % (ref 0–6)
ERYTHROCYTE [DISTWIDTH] IN BLOOD BY AUTOMATED COUNT: 13.3 % (ref 11.6–15.1)
EST. AVERAGE GLUCOSE BLD GHB EST-MCNC: 140 MG/DL
GFR SERPL CREATININE-BSD FRML MDRD: 76 ML/MIN/1.73SQ M
GLUCOSE P FAST SERPL-MCNC: 104 MG/DL (ref 65–99)
HBA1C MFR BLD: 6.5 %
HCT VFR BLD AUTO: 44 % (ref 36.5–49.3)
HGB BLD-MCNC: 13.9 G/DL (ref 12–17)
IMM GRANULOCYTES # BLD AUTO: 0.04 THOUSAND/UL (ref 0–0.2)
IMM GRANULOCYTES NFR BLD AUTO: 0 % (ref 0–2)
LYMPHOCYTES # BLD AUTO: 2.18 THOUSANDS/ÂΜL (ref 0.6–4.47)
LYMPHOCYTES NFR BLD AUTO: 23 % (ref 14–44)
MCH RBC QN AUTO: 27.6 PG (ref 26.8–34.3)
MCHC RBC AUTO-ENTMCNC: 31.6 G/DL (ref 31.4–37.4)
MCV RBC AUTO: 87 FL (ref 82–98)
MONOCYTES # BLD AUTO: 0.51 THOUSAND/ÂΜL (ref 0.17–1.22)
MONOCYTES NFR BLD AUTO: 5 % (ref 4–12)
NEUTROPHILS # BLD AUTO: 6.35 THOUSANDS/ÂΜL (ref 1.85–7.62)
NEUTS SEG NFR BLD AUTO: 66 % (ref 43–75)
NRBC BLD AUTO-RTO: 0 /100 WBCS
PLATELET # BLD AUTO: 357 THOUSANDS/UL (ref 149–390)
PMV BLD AUTO: 9.4 FL (ref 8.9–12.7)
POTASSIUM SERPL-SCNC: 4 MMOL/L (ref 3.5–5.3)
PROT SERPL-MCNC: 6.9 G/DL (ref 6.4–8.4)
RBC # BLD AUTO: 5.04 MILLION/UL (ref 3.88–5.62)
SODIUM SERPL-SCNC: 138 MMOL/L (ref 135–147)
WBC # BLD AUTO: 9.57 THOUSAND/UL (ref 4.31–10.16)

## 2022-10-21 PROCEDURE — 85025 COMPLETE CBC W/AUTO DIFF WBC: CPT

## 2022-10-21 PROCEDURE — 80053 COMPREHEN METABOLIC PANEL: CPT

## 2022-10-21 PROCEDURE — 36415 COLL VENOUS BLD VENIPUNCTURE: CPT

## 2022-10-21 PROCEDURE — 83036 HEMOGLOBIN GLYCOSYLATED A1C: CPT

## 2022-10-22 PROBLEM — Z13.220 SCREENING FOR HYPERLIPIDEMIA: Status: RESOLVED | Noted: 2022-08-23 | Resolved: 2022-10-22

## 2022-10-26 ENCOUNTER — OFFICE VISIT (OUTPATIENT)
Dept: NEPHROLOGY | Facility: CLINIC | Age: 57
End: 2022-10-26
Payer: COMMERCIAL

## 2022-10-26 VITALS
WEIGHT: 315 LBS | SYSTOLIC BLOOD PRESSURE: 162 MMHG | BODY MASS INDEX: 42.66 KG/M2 | OXYGEN SATURATION: 95 % | HEART RATE: 83 BPM | HEIGHT: 72 IN | DIASTOLIC BLOOD PRESSURE: 90 MMHG

## 2022-10-26 DIAGNOSIS — I10 ESSENTIAL HYPERTENSION: ICD-10-CM

## 2022-10-26 DIAGNOSIS — E66.01 CLASS 3 SEVERE OBESITY DUE TO EXCESS CALORIES WITHOUT SERIOUS COMORBIDITY WITH BODY MASS INDEX (BMI) OF 45.0 TO 49.9 IN ADULT (HCC): ICD-10-CM

## 2022-10-26 DIAGNOSIS — Z86.19 HISTORY OF LEGIONNAIRE'S DISEASE: ICD-10-CM

## 2022-10-26 DIAGNOSIS — N18.2 TYPE 2 DIABETES MELLITUS WITH STAGE 2 CHRONIC KIDNEY DISEASE, WITHOUT LONG-TERM CURRENT USE OF INSULIN (HCC): ICD-10-CM

## 2022-10-26 DIAGNOSIS — E11.22 TYPE 2 DIABETES MELLITUS WITH STAGE 2 CHRONIC KIDNEY DISEASE, WITHOUT LONG-TERM CURRENT USE OF INSULIN (HCC): ICD-10-CM

## 2022-10-26 DIAGNOSIS — N17.9 AKI (ACUTE KIDNEY INJURY) (HCC): Primary | ICD-10-CM

## 2022-10-26 DIAGNOSIS — N18.2 STAGE 2 CHRONIC KIDNEY DISEASE: ICD-10-CM

## 2022-10-26 PROCEDURE — 99214 OFFICE O/P EST MOD 30 MIN: CPT | Performed by: PHYSICIAN ASSISTANT

## 2022-10-26 RX ORDER — LISINOPRIL AND HYDROCHLOROTHIAZIDE 25; 20 MG/1; MG/1
1 TABLET ORAL 2 TIMES DAILY
Qty: 30 TABLET | Refills: 0
Start: 2022-10-26

## 2022-10-26 RX ORDER — CARVEDILOL 12.5 MG/1
12.5 TABLET ORAL 2 TIMES DAILY
Qty: 180 TABLET | Refills: 1 | Status: SHIPPED | OUTPATIENT
Start: 2022-10-26 | End: 2023-01-24

## 2022-10-26 NOTE — ASSESSMENT & PLAN NOTE
Lab Results   Component Value Date    EGFR 76 10/21/2022    EGFR 54 10/07/2022    EGFR 47 10/07/2022    CREATININE 1 06 10/21/2022    CREATININE 1 41 (H) 10/07/2022    CREATININE 1 56 (H) 10/07/2022   Routine follow-up with Dr Jes Barrios in 4 months with labs prior

## 2022-10-26 NOTE — PROGRESS NOTES
Assessment & Plan:    1  LIAM (acute kidney injury) [facility])  Assessment & Plan:  Now resolved  Will follow with restarting lisinopril-hydrochlorothiazide  Patient otherwise knows to avoid nephrotoxins, such as NSAIDs  He does have a history of acute kidney injury requiring hemodialysis for a few weeks in 2013  Discussed the importance of avoiding further acute kidney injury, as able  Advised patient to hold lisinopril-hydrochlorothiazide he if he experiences any further poor p o  Intake or GI losses  Orders:  -     Basic metabolic panel; Future; Expected date: 11/09/2022    2  Stage 2 chronic kidney disease  Assessment & Plan:  Lab Results   Component Value Date    EGFR 76 10/21/2022    EGFR 54 10/07/2022    EGFR 47 10/07/2022    CREATININE 1 06 10/21/2022    CREATININE 1 41 (H) 10/07/2022    CREATININE 1 56 (H) 10/07/2022   Routine follow-up with Dr Jennifer Rosario in 4 months with labs prior  Orders:  -     CBC and differential; Future; Expected date: 02/20/2023  -     Comprehensive metabolic panel; Future; Expected date: 02/20/2023  -     Magnesium; Future; Expected date: 02/20/2023  -     Microalbumin / creatinine urine ratio; Future; Expected date: 02/20/2023  -     Phosphorus; Future; Expected date: 02/20/2023  -     Protein / creatinine ratio, urine; Future; Expected date: 02/20/2023  -     PTH, intact; Future; Expected date: 02/20/2023  -     Urinalysis with microscopic; Future; Expected date: 02/20/2023    3  Type 2 diabetes mellitus with stage 2 chronic kidney disease, without long-term current use of insulin ([facility])  Assessment & Plan:    Lab Results   Component Value Date    HGBA1C 6 5 (H) 10/21/2022   Does not tolerate Ozempic due to diarrhea  Orders:  -     lisinopril-hydrochlorothiazide (PRINZIDE,ZESTORETIC) 20-25 MG per tablet; Take 1 tablet by mouth 2 (two) times a day    4  Essential hypertension  Assessment & Plan:  Blood pressure is elevated    Currently on carvedilol 12 5 mg twice daily hydralazine 10 mg 3 times daily  Pre-hospital lisinopril and hydrochlorothiazide are on hold  Acute kidney injuries now resolved  Patient was previously taking lisinopril-HCTZ 20-25 mg twice daily  Will restart once daily x1 week then increase to twice daily if still needed if systolic blood pressure is greater than 130  Medial he 2 weeks after restarting  Low-sodium diet  Will discontinue hydralazine  Orders:  -     lisinopril-hydrochlorothiazide (PRINZIDE,ZESTORETIC) 20-25 MG per tablet; Take 1 tablet by mouth 2 (two) times a day  -     carvedilol (COREG) 12 5 mg tablet; Take 1 tablet (12 5 mg total) by mouth 2 (two) times a day    5  Class 3 severe obesity due to excess calories without serious comorbidity with body mass index (BMI) of 45 0 to 49 9 in [facility])  Assessment & Plan:  BMI greater than 40 is associated with greater risk of progression of renal disease secondary to glomerular hyperfiltration  Recommend weight loss  6  History of Legionnaire's disease  Assessment & Plan:  Required dialysis for 3 weeks in August/ Sept 2013         The benefits, risks and alternatives to the treatment plan were discussed at this visit  Patient was advised of common adverse effects of any medical therapies prescribed  All questions were answered and discussed with the patient and any accompanying family members or caretakers  Subjective:      Patient ID: Romain Daniels is a 59 y o  male seen in the [place] office  HPI     Patient was hospitalized at [facility] from 10/05/2022 through 10/07/2022, at which time patient presented with diarrhea  She was found to have an early/partial small-bowel obstruction  However, she was cleared from the surgery standpoint to not have an obstruction  Nephrology saw patient for acute kidney injury  Hydrochlorothiazide, lisinopril, Ozempic were held  Today, patient presents for follow-up of hospitalization  No complaints       Blood pressure is 162/90 with a heart rate of 83  Patient does monitor blood pressures at home and reports similar readings at home  Denies headaches, lightheadedness, dizziness  Patient reports adherence with antihypertensive regimen and denies adverse effects:  Carvedilol 12 5 mg twice daily, hydralazine 10 mg 3 times daily  Patient denies lower extremity swelling  Reviewed and discussed the results of labs performed 10/21/2022 reveals renal function improved to creatinine of 1 06 mg/dL estimated GFR 76 mL/min  History is obtained from patient  The following portions of the patient's history were reviewed and updated as appropriate: allergies, current medications, past family history, past medical history, past social history, past surgical history, and problem list     Review of Systems   Constitutional: Negative for activity change, chills, diaphoresis, fatigue and fever  HENT: Negative for mouth sores and trouble swallowing  Respiratory: Negative for apnea, cough, chest tightness, shortness of breath and wheezing  Cardiovascular: Positive for leg swelling (worse since stopped lisinopril)  Negative for chest pain and palpitations  Gastrointestinal: Positive for diarrhea (1 watery BM, a few times per week)  Negative for abdominal distention, abdominal pain, blood in stool, constipation and nausea  Genitourinary: Negative for decreased urine volume, difficulty urinating, dysuria, enuresis, frequency, hematuria and urgency  Musculoskeletal: Positive for back pain  Negative for arthralgias and joint swelling  Skin: Negative for pallor, rash and wound  Neurological: Negative for dizziness, seizures, light-headedness, numbness and headaches  Hematological: Does not bruise/bleed easily  Psychiatric/Behavioral: Negative for agitation, behavioral problems and confusion  The patient is not nervous/anxious            Objective:      /90   Pulse 83   Ht 6' (1 829 m)   Wt (!) 150 kg (329 lb 9 6 oz) SpO2 95%   BMI 44 70 kg/m²          Physical Exam  Vitals and nursing note reviewed  Constitutional:       General: He is awake  He is not in acute distress  Appearance: Normal appearance  He is well-developed  He is obese  He is not ill-appearing, toxic-appearing or diaphoretic  HENT:      Head: Normocephalic and atraumatic  Jaw: There is normal jaw occlusion  Nose: Nose normal       Mouth/Throat:      Mouth: Mucous membranes are moist       Pharynx: Oropharynx is clear  No oropharyngeal exudate or posterior oropharyngeal erythema  Eyes:      General: Lids are normal  Vision grossly intact  Gaze aligned appropriately  No scleral icterus  Right eye: No discharge  Left eye: No discharge  Extraocular Movements: Extraocular movements intact  Conjunctiva/sclera: Conjunctivae normal       Pupils: Pupils are equal, round, and reactive to light  Neck:      Thyroid: No thyroid mass or thyromegaly  Trachea: Trachea and phonation normal    Cardiovascular:      Rate and Rhythm: Normal rate and regular rhythm  Heart sounds: Normal heart sounds, S1 normal and S2 normal  No murmur heard  No friction rub  No gallop  Pulmonary:      Effort: Pulmonary effort is normal  No respiratory distress  Breath sounds: Normal breath sounds  No stridor  No wheezing, rhonchi or rales  Abdominal:      General: Abdomen is flat  Bowel sounds are normal  There is no distension  Palpations: Abdomen is soft  There is no mass  Tenderness: There is no abdominal tenderness  There is no guarding  Hernia: No hernia is present  Musculoskeletal:         General: Normal range of motion  Cervical back: Normal range of motion and neck supple  No rigidity or tenderness  Right lower le+ Pitting Edema present  Left lower le+ Pitting Edema present  Lymphadenopathy:      Cervical: No cervical adenopathy  Skin:     General: Skin is warm and dry  Coloration: Skin is not jaundiced  Findings: No bruising  Nails: There is no clubbing  Neurological:      General: No focal deficit present  Mental Status: He is alert and oriented to person, place, and time  Mental status is at baseline  Psychiatric:         Attention and Perception: Attention and perception normal          Mood and Affect: Mood and affect normal          Speech: Speech normal          Behavior: Behavior normal  Behavior is cooperative  Thought Content:  Thought content normal          Judgment: Judgment normal              Lab Results   Component Value Date    SODIUM 138 10/21/2022    K 4 0 10/21/2022     10/21/2022    CO2 26 10/21/2022    AGAP 5 10/21/2022    BUN 12 10/21/2022    CREATININE 1 06 10/21/2022    GLUC 124 10/07/2022    GLUF 104 (H) 10/21/2022    CALCIUM 9 0 10/21/2022    AST 16 10/21/2022    ALT 35 10/21/2022    ALKPHOS 61 10/21/2022    TP 6 9 10/21/2022    TBILI 0 75 10/21/2022    EGFR 76 10/21/2022      Lab Results   Component Value Date    CREATININE 1 06 10/21/2022    CREATININE 1 41 (H) 10/07/2022    CREATININE 1 56 (H) 10/07/2022    CREATININE 4 28 (H) 10/06/2022    CREATININE 4 44 (H) 10/05/2022    CREATININE 1 18 08/24/2022    CREATININE 1 21 10/16/2021    CREATININE 1 20 03/30/2019    CREATININE 1 34 (H) 10/23/2018      Lab Results   Component Value Date    COLORU Yellow 10/16/2021    CLARITYU Clear 10/16/2021    SPECGRAV 1 025 10/16/2021    PHUR 5 5 10/16/2021    LEUKOCYTESUR Negative 10/16/2021    NITRITE Negative 10/16/2021    PROTEIN UA Negative 10/16/2021    GLUCOSEU Negative 10/16/2021    KETONESU Negative 10/16/2021    UROBILINOGEN 0 2 10/16/2021    BILIRUBINUR Negative 10/16/2021    BLOODU Negative 10/16/2021    RBCUA 0-5 10/23/2018    WBCUA 0-5 10/23/2018    EPIS Occasional 10/23/2018    BACTERIA None Seen 10/23/2018      No results found for: LABPROT  No results found for: Darrall Distel  Lab Results   Component Value Date WBC 9 57 10/21/2022    HGB 13 9 10/21/2022    HCT 44 0 10/21/2022    MCV 87 10/21/2022     10/21/2022      Lab Results   Component Value Date    HGB 13 9 10/21/2022    HGB 13 2 10/07/2022    HGB 13 9 10/06/2022    HGB 16 3 10/05/2022    HGB 15 4 08/24/2022      No results found for: IRON, TIBC, FERRITIN   No results found for: PTHCALCIUM, [id number], PHOSPHORUS   Lab Results   Component Value Date    CHOLESTEROL 287 (H) 08/24/2022    HDL 34 (L) 08/24/2022    LDLCALC 176 (H) 08/24/2022    TRIG 387 (H) 08/24/2022      No results found for: URICACID   Lab Results   Component Value Date    HGBA1C 6 5 (H) 10/21/2022      No results found for: TSHANTIBODY, H9QBTWJ, FREET4   No results found for: ADRIENNE, DSDNAAB, RFIGM   No results found for: PROT, UPEP, IMMUNOFIX, KAPPALAMBDA, KAPPALIGHT     Portions of the record may have been created with voice recognition software  Occasional wrong word or "sound a like" substitutions may have occurred due to the inherent limitations of voice recognition software  Read the chart carefully and recognize, using context, where substitutions have occurred  If you have any questions, please contact the dictating provider

## 2022-10-26 NOTE — ASSESSMENT & PLAN NOTE
Now resolved  Will follow with restarting lisinopril-hydrochlorothiazide  Patient otherwise knows to avoid nephrotoxins, such as NSAIDs  He does have a history of acute kidney injury requiring hemodialysis for a few weeks in 2013  Discussed the importance of avoiding further acute kidney injury, as able  Advised patient to hold lisinopril-hydrochlorothiazide he if he experiences any further poor p o  Intake or GI losses

## 2022-10-26 NOTE — ASSESSMENT & PLAN NOTE
Blood pressure is elevated  Currently on carvedilol 12 5 mg twice daily hydralazine 10 mg 3 times daily  Pre-hospital lisinopril and hydrochlorothiazide are on hold  Acute kidney injuries now resolved  Patient was previously taking lisinopril-HCTZ 20-25 mg twice daily  Will restart once daily x1 week then increase to twice daily if still needed if systolic blood pressure is greater than 130  Medial he 2 weeks after restarting  Low-sodium diet  Will discontinue hydralazine

## 2022-10-26 NOTE — ASSESSMENT & PLAN NOTE
Lab Results   Component Value Date    HGBA1C 6 5 (H) 10/21/2022   Does not tolerate Ozempic due to diarrhea

## 2022-10-26 NOTE — PATIENT INSTRUCTIONS
Will go and start lisinopril-hydrochlorothiazide 20-25 mg daily x 1 week, then if blood pressure remains elevated with systolic blood pressures above 130 on top, then increase to twice daily  Repeat kidney labs 2 weeks after restarting lisinopril-hydrochlorothiazide  For the prevention of kidney injury: If you are sick and not eating well or drinking well OR vomiting or having diarrhea, temporarily hold your ACE-inhibitor / ARB / diuretic (lisinopril-HCTZ) while sick and call office for further instructions  Otherwise, please take medications as prescribed  Please avoid NSAIDs (nonsteroidal anti-inflammatory drugs), such as Advil (ibuprofen), Aleve (naproxen), Naprosyn, BCs, Goody's powder  Tylenol (acetaminophen) is a safer option for the kidneys  Do not exceed the maximum dose of acetaminophen  Note that this may be in combination with other drugs NSAID medications  Please avoid herbal supplements, such as turmeric  Please consult your nephrologist before taking any over-the-counter medications

## 2022-10-28 DIAGNOSIS — I10 ESSENTIAL HYPERTENSION: ICD-10-CM

## 2022-10-28 RX ORDER — LISINOPRIL AND HYDROCHLOROTHIAZIDE 20; 12.5 MG/1; MG/1
TABLET ORAL
Qty: 180 TABLET | Refills: 3 | Status: SHIPPED | OUTPATIENT
Start: 2022-10-28

## 2022-11-16 ENCOUNTER — RA CDI HCC (OUTPATIENT)
Dept: OTHER | Facility: HOSPITAL | Age: 57
End: 2022-11-16

## 2022-11-16 DIAGNOSIS — I10 ESSENTIAL HYPERTENSION: ICD-10-CM

## 2022-11-16 RX ORDER — CARVEDILOL 12.5 MG/1
12.5 TABLET ORAL 2 TIMES DAILY
Qty: 180 TABLET | Refills: 1 | Status: SHIPPED | OUTPATIENT
Start: 2022-11-16 | End: 2023-02-14

## 2022-11-29 ENCOUNTER — APPOINTMENT (OUTPATIENT)
Dept: LAB | Facility: CLINIC | Age: 57
End: 2022-11-29

## 2022-11-29 DIAGNOSIS — N17.9 AKI (ACUTE KIDNEY INJURY) (HCC): ICD-10-CM

## 2022-11-29 LAB
ANION GAP SERPL CALCULATED.3IONS-SCNC: 5 MMOL/L (ref 4–13)
BUN SERPL-MCNC: 14 MG/DL (ref 5–25)
CALCIUM SERPL-MCNC: 10 MG/DL (ref 8.3–10.1)
CHLORIDE SERPL-SCNC: 98 MMOL/L (ref 96–108)
CO2 SERPL-SCNC: 32 MMOL/L (ref 21–32)
CREAT SERPL-MCNC: 1.14 MG/DL (ref 0.6–1.3)
GFR SERPL CREATININE-BSD FRML MDRD: 70 ML/MIN/1.73SQ M
GLUCOSE P FAST SERPL-MCNC: 166 MG/DL (ref 65–99)
POTASSIUM SERPL-SCNC: 4.4 MMOL/L (ref 3.5–5.3)
SODIUM SERPL-SCNC: 135 MMOL/L (ref 135–147)

## 2022-12-05 ENCOUNTER — OFFICE VISIT (OUTPATIENT)
Dept: INTERNAL MEDICINE CLINIC | Facility: CLINIC | Age: 57
End: 2022-12-05

## 2022-12-05 VITALS
HEART RATE: 88 BPM | SYSTOLIC BLOOD PRESSURE: 150 MMHG | BODY MASS INDEX: 44.97 KG/M2 | OXYGEN SATURATION: 98 % | TEMPERATURE: 98.9 F | DIASTOLIC BLOOD PRESSURE: 90 MMHG | WEIGHT: 315 LBS

## 2022-12-05 DIAGNOSIS — I10 ESSENTIAL HYPERTENSION: ICD-10-CM

## 2022-12-05 DIAGNOSIS — E11.9 ENCOUNTER FOR DIABETIC FOOT EXAM (HCC): ICD-10-CM

## 2022-12-05 DIAGNOSIS — N17.9 AKI (ACUTE KIDNEY INJURY) (HCC): Primary | ICD-10-CM

## 2022-12-05 PROBLEM — R19.7 DIARRHEA OF PRESUMED INFECTIOUS ORIGIN: Status: RESOLVED | Noted: 2022-10-05 | Resolved: 2022-12-05

## 2022-12-05 RX ORDER — LISINOPRIL AND HYDROCHLOROTHIAZIDE 20; 12.5 MG/1; MG/1
1 TABLET ORAL 2 TIMES DAILY
Qty: 180 TABLET | Refills: 3 | Status: CANCELLED | OUTPATIENT
Start: 2022-12-05

## 2022-12-05 NOTE — PROGRESS NOTES
Assessment/Plan:     Problem List Items Addressed This Visit        Cardiovascular and Mediastinum    Essential hypertension     · 10/26/2022 Nephrology Visit:  · Blood pressure is elevated  Currently on carvedilol 12 5 mg twice daily hydralazine 10 mg 3 times daily  Pre-hospital lisinopril and hydrochlorothiazide are on hold  Acute kidney injuries now resolved  Patient was previously taking lisinopril-HCTZ 20-25 mg twice daily  Will restart once daily x1 week then increase to twice daily if still needed if systolic blood pressure is greater than 130  Medial he 2 weeks after restarting  Low-sodium diet  Will discontinue hydralazine  · 12/5/2022 PCP:  · No longer taking the hydralazine  · Continuing on his coreg 12 5 mg twice a day   · continuing on his lisinopril-HCTZ 20-12 5mg twice a day  · At home he reports his BP to 135/85  Genitourinary    LIAM (acute kidney injury) ([facility] [address] ) - Primary     · 10/5/2022 to 10/7/2022 admitted  · Discharge summary  · Acute kidney injury was present on admission   · Baseline creatinine approximately 1 2  · Arrival creatinine 4 44, status post treatment with IV fluids, discharge creatinine 1 56  · Probably multifactorial, pre renal in the setting of losing a lot of volume because of extensive history of recent diarrhea, as well as the possibility of an acute tubular necrosis in the setting of having infectious enteritis  · Status post treatment with IV fluids  · Renal ultrasound is grossly within normal limits  · Hypokalemia:-patient refusing IV potassium, will receive a total of 80 mEq of p o  potassium today, followed by a prescription for 40 mg of p o  potassium daily for 3 days   Patient will be seen by Nephrology in the near future, and they will be ordering repeat blood work for the outpatient setting for early next week  · DC home today  · 10/18/2022 PCP  · Will check CMP  · 10/21/2022  · Creatinine: 1 06  · 10/26/2022 Nephrology Visit:  · Now resolved    Will follow with restarting lisinopril-hydrochlorothiazide  Patient otherwise knows to avoid nephrotoxins, such as NSAIDs  He does have a history of acute kidney injury requiring hemodialysis for a few weeks in 2013  Discussed the importance of avoiding further acute kidney injury, as able  Advised patient to hold lisinopril-hydrochlorothiazide he if he experiences any further poor p o  Intake or GI losses  · 11/29/2022  · Creatinine: 1 14            Other    Encounter for diabetic foot exam ([facility] [address] )       Subjective:      Patient ID: Alejandro Shipley is a 59 y o  male  The patient is here today to discuss his blood pressure  Please continue to the [facility] section of the note for details of today's visit  The following portions of the patient's history were reviewed and updated as appropriate:     Past Medical History:  He has a past medical history of Benign arteriolar nephrosclerosis, Essential hypertension, History of transfusion (2013), Hyperparathyroidism due to renal insufficiency ([facility] [address] ), Legionnaire's disease ([facility] [address] ), and Pneumonia  ,  _______________________________________________________________________  Medical Problems:  does not have any pertinent problems on file ,  _______________________________________________________________________  Past Surgical History:   has a past surgical history that includes Other surgical history; Colonoscopy; and pr lamnotmy incl w/dcmprsn nrv root 1 intrspc lumbr (Left, 11/15/2021)  ,  _______________________________________________________________________  Family History:  family history includes Cancer in his father; Diabetes type II in his brother and father; Hyperlipidemia in his mother; Lung cancer in his father; Stomach cancer in his father ,  _______________________________________________________________________  Social History:   reports that he has never smoked  He has never used smokeless tobacco  He reports that he does not currently use alcohol   He reports that he does not use drugs  ,  _______________________________________________________________________  Allergies:  has No Known Allergies     _______________________________________________________________________  Current Outpatient Medications   Medication Sig Dispense Refill   • carvedilol (COREG) 12 5 mg tablet Take 1 tablet (12 5 mg total) by mouth 2 (two) times a day 180 tablet 1   • fexofenadine (ALLEGRA) 180 MG tablet Take 180 mg by mouth daily     • lisinopril-hydrochlorothiazide (PRINZIDE,ZESTORETIC) 20-12 5 MG per tablet take 1 tablet by mouth twice a day 180 tablet 3   • Multiple Vitamins-Minerals (multivitamin with minerals) tablet Take 1 tablet by mouth daily       No current facility-administered medications for this visit      _______________________________________________________________________      Review of Systems   Constitutional: Negative for activity change, chills, fatigue and fever  HENT: Negative for rhinorrhea and sore throat  Eyes: Negative for pain  Respiratory: Negative for cough and shortness of breath  Cardiovascular: Negative for chest pain, palpitations and leg swelling  Gastrointestinal: Negative for abdominal pain, constipation, diarrhea, nausea and vomiting  Genitourinary: Negative for difficulty urinating, flank pain, frequency and urgency  Musculoskeletal: Positive for arthralgias and myalgias  Negative for gait problem and joint swelling  Chronic hip pains   Skin: Negative for color change  Neurological: Negative for dizziness, weakness, light-headedness and headaches  Psychiatric/Behavioral: Negative for sleep disturbance  The patient is not nervous/anxious  All other systems reviewed and are negative          Objective:  Vitals:    12/05/22 1004   BP: 150/90   BP Location: Left arm   Patient Position: Sitting   Cuff Size: Standard   Pulse: 88   Temp: 98 9 °F (37 2 °C)   SpO2: 98%   Weight: (!) 150 kg (331 lb 9 6 oz)     Body mass index is 44 97 kg/m²  Physical Exam  Vitals reviewed  Constitutional:       General: He is awake  Appearance: Normal appearance  He is well-developed and normal weight  HENT:      Head: Normocephalic and atraumatic  Nose: Nose normal       Mouth/Throat:      Mouth: Mucous membranes are moist    Eyes:      Extraocular Movements: Extraocular movements intact  Cardiovascular:      Rate and Rhythm: Normal rate and regular rhythm  Pulses: Normal pulses  Heart sounds: Normal heart sounds  Pulmonary:      Effort: Pulmonary effort is normal       Breath sounds: Normal breath sounds  Abdominal:      General: Bowel sounds are normal       Palpations: Abdomen is soft  Musculoskeletal:         General: Normal range of motion  Cervical back: Normal range of motion  Right lower leg: No edema  Left lower leg: No edema  Comments: Chronic bilateral hip pains   Skin:     General: Skin is warm and dry  Neurological:      Mental Status: He is alert and oriented to person, place, and time  Psychiatric:         Attention and Perception: Attention normal          Mood and Affect: Mood normal          Speech: Speech normal          Behavior: Behavior normal  Behavior is cooperative

## 2022-12-05 NOTE — PROGRESS NOTES
Diabetic Foot Exam    Patient's shoes and socks removed  Right Foot/Ankle   Right Foot Inspection  Skin Exam: skin normal and skin intact  No dry skin, no warmth, no callus, no erythema, no maceration, no abnormal color, no pre-ulcer, no ulcer and no callus  Sensory   Vibration: diminished  Proprioception: diminished  Monofilament testing: diminished    Left Foot/Ankle  Left Foot Inspection  Skin Exam: skin normal and skin intact  No dry skin, no warmth, no erythema, no maceration, normal color, no pre-ulcer, no ulcer and no callus  Toe Exam: ROM and strength within normal limits       Sensory   Vibration: diminished  Proprioception: diminished  Monofilament testing: diminished    Assign Risk Category  No deformity present  No loss of protective sensation  No weak pulses  Risk: 0

## 2022-12-05 NOTE — ASSESSMENT & PLAN NOTE
· 10/26/2022 Nephrology Visit:  · Blood pressure is elevated  Currently on carvedilol 12 5 mg twice daily hydralazine 10 mg 3 times daily  Pre-hospital lisinopril and hydrochlorothiazide are on hold  Acute kidney injuries now resolved  Patient was previously taking lisinopril-HCTZ 20-25 mg twice daily  Will restart once daily x1 week then increase to twice daily if still needed if systolic blood pressure is greater than 130  Medial he 2 weeks after restarting  Low-sodium diet  Will discontinue hydralazine  · 12/5/2022 PCP:  · No longer taking the hydralazine  · Continuing on his coreg 12 5 mg twice a day   · continuing on his lisinopril-HCTZ 20-12 5mg twice a day  · At home he reports his BP to 135/85

## 2022-12-05 NOTE — ASSESSMENT & PLAN NOTE
· 10/5/2022 to 10/7/2022 admitted  · Discharge summary  · Acute kidney injury was present on admission   · Baseline creatinine approximately 1 2  · Arrival creatinine 4 44, status post treatment with IV fluids, discharge creatinine 1 56  · Probably multifactorial, pre renal in the setting of losing a lot of volume because of extensive history of recent diarrhea, as well as the possibility of an acute tubular necrosis in the setting of having infectious enteritis  · Status post treatment with IV fluids  · Renal ultrasound is grossly within normal limits  · Hypokalemia:-patient refusing IV potassium, will receive a total of 80 mEq of p o  potassium today, followed by a prescription for 40 mg of p o  potassium daily for 3 days   Patient will be seen by Nephrology in the near future, and they will be ordering repeat blood work for the outpatient setting for early next week  · DC home today  · 10/18/2022 PCP  · Will check CMP  · 10/21/2022  · Creatinine: 1 06  · 10/26/2022 Nephrology Visit:  · Now resolved  Will follow with restarting lisinopril-hydrochlorothiazide  Patient otherwise knows to avoid nephrotoxins, such as NSAIDs  He does have a history of acute kidney injury requiring hemodialysis for a few weeks in 2013  Discussed the importance of avoiding further acute kidney injury, as able  Advised patient to hold lisinopril-hydrochlorothiazide he if he experiences any further poor p o  Intake or GI losses    · 11/29/2022  · Creatinine: 1 14

## 2022-12-05 NOTE — PATIENT INSTRUCTIONS
Problem List Items Addressed This Visit          Cardiovascular and Mediastinum    Essential hypertension     10/26/2022 Nephrology Visit:  Blood pressure is elevated  Currently on carvedilol 12 5 mg twice daily hydralazine 10 mg 3 times daily  Pre-hospital lisinopril and hydrochlorothiazide are on hold  Acute kidney injuries now resolved  Patient was previously taking lisinopril-HCTZ 20-25 mg twice daily  Will restart once daily x1 week then increase to twice daily if still needed if systolic blood pressure is greater than 130  Medial he 2 weeks after restarting  Low-sodium diet  Will discontinue hydralazine  12/5/2022 PCP:  No longer taking the hydralazine  Continuing on his coreg 12 5 mg twice a day   continuing on his lisinopril-HCTZ 20-12 5mg twice a day  At home he reports his BP to 135/85  Genitourinary    LIAM (acute kidney injury) ([facility] [address] ) - Primary     10/5/2022 to 10/7/2022 admitted  Discharge summary  Acute kidney injury was present on admission   Baseline creatinine approximately 1 2  Arrival creatinine 4 44, status post treatment with IV fluids, discharge creatinine 1 56  Probably multifactorial, pre renal in the setting of losing a lot of volume because of extensive history of recent diarrhea, as well as the possibility of an acute tubular necrosis in the setting of having infectious enteritis  Status post treatment with IV fluids  Renal ultrasound is grossly within normal limits  Hypokalemia:-patient refusing IV potassium, will receive a total of 80 mEq of p o  potassium today, followed by a prescription for 40 mg of p o  potassium daily for 3 days  Patient will be seen by Nephrology in the near future, and they will be ordering repeat blood work for the outpatient setting for early next week  DC home today  10/18/2022 PCP  Will check CMP  10/21/2022  Creatinine: 1 06  10/26/2022 Nephrology Visit:  Now resolved  Will follow with restarting lisinopril-hydrochlorothiazide    Patient otherwise knows to avoid nephrotoxins, such as NSAIDs  He does have a history of acute kidney injury requiring hemodialysis for a few weeks in 2013  Discussed the importance of avoiding further acute kidney injury, as able  Advised patient to hold lisinopril-hydrochlorothiazide he if he experiences any further poor p o  Intake or GI losses    11/29/2022  Creatinine: 1 14            Other    Encounter for diabetic foot exam ([facility] [address] )

## 2022-12-27 DIAGNOSIS — I10 ESSENTIAL HYPERTENSION: ICD-10-CM

## 2022-12-27 RX ORDER — LISINOPRIL AND HYDROCHLOROTHIAZIDE 20; 12.5 MG/1; MG/1
1 TABLET ORAL 2 TIMES DAILY
Qty: 180 TABLET | Refills: 3 | Status: SHIPPED | OUTPATIENT
Start: 2022-12-27

## 2023-01-25 DIAGNOSIS — N18.2 TYPE 2 DIABETES MELLITUS WITH STAGE 2 CHRONIC KIDNEY DISEASE, WITHOUT LONG-TERM CURRENT USE OF INSULIN (HCC): Primary | ICD-10-CM

## 2023-01-25 DIAGNOSIS — E11.22 TYPE 2 DIABETES MELLITUS WITH STAGE 2 CHRONIC KIDNEY DISEASE, WITHOUT LONG-TERM CURRENT USE OF INSULIN (HCC): Primary | ICD-10-CM

## 2023-01-25 RX ORDER — GLIMEPIRIDE 2 MG/1
2 TABLET ORAL
Qty: 30 TABLET | Refills: 0 | Status: SHIPPED | OUTPATIENT
Start: 2023-01-25

## 2023-02-10 ENCOUNTER — RA CDI HCC (OUTPATIENT)
Dept: OTHER | Facility: HOSPITAL | Age: 58
End: 2023-02-10

## 2023-02-10 NOTE — PROGRESS NOTES
[facility]  coding opportunities          Chart Reviewed number of suggestions sent to Provider: 1   [id number]    Patients Insurance        Commercial Insurance: [employer]

## 2023-02-17 ENCOUNTER — OFFICE VISIT (OUTPATIENT)
Dept: INTERNAL MEDICINE CLINIC | Facility: CLINIC | Age: 58
End: 2023-02-17

## 2023-02-17 VITALS
DIASTOLIC BLOOD PRESSURE: 86 MMHG | OXYGEN SATURATION: 96 % | TEMPERATURE: 98.2 F | WEIGHT: 315 LBS | SYSTOLIC BLOOD PRESSURE: 138 MMHG | BODY MASS INDEX: 42.66 KG/M2 | HEIGHT: 72 IN | HEART RATE: 86 BPM

## 2023-02-17 DIAGNOSIS — N18.2 STAGE 2 CHRONIC KIDNEY DISEASE: ICD-10-CM

## 2023-02-17 DIAGNOSIS — N18.2 TYPE 2 DIABETES MELLITUS WITH STAGE 2 CHRONIC KIDNEY DISEASE, WITHOUT LONG-TERM CURRENT USE OF INSULIN (HCC): ICD-10-CM

## 2023-02-17 DIAGNOSIS — G89.29 CHRONIC BILATERAL LOW BACK PAIN WITH SCIATICA, SCIATICA LATERALITY UNSPECIFIED: ICD-10-CM

## 2023-02-17 DIAGNOSIS — E11.22 TYPE 2 DIABETES MELLITUS WITH STAGE 2 CHRONIC KIDNEY DISEASE, WITHOUT LONG-TERM CURRENT USE OF INSULIN (HCC): ICD-10-CM

## 2023-02-17 DIAGNOSIS — Z02.89 ENCOUNTER FOR PHYSICAL EXAMINATION RELATED TO EMPLOYMENT: Primary | ICD-10-CM

## 2023-02-17 DIAGNOSIS — I10 ESSENTIAL HYPERTENSION: ICD-10-CM

## 2023-02-17 DIAGNOSIS — M54.40 CHRONIC BILATERAL LOW BACK PAIN WITH SCIATICA, SCIATICA LATERALITY UNSPECIFIED: ICD-10-CM

## 2023-02-17 NOTE — ASSESSMENT & PLAN NOTE
· The patient is here for a [email or web address]  physical examination  · Per the requirements of the IU, the patient is able to perform all duties as explained and discussed on the attached job description

## 2023-02-17 NOTE — PATIENT INSTRUCTIONS
Problem List Items Addressed This Visit          Endocrine    Type 2 diabetes mellitus with stage 2 chronic kidney disease, without long-term current use of insulin ([address] )       Cardiovascular and Mediastinum    Essential hypertension       Nervous and Auditory    Chronic bilateral low back pain with sciatica       Genitourinary    Stage 2 chronic kidney disease       Other    Encounter for physical examination related to employment - Primary     The patient is here for a IU  physical examination  Per the requirements of the IU, the patient is able to perform all duties as explained and discussed on the attached job description

## 2023-02-17 NOTE — PROGRESS NOTES
Assessment/Plan:     Problem List Items Addressed This Visit        Endocrine    Type 2 diabetes mellitus with stage 2 chronic kidney disease, without long-term current use of insulin ([address] )       Cardiovascular and Mediastinum    Essential hypertension       Nervous and Auditory    Chronic bilateral low back pain with sciatica       Genitourinary    Stage 2 chronic kidney disease       Other    Encounter for physical examination related to employment - Primary     · The patient is here for a [employer]  physical examination  · Per the requirements of the [facility], the patient is able to perform all duties as explained and discussed on the attached job description  Subjective:      Patient ID: Thiago Guadarrama is a 59 y o  male  The patient is here for a [employer]  physical examination  Per the requirements of the [facility], the patient is able to perform all duties as explained and discussed on the attached job description  The following portions of the patient's history were reviewed and updated as appropriate:     Past Medical History:  He has a past medical history of Benign arteriolar nephrosclerosis, Essential hypertension, History of transfusion (2013), Hyperparathyroidism due to renal insufficiency ([facility] [address] ), Legionnaire's disease ([facility] [address] ), and Pneumonia  ,  _______________________________________________________________________  Medical Problems:  does not have any pertinent problems on file ,  _______________________________________________________________________  Past Surgical History:   has a past surgical history that includes Other surgical history; Colonoscopy; and pr lamnotmy incl w/dcmprsn nrv root 1 intrspc lumbr (Left, 11/15/2021)  ,  _______________________________________________________________________  Family History:  family history includes Cancer in his father; Diabetes type II in his brother and father; Hyperlipidemia in his mother; Lung cancer in his father; Stomach cancer in his father ,  _______________________________________________________________________  Social History:   reports that he has never smoked  He has never used smokeless tobacco  He reports that he does not currently use alcohol  He reports that he does not use drugs  ,  _______________________________________________________________________  Allergies:  has No Known Allergies     _______________________________________________________________________  Current Outpatient Medications   Medication Sig Dispense Refill   • fexofenadine (ALLEGRA) 180 MG tablet Take 180 mg by mouth daily     • glimepiride (AMARYL) 2 mg tablet Take 1 tablet (2 mg total) by mouth daily with breakfast 30 tablet 0   • lisinopril-hydrochlorothiazide (PRINZIDE,ZESTORETIC) 20-12 5 MG per tablet Take 1 tablet by mouth 2 (two) times a day 180 tablet 3   • Multiple Vitamins-Minerals (multivitamin with minerals) tablet Take 1 tablet by mouth daily     • carvedilol (COREG) 12 5 mg tablet Take 1 tablet (12 5 mg total) by mouth 2 (two) times a day 180 tablet 1     No current facility-administered medications for this visit      _______________________________________________________________________      Review of Systems   Constitutional: Negative for activity change, chills, fatigue and fever  HENT: Negative for rhinorrhea and sore throat  Eyes: Negative for pain  Respiratory: Negative for cough and shortness of breath  Cardiovascular: Negative for chest pain, palpitations and leg swelling  Gastrointestinal: Negative for abdominal pain, constipation, diarrhea, nausea and vomiting  Genitourinary: Negative for difficulty urinating, flank pain, frequency and urgency  Musculoskeletal: Negative for gait problem, joint swelling and myalgias  Skin: Negative for color change  Neurological: Negative for dizziness, weakness, light-headedness and headaches  Psychiatric/Behavioral: Negative for sleep disturbance   The patient is not nervous/anxious  All other systems reviewed and are negative  Objective:  Vitals:    02/17/23 0941   BP: 138/86   BP Location: Left arm   Patient Position: Sitting   Cuff Size: Standard   Pulse: 86   Temp: 98 2 °F (36 8 °C)   SpO2: 96%   Weight: (!) 151 kg (332 lb 9 6 oz)   Height: 6' (1 829 m)     Body mass index is 45 11 kg/m²  Physical Exam  Vitals reviewed  Constitutional:       General: He is awake  Appearance: Normal appearance  He is well-developed and normal weight  HENT:      Head: Normocephalic and atraumatic  Nose: Nose normal       Mouth/Throat:      Mouth: Mucous membranes are moist    Eyes:      Extraocular Movements: Extraocular movements intact  Cardiovascular:      Rate and Rhythm: Normal rate and regular rhythm  Pulses: Normal pulses  Heart sounds: Normal heart sounds  Pulmonary:      Effort: Pulmonary effort is normal       Breath sounds: Normal breath sounds  Abdominal:      General: Bowel sounds are normal       Palpations: Abdomen is soft  Musculoskeletal:         General: Normal range of motion  Cervical back: Normal range of motion  Right lower leg: No edema  Left lower leg: No edema  Skin:     General: Skin is warm and dry  Neurological:      Mental Status: He is alert and oriented to person, place, and time  Psychiatric:         Attention and Perception: Attention normal          Mood and Affect: Mood normal          Speech: Speech normal          Behavior: Behavior normal  Behavior is cooperative

## 2023-02-20 DIAGNOSIS — E11.22 TYPE 2 DIABETES MELLITUS WITH STAGE 2 CHRONIC KIDNEY DISEASE, WITHOUT LONG-TERM CURRENT USE OF INSULIN (HCC): ICD-10-CM

## 2023-02-20 DIAGNOSIS — N18.2 TYPE 2 DIABETES MELLITUS WITH STAGE 2 CHRONIC KIDNEY DISEASE, WITHOUT LONG-TERM CURRENT USE OF INSULIN (HCC): ICD-10-CM

## 2023-02-20 RX ORDER — GLIMEPIRIDE 2 MG/1
2 TABLET ORAL
Qty: 30 TABLET | Refills: 5 | Status: SHIPPED | OUTPATIENT
Start: 2023-02-20

## 2023-03-09 ENCOUNTER — OFFICE VISIT (OUTPATIENT)
Dept: INTERNAL MEDICINE CLINIC | Facility: CLINIC | Age: 58
End: 2023-03-09

## 2023-03-09 VITALS
OXYGEN SATURATION: 96 % | HEIGHT: 72 IN | BODY MASS INDEX: 42.66 KG/M2 | TEMPERATURE: 98.9 F | DIASTOLIC BLOOD PRESSURE: 84 MMHG | WEIGHT: 315 LBS | HEART RATE: 90 BPM | SYSTOLIC BLOOD PRESSURE: 138 MMHG

## 2023-03-09 DIAGNOSIS — I10 ESSENTIAL HYPERTENSION: Primary | ICD-10-CM

## 2023-03-09 RX ORDER — OMEGA-3S/DHA/EPA/FISH OIL/D3 300MG-1000
400 CAPSULE ORAL DAILY
COMMUNITY

## 2023-03-09 NOTE — ASSESSMENT & PLAN NOTE
• 10/26/2022 Nephrology Visit:  • Blood pressure is elevated   Currently on carvedilol 12 5 mg twice daily hydralazine 10 mg 3 times daily   Pre-hospital lisinopril and hydrochlorothiazide are on hold   Acute kidney injuries now resolved  Anum Caceres was previously taking lisinopril-HCTZ 20-25 mg twice daily   Will restart once daily x1 week then increase to twice daily if still needed if systolic blood pressure is greater than 130  Medial he 2 weeks after restarting   Low-sodium diet   Will discontinue hydralazine      • 12/5/2022 PCP:  • No longer taking the hydralazine  • Continuing on his coreg 12 5 mg twice a day   • continuing on his lisinopril-HCTZ 20-12 5mg twice a day  • At home he reports his BP to 135/85      · 2/17/2023   · BP: 138/86    · 3/9/2023  · BP: 138/84

## 2023-03-09 NOTE — PATIENT INSTRUCTIONS
Problem List Items Addressed This Visit          Cardiovascular and Mediastinum    Essential hypertension - Primary     10/26/2022 Nephrology Visit:  Blood pressure is elevated  Currently on carvedilol 12 5 mg twice daily hydralazine 10 mg 3 times daily  Pre-hospital lisinopril and hydrochlorothiazide are on hold  Acute kidney injuries now resolved  Patient was previously taking lisinopril-HCTZ 20-25 mg twice daily  Will restart once daily x1 week then increase to twice daily if still needed if systolic blood pressure is greater than 130  Medial he 2 weeks after restarting  Low-sodium diet  Will discontinue hydralazine  12/5/2022 PCP:  No longer taking the hydralazine  Continuing on his coreg 12 5 mg twice a day   continuing on his lisinopril-HCTZ 20-12 5mg twice a day  At home he reports his BP to 135/85      2/17/2023   BP: 138/86    3/9/2023  BP: 138/84

## 2023-03-09 NOTE — PROGRESS NOTES
Assessment/Plan:     Problem List Items Addressed This Visit        Cardiovascular and Mediastinum    Essential hypertension - Primary     • 10/26/2022 Nephrology Visit:  • Blood pressure is elevated   Currently on carvedilol 12 5 mg twice daily hydralazine 10 mg 3 times daily   Pre-hospital lisinopril and hydrochlorothiazide are on hold   Acute kidney injuries now resolved  Desmond Gupta was previously taking lisinopril-HCTZ 20-25 mg twice daily   Will restart once daily x1 week then increase to twice daily if still needed if systolic blood pressure is greater than 130  Medial he 2 weeks after restarting   Low-sodium diet   Will discontinue hydralazine      • 12/5/2022 PCP:  • No longer taking the hydralazine  • Continuing on his coreg 12 5 mg twice a day   • continuing on his lisinopril-HCTZ 20-12 5mg twice a day  • At home he reports his BP to 135/85  · 2/17/2023   · BP: 138/86    · 3/9/2023  · BP: 138/84            Subjective:      Patient ID: Babs Banks is a 59 y o  male  The patient is here today to discuss his blood pressure issues which has remained stable and does not require any other medication changes at this time  Please continue to the [facility] section of the note for details of today's visit  The following portions of the patient's history were reviewed and updated as appropriate:     Past Medical History:  He has a past medical history of Benign arteriolar nephrosclerosis, Essential hypertension, History of transfusion (2013), Hyperparathyroidism due to renal insufficiency ([facility] [address] ), Legionnaire's disease ([facility] [address] ), and Pneumonia  ,  _______________________________________________________________________  Medical Problems:  does not have any pertinent problems on file ,  _______________________________________________________________________  Past Surgical History:   has a past surgical history that includes Other surgical history;  Colonoscopy; and pr lamnotmy incl w/dcmprsn nrv root 1 intrspc lumbr (Left, 11/15/2021)  ,  _______________________________________________________________________  Family History:  family history includes Cancer in his father; Diabetes type II in his brother and father; Hyperlipidemia in his mother; Lung cancer in his father; Stomach cancer in his father ,  _______________________________________________________________________  Social History:   reports that he has never smoked  He has never used smokeless tobacco  He reports that he does not currently use alcohol  He reports that he does not use drugs  ,  _______________________________________________________________________  Allergies:  has No Known Allergies     _______________________________________________________________________  Current Outpatient Medications   Medication Sig Dispense Refill   • carvedilol (COREG) 12 5 mg tablet Take 1 tablet (12 5 mg total) by mouth 2 (two) times a day 180 tablet 1   • cholecalciferol (VITAMIN D3) 400 units tablet Take 400 Units by mouth daily     • fexofenadine (ALLEGRA) 180 MG tablet Take 180 mg by mouth daily     • glimepiride (AMARYL) 2 mg tablet Take 1 tablet (2 mg total) by mouth daily with breakfast 30 tablet 5   • lisinopril-hydrochlorothiazide (PRINZIDE,ZESTORETIC) 20-12 5 MG per tablet Take 1 tablet by mouth 2 (two) times a day 180 tablet 3   • Multiple Vitamins-Minerals (multivitamin with minerals) tablet Take 1 tablet by mouth daily       No current facility-administered medications for this visit      _______________________________________________________________________      Review of Systems   Constitutional: Negative for activity change, chills, fatigue and fever  HENT: Negative for rhinorrhea and sore throat  Eyes: Negative for pain  Respiratory: Negative for cough and shortness of breath  Cardiovascular: Negative for chest pain, palpitations and leg swelling  Gastrointestinal: Negative for abdominal pain, constipation, diarrhea, nausea and vomiting  Genitourinary: Negative for difficulty urinating, flank pain, frequency and urgency  Musculoskeletal: Positive for arthralgias, gait problem and myalgias  Negative for joint swelling  Skin: Negative for color change  Neurological: Negative for dizziness, weakness, light-headedness and headaches  Psychiatric/Behavioral: Negative for sleep disturbance  The patient is not nervous/anxious  All other systems reviewed and are negative  Objective:  Vitals:    03/09/23 0940   BP: 138/84   BP Location: Left arm   Patient Position: Sitting   Cuff Size: Standard   Pulse: 90   Temp: 98 9 °F (37 2 °C)   SpO2: 96%   Weight: (!) 151 kg (332 lb 3 2 oz)   Height: 6' (1 829 m)     Body mass index is 45 05 kg/m²  Physical Exam  Vitals reviewed  Constitutional:       General: He is awake  Appearance: Normal appearance  He is well-developed and normal weight  HENT:      Head: Normocephalic and atraumatic  Nose: Nose normal       Mouth/Throat:      Mouth: Mucous membranes are moist    Eyes:      Extraocular Movements: Extraocular movements intact  Cardiovascular:      Rate and Rhythm: Normal rate and regular rhythm  Pulses: Normal pulses  Heart sounds: Normal heart sounds  Pulmonary:      Effort: Pulmonary effort is normal       Breath sounds: Normal breath sounds  Abdominal:      General: Bowel sounds are normal       Palpations: Abdomen is soft  Musculoskeletal:         General: Normal range of motion  Cervical back: Normal range of motion  Right lower leg: No edema  Left lower leg: No edema  Comments: Chronic hip pains now, Right > Left   Skin:     General: Skin is warm and dry  Neurological:      Mental Status: He is alert and oriented to person, place, and time  Psychiatric:         Attention and Perception: Attention normal          Mood and Affect: Mood normal          Speech: Speech normal          Behavior: Behavior normal  Behavior is cooperative

## 2023-04-17 DIAGNOSIS — E11.22 TYPE 2 DIABETES MELLITUS WITH STAGE 2 CHRONIC KIDNEY DISEASE, WITHOUT LONG-TERM CURRENT USE OF INSULIN (HCC): ICD-10-CM

## 2023-04-17 DIAGNOSIS — N18.2 TYPE 2 DIABETES MELLITUS WITH STAGE 2 CHRONIC KIDNEY DISEASE, WITHOUT LONG-TERM CURRENT USE OF INSULIN (HCC): ICD-10-CM

## 2023-04-24 ENCOUNTER — PREP FOR PROCEDURE (OUTPATIENT)
Dept: GASTROENTEROLOGY | Facility: CLINIC | Age: 58
End: 2023-04-24

## 2023-04-24 ENCOUNTER — TELEPHONE (OUTPATIENT)
Dept: GASTROENTEROLOGY | Facility: CLINIC | Age: 58
End: 2023-04-24

## 2023-04-24 DIAGNOSIS — Z12.11 SCREENING FOR COLON CANCER: Primary | ICD-10-CM

## 2023-04-24 NOTE — TELEPHONE ENCOUNTER
Scheduled date of colonoscopy (as of today): 6/26/23    Physician performing colonoscopy:ELYSSA    Location of colonoscopy: [address]

## 2023-04-27 DIAGNOSIS — I10 ESSENTIAL HYPERTENSION: ICD-10-CM

## 2023-04-27 RX ORDER — CARVEDILOL 12.5 MG/1
TABLET ORAL
Qty: 180 TABLET | Refills: 3 | Status: SHIPPED | OUTPATIENT
Start: 2023-04-27

## 2023-06-08 DIAGNOSIS — Z12.11 SCREENING FOR COLON CANCER: Primary | ICD-10-CM

## 2023-06-29 RX ORDER — GLIMEPIRIDE 2 MG/1
2 TABLET ORAL
Qty: 30 TABLET | Refills: 5 | Status: SHIPPED | OUTPATIENT
Start: 2023-06-29 | End: 2023-07-05 | Stop reason: SDUPTHER

## 2023-07-05 DIAGNOSIS — E11.22 TYPE 2 DIABETES MELLITUS WITH STAGE 2 CHRONIC KIDNEY DISEASE, WITHOUT LONG-TERM CURRENT USE OF INSULIN (HCC): ICD-10-CM

## 2023-07-05 DIAGNOSIS — N18.2 TYPE 2 DIABETES MELLITUS WITH STAGE 2 CHRONIC KIDNEY DISEASE, WITHOUT LONG-TERM CURRENT USE OF INSULIN (HCC): ICD-10-CM

## 2023-07-05 RX ORDER — GLIMEPIRIDE 2 MG/1
2 TABLET ORAL
Qty: 90 TABLET | Refills: 1 | Status: SHIPPED | OUTPATIENT
Start: 2023-07-05

## 2023-10-16 ENCOUNTER — RA CDI HCC (OUTPATIENT)
Dept: OTHER | Facility: HOSPITAL | Age: 58
End: 2023-10-16

## 2023-10-16 NOTE — PROGRESS NOTES
[address] [facility] coding opportunities          Chart Reviewed number of suggestions sent to Provider: 1   E66.01    Patients Insurance        Commercial Insurance: [employer]

## 2023-10-23 ENCOUNTER — OFFICE VISIT (OUTPATIENT)
Dept: INTERNAL MEDICINE CLINIC | Facility: CLINIC | Age: 58
End: 2023-10-23
Payer: COMMERCIAL

## 2023-10-23 VITALS
DIASTOLIC BLOOD PRESSURE: 72 MMHG | SYSTOLIC BLOOD PRESSURE: 124 MMHG | TEMPERATURE: 97.5 F | HEIGHT: 72 IN | BODY MASS INDEX: 41.88 KG/M2 | HEART RATE: 76 BPM | WEIGHT: 309.2 LBS | OXYGEN SATURATION: 95 %

## 2023-10-23 DIAGNOSIS — Z28.21 REFUSED DIPHTHERIA-PERTUSSIS-TETANUS (DPT) VACCINATION: ICD-10-CM

## 2023-10-23 DIAGNOSIS — Z01.00 DIABETIC EYE EXAM (HCC): ICD-10-CM

## 2023-10-23 DIAGNOSIS — E66.01 CLASS 3 SEVERE OBESITY DUE TO EXCESS CALORIES WITHOUT SERIOUS COMORBIDITY WITH BODY MASS INDEX (BMI) OF 40.0 TO 44.9 IN ADULT (HCC): ICD-10-CM

## 2023-10-23 DIAGNOSIS — Z12.5 SCREENING FOR PROSTATE CANCER: ICD-10-CM

## 2023-10-23 DIAGNOSIS — Z00.00 ANNUAL PHYSICAL EXAM: Primary | ICD-10-CM

## 2023-10-23 DIAGNOSIS — Z13.6 SCREENING FOR CARDIOVASCULAR CONDITION: ICD-10-CM

## 2023-10-23 DIAGNOSIS — Z11.4 SCREENING FOR HIV (HUMAN IMMUNODEFICIENCY VIRUS): ICD-10-CM

## 2023-10-23 DIAGNOSIS — N18.2 TYPE 2 DIABETES MELLITUS WITH STAGE 2 CHRONIC KIDNEY DISEASE, WITHOUT LONG-TERM CURRENT USE OF INSULIN: ICD-10-CM

## 2023-10-23 DIAGNOSIS — G89.29 CHRONIC BILATERAL LOW BACK PAIN WITH BILATERAL SCIATICA: ICD-10-CM

## 2023-10-23 DIAGNOSIS — E55.9 VITAMIN D DEFICIENCY: ICD-10-CM

## 2023-10-23 DIAGNOSIS — Z28.21 REFUSED PNEUMOCOCCAL VACCINE: ICD-10-CM

## 2023-10-23 DIAGNOSIS — M54.41 CHRONIC BILATERAL LOW BACK PAIN WITH BILATERAL SCIATICA: ICD-10-CM

## 2023-10-23 DIAGNOSIS — E11.22 TYPE 2 DIABETES MELLITUS WITH STAGE 2 CHRONIC KIDNEY DISEASE, WITHOUT LONG-TERM CURRENT USE OF INSULIN: ICD-10-CM

## 2023-10-23 DIAGNOSIS — I10 ESSENTIAL HYPERTENSION: ICD-10-CM

## 2023-10-23 DIAGNOSIS — E11.9 ENCOUNTER FOR DIABETIC FOOT EXAM (HCC): ICD-10-CM

## 2023-10-23 DIAGNOSIS — M54.42 CHRONIC BILATERAL LOW BACK PAIN WITH BILATERAL SCIATICA: ICD-10-CM

## 2023-10-23 DIAGNOSIS — Z11.59 NEED FOR HEPATITIS C SCREENING TEST: ICD-10-CM

## 2023-10-23 DIAGNOSIS — E11.9 DIABETIC EYE EXAM (HCC): ICD-10-CM

## 2023-10-23 DIAGNOSIS — Z12.11 COLON CANCER SCREENING: ICD-10-CM

## 2023-10-23 PROBLEM — Z23 ENCOUNTER FOR IMMUNIZATION: Status: ACTIVE | Noted: 2023-10-23

## 2023-10-23 PROBLEM — Z01.818 PRE-OP EXAMINATION: Status: RESOLVED | Noted: 2021-10-19 | Resolved: 2023-10-23

## 2023-10-23 PROBLEM — E87.8 ELECTROLYTE ABNORMALITY: Status: RESOLVED | Noted: 2022-10-05 | Resolved: 2023-10-23

## 2023-10-23 PROBLEM — K56.609 SBO (SMALL BOWEL OBSTRUCTION) (HCC): Status: RESOLVED | Noted: 2022-10-05 | Resolved: 2023-10-23

## 2023-10-23 PROBLEM — Z02.89 ENCOUNTER FOR PHYSICAL EXAMINATION RELATED TO EMPLOYMENT: Status: RESOLVED | Noted: 2023-02-17 | Resolved: 2023-10-23

## 2023-10-23 PROBLEM — Z09 HOSPITAL DISCHARGE FOLLOW-UP: Status: RESOLVED | Noted: 2022-10-18 | Resolved: 2023-10-23

## 2023-10-23 PROCEDURE — 99396 PREV VISIT EST AGE 40-64: CPT | Performed by: NURSE PRACTITIONER

## 2023-10-23 NOTE — PATIENT INSTRUCTIONS
Wellness Visit for Adults   AMBULATORY CARE:   A wellness visit  is when you see your healthcare provider to get screened for health problems. Your healthcare provider will also give you advice on how to stay healthy. Write down your questions so you remember to ask them. Ask your healthcare provider how often you should have a wellness visit. What happens at a wellness visit:  Your healthcare provider will ask about your health, and your family history of health problems. This includes high blood pressure, heart disease, and cancer. He or she will ask if you have symptoms that concern you, if you smoke, and about your mood. You may also be asked about your intake of medicines, supplements, food, and alcohol. Any of the following may be done: Your weight  will be checked. Your height may also be checked so your body mass index (BMI) can be calculated. Your BMI shows if you are at a healthy weight. Your blood pressure  and heart rate will be checked. Your temperature may also be checked. Blood and urine tests  may be done. Blood tests may be done to check your cholesterol levels. Abnormal cholesterol levels increase your risk for heart disease and stroke. You may also need a blood or urine test to check for diabetes if you are at increased risk. Urine tests may be done to look for signs of an infection or kidney disease. A physical exam  includes checking your heartbeat and lungs with a stethoscope. Your healthcare provider may also check your skin to look for sun damage. Screening tests  may be recommended. A screening test is done to check for diseases that may not cause symptoms. The screening tests you may need depend on your age, gender, family history, and lifestyle habits. For example, colorectal screening may be recommended if you are 48years old or older. Screening tests you need if you are a woman:   A Pap smear  is used to screen for cervical cancer.  Pap smears are usually done every 3 to 5 years depending on your age. You may need them more often if you have had abnormal Pap smear test results in the past. Ask your healthcare provider how often you should have a Pap smear. A mammogram  is an x-ray of your breasts to screen for breast cancer. Experts recommend mammograms every 2 years starting at age 48 years. You may need a mammogram at age 52 years or younger if you have an increased risk for breast cancer. Talk to your healthcare provider about when you should start having mammograms and how often you need them. Vaccines you may need:   Get an influenza vaccine  every year. The influenza vaccine protects you from the flu. Several types of viruses cause the flu. The viruses change over time, so new vaccines are made each year. Get a tetanus-diphtheria (Td) booster vaccine  every 10 years. This vaccine protects you against tetanus and diphtheria. Tetanus is a severe infection that may cause painful muscle spasms and lockjaw. Diphtheria is a severe bacterial infection that causes a thick covering in the back of your mouth and throat. Get a human papillomavirus (HPV) vaccine  if you are female and aged 23 to 32 or male 23 to 24 and never received it. This vaccine protects you from HPV infection. HPV is the most common infection spread by sexual contact. HPV may also cause vaginal, penile, and anal cancers. Get a pneumococcal vaccine  if you are aged 72 years or older. The pneumococcal vaccine is an injection given to protect you from pneumococcal disease. Pneumococcal disease is an infection caused by pneumococcal bacteria. The infection may cause pneumonia, meningitis, or an ear infection. Get a shingles vaccine  if you are 60 or older, even if you have had shingles before. The shingles vaccine is an injection to protect you from the varicella-zoster virus. This is the same virus that causes chickenpox.  Shingles is a painful rash that develops in people who had chickenpox or have been exposed to the virus. How to eat healthy:  My Plate is a model for planning healthy meals. It shows the types and amounts of foods that should go on your plate. Fruits and vegetables make up about half of your plate, and grains and protein make up the other half. A serving of dairy is included on the side of your plate. The amount of calories and serving sizes you need depends on your age, gender, weight, and height. Examples of healthy foods are listed below:  Eat a variety of vegetables  such as dark green, red, and orange vegetables. You can also include canned vegetables low in sodium (salt) and frozen vegetables without added butter or sauces. Eat a variety of fresh fruits , canned fruit in 100% juice, frozen fruit, and dried fruit. Include whole grains. At least half of the grains you eat should be whole grains. Examples include whole-wheat bread, wheat pasta, brown rice, and whole-grain cereals such as oatmeal.    Eat a variety of protein foods such as seafood (fish and shellfish), lean meat, and poultry without skin (turkey and chicken). Examples of lean meats include pork leg, shoulder, or tenderloin, and beef round, sirloin, tenderloin, and extra lean ground beef. Other protein foods include eggs and egg substitutes, beans, peas, soy products, nuts, and seeds. Choose low-fat dairy products such as skim or 1% milk or low-fat yogurt, cheese, and cottage cheese. Limit unhealthy fats  such as butter, hard margarine, and shortening. Exercise:  Exercise at least 30 minutes per day on most days of the week. Some examples of exercise include walking, biking, dancing, and swimming. You can also fit in more physical activity by taking the stairs instead of the elevator or parking farther away from stores. Include muscle strengthening activities 2 days each week. Regular exercise provides many health benefits.  It helps you manage your weight, and decreases your risk for type 2 diabetes, heart disease, stroke, and high blood pressure. Exercise can also help improve your mood. Ask your healthcare provider about the best exercise plan for you. General health and safety guidelines:   Do not smoke. Nicotine and other chemicals in cigarettes and cigars can cause lung damage. Ask your healthcare provider for information if you currently smoke and need help to quit. E-cigarettes or smokeless tobacco still contain nicotine. Talk to your healthcare provider before you use these products. Limit alcohol. A drink of alcohol is 12 ounces of beer, 5 ounces of wine, or 1½ ounces of liquor. Lose weight, if needed. Being overweight increases your risk of certain health conditions. These include heart disease, high blood pressure, type 2 diabetes, and certain types of cancer. Protect your skin. Do not sunbathe or use tanning beds. Use sunscreen with a SPF 15 or higher. Apply sunscreen at least 15 minutes before you go outside. Reapply sunscreen every 2 hours. Wear protective clothing, hats, and sunglasses when you are outside. Drive safely. Always wear your seatbelt. Make sure everyone in your car wears a seatbelt. A seatbelt can save your life if you are in an accident. Do not use your cell phone when you are driving. This could distract you and cause an accident. Pull over if you need to make a call or send a text message. Practice safe sex. Use latex condoms if are sexually active and have more than one partner. Your healthcare provider may recommend screening tests for sexually transmitted infections (STIs). Wear helmets, lifejackets, and protective gear. Always wear a helmet when you ride a bike or motorcycle, go skiing, or play sports that could cause a head injury. Wear protective equipment when you play sports. Wear a lifejacket when you are on a boat or doing water sports.     © Copyright [employer] 2023 Information is for End User's use only and may not be sold, redistributed or otherwise used for commercial purposes. The above information is an  only. It is not intended as medical advice for individual conditions or treatments. Talk to your doctor, nurse or pharmacist before following any medical regimen to see if it is safe and effective for you. Cholesterol and Your Health   AMBULATORY CARE:   Cholesterol  is a waxy, fat-like substance. Your body uses cholesterol to make hormones and new cells, and to protect nerves. Cholesterol is made by your body. It also comes from certain foods you eat, such as meat and dairy products. Your healthcare provider can help you set goals for your cholesterol levels. Your provider can help you create a plan to meet your goals. Cholesterol level goals: Your cholesterol level goals depend on your risk for heart disease, your age, and your other health conditions. The following are general guidelines: Total cholesterol  includes low-density lipoprotein (LDL), high-density lipoprotein (HDL), and triglyceride levels. The total cholesterol level should be lower than 200 mg/dL and is best at about 150 mg/dL. LDL cholesterol  is called bad cholesterol  because it forms plaque in your arteries. As plaque builds up, your arteries become narrow, and less blood flows through. When plaque decreases blood flow to your heart, you may have chest pain. If plaque completely blocks an artery that brings blood to your heart, you may have a heart attack. Plaque can break off and form blood clots. Blood clots may block arteries in your brain and cause a stroke. The level should be less than 130 mg/dL and is best at about 100 mg/dL. HDL cholesterol  is called good cholesterol  because it helps remove LDL cholesterol from your arteries. It does this by attaching to LDL cholesterol and carrying it to your liver. Your liver breaks down LDL cholesterol so your body can get rid of it.  High levels of HDL cholesterol can help prevent a heart attack and stroke. Low levels of HDL cholesterol can increase your risk for heart disease, heart attack, and stroke. The level should be at least 40 mg/dL in males or at least 50 mg/dL in females. Triglycerides  are a type of fat that store energy from foods you eat. High levels of triglycerides also cause plaque buildup. This can increase your risk for a heart attack or stroke. If your triglyceride level is high, your LDL cholesterol level may also be high. The level should be less than 150 mg/dL. Any of the following can increase your risk for high cholesterol:   Smoking or drinking large amounts of alcohol    Having overweight or obesity, or not getting enough exercise    A medical condition such as hypertension (high blood pressure) or diabetes    A family history of high cholesterol    Age older than 72    What you need to know about having your cholesterol levels checked: Adults 21to 39years of age should have their cholesterol levels checked every 4 to 6 years. Adults 45 years or older should have their cholesterol checked every 1 to 2 years. You may need your cholesterol checked more often, or at a younger age, if you have risk factors for heart disease. You may also need to have your cholesterol checked more often if you have other health conditions, such as diabetes. Blood tests are used to check cholesterol levels. Blood tests measure your levels of triglycerides, LDL cholesterol, and HDL cholesterol. How healthy fats affect your cholesterol levels:  Healthy fats, also called unsaturated fats, help lower LDL cholesterol and triglyceride levels. Healthy fats include the following:  Monounsaturated fats  are found in foods such as olive oil, canola oil, avocado, nuts, and olives. Polyunsaturated fats,  such as omega 3 fats, are found in fish, such as salmon, trout, and tuna. They can also be found in plant foods such as flaxseed, walnuts, and soybeans.     How unhealthy fats affect your cholesterol levels: Unhealthy fats increase LDL cholesterol and triglyceride levels. They are found in foods high in cholesterol, saturated fat, and trans fat:  Cholesterol  is found in eggs, dairy, and meat. Saturated fat  is found in butter, cheese, ice cream, whole milk, and coconut oil. Saturated fat is also found in meat, such as sausage, hot dogs, and bologna. Trans fat  is found in liquid oils and is used in fried and baked foods. Foods that contain trans fats include chips, crackers, muffins, sweet rolls, microwave popcorn, and cookies. Treatment  for high cholesterol will also decrease your risk of heart disease, heart attack, and stroke. Treatment may include any of the following:  Lifestyle changes  may include food, exercise, weight loss, and quitting smoking. You may also need to decrease the amount of alcohol you drink. Your healthcare provider will want you to start with lifestyle changes. Other treatment may be added if lifestyle changes are not enough. Your healthcare provider may recommend you work with a team to manage hyperlipidemia. The team may include medical experts such as a dietitian, an exercise or physical therapist, and a behavior therapist. Your family members may be included in helping you create lifestyle changes. Medicines  may be given to lower your LDL cholesterol, triglyceride levels, or total cholesterol level. You may need medicines to lower your cholesterol if any of the following is true:    You have a history of stroke, TIA, unstable angina, or a heart attack. Your LDL cholesterol level is 190 mg/dL or higher. You are age 36 to 76 years, have diabetes or heart disease risk factors, and your LDL cholesterol is 70 mg/dL or higher. Supplements  include fish oil, red yeast rice, and garlic. Fish oil may help lower your triglyceride and LDL cholesterol levels. It may also increase your HDL cholesterol level.  Red yeast rice may help decrease your total cholesterol level and LDL cholesterol level. Garlic may help lower your total cholesterol level. Do not take any supplements without talking to your healthcare provider. Food changes you can make to lower your cholesterol levels:  A dietitian can help you create a healthy eating plan. Your dietitian can show you how to read food labels and choose foods low in saturated fat, trans fats, and cholesterol. Decrease the total amount of fat you eat. Choose lean meats, fat-free or 1% fat milk, and low-fat dairy products, such as yogurt and cheese. Try to limit or avoid red meats. Limit or do not eat fried foods or baked goods, such as cookies. Replace unhealthy fats with healthy fats. Cook foods in olive oil or canola oil. Choose soft margarines that are low in saturated fat and trans fat. Seeds, nuts, and avocados are other examples of healthy fats. Eat foods with omega-3 fats. Examples include salmon, tuna, mackerel, walnuts, and flaxseed. Eat fish 2 times per week. Pregnant women should not eat fish that have high levels of mercury, such as shark, swordfish, and ella mackerel. Increase the amount of high-fiber foods you eat. High-fiber foods can help lower your LDL cholesterol. Aim to get between 20 and 30 grams of fiber each day. Fruits and vegetables are high in fiber. Eat at least 5 servings each day. Other high-fiber foods are whole-grain or whole-wheat breads, pastas, or cereals, and brown rice. Eat 3 ounces of whole-grain foods each day. Increase fiber slowly. You may have abdominal discomfort, bloating, and gas if you add fiber to your diet too quickly. Eat healthy protein foods. Examples include low-fat dairy products, skinless chicken and turkey, fish, and nuts. Limit foods and drinks that are high in sugar. Your dietitian or healthcare provider can help you create daily limits for high-sugar foods and drinks. The limit may be lower if you have diabetes or another health condition.  Limits can also help you lose weight if needed. Lifestyle changes you can make to lower your cholesterol levels:   Maintain a healthy weight. Ask your healthcare provider what a healthy weight is for you. Ask your provider to help you create a weight loss plan if needed. Weight loss can decrease your total cholesterol and triglyceride levels. Weight loss may also help keep your blood pressure at a healthy level. Be physically active throughout the day. Physical activity, such as exercise, can help lower your total cholesterol level and maintain a healthy weight. Physical activity can also help increase your HDL cholesterol level. Work with your healthcare provider to create an program that is right for you. Get at least 30 to 40 minutes of moderate physical activity most days of the week. Examples of exercise include brisk walking, swimming, or biking. Also include strength training at least 2 times each week. Your healthcare providers can help you create a physical activity plan. Do not smoke. Nicotine and other chemicals in cigarettes and cigars can raise your cholesterol levels. Ask your healthcare provider for information if you currently smoke and need help to quit. E-cigarettes or smokeless tobacco still contain nicotine. Talk to your healthcare provider before you use these products. Limit or do not drink alcohol. Alcohol can increase your triglyceride levels. Ask your healthcare provider before you drink alcohol. Ask how much is okay for you to drink in 24 hours or 1 week. Follow up with your doctor as directed:  Write down your questions so you remember to ask them during your visits. © Copyright [place] 2023 Information is for End User's use only and may not be sold, redistributed or otherwise used for commercial purposes. The above information is an  only. It is not intended as medical advice for individual conditions or treatments.  Talk to your doctor, nurse or pharmacist before following any medical regimen to see if it is safe and effective for you.     Problem List Items Addressed This Visit          Endocrine    Type 2 diabetes mellitus with stage 2 chronic kidney disease, without long-term current use of insulin        Lab Results   Component Value Date    HGBA1C 6.5 (H) 10/21/2022   HGBA1c  Microalbumin  Continue glimepiride (Amaryl)         Relevant Orders    Hemoglobin A1C    Albumin / creatinine urine ratio       Cardiovascular and Mediastinum    Essential hypertension     10/23/2023  Continue coreg, [id number]            Nervous and Auditory    Chronic bilateral low back pain with bilateral sciatica     Chronic aches and pains  Stable             Other    Annual physical exam - Primary     Lifestyle modification, diet and exercise discussed  Medications discussed and refilled appropriately  Labs discussed and ordered   Hepatitis C screening discussed  HIV screening discussed  Depression screening performed  Anxiety screening performed  Urinary Incontinence screening performed   BMI discussed  DM foot exam performed  DM eye exam ordered  Colorectal cancer screening discussed and ordered   Fall screening performed         Relevant Orders    CBC and Platelet    Comprehensive metabolic panel    Class 3 severe obesity due to excess calories without serious comorbidity with body mass index (BMI) of 40.0 to 44.9 in adult [facility])     Lifestyle modification, diet and exercise discussed  3/9/2023  Weight: (!) 151 kg (332 lb 3.2 oz)   10/23/2023  Weight:           140 kg   (309 lb 3.2 oz)         Colon cancer screening    Relevant Orders    Ambulatory referral to Gastroenterology    Diabetic eye exam [facility])     Patient will go to eye care provider         Encounter for diabetic foot exam ([address])    Vitamin D deficiency     Check lab  Continue supplementation         Relevant Orders    Vitamin D 25 hydroxy    Screening for prostate cancer    Relevant Orders    PSA, Total Screen Screening for cardiovascular condition    Relevant Orders    Lipid panel    Screening for HIV (human immunodeficiency virus)    Relevant Orders    HIV 1/2 AB/AG w Reflex [facility] for 2 yr old and above    HIV 1/2 AG/AB w Reflex [facility] for 2 yr old and above    Need for hepatitis C screening test    Relevant Orders    Hepatitis C antibody    Refused pneumococcal vaccine    Refused diphtheria-pertussis-tetanus (DPT) vaccination

## 2023-10-23 NOTE — ASSESSMENT & PLAN NOTE
Lifestyle modification, diet and exercise discussed  3/9/2023  Weight: (!) 151 kg (332 lb 3.2 oz)   10/23/2023  Weight:           140 kg   (309 lb 3.2 oz)

## 2023-10-23 NOTE — ASSESSMENT & PLAN NOTE
Lifestyle modification, diet and exercise discussed  Medications discussed and refilled appropriately  Labs discussed and ordered   Hepatitis C screening discussed  HIV screening discussed  Depression screening performed  Anxiety screening performed  Urinary Incontinence screening performed   BMI discussed  DM foot exam performed  DM eye exam ordered  Colorectal cancer screening discussed and ordered   Fall screening performed

## 2023-10-23 NOTE — ASSESSMENT & PLAN NOTE
Lab Results   Component Value Date    HGBA1C 6.5 (H) 10/21/2022   HGBA1c  Microalbumin  Continue glimepiride (Amaryl)

## 2023-10-23 NOTE — PROGRESS NOTES
[id number]    NAME: Anastasia Cooks  AGE: 59 y.o.  SEX: male  : 1965     DATE: 10/23/2023     Assessment and Plan:     Problem List Items Addressed This Visit        Endocrine    Type 2 diabetes mellitus with stage 2 chronic kidney disease, without long-term current use of insulin        Lab Results   Component Value Date    HGBA1C 6.5 (H) 10/21/2022   HGBA1c  Microalbumin  Continue glimepiride (Amaryl)         Relevant Orders    Hemoglobin A1C    Albumin / creatinine urine ratio       Cardiovascular and Mediastinum    Essential hypertension     10/23/2023  Continue coreg, lisinopril-HCTZ            Nervous and Auditory    Chronic bilateral low back pain with bilateral sciatica     Chronic aches and pains  Stable             Other    Annual physical exam - Primary     Lifestyle modification, diet and exercise discussed  Medications discussed and refilled appropriately  Labs discussed and ordered   Hepatitis C screening discussed  HIV screening discussed  Depression screening performed  Anxiety screening performed  Urinary Incontinence screening performed   BMI discussed  DM foot exam performed  DM eye exam ordered  Colorectal cancer screening discussed and ordered   Fall screening performed         Relevant Orders    CBC and Platelet    Comprehensive metabolic panel    Class 3 severe obesity due to excess calories without serious comorbidity with body mass index (BMI) of 40.0 to 44.9 in adult [facility])     Lifestyle modification, diet and exercise discussed  3/9/2023  Weight: (!) 151 kg (332 lb 3.2 oz)   10/23/2023  Weight:           140 kg   (309 lb 3.2 oz)         Colon cancer screening    Relevant Orders    Ambulatory referral to Gastroenterology    Diabetic eye exam [facility])     Patient will go to eye care provider         Encounter for diabetic foot exam ([address])    Vitamin D deficiency     Check lab  Continue supplementation Relevant Orders    Vitamin D 25 hydroxy    Screening for prostate cancer    Relevant Orders    PSA, Total Screen    Screening for cardiovascular condition    Relevant Orders    Lipid panel    Screening for HIV (human immunodeficiency virus)    Relevant Orders    HIV 1/2 AB/AG w Reflex [facility] for 2 yr old and above    HIV 1/2 AG/AB w Reflex [facility] for 2 yr old and above    Need for hepatitis C screening test    Relevant Orders    Hepatitis C antibody    Refused pneumococcal vaccine    Refused diphtheria-pertussis-tetanus (DPT) vaccination     Diabetic Foot Exam    Patient's shoes and socks removed. Right Foot/Ankle   Right Foot Inspection  Skin Exam: skin normal and skin intact. No dry skin, no warmth, no callus, no erythema, no maceration, no abnormal color, no pre-ulcer, no ulcer and no callus. Toe Exam: ROM and strength within normal limits. Sensory   Vibration: diminished  Proprioception: intact  Monofilament testing: diminished    Vascular  The right DP pulse is 2+. Left Foot/Ankle  Left Foot Inspection  Skin Exam: skin normal and skin intact. No dry skin, no warmth, no erythema, no maceration, normal color, no pre-ulcer, no ulcer and no callus. Toe Exam: ROM and strength within normal limits. Sensory   Vibration: diminished  Proprioception: intact  Monofilament testing: diminished    Vascular  The left DP pulse is 2+. Assign Risk Category  No deformity present  No loss of protective sensation  No weak pulses  Risk: 0    Immunizations and preventive care screenings were discussed with patient today. Appropriate education was printed on patient's after visit summary. Discussed risks and benefits of prostate cancer screening. We discussed the controversial history of PSA screening for prostate cancer in the [facility] as well as the risk of over detection and over treatment of prostate cancer by way of PSA screening.   The patient understands that PSA blood testing is an imperfect way to screen for prostate cancer and that elevated PSA levels in the blood may also be caused by infection, inflammation, prostatic trauma or manipulation, urological procedures, or by benign prostatic enlargement. The role of the digital rectal examination in prostate cancer screening was also discussed and I discussed with him that there is large interobserver variability in the findings of digital rectal examination. Counseling:  Alcohol/drug use: discussed moderation in alcohol intake, the recommendations for healthy alcohol use, and avoidance of illicit drug use. Dental Health: discussed importance of regular tooth brushing, flossing, and dental visits. Injury prevention: discussed safety/seat belts, safety helmets, smoke detectors, carbon dioxide detectors, and smoking near bedding or upholstery. Sexual health: discussed sexually transmitted diseases, partner selection, use of condoms, avoidance of unintended pregnancy, and contraceptive alternatives. Exercise: the importance of regular exercise/physical activity was discussed. Recommend exercise 3-5 times per week for at least 30 minutes. BMI Counseling: Body mass index is 41.94 kg/m². The BMI is above normal. Nutrition recommendations include decreasing portion sizes, encouraging healthy choices of fruits and vegetables, decreasing fast food intake, consuming healthier snacks, limiting drinks that contain sugar, moderation in carbohydrate intake, increasing intake of lean protein, reducing intake of saturated and trans fat and reducing intake of cholesterol. Exercise recommendations include exercising 3-5 times per week. No pharmacotherapy was ordered. Rationale for BMI follow-up plan is due to patient being overweight or obese. Depression Screening and Follow-up Plan: Patient was screened for depression during today's encounter. They screened negative with a PHQ-2 score of 0. Return in about 6 months (around 4/23/2024) for Recheck. Chief Complaint:     Chief Complaint   Patient presents with   • Physical Exam     Visit for annual physical   patient stated he will go to eye dr. For DM eye exam   Had flu vaccine yesterday   Declined pneumo and tdap       History of Present Illness:     Adult Annual Physical   Patient here for a comprehensive physical exam. The patient reports problems - as discussed  . Diet and Physical Activity  Diet/Nutrition: diabetic diet. Exercise: 1-2 times a week on average. Depression Screening  PHQ-2/9 Depression Screening    Little interest or pleasure in doing things: 0 - not at all  Feeling down, depressed, or hopeless: 0 - not at all  Trouble falling or staying asleep, or sleeping too much: 0 - not at all  Feeling tired or having little energy: 0 - not at all  Poor appetite or overeatin - not at all  Feeling bad about yourself - or that you are a failure or have let yourself or your family down: 0 - not at all  Trouble concentrating on things, such as reading the newspaper or watching television: 0 - not at all  Moving or speaking so slowly that other people could have noticed. Or the opposite - being so fidgety or restless that you have been moving around a lot more than usual: 0 - not at all  Thoughts that you would be better off dead, or of hurting yourself in some way: 0 - not at all  PHQ-2 Score: 0  PHQ-2 Interpretation: Negative depression screen  PHQ-9 Score: 0   PHQ-9 Interpretation: No or Minimal depression        General Health  Sleep: sleeps well. Hearing: normal - bilateral.  Vision: wears glasses. Dental: regular dental visits.  Health  Symptoms include: none    Advanced Care Planning  Do you have an advanced directive? no  Do you have a durable medical power of ? no     Review of Systems:     Review of Systems   Constitutional:  Negative for activity change, chills, fatigue and fever. HENT:  Negative for rhinorrhea and sore throat. Eyes:  Negative for pain. Respiratory:  Negative for cough and shortness of breath. Cardiovascular:  Negative for chest pain, palpitations and leg swelling. Gastrointestinal:  Negative for abdominal pain, constipation, diarrhea, nausea and vomiting. Genitourinary:  Negative for difficulty urinating, flank pain, frequency and urgency. Musculoskeletal:  Positive for arthralgias, back pain and myalgias. Negative for gait problem and joint swelling. Skin:  Negative for color change. Neurological:  Negative for dizziness, weakness, light-headedness and headaches. Psychiatric/Behavioral:  Negative for sleep disturbance. The patient is not nervous/anxious. All other systems reviewed and are negative.      Past Medical History:     Past Medical History:   Diagnosis Date   • Benign arteriolar nephrosclerosis    • Essential hypertension    • History of transfusion 2013   • Hyperparathyroidism due to renal insufficiency (HCC)    • Legionnaire's disease ([address])    • Pneumonia     s/p VDRF       Past Surgical History:     Past Surgical History:   Procedure Laterality Date   • COLONOSCOPY     • OTHER SURGICAL HISTORY      Dialysis catheter    • MI LAMNOTMY INCL W/DCMPRSN NRV ROOT 1 INTRSPC LUMBR Left 11/15/2021    Procedure: Left sided L1-2 microdiscectomy;  Surgeon: Mando Pratt MD;  Location: [facility] OR;  Service: Neurosurgery      Family History:     Family History   Problem Relation Age of Onset   • Hyperlipidemia Mother    • Lung cancer Father    • Stomach cancer Father    • Cancer Father    • Diabetes type II Father    • Diabetes type II Brother       Social History:     Social History     Socioeconomic History   • Marital status: /Civil Union     Spouse name: None   • Number of children: None   • Years of education: None   • Highest education level: None   Occupational History   • Occupation:     Tobacco Use   • Smoking status: Never   • Smokeless tobacco: Never   Vaping Use   • Vaping Use: Never used Substance and Sexual Activity   • Alcohol use: Not Currently     Comment: Denies alcohol use - As per [employer]    • Drug use: Never   • Sexual activity: None   Other Topics Concern   • None   Social History Narrative    Consumes on average 3 cups of regular coffee per day      Social Determinants of Health     Financial Resource Strain: Not on file   Food Insecurity: No Food Insecurity (10/6/2022)    Hunger Vital Sign    • Worried About Running Out of Food in the Last Year: Never true    • Ran Out of Food in the Last Year: Never true   Transportation Needs: No Transportation Needs (10/6/2022)    PRAPARE - Transportation    • Lack of Transportation (Medical): No    • Lack of Transportation (Non-Medical): No   Physical Activity: Not on file   Stress: Not on file   Social Connections: Not on file   Intimate Partner Violence: Not on file   Housing Stability: Low Risk  (10/6/2022)    Housing Stability Vital Sign    • Unable to Pay for Housing in the Last Year: No    • Number of Places Lived in the Last Year: 1    • Unstable Housing in the Last Year: No      Current Medications:     Current Outpatient Medications   Medication Sig Dispense Refill   • carvedilol (COREG) 12.5 mg tablet TAKE 1 TABLET TWICE A  tablet 3   • cholecalciferol (VITAMIN D3) 400 units tablet Take 400 Units by mouth daily     • fexofenadine (ALLEGRA) 180 MG tablet Take 180 mg by mouth daily     • glimepiride (AMARYL) 2 mg tablet Take 1 tablet (2 mg total) by mouth daily with breakfast 90 tablet 1   • lisinopril-hydrochlorothiazide (PRINZIDE,ZESTORETIC) 20-12.5 MG per tablet Take 1 tablet by mouth 2 (two) times a day 180 tablet 3   • Multiple Vitamins-Minerals (multivitamin with minerals) tablet Take 1 tablet by mouth daily       No current facility-administered medications for this visit.       Allergies:     No Known Allergies   Physical Exam:     /72 (BP Location: Right arm, Patient Position: Sitting)   Pulse 76   Temp 97.5 °F (36.4 °C) (Tympanic)   Ht 6' (1.829 m)   Wt (!) 140 kg (309 lb 3.2 oz)   SpO2 95%   BMI 41.94 kg/m²     Physical Exam  Vitals and nursing note reviewed. Constitutional:       General: He is awake. Appearance: Normal appearance. He is well-developed and overweight. HENT:      Head: Normocephalic and atraumatic. Nose: Nose normal.      Mouth/Throat:      Mouth: Mucous membranes are moist.   Eyes:      Conjunctiva/sclera: Conjunctivae normal.   Cardiovascular:      Rate and Rhythm: Normal rate and regular rhythm. Pulses: Normal pulses. no weak pulses          Dorsalis pedis pulses are 2+ on the right side and 2+ on the left side. Heart sounds: Normal heart sounds. No murmur heard. Pulmonary:      Effort: Pulmonary effort is normal. No respiratory distress. Breath sounds: Normal breath sounds. Abdominal:      General: Bowel sounds are normal.      Palpations: Abdomen is soft. Tenderness: There is no abdominal tenderness. Musculoskeletal:      Cervical back: Neck supple. Right lower leg: No edema. Left lower leg: No edema. Feet:      Right foot:      Skin integrity: No ulcer, skin breakdown, erythema, warmth, callus or dry skin. Left foot:      Skin integrity: No ulcer, skin breakdown, erythema, warmth, callus or dry skin. Skin:     General: Skin is warm and dry. Neurological:      Mental Status: He is alert and oriented to person, place, and time. Psychiatric:         Attention and Perception: Attention normal.         Mood and Affect: Mood normal.         Speech: Speech normal.         Behavior: Behavior normal. Behavior is cooperative.           LAURA Gray  [facility]

## 2023-10-26 ENCOUNTER — TELEPHONE (OUTPATIENT)
Age: 58
End: 2023-10-26

## 2023-10-26 NOTE — TELEPHONE ENCOUNTER
Scheduled date of colonoscopy (as of today): 12/28/23  Physician performing colonoscopy: jody  Location of colonoscopy: [facility]  Bowel prep reviewed with patient: lorena / rula  Instructions reviewed with patient by: heike  Clearances: n/a    Rescheduled from 6/26/23

## 2023-11-07 ENCOUNTER — VBI (OUTPATIENT)
Dept: ADMINISTRATIVE | Facility: OTHER | Age: 58
End: 2023-11-07

## 2023-11-21 DIAGNOSIS — I10 ESSENTIAL HYPERTENSION: ICD-10-CM

## 2023-11-21 RX ORDER — LISINOPRIL AND HYDROCHLOROTHIAZIDE 20; 12.5 MG/1; MG/1
1 TABLET ORAL 2 TIMES DAILY
Qty: 180 TABLET | Refills: 3 | Status: SHIPPED | OUTPATIENT
Start: 2023-11-21

## 2023-11-27 ENCOUNTER — APPOINTMENT (OUTPATIENT)
Dept: LAB | Facility: CLINIC | Age: 58
End: 2023-11-27
Payer: COMMERCIAL

## 2023-11-27 DIAGNOSIS — Z00.00 ANNUAL PHYSICAL EXAM: ICD-10-CM

## 2023-11-27 DIAGNOSIS — N18.2 TYPE 2 DIABETES MELLITUS WITH STAGE 2 CHRONIC KIDNEY DISEASE, WITHOUT LONG-TERM CURRENT USE OF INSULIN: ICD-10-CM

## 2023-11-27 DIAGNOSIS — E11.22 TYPE 2 DIABETES MELLITUS WITH STAGE 2 CHRONIC KIDNEY DISEASE, WITHOUT LONG-TERM CURRENT USE OF INSULIN: ICD-10-CM

## 2023-11-27 DIAGNOSIS — Z13.6 SCREENING FOR CARDIOVASCULAR CONDITION: ICD-10-CM

## 2023-11-27 DIAGNOSIS — Z11.4 SCREENING FOR HIV (HUMAN IMMUNODEFICIENCY VIRUS): ICD-10-CM

## 2023-11-27 DIAGNOSIS — Z11.59 NEED FOR HEPATITIS C SCREENING TEST: ICD-10-CM

## 2023-11-27 DIAGNOSIS — Z12.5 SCREENING FOR PROSTATE CANCER: ICD-10-CM

## 2023-11-27 DIAGNOSIS — E55.9 VITAMIN D DEFICIENCY: ICD-10-CM

## 2023-11-27 LAB
25(OH)D3 SERPL-MCNC: 31.6 NG/ML (ref 30–100)
ALBUMIN SERPL BCP-MCNC: 4.1 G/DL (ref 3.5–5)
ALP SERPL-CCNC: 46 U/L (ref 34–104)
ALT SERPL W P-5'-P-CCNC: 20 U/L (ref 7–52)
ANION GAP SERPL CALCULATED.3IONS-SCNC: 9 MMOL/L
AST SERPL W P-5'-P-CCNC: 20 U/L (ref 13–39)
BILIRUB SERPL-MCNC: 0.83 MG/DL (ref 0.2–1)
BUN SERPL-MCNC: 22 MG/DL (ref 5–25)
CALCIUM SERPL-MCNC: 9 MG/DL (ref 8.4–10.2)
CHLORIDE SERPL-SCNC: 98 MMOL/L (ref 96–108)
CHOLEST SERPL-MCNC: 243 MG/DL
CO2 SERPL-SCNC: 30 MMOL/L (ref 21–32)
CREAT SERPL-MCNC: 1.02 MG/DL (ref 0.6–1.3)
CREAT UR-MCNC: 97.6 MG/DL
ERYTHROCYTE [DISTWIDTH] IN BLOOD BY AUTOMATED COUNT: 12.7 % (ref 11.6–15.1)
EST. AVERAGE GLUCOSE BLD GHB EST-MCNC: 140 MG/DL
GFR SERPL CREATININE-BSD FRML MDRD: 79 ML/MIN/1.73SQ M
GLUCOSE P FAST SERPL-MCNC: 114 MG/DL (ref 65–99)
HBA1C MFR BLD: 6.5 %
HCT VFR BLD AUTO: 45.8 % (ref 36.5–49.3)
HCV AB SER QL: NORMAL
HDLC SERPL-MCNC: 35 MG/DL
HGB BLD-MCNC: 15.6 G/DL (ref 12–17)
HIV 1+2 AB+HIV1 P24 AG SERPL QL IA: NORMAL
HIV 2 AB SERPL QL IA: NORMAL
HIV1 AB SERPL QL IA: NORMAL
HIV1 P24 AG SERPL QL IA: NORMAL
LDLC SERPL CALC-MCNC: 163 MG/DL (ref 0–100)
MCH RBC QN AUTO: 29.2 PG (ref 26.8–34.3)
MCHC RBC AUTO-ENTMCNC: 34.1 G/DL (ref 31.4–37.4)
MCV RBC AUTO: 86 FL (ref 82–98)
MICROALBUMIN UR-MCNC: 17.7 MG/L
MICROALBUMIN/CREAT 24H UR: 18 MG/G CREATININE (ref 0–30)
NONHDLC SERPL-MCNC: 208 MG/DL
PLATELET # BLD AUTO: 283 THOUSANDS/UL (ref 149–390)
PMV BLD AUTO: 10 FL (ref 8.9–12.7)
POTASSIUM SERPL-SCNC: 3.8 MMOL/L (ref 3.5–5.3)
PROT SERPL-MCNC: 6.6 G/DL (ref 6.4–8.4)
PSA SERPL-MCNC: 0.35 NG/ML (ref 0–4)
RBC # BLD AUTO: 5.35 MILLION/UL (ref 3.88–5.62)
SODIUM SERPL-SCNC: 137 MMOL/L (ref 135–147)
TRIGL SERPL-MCNC: 226 MG/DL
WBC # BLD AUTO: 9.33 THOUSAND/UL (ref 4.31–10.16)

## 2023-11-27 PROCEDURE — 82043 UR ALBUMIN QUANTITATIVE: CPT

## 2023-11-27 PROCEDURE — G0103 PSA SCREENING: HCPCS

## 2023-11-27 PROCEDURE — 36415 COLL VENOUS BLD VENIPUNCTURE: CPT

## 2023-11-27 PROCEDURE — 86803 HEPATITIS C AB TEST: CPT

## 2023-11-27 PROCEDURE — 80061 LIPID PANEL: CPT

## 2023-11-27 PROCEDURE — 80053 COMPREHEN METABOLIC PANEL: CPT

## 2023-11-27 PROCEDURE — 83036 HEMOGLOBIN GLYCOSYLATED A1C: CPT

## 2023-11-27 PROCEDURE — 87389 HIV-1 AG W/HIV-1&-2 AB AG IA: CPT

## 2023-11-27 PROCEDURE — 82570 ASSAY OF URINE CREATININE: CPT

## 2023-11-27 PROCEDURE — 82306 VITAMIN D 25 HYDROXY: CPT

## 2023-11-27 PROCEDURE — 85027 COMPLETE CBC AUTOMATED: CPT

## 2023-12-06 LAB
LEFT EYE DIABETIC RETINOPATHY: NORMAL
RIGHT EYE DIABETIC RETINOPATHY: NORMAL

## 2023-12-22 DIAGNOSIS — I10 ESSENTIAL HYPERTENSION: ICD-10-CM

## 2023-12-22 PROBLEM — Z12.5 SCREENING FOR PROSTATE CANCER: Status: RESOLVED | Noted: 2023-10-23 | Resolved: 2023-12-22

## 2023-12-22 PROBLEM — Z13.6 SCREENING FOR CARDIOVASCULAR CONDITION: Status: RESOLVED | Noted: 2023-10-23 | Resolved: 2023-12-22

## 2023-12-22 PROBLEM — Z11.59 NEED FOR HEPATITIS C SCREENING TEST: Status: RESOLVED | Noted: 2023-10-23 | Resolved: 2023-12-22

## 2023-12-22 PROBLEM — Z12.11 COLON CANCER SCREENING: Status: RESOLVED | Noted: 2021-09-16 | Resolved: 2023-12-22

## 2023-12-22 RX ORDER — LISINOPRIL AND HYDROCHLOROTHIAZIDE 20; 12.5 MG/1; MG/1
1 TABLET ORAL 2 TIMES DAILY
Qty: 180 TABLET | Refills: 1 | Status: SHIPPED | OUTPATIENT
Start: 2023-12-22

## 2023-12-26 DIAGNOSIS — E11.22 TYPE 2 DIABETES MELLITUS WITH STAGE 2 CHRONIC KIDNEY DISEASE, WITHOUT LONG-TERM CURRENT USE OF INSULIN: ICD-10-CM

## 2023-12-26 DIAGNOSIS — N18.2 TYPE 2 DIABETES MELLITUS WITH STAGE 2 CHRONIC KIDNEY DISEASE, WITHOUT LONG-TERM CURRENT USE OF INSULIN: ICD-10-CM

## 2023-12-26 RX ORDER — GLIMEPIRIDE 2 MG/1
2 TABLET ORAL
Qty: 90 TABLET | Refills: 3 | Status: SHIPPED | OUTPATIENT
Start: 2023-12-26

## 2023-12-28 ENCOUNTER — ANESTHESIA EVENT (OUTPATIENT)
Dept: GASTROENTEROLOGY | Facility: HOSPITAL | Age: 58
End: 2023-12-28

## 2023-12-28 ENCOUNTER — ANESTHESIA (OUTPATIENT)
Dept: GASTROENTEROLOGY | Facility: HOSPITAL | Age: 58
End: 2023-12-28

## 2023-12-28 ENCOUNTER — HOSPITAL ENCOUNTER (OUTPATIENT)
Dept: GASTROENTEROLOGY | Facility: HOSPITAL | Age: 58
Setting detail: OUTPATIENT SURGERY
End: 2023-12-28
Attending: INTERNAL MEDICINE
Payer: COMMERCIAL

## 2023-12-28 VITALS
OXYGEN SATURATION: 94 % | TEMPERATURE: 98.5 F | DIASTOLIC BLOOD PRESSURE: 56 MMHG | SYSTOLIC BLOOD PRESSURE: 95 MMHG | HEART RATE: 83 BPM | RESPIRATION RATE: 16 BRPM

## 2023-12-28 DIAGNOSIS — Z12.11 SCREENING FOR COLON CANCER: ICD-10-CM

## 2023-12-28 LAB — GLUCOSE SERPL-MCNC: 111 MG/DL (ref 65–140)

## 2023-12-28 PROCEDURE — 82948 REAGENT STRIP/BLOOD GLUCOSE: CPT

## 2023-12-28 PROCEDURE — 88305 TISSUE EXAM BY PATHOLOGIST: CPT | Performed by: PATHOLOGY

## 2023-12-28 RX ORDER — PROPOFOL 10 MG/ML
INJECTION, EMULSION INTRAVENOUS AS NEEDED
Status: DISCONTINUED | OUTPATIENT
Start: 2023-12-28 | End: 2023-12-28

## 2023-12-28 RX ORDER — LIDOCAINE HYDROCHLORIDE 20 MG/ML
INJECTION, SOLUTION EPIDURAL; INFILTRATION; INTRACAUDAL; PERINEURAL AS NEEDED
Status: DISCONTINUED | OUTPATIENT
Start: 2023-12-28 | End: 2023-12-28

## 2023-12-28 RX ORDER — SODIUM CHLORIDE, SODIUM LACTATE, POTASSIUM CHLORIDE, CALCIUM CHLORIDE 600; 310; 30; 20 MG/100ML; MG/100ML; MG/100ML; MG/100ML
125 INJECTION, SOLUTION INTRAVENOUS CONTINUOUS
Status: DISCONTINUED | OUTPATIENT
Start: 2023-12-28 | End: 2024-01-01 | Stop reason: HOSPADM

## 2023-12-28 RX ORDER — SODIUM CHLORIDE, SODIUM LACTATE, POTASSIUM CHLORIDE, CALCIUM CHLORIDE 600; 310; 30; 20 MG/100ML; MG/100ML; MG/100ML; MG/100ML
INJECTION, SOLUTION INTRAVENOUS CONTINUOUS PRN
Status: DISCONTINUED | OUTPATIENT
Start: 2023-12-28 | End: 2023-12-28

## 2023-12-28 RX ADMIN — PROPOFOL 60 MG: 10 INJECTION, EMULSION INTRAVENOUS at 09:37

## 2023-12-28 RX ADMIN — SODIUM CHLORIDE, SODIUM LACTATE, POTASSIUM CHLORIDE, AND CALCIUM CHLORIDE: .6; .31; .03; .02 INJECTION, SOLUTION INTRAVENOUS at 08:42

## 2023-12-28 RX ADMIN — PROPOFOL 20 MG: 10 INJECTION, EMULSION INTRAVENOUS at 09:21

## 2023-12-28 RX ADMIN — PROPOFOL 20 MG: 10 INJECTION, EMULSION INTRAVENOUS at 09:33

## 2023-12-28 RX ADMIN — PROPOFOL 20 MG: 10 INJECTION, EMULSION INTRAVENOUS at 09:30

## 2023-12-28 RX ADMIN — PROPOFOL 20 MG: 10 INJECTION, EMULSION INTRAVENOUS at 09:26

## 2023-12-28 RX ADMIN — PROPOFOL 100 MG: 10 INJECTION, EMULSION INTRAVENOUS at 09:19

## 2023-12-28 RX ADMIN — SODIUM CHLORIDE, SODIUM LACTATE, POTASSIUM CHLORIDE, AND CALCIUM CHLORIDE 125 ML/HR: .6; .31; .03; .02 INJECTION, SOLUTION INTRAVENOUS at 08:34

## 2023-12-28 RX ADMIN — LIDOCAINE HYDROCHLORIDE 100 MG: 20 INJECTION, SOLUTION EPIDURAL; INFILTRATION; INTRACAUDAL; PERINEURAL at 09:17

## 2023-12-28 RX ADMIN — PROPOFOL 10 MG: 10 INJECTION, EMULSION INTRAVENOUS at 09:23

## 2023-12-28 RX ADMIN — PROPOFOL 50 MG: 10 INJECTION, EMULSION INTRAVENOUS at 09:28

## 2023-12-28 NOTE — H&P
[facility]  History & Physical     PATIENT INFO     Name: Aaron Snyder  YOB: 1965   Age: 58 y.o.   Sex: male   MRN: [id number]     HISTORY OF PRESENT ILLNESS     Aaron Snyder is a 58 y.o. year old male who presents for screening colonoscopy. Not on antithrombotics or anticoagulants.     REVIEW OF SYSTEMS     Per the HPI, and otherwise unremarkable.    Historical Information   Past Medical History:   Diagnosis Date    Benign arteriolar nephrosclerosis     Essential hypertension     History of transfusion 2013    Hyperparathyroidism due to renal insufficiency (HCC)     Legionnaire's disease (HCC)     Pneumonia     s/p VDRF      Past Surgical History:   Procedure Laterality Date    COLONOSCOPY      OTHER SURGICAL HISTORY      Dialysis catheter     GA LAMNOTMY INCL W/DCMPRSN NRV ROOT 1 INTRSPC LUMBR Left 11/15/2021    Procedure: Left sided L1-2 microdiscectomy;  Surgeon: Andi Campoverde MD;  Location: [facility] OR;  Service: Neurosurgery     Social History   Social History     Substance and Sexual Activity   Alcohol Use Not Currently    Comment: Denies alcohol use - As per [employer]      Social History     Substance and Sexual Activity   Drug Use Never     Social History     Tobacco Use   Smoking Status Never   Smokeless Tobacco Never     Family History   Problem Relation Age of Onset    Hyperlipidemia Mother     Lung cancer Father     Stomach cancer Father     Cancer Father     Diabetes type II Father     Diabetes type II Brother         MEDICATIONS & ALLERGIES     Current Outpatient Medications   Medication Instructions    carvedilol (COREG) 12.5 mg tablet TAKE 1 TABLET TWICE A DAY    cholecalciferol (VITAMIN D3) 400 Units, Oral, Daily    fexofenadine (ALLEGRA) 180 mg, Oral, Daily    glimepiride (AMARYL) 2 mg, Daily with breakfast    lisinopril-hydrochlorothiazide (PRINZIDE,ZESTORETIC) 20-12.5 MG per tablet 1 tablet, Oral, 2 times daily    Multiple Vitamins-Minerals  (multivitamin with minerals) tablet 1 tablet, Oral, Daily     No Known Allergies     PHYSICAL EXAM      Objective   Blood pressure 128/74, pulse 65, temperature (!) 97.3 °F (36.3 °C), temperature source Temporal, resp. rate 18, SpO2 95%. There is no height or weight on file to calculate BMI.    General Appearance:   Alert, cooperative, no distress   Lungs:   Equal chest rise, respirations unlabored    Heart:   Regular rate and rhythm   Abdomen:   Soft, non-tender, non-distended; normal bowel sounds; no masses, no organomegaly    Extremities:   No edema       ASSESSMENT & PLAN     This is a 58 y.o. year old male here for screening colonoscopy, and he is stable and optimized for his procedure.      Peterson Polanco D.O.  [facility]  Division of Gastroenterology & Hepatology  Available on TigerText  [email or web address]    ** Please Note: This note is constructed using a voice recognition dictation system. **

## 2023-12-28 NOTE — ANESTHESIA PREPROCEDURE EVALUATION
Procedure:  COLONOSCOPY    Relevant Problems   ANESTHESIA (within normal limits)      CARDIO   (+) Essential hypertension   (-) Angina at rest   (-) Angina of effort   (-) Chest pain   (-) ROJAS (dyspnea on exertion)      ENDO   (+) Type 2 diabetes mellitus with stage 2 chronic kidney disease, without long-term current use of insulin       GI/HEPATIC   (-) Chronic liver disease      /RENAL   (+) LIAM (acute kidney injury) (HCC)   (+) Stage 2 chronic kidney disease      MUSCULOSKELETAL   (+) Chronic bilateral low back pain with bilateral sciatica   (+) Herniation of intervertebral disc of lumbar spine due to degeneration      NEURO/PSYCH   (+) Chronic bilateral low back pain with bilateral sciatica   (-) CVA (cerebral vascular accident) (HCC)   (-) Seizures (HCC)      PULMONARY   (-) Asthma   (-) Chronic obstructive pulmonary disease (HCC)   (-) Sleep apnea        Physical Exam    Airway    Mallampati score: I  TM Distance: >3 FB  Neck ROM: full     Dental   No notable dental hx     Cardiovascular      Pulmonary      Other Findings        Anesthesia Plan  ASA Score- 2     Anesthesia Type- IV sedation with anesthesia with ASA Monitors.         Additional Monitors:     Airway Plan:            Plan Factors-Exercise tolerance (METS): >4 METS.    Chart reviewed.    Patient summary reviewed.                  Induction- intravenous.    Postoperative Plan-     Informed Consent- Anesthetic plan and risks discussed with patient.  I personally reviewed this patient with the CRNA. Discussed and agreed on the Anesthesia Plan with the CRNA..

## 2023-12-28 NOTE — ANESTHESIA POSTPROCEDURE EVALUATION
Post-Op Assessment Note    CV Status:  Stable    Pain management: adequate       Mental Status:  Alert and awake   Hydration Status:  Euvolemic   PONV Controlled:  Controlled   Airway Patency:  Patent     Post Op Vitals Reviewed: Yes      Staff: CRNA               BP   95/56   Temp   98.5   Pulse 67   Resp 18   SpO2 97

## 2024-01-04 PROCEDURE — 88305 TISSUE EXAM BY PATHOLOGIST: CPT | Performed by: PATHOLOGY

## 2024-02-20 ENCOUNTER — OFFICE VISIT (OUTPATIENT)
Dept: URGENT CARE | Facility: CLINIC | Age: 59
End: 2024-02-20
Payer: COMMERCIAL

## 2024-02-20 ENCOUNTER — APPOINTMENT (OUTPATIENT)
Dept: RADIOLOGY | Facility: CLINIC | Age: 59
End: 2024-02-20
Payer: COMMERCIAL

## 2024-02-20 VITALS
HEART RATE: 59 BPM | OXYGEN SATURATION: 94 % | TEMPERATURE: 97.9 F | DIASTOLIC BLOOD PRESSURE: 85 MMHG | SYSTOLIC BLOOD PRESSURE: 167 MMHG

## 2024-02-20 DIAGNOSIS — M25.551 RIGHT HIP PAIN: Primary | ICD-10-CM

## 2024-02-20 DIAGNOSIS — M25.551 RIGHT HIP PAIN: ICD-10-CM

## 2024-02-20 PROCEDURE — 73502 X-RAY EXAM HIP UNI 2-3 VIEWS: CPT

## 2024-02-20 PROCEDURE — 99213 OFFICE O/P EST LOW 20 MIN: CPT | Performed by: PHYSICIAN ASSISTANT

## 2024-02-20 NOTE — PROGRESS NOTES
[facility]    NAME: Aaron Snyder is a 58 y.o. male  : 1965    MRN: [id number]  DATE: 2024  TIME: 2:11 PM    Assessment and Plan   Right hip pain [M25.551]  1. Right hip pain  XR hip/pelv 2-3 vws right if performed    Ambulatory Referral to Orthopedic Surgery          Patient Instructions     Patient Instructions   Tylenol as needed.  Follow up with ortho.    Chief Complaint     Chief Complaint   Patient presents with    Hip Pain     Right hip and thigh, and left hip, burns since last week, also c/o dull pain going across groin, c/o certain triggers, like biting foods and certain movements including standing up straight        History of Present Illness   58 year old male here with right hip pain.  Started after snow storm last week and running the .  Sharp pain in right hip with burning sensation down to right thigh.  Occasional pain in groin that radiates to left side.  Has had this off and on in the past, never lasts more than a couple days.  No urinary complaints.  States he can ambulate fine.  Pain sometimes with sitting and when he tries to stand up straight.        Review of Systems   Review of Systems   Constitutional:  Negative for chills, fatigue and fever.   Respiratory:  Negative for cough, shortness of breath and wheezing.    Gastrointestinal:  Negative for abdominal pain, diarrhea, nausea and vomiting.   Genitourinary:  Negative for dysuria, flank pain, frequency, hematuria, scrotal swelling, testicular pain and urgency.   Musculoskeletal:         Right hip pain   Neurological:  Negative for headaches.   All other systems reviewed and are negative.      Current Medications     Current Outpatient Medications:     carvedilol (COREG) 12.5 mg tablet, TAKE 1 TABLET TWICE A DAY, Disp: 180 tablet, Rfl: 3    cholecalciferol (VITAMIN D3) 400 units tablet, Take 400 Units by mouth daily, Disp: , Rfl:     fexofenadine (ALLEGRA) 180 MG tablet, Take 180 mg by mouth daily,  Disp: , Rfl:     glimepiride (AMARYL) 2 mg tablet, TAKE 1 TABLET DAILY WITH BREAKFAST, Disp: 90 tablet, Rfl: 3    lisinopril-hydrochlorothiazide (PRINZIDE,ZESTORETIC) 20-12.5 MG per tablet, take 1 tablet by mouth twice a day, Disp: 180 tablet, Rfl: 1    Multiple Vitamins-Minerals (multivitamin with minerals) tablet, Take 1 tablet by mouth daily, Disp: , Rfl:     Current Allergies     Allergies as of 02/20/2024    (No Known Allergies)          The following portions of the patient's history were reviewed and updated as appropriate: allergies, current medications, past family history, past medical history, past social history, past surgical history and problem list.   Past Medical History:   Diagnosis Date    Benign arteriolar nephrosclerosis     Diabetes mellitus (HCC)     Essential hypertension     History of transfusion 2013    Hyperparathyroidism due to renal insufficiency (HCC)     Legionnaire's disease (HCC)     Pneumonia     s/p VDRF      Past Surgical History:   Procedure Laterality Date    COLONOSCOPY      OTHER SURGICAL HISTORY      Dialysis catheter     MA LAMNOTMY INCL W/DCMPRSN NRV ROOT 1 INTRSPC LUMBR Left 11/15/2021    Procedure: Left sided L1-2 microdiscectomy;  Surgeon: Andi Campoverde MD;  Location:  MAIN OR;  Service: Neurosurgery     Family History   Problem Relation Age of Onset    Hyperlipidemia Mother     Lung cancer Father     Stomach cancer Father     Cancer Father     Diabetes type II Father     Diabetes type II Brother      Social History     Socioeconomic History    Marital status: /Civil Union     Spouse name: Not on file    Number of children: Not on file    Years of education: Not on file    Highest education level: Not on file   Occupational History    Occupation:     Tobacco Use    Smoking status: Never    Smokeless tobacco: Never   Vaping Use    Vaping status: Never Used   Substance and Sexual Activity    Alcohol use: Not Currently     Comment: Denies alcohol  use - As per [employer]     Drug use: Never    Sexual activity: Not on file   Other Topics Concern    Not on file   Social History Narrative    Consumes on average 3 cups of regular coffee per day      Social Determinants of Health     Financial Resource Strain: Not on file   Food Insecurity: No Food Insecurity (10/6/2022)    Hunger Vital Sign     Worried About Running Out of Food in the Last Year: Never true     Ran Out of Food in the Last Year: Never true   Transportation Needs: No Transportation Needs (10/6/2022)    PRAPARE - Transportation     Lack of Transportation (Medical): No     Lack of Transportation (Non-Medical): No   Physical Activity: Not on file   Stress: Not on file   Social Connections: Not on file   Intimate Partner Violence: Not on file   Housing Stability: Low Risk  (10/6/2022)    Housing Stability Vital Sign     Unable to Pay for Housing in the Last Year: No     Number of Places Lived in the Last Year: 1     Unstable Housing in the Last Year: No     Medications have been verified.    Objective   /85   Pulse 59   Temp 97.9 °F (36.6 °C)   SpO2 94%      Physical Exam   Physical Exam  Vitals and nursing note reviewed.   Constitutional:       General: He is not in acute distress.     Appearance: Normal appearance. He is well-developed.   HENT:      Head: Normocephalic and atraumatic.   Cardiovascular:      Rate and Rhythm: Normal rate and regular rhythm.      Heart sounds: Normal heart sounds. No murmur heard.  Pulmonary:      Effort: Pulmonary effort is normal. No respiratory distress.      Breath sounds: Normal breath sounds.   Abdominal:      General: Bowel sounds are normal.      Tenderness: There is no abdominal tenderness.   Musculoskeletal:      Right hip: Tenderness present. No bony tenderness or crepitus. Normal range of motion. Normal strength.

## 2024-02-20 NOTE — LETTER
February 20, 2024     Patient: Aaron Snyder   YOB: 1965   Date of Visit: 2/20/2024       To Whom It May Concern:    It is my medical opinion that Aaron Snyder may return to work when cleared by ortho.    If you have any questions or concerns, please don't hesitate to call.         Sincerely,        Michelle Behler, PA-C    CC: No Recipients

## 2024-02-22 ENCOUNTER — OFFICE VISIT (OUTPATIENT)
Dept: OBGYN CLINIC | Facility: CLINIC | Age: 59
End: 2024-02-22
Payer: COMMERCIAL

## 2024-02-22 VITALS
DIASTOLIC BLOOD PRESSURE: 82 MMHG | SYSTOLIC BLOOD PRESSURE: 142 MMHG | BODY MASS INDEX: 42.39 KG/M2 | HEART RATE: 84 BPM | HEIGHT: 72 IN | WEIGHT: 313 LBS

## 2024-02-22 DIAGNOSIS — M25.551 RIGHT HIP PAIN: ICD-10-CM

## 2024-02-22 DIAGNOSIS — N18.2 TYPE 2 DIABETES MELLITUS WITH STAGE 2 CHRONIC KIDNEY DISEASE, WITHOUT LONG-TERM CURRENT USE OF INSULIN: ICD-10-CM

## 2024-02-22 DIAGNOSIS — M54.16 RADICULOPATHY, LUMBAR REGION: Primary | ICD-10-CM

## 2024-02-22 DIAGNOSIS — E11.22 TYPE 2 DIABETES MELLITUS WITH STAGE 2 CHRONIC KIDNEY DISEASE, WITHOUT LONG-TERM CURRENT USE OF INSULIN: ICD-10-CM

## 2024-02-22 DIAGNOSIS — M16.11 PRIMARY OSTEOARTHRITIS OF ONE HIP, RIGHT: ICD-10-CM

## 2024-02-22 PROCEDURE — 99204 OFFICE O/P NEW MOD 45 MIN: CPT | Performed by: STUDENT IN AN ORGANIZED HEALTH CARE EDUCATION/TRAINING PROGRAM

## 2024-02-22 RX ORDER — GABAPENTIN 100 MG/1
100 CAPSULE ORAL DAILY
Qty: 14 CAPSULE | Refills: 0 | Status: SHIPPED | OUTPATIENT
Start: 2024-02-22

## 2024-02-22 NOTE — LETTER
February 22, 2024     Patient: Aaron Snyder  YOB: 1965  Date of Visit: 2/22/2024      To Whom it May Concern:    Aaron Snyder is under my professional care. Aaron was seen in my office on 2/22/2024. Aaron may return to work on 2/26/24 .    If you have any questions or concerns, please don't hesitate to call.         Sincerely,          Miguel Angel Staley MD        CC: No Recipients

## 2024-02-22 NOTE — PROGRESS NOTES
Ortho Sports Medicine New Patient Hip Visit    Assesment:   58 y.o. male with right hip pain for 1 week after shoveling snow with x-ray showing moderate right hip osteoarthritis    Plan:  I reviewed the history, exam, and imaging with the patient and his wife in clinic today.  I did discuss with the patient that his x-rays show moderate hip osteoarthritis.  However, on exam he does not have significant pain with logroll or range of motion of the hip.  He does state that the pain starts in his posterior lateral buttock and radiates down the anterior aspect of his thigh.  He describes the pain as burning.  I discussed with the patient that his pain could be due to multiple etiologies.  I did discuss that he does have some right hip osteoarthritis on x-rays and that that can cause anterior thigh pain as well as pain in the groin.  In addition, I did discuss that anterior thigh pain as well as burning pain could be due to lumbar radiculopathy.  I discussed potential treatment options with the patient.  The patient is unable to take anti-inflammatories due to kidney disease.  Therefore, for the hip I discussed a intra-articular corticosteroid injection and explained that it could provide both diagnostic and therapeutic effects.  For the anterior thigh burning pain, I discussed possibly a trial of gabapentin in the evening to see if that addresses his pain.  I also discussed that I would recommend referral to my spine and pain management colleagues for evaluation of his lumbar spine as a potential contributor to his pain.  The patient was amenable to this plan and demonstrated understanding of our discussion.  All of his questions were answered.  I put in a referral for a image guided corticosteroid injection into the hip joint.  I provided the patient with a prescription for gabapentin 100 mg nightly and did warn him that it may cause drowsiness.  I also placed referral for spine and pain management for evaluation of the  lumbar spine.  I recommended the patient follow-up in 4 weeks for repeat evaluation.  The patient can reach out to clinic with any questions or concerns anytime.    Conservative Treatment:   Ice to hip region for 20 minutes at least 1-2 times daily.  Prescription gabapentin for pain.  Tylenol for pain.  Let pain guide gradual return activities.  Referral placed for radiology, intra-articular hip corticosteroid injection.  Referral placed for spine and pain management.     Imaging:  All imaging from today was reviewed by myself and explained to the patient.     Injection:    Referral placed for right hip intra-articular corticosteroid injection    Surgery:  No surgery is recommended at this point, continue with conservative treatment plan as noted.    Follow up:  Return in about 4 weeks (around 3/21/2024) for Recheck.        Chief Complaint   Patient presents with    Right Hip - Pain       History of Present Illness:  The patient is a 58 y.o. male seen in clinic for right hip pain. The patient reports chronic right hip pain for several years, however, he had a recent exacerbation of pain. The patient states that he used a  to manage the recent snow storm a week ago on 2/13/24, he noticed significant pain afterward. The patient reports pain along the lateral aspect of his hip, that is deep into his hip. He also reports a new onset of burning in the anterior thigh. He states that the pain is exacerbated by walking, twisting, and using stairs. The patient states that when he lays down, the pain is significantly worse in his thigh. He states that the pain is triggered at random. The patient states that he was able to rest from Friday to Monday, which assisted with his pain. However, as he reported to work on Tuesday, he stepped out of his truck which significantly exacerbated his pain. He states that he was unable to apply pressure through his right lower extremity. The patient states that he has taken Tylenol  and used ice which help minimally. He has a history of an L1-L2 microdiscectomy in 2021. The patient also has a history of Kidney Disease and therefore he cannot take NSAIDs. He has a history of diabetes, which he states is well controlled.     Occupation:     The patient has the following co-morbidities: Kidney Disease, Type 2 Diabetes       Hip Surgical History:  None    Past Medical, Social and Family History:  Past Medical History:   Diagnosis Date    Benign arteriolar nephrosclerosis     Diabetes mellitus (HCC)     Essential hypertension     History of transfusion 2013    Hyperparathyroidism due to renal insufficiency (HCC)     Legionnaire's disease (HCC)     Pneumonia     s/p VDRF      Past Surgical History:   Procedure Laterality Date    COLONOSCOPY      OTHER SURGICAL HISTORY      Dialysis catheter     OK LAMNOTMY INCL W/DCMPRSN NRV ROOT 1 INTRSPC LUMBR Left 11/15/2021    Procedure: Left sided L1-2 microdiscectomy;  Surgeon: Andi Campoverde MD;  Location: [facility] OR;  Service: Neurosurgery     No Known Allergies  Current Outpatient Medications on File Prior to Visit   Medication Sig Dispense Refill    carvedilol (COREG) 12.5 mg tablet TAKE 1 TABLET TWICE A  tablet 3    cholecalciferol (VITAMIN D3) 400 units tablet Take 400 Units by mouth daily      fexofenadine (ALLEGRA) 180 MG tablet Take 180 mg by mouth daily      glimepiride (AMARYL) 2 mg tablet TAKE 1 TABLET DAILY WITH BREAKFAST 90 tablet 3    lisinopril-hydrochlorothiazide (PRINZIDE,ZESTORETIC) 20-12.5 MG per tablet take 1 tablet by mouth twice a day 180 tablet 1    Multiple Vitamins-Minerals (multivitamin with minerals) tablet Take 1 tablet by mouth daily       No current facility-administered medications on file prior to visit.     Social History     Socioeconomic History    Marital status: /Civil Union     Spouse name: Not on file    Number of children: Not on file    Years of education: Not on file    Highest  education level: Not on file   Occupational History    Occupation:     Tobacco Use    Smoking status: Never    Smokeless tobacco: Never   Vaping Use    Vaping status: Never Used   Substance and Sexual Activity    Alcohol use: Not Currently     Comment: Denies alcohol use - As per [employer]     Drug use: Never    Sexual activity: Not on file   Other Topics Concern    Not on file   Social History Narrative    Consumes on average 3 cups of regular coffee per day      Social Determinants of Health     Financial Resource Strain: Not on file   Food Insecurity: No Food Insecurity (10/6/2022)    Hunger Vital Sign     Worried About Running Out of Food in the Last Year: Never true     Ran Out of Food in the Last Year: Never true   Transportation Needs: No Transportation Needs (10/6/2022)    PRAPARE - Transportation     Lack of Transportation (Medical): No     Lack of Transportation (Non-Medical): No   Physical Activity: Not on file   Stress: Not on file   Social Connections: Not on file   Intimate Partner Violence: Not on file   Housing Stability: Low Risk  (10/6/2022)    Housing Stability Vital Sign     Unable to Pay for Housing in the Last Year: No     Number of Places Lived in the Last Year: 1     Unstable Housing in the Last Year: No         I have reviewed the past medical, surgical, social and family history, medications and allergies as documented in the EMR.    Review of systems: ROS is negative other than that noted in the HPI.  Constitutional: Negative for fatigue and fever.   HENT: Negative for sore throat.    Respiratory: Negative for shortness of breath.    Cardiovascular: Negative for chest pain.   Gastrointestinal: Negative for abdominal pain.   Endocrine: Negative for cold intolerance and heat intolerance.   Genitourinary: Negative for flank pain.   Musculoskeletal: Negative for back pain.   Skin: Negative for rash.   Allergic/Immunologic: Negative for immunocompromised state.   Neurological: Negative  for dizziness.   Psychiatric/Behavioral: Negative for agitation.      Physical Exam:    Blood pressure 142/82, pulse 84, height 6' (1.829 m), weight (!) 142 kg (313 lb).    General/Constitutional: NAD, well developed, well nourished  HENT: Normocephalic, atraumatic  CV: Intact distal pulses, regular rate  Resp: No respiratory distress or labored breathing  Abd: No abdominal distention  Lymphatic: No lymphadenopathy palpated  Neuro: Alert and Oriented x 3, no focal deficits noted  Psych: Normal mood, normal affect, normal judgement, normal behavior  Skin: Warm, dry, no rashes, no erythema      Focused Right Hip Exam:  No dislocation/deformity  ROM: Full  Greater troch:non-tender   SI joint: non-tender  Carrillo test: negative  Yvonne's test:  negative  Abduction: 5/5  AT/GS intact    Back:    No TTP over lumbar spinous processes, paraspinal musculature  Negative SLR, pain in anterior thigh     No calf tenderness to palpation bilaterally    LE NV Exam: +2 DP/PT pulses bilaterally  Sensation intact to light touch L2-S1 bilaterally, SPN/DPN/TA motor intact    Hip Imaging:    X-rays of the right hip were obtained on 2/20/2024 and reviewed with the patient. Based on my independent evaluation, the imaging shows moderate osteoarthritis of right hip.      Scribe Attestation      I,:  Audrey Mackenzie am acting as a scribe while in the presence of the attending physician.:       I,:  Miguel Angel Staley MD personally performed the services described in this documentation    as scribed in my presence.:

## 2024-02-23 ENCOUNTER — CONSULT (OUTPATIENT)
Dept: PAIN MEDICINE | Facility: CLINIC | Age: 59
End: 2024-02-23
Payer: COMMERCIAL

## 2024-02-23 VITALS
HEART RATE: 80 BPM | SYSTOLIC BLOOD PRESSURE: 149 MMHG | HEIGHT: 72 IN | DIASTOLIC BLOOD PRESSURE: 90 MMHG | WEIGHT: 313 LBS | BODY MASS INDEX: 42.39 KG/M2

## 2024-02-23 DIAGNOSIS — M16.11 PRIMARY OSTEOARTHRITIS OF RIGHT HIP: ICD-10-CM

## 2024-02-23 DIAGNOSIS — M51.36 DDD (DEGENERATIVE DISC DISEASE), LUMBAR: ICD-10-CM

## 2024-02-23 DIAGNOSIS — M25.551 RIGHT HIP PAIN: Primary | ICD-10-CM

## 2024-02-23 PROCEDURE — 99204 OFFICE O/P NEW MOD 45 MIN: CPT | Performed by: STUDENT IN AN ORGANIZED HEALTH CARE EDUCATION/TRAINING PROGRAM

## 2024-02-23 NOTE — PROGRESS NOTES
"Assessment:  1. Right hip pain    2. DDD (degenerative disc disease), lumbar    3. Primary osteoarthritis of right hip        Portions of the record may have been created with voice recognition software. Occasional wrong word or \"sound a like\" substitutions may have occurred due to the inherent limitations of voice recognition software. Read the chart carefully and recognize, using context, where substitutions have occurred. Contact me with any questions.       Plan:  58 y o m w/ PMH of DM2, HTN, CKD, s/p lumbar discectomy referred by Dr Staley, here for initial evaluation of pain in right groin and anterior thigh. Notes he has had similar symptoms in the past that resolved spontaneously. Current symptoms flared after shoveling snow about 1 month ago. Notes pain limited difficulty with ambulation, denies new/progressive weakness, saddle anesthesia, bbi. Xray of R hip shows moderate hip OA. He also has known lumbar DDD and chronic low back pain issues, previously underwent L L1-2 microdiscectomy in 2021. Current symptoms likely 2/2 hip OA vs lumbar DDD.    Will schedule for right hip intraarticular CSI.    Recommend course of physical therapy for core/back/LE strengthening, addressing gait/balance.    Follow up in 1 month after injection. If symptoms persist or worsen, consider lumbar spine MRI for further evaluation.      Complete risks and benefits including bleeding, infection, tissue reaction, nerve injury and allergic reaction were discussed. The approach was demonstrated using models and literature was provided. Verbal and written consent was obtained.      History of Present Illness:    The patient is a 58 y.o. male who presents for consultation in regards to Back Pain and Leg Pain.  Symptoms have been present for 1 month. Symptoms began after shoveling snow. Pain is reported to be 10 on the numeric rating scale.  Symptoms are felt intermittently and worst in the no typical pattern.  Symptoms are " characterized as burning, shooting, and sharp.  Symptoms are associated with right leg weakness.  Aggravating factors include lying down, sitting, and walking.  Relieving factors include relaxation.        Review of Systems:    Review of Systems   Constitutional:  Negative for chills, fatigue and fever.   HENT:  Negative for hearing loss, sinus pain, sore throat and trouble swallowing.    Eyes:  Negative for pain and visual disturbance.   Respiratory:  Negative for shortness of breath and wheezing.    Cardiovascular:  Negative for chest pain and palpitations.   Gastrointestinal:  Negative for abdominal pain, constipation and nausea.   Endocrine: Negative for polydipsia and polyuria.   Genitourinary:  Negative for difficulty urinating.   Musculoskeletal:  Positive for gait problem. Negative for arthralgias, joint swelling and myalgias.   Skin:  Negative for rash.   Neurological:  Negative for dizziness, weakness and headaches.   Hematological:  Does not bruise/bleed easily.   Psychiatric/Behavioral:  Negative for dysphoric mood. The patient is not nervous/anxious.    All other systems reviewed and are negative.        Past Medical History:   Diagnosis Date    Benign arteriolar nephrosclerosis     Diabetes mellitus (HCC)     Essential hypertension     History of transfusion 2013    Hyperparathyroidism due to renal insufficiency (HCC)     Legionnaire's disease (HCC)     Pneumonia     s/p VDRF        Past Surgical History:   Procedure Laterality Date    COLONOSCOPY      OTHER SURGICAL HISTORY      Dialysis catheter     SD LAMNOTMY INCL W/DCMPRSN NRV ROOT 1 INTRSPC LUMBR Left 11/15/2021    Procedure: Left sided L1-2 microdiscectomy;  Surgeon: Andi Campoverde MD;  Location:  MAIN OR;  Service: Neurosurgery       Family History   Problem Relation Age of Onset    Hyperlipidemia Mother     Lung cancer Father     Stomach cancer Father     Cancer Father     Diabetes type II Father     Diabetes type II Brother         Social History     Occupational History    Occupation:     Tobacco Use    Smoking status: Never    Smokeless tobacco: Never   Vaping Use    Vaping status: Never Used   Substance and Sexual Activity    Alcohol use: Not Currently     Comment: Denies alcohol use - As per [employer]     Drug use: Never    Sexual activity: Not on file         Current Outpatient Medications:     carvedilol (COREG) 12.5 mg tablet, TAKE 1 TABLET TWICE A DAY, Disp: 180 tablet, Rfl: 3    cholecalciferol (VITAMIN D3) 400 units tablet, Take 400 Units by mouth daily, Disp: , Rfl:     fexofenadine (ALLEGRA) 180 MG tablet, Take 180 mg by mouth daily, Disp: , Rfl:     gabapentin (Neurontin) 100 mg capsule, Take 1 capsule (100 mg total) by mouth daily, Disp: 14 capsule, Rfl: 0    glimepiride (AMARYL) 2 mg tablet, TAKE 1 TABLET DAILY WITH BREAKFAST, Disp: 90 tablet, Rfl: 3    lisinopril-hydrochlorothiazide (PRINZIDE,ZESTORETIC) 20-12.5 MG per tablet, take 1 tablet by mouth twice a day, Disp: 180 tablet, Rfl: 1    Multiple Vitamins-Minerals (multivitamin with minerals) tablet, Take 1 tablet by mouth daily, Disp: , Rfl:     No Known Allergies    Physical Exam:    /90   Pulse 80   Ht 6' (1.829 m)   Wt (!) 142 kg (313 lb)   BMI 42.45 kg/m²     Constitutional: normal, well developed, well nourished, alert, in no distress and non-toxic and no overt pain behavior.  Eyes: anicteric  HEENT: grossly intact  Neck: supple, symmetric, trachea midline and no masses   Pulmonary:even and unlabored  Cardiovascular:No edema or pitting edema present  Skin:Normal without rashes or lesions and well hydrated  Psychiatric:Mood and affect appropriate  Neurologic:Cranial Nerves II-XII grossly intact  Musculoskeletal:ambulates with cane. Concordant symptoms with passive R hip IR/ER, R DF 4/5, remainder BLE strength grossly intact       Imaging  FL spine and pain procedure    (Results Pending)     XR HIP/PELV 2-3 VWS RIGHT W PELVIS IF PERFORMED      INDICATION: M25.551: Pain in right hip.     COMPARISON: Right hip x-ray dated September 22, 2021.     FINDINGS:     No acute fracture or dislocation.     Moderate hip osteoarthritis. Mild to moderate osteoarthritic degenerative changes of the left hip joint are also present.     No lytic or blastic osseous lesion.     Unremarkable soft tissues.     Unremarkable visualized lumbar spine.     IMPRESSION:     No acute osseous abnormality.     Degenerative changes as described.         Orders Placed This Encounter   Procedures    FL spine and pain procedure

## 2024-03-01 ENCOUNTER — TELEPHONE (OUTPATIENT)
Dept: INTERNAL MEDICINE CLINIC | Facility: CLINIC | Age: 59
End: 2024-03-01

## 2024-03-01 ENCOUNTER — TELEPHONE (OUTPATIENT)
Age: 59
End: 2024-03-01

## 2024-03-01 DIAGNOSIS — M16.11 PRIMARY OSTEOARTHRITIS OF ONE HIP, RIGHT: ICD-10-CM

## 2024-03-01 DIAGNOSIS — M25.551 RIGHT HIP PAIN: ICD-10-CM

## 2024-03-01 DIAGNOSIS — M54.16 RADICULOPATHY, LUMBAR REGION: ICD-10-CM

## 2024-03-01 NOTE — TELEPHONE ENCOUNTER
Can increase gabapentin to 300 mg at night. Continue ice, rest, judicious use of otc tylenol. If symptoms intractable, accompanied by new neuro deficits, seek urgent attention at ER. If experiencing any significant side effects from gabapentin dose increase, call office to discuss.

## 2024-03-01 NOTE — TELEPHONE ENCOUNTER
Caller: Aaron     Doctor: Dr Topete     Reason for call: Patient calling stating 10/10 pain level and would like to speak to Dr Topete regarding pain please advise     Call back#: [phone]

## 2024-03-01 NOTE — TELEPHONE ENCOUNTER
S/W pt. Notes his pain is not 10/10 today. He has episodes of increased pain  Yesterday while having a bowel movement the pain was 10/10 +. He states he was not straining but this triggered the pain. It took until last night for him to get control of the pain using ice and ES tylenol. He is also taking Gabapentin 100 nightly as ordered by ortho for approx one week. Has tried OTC patches and they do no provide any relief. He is frustrated as he is needing to call off work when the pain is severe.  He is scheduled for injection on Monday however he is asking for advise for something else he can do or take.  HE is scheduled to start PT next week

## 2024-03-01 NOTE — TELEPHONE ENCOUNTER
S/W pt, advised and will start the increase tonight since he is not working over the weekend, to observe for side effects. Aware to call with any problems

## 2024-03-01 NOTE — TELEPHONE ENCOUNTER
Patient called in because he is having right hip/thigh pain- dull pain 3/4 Certain things he does will trigger the pain and then he isn't able to bear any weight. He sees [facility] on Monday to get a shot for the pain. His wife is a physical therapist and she thinks something is pinched. He said he had a bowel movement and it triggered the pain. He just wants to know if things are going in the right direction to get this figured out.

## 2024-03-04 ENCOUNTER — HOSPITAL ENCOUNTER (OUTPATIENT)
Dept: RADIOLOGY | Facility: HOSPITAL | Age: 59
Discharge: HOME | End: 2024-03-04
Attending: STUDENT IN AN ORGANIZED HEALTH CARE EDUCATION/TRAINING PROGRAM
Payer: COMMERCIAL

## 2024-03-04 ENCOUNTER — TELEPHONE (OUTPATIENT)
Dept: PAIN MEDICINE | Facility: CLINIC | Age: 59
End: 2024-03-04

## 2024-03-04 VITALS
TEMPERATURE: 97.4 F | DIASTOLIC BLOOD PRESSURE: 81 MMHG | SYSTOLIC BLOOD PRESSURE: 153 MMHG | RESPIRATION RATE: 20 BRPM | HEART RATE: 72 BPM | OXYGEN SATURATION: 94 %

## 2024-03-04 DIAGNOSIS — M79.2 NEUROPATHIC PAIN: Primary | ICD-10-CM

## 2024-03-04 DIAGNOSIS — M25.551 RIGHT HIP PAIN: ICD-10-CM

## 2024-03-04 DIAGNOSIS — M16.11 PRIMARY OSTEOARTHRITIS OF RIGHT HIP: ICD-10-CM

## 2024-03-04 PROCEDURE — 20610 DRAIN/INJ JOINT/BURSA W/O US: CPT | Performed by: STUDENT IN AN ORGANIZED HEALTH CARE EDUCATION/TRAINING PROGRAM

## 2024-03-04 PROCEDURE — 77002 NEEDLE LOCALIZATION BY XRAY: CPT

## 2024-03-04 PROCEDURE — 77002 NEEDLE LOCALIZATION BY XRAY: CPT | Performed by: STUDENT IN AN ORGANIZED HEALTH CARE EDUCATION/TRAINING PROGRAM

## 2024-03-04 RX ORDER — LIDOCAINE HYDROCHLORIDE 10 MG/ML
2 INJECTION, SOLUTION EPIDURAL; INFILTRATION; INTRACAUDAL; PERINEURAL ONCE
Status: COMPLETED | OUTPATIENT
Start: 2024-03-04 | End: 2024-03-04

## 2024-03-04 RX ORDER — METHYLPREDNISOLONE ACETATE 40 MG/ML
40 INJECTION, SUSPENSION INTRA-ARTICULAR; INTRALESIONAL; INTRAMUSCULAR; PARENTERAL; SOFT TISSUE ONCE
Status: COMPLETED | OUTPATIENT
Start: 2024-03-04 | End: 2024-03-04

## 2024-03-04 RX ORDER — ROPIVACAINE HYDROCHLORIDE 2 MG/ML
1 INJECTION, SOLUTION EPIDURAL; INFILTRATION; PERINEURAL ONCE
Status: COMPLETED | OUTPATIENT
Start: 2024-03-04 | End: 2024-03-04

## 2024-03-04 RX ORDER — GABAPENTIN 300 MG/1
300 CAPSULE ORAL
Qty: 30 CAPSULE | Refills: 0 | Status: SHIPPED | OUTPATIENT
Start: 2024-03-04

## 2024-03-04 RX ORDER — GABAPENTIN 100 MG/1
100 CAPSULE ORAL DAILY
Qty: 14 CAPSULE | Refills: 1 | Status: SHIPPED | OUTPATIENT
Start: 2024-03-04 | End: 2024-03-04

## 2024-03-04 RX ADMIN — LIDOCAINE HYDROCHLORIDE 2 ML: 10 INJECTION, SOLUTION EPIDURAL; INFILTRATION; INTRACAUDAL; PERINEURAL at 11:25

## 2024-03-04 RX ADMIN — METHYLPREDNISOLONE ACETATE 40 MG: 40 INJECTION, SUSPENSION INTRA-ARTICULAR; INTRALESIONAL; INTRAMUSCULAR; PARENTERAL; SOFT TISSUE at 11:27

## 2024-03-04 RX ADMIN — ROPIVACAINE HYDROCHLORIDE 1 ML: 2 INJECTION, SOLUTION EPIDURAL; INFILTRATION; PERINEURAL at 11:27

## 2024-03-04 RX ADMIN — IOHEXOL 1 ML: 300 INJECTION, SOLUTION INTRAVENOUS at 11:26

## 2024-03-04 NOTE — LETTER
March 4, 2024     Patient: Aaron Snyder  YOB: 1965  Date of Visit: 3/4/2024      To Whom it May Concern:    Aaron Snyder is under my professional care. Aaron was seen in my office on 3/4/2024. Aaron may return to work on 03/05/2024 .    If you have any questions or concerns, please don't hesitate to call.         Sincerely,          Dr Ines Topete

## 2024-03-04 NOTE — TELEPHONE ENCOUNTER
Pt called requesting refill on gabapentin. Was advised to take 300mg nightly. Pt needs a new Rx now with updated dose

## 2024-03-04 NOTE — TELEPHONE ENCOUNTER
Ask the patient to let us know if his hip/thigh pain improves or not since his injection earlier today.

## 2024-03-04 NOTE — H&P
History of Present Illness: The patient is a 58 y.o. male who presents with complaints of right hip pain.    Past Medical History:   Diagnosis Date    Benign arteriolar nephrosclerosis     Diabetes mellitus (HCC)     Essential hypertension     History of transfusion 2013    Hyperparathyroidism due to renal insufficiency (HCC)     Legionnaire's disease (HCC)     Pneumonia     s/p VDRF        Past Surgical History:   Procedure Laterality Date    COLONOSCOPY      OTHER SURGICAL HISTORY      Dialysis catheter     FL LAMNOTMY INCL W/DCMPRSN NRV ROOT 1 INTRSPC LUMBR Left 11/15/2021    Procedure: Left sided L1-2 microdiscectomy;  Surgeon: Andi Campoverde MD;  Location:  MAIN OR;  Service: Neurosurgery         Current Outpatient Medications:     carvedilol (COREG) 12.5 mg tablet, TAKE 1 TABLET TWICE A DAY, Disp: 180 tablet, Rfl: 3    cholecalciferol (VITAMIN D3) 400 units tablet, Take 400 Units by mouth daily, Disp: , Rfl:     fexofenadine (ALLEGRA) 180 MG tablet, Take 180 mg by mouth daily, Disp: , Rfl:     gabapentin (NEURONTIN) 300 mg capsule, Take 1 capsule (300 mg total) by mouth daily at bedtime, Disp: 30 capsule, Rfl: 0    glimepiride (AMARYL) 2 mg tablet, TAKE 1 TABLET DAILY WITH BREAKFAST, Disp: 90 tablet, Rfl: 3    lisinopril-hydrochlorothiazide (PRINZIDE,ZESTORETIC) 20-12.5 MG per tablet, take 1 tablet by mouth twice a day, Disp: 180 tablet, Rfl: 1    Multiple Vitamins-Minerals (multivitamin with minerals) tablet, Take 1 tablet by mouth daily, Disp: , Rfl:   No current facility-administered medications for this encounter.    No Known Allergies    Physical Exam:   Vitals:    03/04/24 1132   BP: 153/81   Pulse: 72   Resp: 20   Temp:    SpO2: 94%     General: Awake, Alert, Oriented x 3, Mood and affect appropriate  Respiratory: Respirations even and unlabored  Cardiovascular: Peripheral pulses intact; no edema  Musculoskeletal Exam: antalgic gait    ASA Score: 2    Patient/Chart Verification  Patient ID  Verified: Verbal  Consents Confirmed: Procedural, To be obtained in the Pre-Procedure area  H&P( within 30 days) Verified: Yes  Allergies Reviewed: Yes  Anticoag/NSAID held?: NA  Currently on antibiotics?: NA    Assessment:   1. Right hip pain    2. Primary osteoarthritis of right hip        Plan: right intraarticular hip injection

## 2024-03-04 NOTE — DISCHARGE INSTRUCTIONS

## 2024-03-07 ENCOUNTER — EVALUATION (OUTPATIENT)
Dept: PHYSICAL THERAPY | Facility: CLINIC | Age: 59
End: 2024-03-07
Payer: COMMERCIAL

## 2024-03-07 DIAGNOSIS — M25.551 RIGHT HIP PAIN: Primary | ICD-10-CM

## 2024-03-07 DIAGNOSIS — M51.36 DDD (DEGENERATIVE DISC DISEASE), LUMBAR: ICD-10-CM

## 2024-03-07 DIAGNOSIS — M16.11 PRIMARY OSTEOARTHRITIS OF RIGHT HIP: ICD-10-CM

## 2024-03-07 PROCEDURE — 97530 THERAPEUTIC ACTIVITIES: CPT | Performed by: PHYSICAL MEDICINE & REHABILITATION

## 2024-03-07 PROCEDURE — 97161 PT EVAL LOW COMPLEX 20 MIN: CPT | Performed by: PHYSICAL MEDICINE & REHABILITATION
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
## 2024-03-11 ENCOUNTER — TELEPHONE (OUTPATIENT)
Dept: PAIN MEDICINE | Facility: CLINIC | Age: 59
End: 2024-03-11

## 2024-03-13 ENCOUNTER — OFFICE VISIT (OUTPATIENT)
Dept: PHYSICAL THERAPY | Facility: CLINIC | Age: 59
End: 2024-03-13
Payer: COMMERCIAL

## 2024-03-13 DIAGNOSIS — M16.11 PRIMARY OSTEOARTHRITIS OF RIGHT HIP: ICD-10-CM

## 2024-03-13 DIAGNOSIS — M51.36 DDD (DEGENERATIVE DISC DISEASE), LUMBAR: ICD-10-CM

## 2024-03-13 DIAGNOSIS — M25.551 RIGHT HIP PAIN: Primary | ICD-10-CM

## 2024-03-13 PROCEDURE — 97140 MANUAL THERAPY 1/> REGIONS: CPT | Performed by: PHYSICAL MEDICINE & REHABILITATION

## 2024-03-13 PROCEDURE — 97110 THERAPEUTIC EXERCISES: CPT | Performed by: PHYSICAL MEDICINE & REHABILITATION

## 2024-03-13 NOTE — PROGRESS NOTES
"Daily Note     Today's date: 3/13/2024  Patient name: Aaron Snyder  : 1965  MRN: [id number]  Referring provider: Ines Topete MD  Dx:   Encounter Diagnosis     ICD-10-CM    1. Right hip pain  M25.551       2. DDD (degenerative disc disease), lumbar  M51.36       3. Primary osteoarthritis of right hip  M16.11                      Subjective: Pt notes he was pretty good for a few days following last visit. Pt notes then his daughter had her baby over the weekend; was in the hospital waiting room for \"24 hrs\" and didn't have his medications; pain was bad then. Notes he had more R hip pain. Pain rising from chair and toilet with pressure on R buttocks. Notes the pain is variable; at times is OK; at times the pain is sharp and \"bad\". Pain 1* R groin and anterior thigh to knee only; no sx below knee. No new sx/complaints. Sees Dr. Staley in 1-2 weeks. Notes the pain is bad the more he is up/moving; bad at the end of the day. Increased pain first getting up and putting weight on R LE.       Objective: See treatment diary below      Assessment: Tolerated treatment fair. Pt initially with hip pain sitting on seat of Nustep; no pain once in seat and moving; no pain with or following the Nustep otherwise. Tolerated standing SLRs well ;min pain R groin at times with SLR flex and march at end range hip flex; reduced with reps and reduced ROM to tolerance. Pain with isometric Hip abd and ADD after 7-10 reps; pain R groin vs lateral hip; reduced at rest. No change in sx with LAD R hip; pt with inability to totally relax  RLE with MT causing him to maintain isometric contraction R hip flexors; this caused increased R hip flexor pain the longer he held it; pain resolved at rest. Pt with pain R hip/groin with first standing up; resolved after ~1 min; pt notes this is \"normal\" when he first gets up. NO change in baseline sx end of session. Cont with pain limited ROM and strength R hip. Cont with antalgic gait and " "reduced tolerance for standing/walking, stairs, etc.  Patient demonstrated fatigue post treatment and would benefit from continued PT      Plan: Continue per plan of care.  Progress treatment as tolerated.       Precautions: diabetes, HTN, chronic pain       Re-eval Date:  4/18    Date 3/7/2024  3/13      Visit Count 1 2      FOTO 3/7/2024        Pain In See IE 2        Pain Out See IE 2          Manuals 3/7/2024  3/13      R hip PROM, R hip LAD as jona, R HS, hip/flexor quad stretch   R hip LAD supine as jona, R hip PROM ER and ABD as jona supine   10'                               Neuro Re-Ed        Romberg      Tandem        Perturbation training                                                 Ther Ex        Nustep R hip AAROM      R hip SLRs standing vs supine  L1 10'         0#   2x10 ea standing       Standing R hip flex/march  0# 2x10       LAQ      HS curl        Bridges      Clamshells         Step ups      Mini squats           Isometric hip ADD  Hooklying  1x10-15/3\"      Hip abd hookluying   Blue   10-12x 5\"                               Ther Activity pt education regarding pathophysiology/pathoanatomy of present pain/sx and condition, role of PT in improving pain/sx and function, pt education regarding activity modification to avoid exacerbation of sx and delayed recovery                         Gait Training                        Modalities  CP x 10' R anterior/lateral hip supine after session  No adverse reaction to tx noted                             3/7/2024  - HEP was issued and reviewed this date for above noted exercises. Pt demonstrated understanding without incident and without questions/concerns. Will continue to update upcoming.            "

## 2024-03-14 ENCOUNTER — OFFICE VISIT (OUTPATIENT)
Dept: PHYSICAL THERAPY | Facility: CLINIC | Age: 59
End: 2024-03-14
Payer: COMMERCIAL

## 2024-03-14 DIAGNOSIS — M51.36 DDD (DEGENERATIVE DISC DISEASE), LUMBAR: ICD-10-CM

## 2024-03-14 DIAGNOSIS — M25.551 RIGHT HIP PAIN: Primary | ICD-10-CM

## 2024-03-14 DIAGNOSIS — M16.11 PRIMARY OSTEOARTHRITIS OF RIGHT HIP: ICD-10-CM

## 2024-03-14 PROCEDURE — 97110 THERAPEUTIC EXERCISES: CPT

## 2024-03-14 PROCEDURE — 97140 MANUAL THERAPY 1/> REGIONS: CPT

## 2024-03-14 NOTE — PROGRESS NOTES
"Daily Note     Today's date: 3/14/2024  Patient name: Aaron Snyder  : 1965  MRN: [id number]  Referring provider: Ines Topete MD  Dx:   Encounter Diagnosis     ICD-10-CM    1. Right hip pain  M25.551       2. DDD (degenerative disc disease), lumbar  M51.36       3. Primary osteoarthritis of right hip  M16.11           Start Time: 1000  Stop Time: 1105  Total time in clinic (min): 65 minutes    Subjective: \"I am very frustrated with the pain.  Anything can set it off and it goes to 10/10.  Sometimes if I stand up straight and do on my toes, I can decrease the pain.  The pain is mostly at the outside of my hip and goes down into my thigh.\"      Objective: See treatment diary below      Assessment: Tolerated treatment fair. Exacerbation of symptoms throughout session.  Pt able to perform exercises without symptoms and during pain intensifies.  Noted R thigh burning with gentle distraction initially.  Able to resume LAD, hip flexion, and rotations without incident.  Will continue to monitor and progress as able.  Patient demonstrated fatigue post treatment and would benefit from continued PT      Plan: Continue per plan of care.  Progress treatment as tolerated.       Precautions: diabetes, HTN, chronic pain       Re-eval Date:      Date 3/7/2024  3/13 3/14     Visit Count 1 2 3     FOTO 3/7/2024        Pain In See IE 2 1-2/10       Pain Out See IE 2 1-2/10         Manuals 3/7/2024  3/13 3/14     R hip PROM, R hip LAD as jona, R HS, hip/flexor quad stretch   R hip LAD supine as jona, R hip PROM ER and ABD as jona supine   10'  R hip LAD supine as jona, R hip PROM ER and ABD as jona supine   10'                              Neuro Re-Ed        Romberg      Tandem        Perturbation training                                                 Ther Ex        Nustep R hip AAROM      R hip SLRs standing vs supine  L1 10'         0#   2x10 ea standing       Standing R hip flex/march  0# 2x10  L1 10'        0#   2x10 " "ea standing       Standing R hip flex/march  0# 2x10      LAQ      HS curl   2x10/3-5\"     Bridges      Clamshells         Step ups      Mini squats           Isometric hip ADD  Hooklying  1x10-15/3\"      Hip abd hookluying   Blue   10-12x 5\"  Isometric hip ADD  Straight leg  2x10-15/3\"      Hip abd hooklying   Blue   2x10-12x 5\"                              Ther Activity pt education regarding pathophysiology/pathoanatomy of present pain/sx and condition, role of PT in improving pain/sx and function, pt education regarding activity modification to avoid exacerbation of sx and delayed recovery                         Gait Training                        Modalities  CP x 10' R anterior/lateral hip supine after session  No adverse reaction to tx noted  deferred                           3/7/2024  - HEP was issued and reviewed this date for above noted exercises. Pt demonstrated understanding without incident and without questions/concerns. Will continue to update upcoming.              "

## 2024-03-19 ENCOUNTER — OFFICE VISIT (OUTPATIENT)
Dept: PHYSICAL THERAPY | Facility: CLINIC | Age: 59
End: 2024-03-19
Payer: COMMERCIAL

## 2024-03-19 DIAGNOSIS — M25.551 RIGHT HIP PAIN: Primary | ICD-10-CM

## 2024-03-19 DIAGNOSIS — M16.11 PRIMARY OSTEOARTHRITIS OF RIGHT HIP: ICD-10-CM

## 2024-03-19 DIAGNOSIS — M51.36 DDD (DEGENERATIVE DISC DISEASE), LUMBAR: ICD-10-CM

## 2024-03-19 PROCEDURE — 97140 MANUAL THERAPY 1/> REGIONS: CPT

## 2024-03-19 PROCEDURE — 97110 THERAPEUTIC EXERCISES: CPT

## 2024-03-19 NOTE — PROGRESS NOTES
"Daily Note     Today's date: 3/19/2024  Patient name: Aaron Snyder  : 1965  MRN: [id number]  Referring provider: Ines Topete MD  Dx:   Encounter Diagnosis     ICD-10-CM    1. Right hip pain  M25.551       2. DDD (degenerative disc disease), lumbar  M51.36       3. Primary osteoarthritis of right hip  M16.11           Start Time: 1000  Stop Time: 1045  Total time in clinic (min): 45 minutes    Subjective: \"I don't know if I am getting use to the pain or if it's getting better.  I still get the sharp pain with stepping at times, but it doesn't last as long.  I still can't lay down to sleep, I may get 2 hours before the pain gets unbearable and I go out to the chair.  I do know I have to take tylenol every 6 hours or the pain is bad.\"      Objective: See treatment diary below      Assessment: Tolerated treatment well.  Minimal sharp stabbing pains throughout session.  Able to increase reps on various exercises without incident.  Added hamstring with initial cramp in hamstring from quick movements.  Good response to LAD and MT; no pain throughout.  Increased hip mobility noted, added seated hip ER/IR without change in symptoms.  Will continue to monitor and progress as able.  Patient demonstrated fatigue post treatment and would benefit from continued PT      Plan: Continue per plan of care.  Progress treatment as tolerated.       Precautions: diabetes, HTN, chronic pain       Re-eval Date:      Date 3/7/2024  3/13 3/14 3/19    Visit Count 1 2 3     FOTO 3/7/2024        Pain In See IE 2 -2/10 2-3/10      Pain Out See IE 2 -2/10 1-2/10        Manuals 3/7/2024  3/13 3/14 3/19    R hip PROM, R hip LAD as jona, R HS, hip/flexor quad stretch   R hip LAD supine as jona, R hip PROM ER and ABD as jona supine   10'  R hip LAD supine as jona, R hip PROM ER and ABD as jona supine   10'  R hip LAD supine as jona, R hip PROM ER and ABD as jona supine, seated ER/IR  10'                             Neuro Re-Ed      " "  Romberg      Tandem        Perturbation training                                                 Ther Ex        Nustep R hip AAROM      R hip SLRs standing vs supine  L1 10'         0#   2x10 ea standing       Standing R hip flex/march  0# 2x10  L1 10'        0#   2x10 ea standing       Standing R hip flex/march  0# 2x10  L1 10'        0#   2x10 ea standing       Standing R hip flex/march  0# 2x10     LAQ      HS curl   2x10/3-5\" 2x10/3-5\"      2x10/3-5\"    Bridges      Clamshells         Step ups      Mini squats           Isometric hip ADD  Hooklying  1x10-15/3\"      Hip abd hookluying   Blue   10-12x 5\"  Isometric hip ADD  Straight leg  2x10-15/3\"      Hip abd hooklying   Blue   2x10-12x 5\"  Isometric hip ADD  Hooklying  2x10-15/3\"      Hip abd hooklying   Blue   2x10-15x 5\"                               Ther Activity pt education regarding pathophysiology/pathoanatomy of present pain/sx and condition, role of PT in improving pain/sx and function, pt education regarding activity modification to avoid exacerbation of sx and delayed recovery                         Gait Training                        Modalities  CP x 10' R anterior/lateral hip supine after session  No adverse reaction to tx noted  deferred                           3/7/2024  - HEP was issued and reviewed this date for above noted exercises. Pt demonstrated understanding without incident and without questions/concerns. Will continue to update upcoming.                "

## 2024-03-21 ENCOUNTER — OFFICE VISIT (OUTPATIENT)
Dept: PHYSICAL THERAPY | Facility: CLINIC | Age: 59
End: 2024-03-21
Payer: COMMERCIAL

## 2024-03-21 DIAGNOSIS — M16.11 PRIMARY OSTEOARTHRITIS OF RIGHT HIP: ICD-10-CM

## 2024-03-21 DIAGNOSIS — M25.551 RIGHT HIP PAIN: Primary | ICD-10-CM

## 2024-03-21 DIAGNOSIS — M51.36 DDD (DEGENERATIVE DISC DISEASE), LUMBAR: ICD-10-CM

## 2024-03-21 PROCEDURE — 97110 THERAPEUTIC EXERCISES: CPT | Performed by: PHYSICAL MEDICINE & REHABILITATION

## 2024-03-21 PROCEDURE — 97140 MANUAL THERAPY 1/> REGIONS: CPT | Performed by: PHYSICAL MEDICINE & REHABILITATION

## 2024-03-21 NOTE — PROGRESS NOTES
"Daily Note     Today's date: 3/21/2024  Patient name: Aaron Snyder  : 1965  MRN: [id number]  Referring provider: Ines Topete MD  Dx:   Encounter Diagnosis     ICD-10-CM    1. Right hip pain  M25.551       2. DDD (degenerative disc disease), lumbar  M51.36       3. Primary osteoarthritis of right hip  M16.11                      Subjective: Pt notes no new sx/complaints. Notes his R hip/LE still hurts, however he no longer gets the \"sharp pains\" like he was getting. Still having a very hard time sleeping; pain wakes him up after awhile. Notes he was sore after last tx for a little while; traction/LAD didn't seem as beneficial \"as it was the first time\". Has been getting some pain R L/S also. Notes pivoting on R LE hurts \"in the hip\". RTD tomorrow.       Objective: See treatment diary below      Assessment: Tolerated treatment well. Able to complete standing hip exercises without reported increase in baseline pain/sx. Continues to note, however, increasing R hip pain the longer the session/exercise/activity goes on, sometimes without warning/lead up; cont with easily aggravated R hip/thigh pain; however less sx irritability and improved exercise tolerance noted vs first session. Cont with discomfort with hip ADD isometric; will d/c this exercise. No complaints with/during LAD supine; intermittent discomfort R hip with initial circumduction (very limited ROM/small circular motion) that was relieved with LAD/traction and repeated motion; no other sx/complaints after MT supine/sitting. Cont with antalgic transfer supine to sit with pressure over R buttock. Cont with elevated pain first steps that is relieved after ~30sec-1min. Increased R hip/anterior thigh pain overall after session; pt unsure what had triggered it; no new /additional sx per pt, reports this is the \"same pain\" he typically feels when it is aggravated; deferred CP. Pt appears to note manual traction/LAD with PT's hands instead of using " "towel at ankle may have been more beneficial at first tx session as he felt good the rest of the day afterward that session only; will trial Nv. Plan to decrease there ex and dc hip add NV and assess for pt tolerance/sx after tx. Pt f/u with MD tomorrow. Patient demonstrated fatigue post treatment and would benefit from continued PT      Plan: Continue per plan of care.  Progress treatment as tolerated.       Precautions: diabetes, HTN, chronic pain       Re-eval Date:  4/18    Date 3/7/2024  3/13 3/14 3/19 3/21   Visit Count 1 2 3 4/9 5/9   FOTO 3/7/2024        Pain In See IE 2 1-2/10 2-3/10   4/10   Pain Out See IE 2 1-2/10 1-2/10 6/10       Manuals 3/7/2024  3/13 3/14 3/19 3/21   R hip PROM, R hip LAD as jona, R HS, hip/flexor quad stretch   R hip LAD supine as jona, R hip PROM ER and ABD as jona supine   10'  R hip LAD supine as jona, R hip PROM ER and ABD as jona supine   10'  R hip LAD supine as jona, R hip PROM ER and ABD as jona supine, seated ER/IR  10'  R hip LAD supine as jona, R hip LAD with gentle CW/CCW circumduction ROM as tolerated/limited ROM /small circular motions as jona  10'                            Neuro Re-Ed        Romberg      Tandem        Perturbation training                                                 Ther Ex        Nustep R hip AAROM      R hip SLRs standing vs supine  L1 10'         0#   2x10 ea standing       Standing R hip flex/march  0# 2x10  L1 10'        0#   2x10 ea standing       Standing R hip flex/march  0# 2x10  L1 10'        0#   2x10 ea standing       Standing R hip flex/march  0# 2x10  L1 10'        0#   2x10 ea standing       Standing R hip flex/march  0# 2x10    LAQ      HS curl   2x10/3-5\" 2x10/3-5\"      2x10/3-5\" 2x10/3-5\"      2x10/3-5\"   Bridges      Clamshells         Step ups      Mini squats           Isometric hip ADD  Hooklying  1x10-15/3\"      Hip abd hookluying   Blue   10-12x 5\"  Isometric hip ADD  Straight leg  2x10-15/3\"      Hip abd hooklying   Blue " "  2x10-12x 5\"  Isometric hip ADD  Hooklying  2x10-15/3\"      Hip abd hooklying   Blue   2x10-15x 5\"    Isometric hip ADD  Hooklying  2x10-15/3\"      Hip abd hooklying   Blue   2x10-15x 5\"                            Ther Activity pt education regarding pathophysiology/pathoanatomy of present pain/sx and condition, role of PT in improving pain/sx and function, pt education regarding activity modification to avoid exacerbation of sx and delayed recovery                         Gait Training                        Modalities  CP x 10' R anterior/lateral hip supine after session  No adverse reaction to tx noted  deferred                           3/7/2024  - HEP was issued and reviewed this date for above noted exercises. Pt demonstrated understanding without incident and without questions/concerns. Will continue to update upcoming.                  "

## 2024-03-22 ENCOUNTER — OFFICE VISIT (OUTPATIENT)
Dept: OBGYN CLINIC | Facility: CLINIC | Age: 59
End: 2024-03-22
Payer: COMMERCIAL

## 2024-03-22 VITALS
SYSTOLIC BLOOD PRESSURE: 130 MMHG | WEIGHT: 315 LBS | HEART RATE: 86 BPM | HEIGHT: 72 IN | DIASTOLIC BLOOD PRESSURE: 83 MMHG | BODY MASS INDEX: 42.66 KG/M2

## 2024-03-22 DIAGNOSIS — M16.11 PRIMARY OSTEOARTHRITIS OF ONE HIP, RIGHT: Primary | ICD-10-CM

## 2024-03-22 PROCEDURE — 99214 OFFICE O/P EST MOD 30 MIN: CPT | Performed by: STUDENT IN AN ORGANIZED HEALTH CARE EDUCATION/TRAINING PROGRAM

## 2024-03-22 NOTE — PROGRESS NOTES
Ortho Sports Medicine New Patient Hip Visit    Assesment:   58 y.o. male with right hip pain after shoveling snow with x-ray showing moderate right hip osteoarthritis    Plan:  I reviewed the history, exam, and imaging with the patient in clinic today.  I did review the patient's x-rays with him again which shows moderate hip osteoarthritis.  On exam, he continues to have no pain with logroll or range of motion of the hip.  He has no tenderness over the greater trochanteric bursa.  He states that his pain is more posterior with radiation to the groin and anterior thigh.  Describes the pain as both sharp and burning.  He recently had a image guided corticosteroid injection into the right hip which she states provided no relief not even initially after the injection occurred despite numbing medication included in the injection.  I again discussed with the patient that his pain could be multifactorial including pain from his hip osteoarthritis as well as possible lumbar radiculopathy.  He does have a history of prior lumbar spine surgery.  I discussed that given that he got no relief from his injection that I am concerned that his may be coming from his lumbar spine.  He does have a follow-up appoint with Dr. Topete in a few weeks.  I recommended the patient discuss his symptoms and history with Dr. Topete to see if she thinks that his pain could potentially be coming from his lumbar spine.  I did discuss that given the amount of arthritis he has in his hip that his options include physical therapy, medications, and possible total hip arthroplasty.  I discussed that after he sees Dr. Topete he can follow-up for further discussion of possible surgical intervention if the lumbar spine is ruled out as a possible contributor to his pain.  The patient demonstrated understanding discussion was agreed with plan.  All his questions were answered.  I will see him back in clinic after his visit with Dr. Topete pending a possible  further workup of lumbar radiculopathy.  Patient can reach out to clinic with any questions or concerns anytime.    Conservative Treatment:   Ice to hip region for 20 minutes at least 1-2 times daily.  Prescription gabapentin for pain.  Tylenol for pain.  Let pain guide gradual return activities.    Imaging:  All imaging from today was reviewed by myself and explained to the patient.     Injection:    No injections at this time.    Surgery:  No surgery is recommended at this point, continue with conservative treatment plan as noted.    Follow up:  Return if symptoms worsen or fail to improve.  Patient to follow-up after his next appoint with Dr. Topete for further discussion of treatment of his hip pain.        Chief Complaint   Patient presents with    Right Hip - Follow-up, Pain       History of Present Illness:  The patient is a 58 y.o. male seen in clinic for right hip pain. The patient reports chronic right hip pain for several years, however, he had a recent exacerbation of pain. The patient states that he used a  to manage the recent snow storm a week ago on 2/13/24, he noticed significant pain afterward. The patient reports pain along the lateral aspect of his hip, that is deep into his hip. He also reports a new onset of burning in the anterior thigh. He states that the pain is exacerbated by walking, twisting, and using stairs. The patient states that when he lays down, the pain is significantly worse in his thigh. He states that the pain is triggered at random. The patient states that he was able to rest from Friday to Monday, which assisted with his pain. However, as he reported to work on Tuesday, he stepped out of his truck which significantly exacerbated his pain. He states that he was unable to apply pressure through his right lower extremity. The patient states that he has taken Tylenol and used ice which help minimally. He has a history of an L1-L2 microdiscectomy in 2021. The patient  also has a history of Kidney Disease and therefore he cannot take NSAIDs. He has a history of diabetes, which he states is well controlled.     Occupation:     The patient has the following co-morbidities: Kidney Disease, Type 2 Diabetes       Hip Surgical History:  None    Past Medical, Social and Family History:  Past Medical History:   Diagnosis Date    Benign arteriolar nephrosclerosis     Diabetes mellitus (HCC)     Essential hypertension     History of transfusion 2013    Hyperparathyroidism due to renal insufficiency (HCC)     Legionnaire's disease (HCC)     Pneumonia     s/p VDRF      Past Surgical History:   Procedure Laterality Date    COLONOSCOPY      OTHER SURGICAL HISTORY      Dialysis catheter     [place] LAMNOTMY INCL W/DCMPRSN NRV ROOT 1 INTRSPC LUMBR Left 11/15/2021    Procedure: Left sided L1-2 microdiscectomy;  Surgeon: Andi Campoverde MD;  Location: [facility];  Service: Neurosurgery     No Known Allergies  Current Outpatient Medications on File Prior to Visit   Medication Sig Dispense Refill    carvedilol (COREG) 12.5 mg tablet TAKE 1 TABLET TWICE A  tablet 3    cholecalciferol (VITAMIN D3) 400 units tablet Take 400 Units by mouth daily      fexofenadine (ALLEGRA) 180 MG tablet Take 180 mg by mouth daily      gabapentin (NEURONTIN) 300 mg capsule Take 1 capsule (300 mg total) by mouth daily at bedtime 30 capsule 0    glimepiride (AMARYL) 2 mg tablet TAKE 1 TABLET DAILY WITH BREAKFAST 90 tablet 3    lisinopril-hydrochlorothiazide (PRINZIDE,ZESTORETIC) 20-12.5 MG per tablet take 1 tablet by mouth twice a day 180 tablet 1    Multiple Vitamins-Minerals (multivitamin with minerals) tablet Take 1 tablet by mouth daily       No current facility-administered medications on file prior to visit.     Social History     Socioeconomic History    Marital status: /Civil Union     Spouse name: Not on file    Number of children: Not on file    Years of education: Not on file     Highest education level: Not on file   Occupational History    Occupation:     Tobacco Use    Smoking status: Never    Smokeless tobacco: Never   Vaping Use    Vaping status: Never Used   Substance and Sexual Activity    Alcohol use: Not Currently     Comment: Denies alcohol use - As per [employer]     Drug use: Never    Sexual activity: Not on file   Other Topics Concern    Not on file   Social History Narrative    Consumes on average 3 cups of regular coffee per day      Social Determinants of Health     Financial Resource Strain: Not on file   Food Insecurity: No Food Insecurity (10/6/2022)    Hunger Vital Sign     Worried About Running Out of Food in the Last Year: Never true     Ran Out of Food in the Last Year: Never true   Transportation Needs: No Transportation Needs (10/6/2022)    PRAPARE - Transportation     Lack of Transportation (Medical): No     Lack of Transportation (Non-Medical): No   Physical Activity: Not on file   Stress: Not on file   Social Connections: Not on file   Intimate Partner Violence: Not on file   Housing Stability: Low Risk  (10/6/2022)    Housing Stability Vital Sign     Unable to Pay for Housing in the Last Year: No     Number of Places Lived in the Last Year: 1     Unstable Housing in the Last Year: No         I have reviewed the past medical, surgical, social and family history, medications and allergies as documented in the EMR.    Review of systems: ROS is negative other than that noted in the HPI.  Constitutional: Negative for fatigue and fever.   HENT: Negative for sore throat.    Respiratory: Negative for shortness of breath.    Cardiovascular: Negative for chest pain.   Gastrointestinal: Negative for abdominal pain.   Endocrine: Negative for cold intolerance and heat intolerance.   Genitourinary: Negative for flank pain.   Musculoskeletal: Negative for back pain.   Skin: Negative for rash.   Allergic/Immunologic: Negative for immunocompromised state.   Neurological:  Negative for dizziness.   Psychiatric/Behavioral: Negative for agitation.      Physical Exam:    Blood pressure 130/83, pulse 86, height 6' (1.829 m), weight (!) 146 kg (321 lb).    General/Constitutional: NAD, well developed, well nourished  HENT: Normocephalic, atraumatic  CV: Intact distal pulses, regular rate  Resp: No respiratory distress or labored breathing  Abd: No abdominal distention  Lymphatic: No lymphadenopathy palpated  Neuro: Alert and Oriented x 3, no focal deficits noted  Psych: Normal mood, normal affect, normal judgement, normal behavior  Skin: Warm, dry, no rashes, no erythema      Focused Right Hip Exam:  No dislocation/deformity  ROM: Full, painless range of motion of the hip  Greater troch:non-tender   SI joint: non-tender  Carrillo test: negative  Yvonne's test:  negative  Abduction: 5/5  AT/GS intact  Negative logroll    Back:    No TTP over lumbar spinous processes, paraspinal musculature  Negative SLR, pain in anterior thigh     No calf tenderness to palpation bilaterally    LE NV Exam: +2 DP/PT pulses bilaterally  Sensation intact to light touch L2-S1 bilaterally, SPN/DPN/TA motor intact    Hip Imaging:    X-rays of the right hip were obtained on 2/20/2024 and reviewed with the patient. Based on my independent evaluation, the imaging shows moderate osteoarthritis of right hip.      Scribe Attestation      I,:  Abbey Tobar am acting as a scribe while in the presence of the attending physician.:       I,:  Miguel Angel Staley MD personally performed the services described in this documentation    as scribed in my presence.:

## 2024-03-22 NOTE — PROGRESS NOTES
Ortho Sports Medicine New Patient Hip Visit    Assesment:   58 y.o. male with right hip pain for approximately 4 weeks beginning after shoveling snow with x-ray showing moderate right hip osteoarthritis    Plan:  I reviewed the history, exam, and imaging with the patient and his wife in clinic today.  I did discuss with the patient that his x-rays show moderate hip osteoarthritis.  However, on exam he does not have significant pain with logroll or range of motion of the hip.  He does state that the pain starts in his anterior groin and radiates down the anterior aspect of his thigh.  He describes the pain as burning and states that he no longer has the sharp pain that he was having prior.  I discussed with the patient that his pain could be due to multiple etiologies.  I did discuss that he does have some right hip osteoarthritis on x-rays and that that can cause anterior thigh pain as well as pain in the groin.  In addition, I did discuss that anterior thigh pain as well as burning pain could be due to lumbar radiculopathy.  I discussed potential treatment options with the patient.  The patient is unable to take anti-inflammatories due to kidney disease.  I also discussed that I would recommend referral to my spine and pain management colleagues for evaluation of his lumbar spine as a potential contributor to his pain.  The patient was amenable to this plan and demonstrated understanding of our discussion.  I also placed referral for spine and pain management for evaluation of the lumbar spine.  I recommended the patient follow-up in *** weeks for repeat evaluation.  The patient can reach out to clinic with any questions or concerns anytime.    Conservative Treatment:   Ice to hip region for 20 minutes at least 1-2 times daily.  Prescription gabapentin for pain.  Tylenol for pain.  Let pain guide gradual return activities.  Continue follow-up with Dr. Topete    Imaging:  All imaging from today was reviewed by myself  and explained to the patient.     Injection:    Patient received an intra-articular hip injection performed by Dr. Topete on 3/4/24    Surgery:  No surgery is recommended at this point, continue with conservative treatment plan as noted.    Follow up:  Return if symptoms worsen or fail to improve.  Continue to follow-up with Dr. Topete          Chief Complaint   Patient presents with   • Right Hip - Follow-up, Pain       History of Present Illness:  The patient is a 58 y.o. male seen in clinic for right hip pain. The patient reports chronic right hip pain for several years, however, he had a recent exacerbation of pain. The patient states that he used a  to manage the recent snow storm roughly 4 weeks ago on 2/13/24, he noticed significant pain afterward. The patient reports pain along the lateral aspect of his hip, that is deep into his hip. He also reports a new onset of burning in the anterior thigh. He states that the pain is exacerbated by walking, twisting, and using stairs. The patient states that when he lays down, the pain is significantly worse in his thigh. He states that the pain is triggered at random. The patient states that he was able to rest from Friday to Monday, which assisted with his pain. However, as he reported to work on Tuesday, he stepped out of his truck which significantly exacerbated his pain. He states that he was unable to apply pressure through his right lower extremity. The patient states that he has taken Tylenol and used ice which help minimally. He has a history of an L1-L2 microdiscectomy in 2021. The patient also has a history of Kidney Disease and therefore he cannot take NSAIDs. He has a history of diabetes, which he states is well controlled.     On presentation today, patient reports that he is doing minimally better however he is still having pain. He reports that the pain is exacerbated with standing and twisting and seems to be worse when he is weightbearing. He  states that the right hip CS injection did not help at all, not even in the short term. He reports that the pain feels slightly different than the previous pain that he had with his back.    Occupation:     The patient has the following co-morbidities: Kidney Disease, Type 2 Diabetes       Hip Surgical History:  None    Past Medical, Social and Family History:  Past Medical History:   Diagnosis Date   • Benign arteriolar nephrosclerosis    • Diabetes mellitus (HCC)    • Essential hypertension    • History of transfusion 2013   • Hyperparathyroidism due to renal insufficiency (HCC)    • Legionnaire's disease (HCC)    • Pneumonia     s/p VDRF      Past Surgical History:   Procedure Laterality Date   • COLONOSCOPY     • OTHER SURGICAL HISTORY      Dialysis catheter    • [place] LAMNOTMY INCL W/DCMPRSN NRV ROOT 1 INTRSPC LUMBR Left 11/15/2021    Procedure: Left sided L1-2 microdiscectomy;  Surgeon: Andi Campoverde MD;  Location: [facility];  Service: Neurosurgery     No Known Allergies  Current Outpatient Medications on File Prior to Visit   Medication Sig Dispense Refill   • carvedilol (COREG) 12.5 mg tablet TAKE 1 TABLET TWICE A  tablet 3   • cholecalciferol (VITAMIN D3) 400 units tablet Take 400 Units by mouth daily     • fexofenadine (ALLEGRA) 180 MG tablet Take 180 mg by mouth daily     • gabapentin (NEURONTIN) 300 mg capsule Take 1 capsule (300 mg total) by mouth daily at bedtime 30 capsule 0   • glimepiride (AMARYL) 2 mg tablet TAKE 1 TABLET DAILY WITH BREAKFAST 90 tablet 3   • lisinopril-hydrochlorothiazide (PRINZIDE,ZESTORETIC) 20-12.5 MG per tablet take 1 tablet by mouth twice a day 180 tablet 1   • Multiple Vitamins-Minerals (multivitamin with minerals) tablet Take 1 tablet by mouth daily       No current facility-administered medications on file prior to visit.     Social History     Socioeconomic History   • Marital status: /Civil Union     Spouse name: Not on file   • Number  of children: Not on file   • Years of education: Not on file   • Highest education level: Not on file   Occupational History   • Occupation:     Tobacco Use   • Smoking status: Never   • Smokeless tobacco: Never   Vaping Use   • Vaping status: Never Used   Substance and Sexual Activity   • Alcohol use: Not Currently     Comment: Denies alcohol use - As per [employer]    • Drug use: Never   • Sexual activity: Not on file   Other Topics Concern   • Not on file   Social History Narrative    Consumes on average 3 cups of regular coffee per day      Social Determinants of Health     Financial Resource Strain: Not on file   Food Insecurity: No Food Insecurity (10/6/2022)    Hunger Vital Sign    • Worried About Running Out of Food in the Last Year: Never true    • Ran Out of Food in the Last Year: Never true   Transportation Needs: No Transportation Needs (10/6/2022)    PRAPARE - Transportation    • Lack of Transportation (Medical): No    • Lack of Transportation (Non-Medical): No   Physical Activity: Not on file   Stress: Not on file   Social Connections: Not on file   Intimate Partner Violence: Not on file   Housing Stability: Low Risk  (10/6/2022)    Housing Stability Vital Sign    • Unable to Pay for Housing in the Last Year: No    • Number of Places Lived in the Last Year: 1    • Unstable Housing in the Last Year: No         I have reviewed the past medical, surgical, social and family history, medications and allergies as documented in the EMR.    Review of systems: ROS is negative other than that noted in the HPI.  Constitutional: Negative for fatigue and fever.   HENT: Negative for sore throat.    Respiratory: Negative for shortness of breath.    Cardiovascular: Negative for chest pain.   Gastrointestinal: Negative for abdominal pain.   Endocrine: Negative for cold intolerance and heat intolerance.   Genitourinary: Negative for flank pain.   Musculoskeletal: Negative for back pain.   Skin: Negative for  rash.   Allergic/Immunologic: Negative for immunocompromised state.   Neurological: Negative for dizziness.   Psychiatric/Behavioral: Negative for agitation.      Physical Exam:    Blood pressure 130/83, pulse 86, height 6' (1.829 m), weight (!) 146 kg (321 lb).    General/Constitutional: NAD, well developed, well nourished  HENT: Normocephalic, atraumatic  CV: Intact distal pulses, regular rate  Resp: No respiratory distress or labored breathing  Abd: No abdominal distention  Lymphatic: No lymphadenopathy palpated  Neuro: Alert and Oriented x 3, no focal deficits noted  Psych: Normal mood, normal affect, normal judgement, normal behavior  Skin: Warm, dry, no rashes, no erythema      Focused Right Hip Exam:  No dislocation/deformity  ROM: Full  Greater troch:non-tender   SI joint: non-tender  Carrillo test: negative  Yvonne's test:  negative  Abduction: 5/5  AT/GS intact    Back:    No TTP over lumbar spinous processes, paraspinal musculature  Negative SLR, pain in anterior thigh     No calf tenderness to palpation bilaterally    LE NV Exam: +2 DP/PT pulses bilaterally  Sensation intact to light touch L2-S1 bilaterally, SPN/DPN/TA motor intact    Hip Imaging:    X-rays of the right hip were obtained on 2/20/2024 and reviewed with the patient. Based on my independent evaluation, the imaging shows moderate osteoarthritis of right hip.      Scribe Attestation    I,:   am acting as a scribe while in the presence of the attending physician.:       I,:   personally performed the services described in this documentation    as scribed in my presence.:

## 2024-03-25 NOTE — TELEPHONE ENCOUNTER
Caller: Aaron   Doctor/office: Yoselyn CHAUDHARI#: [phone]    % of improvement: 15%  Pain Scale (1-10): 8/10    Wife thinks he has Bursitis she is a physical therapist     Please advise on next step?

## 2024-03-26 ENCOUNTER — OFFICE VISIT (OUTPATIENT)
Dept: PHYSICAL THERAPY | Facility: CLINIC | Age: 59
End: 2024-03-26
Payer: COMMERCIAL

## 2024-03-26 DIAGNOSIS — M25.551 RIGHT HIP PAIN: Primary | ICD-10-CM

## 2024-03-26 DIAGNOSIS — M51.36 DDD (DEGENERATIVE DISC DISEASE), LUMBAR: ICD-10-CM

## 2024-03-26 DIAGNOSIS — M16.11 PRIMARY OSTEOARTHRITIS OF RIGHT HIP: ICD-10-CM

## 2024-03-26 PROCEDURE — 97110 THERAPEUTIC EXERCISES: CPT

## 2024-03-26 NOTE — PROGRESS NOTES
"Daily Note     Today's date: 3/26/2024  Patient name: Aaron Snyder  : 1965  MRN: [id number]  Referring provider: Ines Topete MD  Dx:   Encounter Diagnosis     ICD-10-CM    1. Right hip pain  M25.551       2. DDD (degenerative disc disease), lumbar  M51.36       3. Primary osteoarthritis of right hip  M16.11           Start Time: 1000  Stop Time: 1055  Total time in clinic (min): 55 minutes    Subjective: \"I am very frustrated that no one is telling me what is wrong and passing me to someone else.  I am scheduled to see pain management now.  My wife thinks it's bursitis.  The injection didn't help at all, it made it worse.\"      Objective: See treatment diary below      Assessment: Tolerated treatment fair.  Pt able to complete program without change in symptoms.  Pt unable to lay supine at this time to progress with exercises.  Held add sq 2* increased pain after last session.  Pt was able to add mini squats without pain.  Limited hip motion noted in all directions with muscle guarding.  Good response to LAD.  Will continue to monitor and progress as able.  Patient demonstrated fatigue post treatment and would benefit from continued PT      Plan: Continue per plan of care.  Progress treatment as tolerated.       Precautions: diabetes, HTN, chronic pain       Re-eval Date:      Date 3/26       Visit Count 6/       FOTO 3/7/2024        Pain In See IE       Pain Out See IE           Manuals 3/26       R hip PROM, R hip LAD as jona, R HS, hip/flexor quad stretch  R hip LAD supine as jona, R hip LAD with gentle CW/CCW circumduction ROM as tolerated/limited ROM /small circular motions as jona  10'                                Neuro Re-Ed        Romberg      Tandem        Perturbation training                                                 Ther Ex        Nustep R hip AAROM      R hip SLRs standing vs supine L3 10'        0#   2x10 ea standing       Standing R hip flex/march  0# 2x10        LAQ      HS " "curl 2x10/3-5\"      2x10/3-5\"       Bridges      Clamshells         Step ups      Mini squats        2x10                    Hip abd hooklying   Blue   2x10-15x 5\"                                Ther Activity pt education regarding pathophysiology/pathoanatomy of present pain/sx and condition, role of PT in improving pain/sx and function, pt education regarding activity modification to avoid exacerbation of sx and delayed recovery                         Gait Training                        Modalities  CP x 10' R anterior/lateral hip supine after session  No adverse reaction to tx noted  deferred                           3/7/2024  - HEP was issued and reviewed this date for above noted exercises. Pt demonstrated understanding without incident and without questions/concerns. Will continue to update upcoming.                    "

## 2024-03-28 ENCOUNTER — OFFICE VISIT (OUTPATIENT)
Dept: PHYSICAL THERAPY | Facility: CLINIC | Age: 59
End: 2024-03-28
Payer: COMMERCIAL

## 2024-03-28 DIAGNOSIS — M16.11 PRIMARY OSTEOARTHRITIS OF RIGHT HIP: ICD-10-CM

## 2024-03-28 DIAGNOSIS — M25.551 RIGHT HIP PAIN: Primary | ICD-10-CM

## 2024-03-28 DIAGNOSIS — M51.36 DDD (DEGENERATIVE DISC DISEASE), LUMBAR: ICD-10-CM

## 2024-03-28 PROCEDURE — 97140 MANUAL THERAPY 1/> REGIONS: CPT

## 2024-03-28 PROCEDURE — 97110 THERAPEUTIC EXERCISES: CPT

## 2024-03-28 NOTE — PROGRESS NOTES
"Daily Note     Today's date: 3/28/2024  Patient name: Aaron Snyder  : 1965  MRN: [id number]  Referring provider: Ines Topete MD  Dx:   Encounter Diagnosis     ICD-10-CM    1. Right hip pain  M25.551       2. DDD (degenerative disc disease), lumbar  M51.36       3. Primary osteoarthritis of right hip  M16.11           Start Time: 0945  Stop Time: 1030  Total time in clinic (min): 45 minutes    Subjective: \"My hip does not feel too bad today. It is starting to hurt less now\"       Objective: See treatment diary below      Assessment: Tolerated treatment well. Able to complete POC without incident. Performance of exercises improved due to experiencing less pain this session. Able to ambulate into clinic without SPC this session with good elías but with decreased stride length of RLE. No noted increase in pain with interventions today. Slight soreness felt at end of session in R hip. Still demo impaired R hip and lumbar ROM. Will cont to progress as tolerated. Patient demonstrated fatigue post treatment and would benefit from continued PT      Plan: Continue per plan of care.  Progress treatment as tolerated.       Precautions: diabetes, HTN, chronic pain       Re-eval Date:      Date 3/26 3/28      Visit Count       FOTO 3/7/2024        Pain In See IE Sore, 0-1/10      Pain Out See IE 1/10 sore           Manuals 3/26 3/28      R hip PROM, R hip LAD as jona, R HS, hip/flexor quad stretch  R hip LAD supine as jona, R hip LAD with gentle CW/CCW circumduction ROM as tolerated/limited ROM /small circular motions as jona  10'  R hip LAD supine as jona, R hip LAD with gentle CW/CCW circumduction ROM as tolerated/limited ROM /small circular motions as jona  10'                               Neuro Re-Ed        Romberg      Tandem        Perturbation training                                                 Ther Ex        Nustep R hip AAROM      R hip SLRs standing vs supine L3 10'        0#   2x10 ea " "standing       Standing R hip flex/march  0# 2x10  L3 10'         0# 2x10 ea standing         Standing R hip flex/march 0# 2x10      LAQ      HS curl 2x10/3-5\"      2x10/3-5\" 2x10/3-5\"       2x10/3-5\"       Bridges      Clamshells         Step ups      Mini squats        2x10       2x10                    Hip abd hooklying   Blue   2x10-15x 5\"              Hip abd hooklying   Blue   2x10-15x 5\"                               Ther Activity pt education regarding pathophysiology/pathoanatomy of present pain/sx and condition, role of PT in improving pain/sx and function, pt education regarding activity modification to avoid exacerbation of sx and delayed recovery                         Gait Training                        Modalities                              3/7/2024  - HEP was issued and reviewed this date for above noted exercises. Pt demonstrated understanding without incident and without questions/concerns. Will continue to update upcoming.                      "

## 2024-04-02 ENCOUNTER — OFFICE VISIT (OUTPATIENT)
Dept: PHYSICAL THERAPY | Facility: CLINIC | Age: 59
End: 2024-04-02
Payer: COMMERCIAL

## 2024-04-02 DIAGNOSIS — M16.11 PRIMARY OSTEOARTHRITIS OF RIGHT HIP: ICD-10-CM

## 2024-04-02 DIAGNOSIS — M51.36 DDD (DEGENERATIVE DISC DISEASE), LUMBAR: ICD-10-CM

## 2024-04-02 DIAGNOSIS — M25.551 RIGHT HIP PAIN: Primary | ICD-10-CM

## 2024-04-02 PROCEDURE — 97140 MANUAL THERAPY 1/> REGIONS: CPT | Performed by: PHYSICAL MEDICINE & REHABILITATION

## 2024-04-02 PROCEDURE — 97110 THERAPEUTIC EXERCISES: CPT | Performed by: PHYSICAL MEDICINE & REHABILITATION

## 2024-04-02 NOTE — PROGRESS NOTES
"Daily Note     Today's date: 2024  Patient name: Aaron Snyder  : 1965  MRN: [id number]  Referring provider: Ines Topete MD  Dx:   Encounter Diagnosis     ICD-10-CM    1. Right hip pain  M25.551       2. DDD (degenerative disc disease), lumbar  M51.36       3. Primary osteoarthritis of right hip  M16.11                      Subjective: Pt notes his hip/leg is \"the same\" overall; no new sx/complaints. Does note the pain is now just 1* in R groin; does not seem to radiate into thigh anymore. NO new n/t or sensory changes. No progressive weakness. No BBI. NO new pain/sx. Notes he does not get the pain like he was getting with sitting on the toilet seat. Pain is overall variable based on the day, time of day, activity etc. Notes his hip is sore this morning; did sleep in his bed longer than he has before; lays on his side. Hoping to \"get answers\" from spine and pain at upcoming visit.       Objective: See treatment diary below      Assessment: Tolerated treatment well. Despite cont'd pain 1* R hip/thigh, he demonstrates improvements in gait/mobility and activity tolerance. Pain appears more centralized to R groin at present; no radiating pain today. Added standing Hrs and seated L/S/ hip AAROM flexion with ball; pt tolerated well without pain/sx. Pt able to tolerate more PROM R hip with MT vs prior sessions. WFL R hip flexion and ER noted without pain; stiffness continues into hip ABD however not painful per pt. Pt notes reduced pain R hip with LAD. Min soreness R hip after tx however \"no worse than usual with walking\". Pt ambulating without cane with improvement in stance time R LE. Signs/sx remain consistent with upper lumbar radiculopathy vs hip pathology. No complaints after session. Patient demonstrated fatigue post treatment and would benefit from continued PT      Plan: Continue per plan of care.  Progress treatment as tolerated.       Precautions: diabetes, HTN, chronic pain       Re-eval " "Date:  4/18    Date 3/26 3/28 4/2     Visit Count 6/9 7/9 8/9     FOTO 3/7/2024        Pain In See IE Sore, 0-1/10 1-2/10 R groin      Pain Out See IE 1/10 sore  3-4/10 min sore R hip          Manuals 3/26 3/28 4/2     R hip PROM, R hip LAD as jona, R HS, hip/flexor quad stretch  R hip LAD supine as jona, R hip LAD with gentle CW/CCW circumduction ROM as tolerated/limited ROM /small circular motions as jona  10'  R hip LAD supine as jona, R hip LAD with gentle CW/CCW circumduction ROM as tolerated/limited ROM /small circular motions as jona  10'  R hip LAD supine as jona, R hip LAD with gentle CW/CCW circumduction ROM as tolerated/limited ROM /small circular motions as jona, hip ADD stretch and hip ABD/ER PROM as tolerated supine   15'                              Neuro Re-Ed        Romberg      Tandem        Perturbation training                                                 Ther Ex        Nustep R hip AAROM      R hip SLRs standing vs supine L3 10'        0#   2x10 ea standing       Standing R hip flex/march  0# 2x10  L3 10'         0# 2x10 ea standing         Standing R hip flex/march 0# 2x10 L3 10'         0# 2x10 ea standing         Standing R hip flex/march 0# 2x10     LAQ      HS curl 2x10/3-5\"      2x10/3-5\" 2x10/3-5\"       2x10/3-5\"  2x10/3-5\"       2x10/3-5\"      Bridges      Clamshells         Step ups      Mini squats        2x10       2x10        2x10                   Hip abd hooklying   Blue   2x10-15x 5\"              Hip abd hooklying   Blue   2x10-15x 5\"  Standing HR  2x10           Hip abd hooklying   Blue   2x10-15x 5\"         Seated lumbar AAROM flexion with tball  10x 10\"                      Ther Activity pt education regarding pathophysiology/pathoanatomy of present pain/sx and condition, role of PT in improving pain/sx and function, pt education regarding activity modification to avoid exacerbation of sx and delayed recovery                         Gait Training                        Modalities   "                            3/7/2024  - HEP was issued and reviewed this date for above noted exercises. Pt demonstrated understanding without incident and without questions/concerns. Will continue to update upcoming.

## 2024-04-04 ENCOUNTER — APPOINTMENT (OUTPATIENT)
Dept: PHYSICAL THERAPY | Facility: CLINIC | Age: 59
End: 2024-04-04
Payer: COMMERCIAL

## 2024-04-12 ENCOUNTER — OFFICE VISIT (OUTPATIENT)
Dept: PAIN MEDICINE | Facility: CLINIC | Age: 59
End: 2024-04-12
Payer: COMMERCIAL

## 2024-04-12 VITALS
SYSTOLIC BLOOD PRESSURE: 142 MMHG | DIASTOLIC BLOOD PRESSURE: 86 MMHG | HEIGHT: 72 IN | WEIGHT: 315 LBS | BODY MASS INDEX: 42.66 KG/M2 | HEART RATE: 92 BPM

## 2024-04-12 DIAGNOSIS — M25.551 RIGHT HIP PAIN: Primary | ICD-10-CM

## 2024-04-12 DIAGNOSIS — M54.16 LUMBAR RADICULITIS: ICD-10-CM

## 2024-04-12 DIAGNOSIS — M16.11 PRIMARY OSTEOARTHRITIS OF RIGHT HIP: ICD-10-CM

## 2024-04-12 PROCEDURE — 99214 OFFICE O/P EST MOD 30 MIN: CPT | Performed by: STUDENT IN AN ORGANIZED HEALTH CARE EDUCATION/TRAINING PROGRAM

## 2024-04-12 NOTE — PROGRESS NOTES
"Assessment:  1. Right hip pain    2. Lumbar radiculitis    3. Primary osteoarthritis of right hip         Portions of the record may have been created with voice recognition software. Occasional wrong word or \"sound a like\" substitutions may have occurred due to the inherent limitations of voice recognition software. Read the chart carefully and recognize, using context, where substitutions have occurred. Contact me with any questions.         Plan:  58-year-old male here for follow-up visit with regards to right hip pain.  Since last visit, he underwent a right hip intra-articular CSI on 3/4/2024.  He feels that the injection did not change his symptoms but feels that symptoms have improved overall since last visit.  He denies new or progressive weakness, saddle anesthesia, bowel/bladder incontinence.  He is frustrated by residual symptoms and notes continued interference with function and ambulation.  He has been undergoing physical therapy without significant change in symptoms.      Obtain lumbar spine MRI for further evaluation of persistent right groin pain symptoms, prior history of L1-2 microdiscectomy in '21.    Continue physical therapy/home exercise program.    Follow-up in 3 to 4 weeks to discuss MRI results and next steps.        History of Present Illness:  The patient is a 58 y.o. male who presents for a follow up office visit in regards to Hip Pain.       No longer taking gabapentin due to lack of relief.    I have personally reviewed and/or updated the patient's past medical history, past surgical history, family history, social history, current medications, allergies, and vital signs today.         Review of Systems  Review of Systems   Constitutional:  Negative for fever.   HENT:  Negative for sinus pain.    Eyes:  Negative for redness.   Respiratory:  Negative for shortness of breath.    Cardiovascular:  Negative for palpitations.   Gastrointestinal:  Negative for abdominal pain and nausea. "   Endocrine: Negative for polydipsia.   Genitourinary:  Negative for difficulty urinating.   Musculoskeletal:  Positive for gait problem. Negative for myalgias.        Joint stiffness   Skin:  Negative for rash.   Neurological:  Negative for seizures and headaches.   Psychiatric/Behavioral:  Negative for decreased concentration. The patient is not nervous/anxious.            Past Medical History:   Diagnosis Date    Benign arteriolar nephrosclerosis     Diabetes mellitus (HCC)     Essential hypertension     History of transfusion 2013    Hyperparathyroidism due to renal insufficiency (HCC)     Legionnaire's disease (HCC)     Pneumonia     s/p VDRF        Past Surgical History:   Procedure Laterality Date    COLONOSCOPY      OTHER SURGICAL HISTORY      Dialysis catheter     MA LAMNOTMY INCL W/DCMPRSN NRV ROOT 1 INTRSPC LUMBR Left 11/15/2021    Procedure: Left sided L1-2 microdiscectomy;  Surgeon: Andi Campoverde MD;  Location:  MAIN OR;  Service: Neurosurgery       Family History   Problem Relation Age of Onset    Hyperlipidemia Mother     Lung cancer Father     Stomach cancer Father     Cancer Father     Diabetes type II Father     Diabetes type II Brother        Social History     Occupational History    Occupation:     Tobacco Use    Smoking status: Never    Smokeless tobacco: Never   Vaping Use    Vaping status: Never Used   Substance and Sexual Activity    Alcohol use: Not Currently     Comment: Denies alcohol use - As per [employer]     Drug use: Never    Sexual activity: Not on file         Current Outpatient Medications:     carvedilol (COREG) 12.5 mg tablet, TAKE 1 TABLET TWICE A DAY, Disp: 180 tablet, Rfl: 3    cholecalciferol (VITAMIN D3) 400 units tablet, Take 400 Units by mouth daily, Disp: , Rfl:     fexofenadine (ALLEGRA) 180 MG tablet, Take 180 mg by mouth daily, Disp: , Rfl:     glimepiride (AMARYL) 2 mg tablet, TAKE 1 TABLET DAILY WITH BREAKFAST, Disp: 90 tablet, Rfl: 3     lisinopril-hydrochlorothiazide (PRINZIDE,ZESTORETIC) 20-12.5 MG per tablet, take 1 tablet by mouth twice a day, Disp: 180 tablet, Rfl: 1    Multiple Vitamins-Minerals (multivitamin with minerals) tablet, Take 1 tablet by mouth daily, Disp: , Rfl:     gabapentin (NEURONTIN) 300 mg capsule, Take 1 capsule (300 mg total) by mouth daily at bedtime (Patient not taking: Reported on 4/12/2024), Disp: 30 capsule, Rfl: 0    No Known Allergies    Physical Exam:    /86   Pulse 92   Ht 6' (1.829 m)   Wt (!) 146 kg (321 lb)   BMI 43.54 kg/m²     Constitutional:normal, well developed, well nourished, alert, in no distress and non-toxic and no overt pain behavior.  Eyes:anicteric  HEENT:grossly intact  Neck:supple, symmetric, trachea midline and no masses   Pulmonary:even and unlabored  Cardiovascular:No edema or pitting edema present  Skin:Normal without rashes or lesions and well hydrated  Psychiatric:Mood and affect appropriate  Neurologic:Cranial Nerves II-XII grossly intact  Musculoskeletal:antalgic gait    Imaging  MRI lumbar spine without contrast    (Results Pending)       Orders Placed This Encounter   Procedures    MRI lumbar spine without contrast

## 2024-04-16 ENCOUNTER — RA CDI HCC (OUTPATIENT)
Dept: OTHER | Facility: HOSPITAL | Age: 59
End: 2024-04-16

## 2024-04-16 NOTE — PROGRESS NOTES
HCC coding opportunities          Chart Reviewed number of suggestions sent to Provider: 2   E11.36  E66.01    Patients Insurance        Commercial Insurance: [employer]

## 2024-04-22 DIAGNOSIS — I10 ESSENTIAL HYPERTENSION: ICD-10-CM

## 2024-04-22 RX ORDER — CARVEDILOL 12.5 MG/1
TABLET ORAL
Qty: 180 TABLET | Refills: 3 | Status: SHIPPED | OUTPATIENT
Start: 2024-04-22

## 2024-04-23 ENCOUNTER — OFFICE VISIT (OUTPATIENT)
Dept: INTERNAL MEDICINE CLINIC | Facility: CLINIC | Age: 59
End: 2024-04-23
Payer: COMMERCIAL

## 2024-04-23 VITALS
HEIGHT: 72 IN | TEMPERATURE: 97.6 F | BODY MASS INDEX: 42.66 KG/M2 | DIASTOLIC BLOOD PRESSURE: 74 MMHG | SYSTOLIC BLOOD PRESSURE: 134 MMHG | WEIGHT: 315 LBS | OXYGEN SATURATION: 96 % | HEART RATE: 80 BPM

## 2024-04-23 DIAGNOSIS — N18.2 TYPE 2 DIABETES MELLITUS WITH STAGE 2 CHRONIC KIDNEY DISEASE, WITHOUT LONG-TERM CURRENT USE OF INSULIN  (HCC): ICD-10-CM

## 2024-04-23 DIAGNOSIS — E11.22 TYPE 2 DIABETES MELLITUS WITH STAGE 2 CHRONIC KIDNEY DISEASE, WITHOUT LONG-TERM CURRENT USE OF INSULIN  (HCC): ICD-10-CM

## 2024-04-23 DIAGNOSIS — E55.9 VITAMIN D DEFICIENCY: ICD-10-CM

## 2024-04-23 DIAGNOSIS — N18.2 STAGE 2 CHRONIC KIDNEY DISEASE: ICD-10-CM

## 2024-04-23 DIAGNOSIS — E66.01 CLASS 3 SEVERE OBESITY DUE TO EXCESS CALORIES WITHOUT SERIOUS COMORBIDITY WITH BODY MASS INDEX (BMI) OF 40.0 TO 44.9 IN ADULT (HCC): ICD-10-CM

## 2024-04-23 DIAGNOSIS — Z13.220 SCREENING FOR HYPERLIPIDEMIA: ICD-10-CM

## 2024-04-23 DIAGNOSIS — I10 ESSENTIAL HYPERTENSION: Primary | Chronic | ICD-10-CM

## 2024-04-23 DIAGNOSIS — M25.551 RIGHT HIP PAIN: ICD-10-CM

## 2024-04-23 DIAGNOSIS — M16.11 PRIMARY OSTEOARTHRITIS OF RIGHT HIP: ICD-10-CM

## 2024-04-23 DIAGNOSIS — Z12.5 SCREENING FOR PROSTATE CANCER: ICD-10-CM

## 2024-04-23 DIAGNOSIS — Z23 ENCOUNTER FOR IMMUNIZATION: ICD-10-CM

## 2024-04-23 PROBLEM — G89.29 CHRONIC BILATERAL LOW BACK PAIN WITH BILATERAL SCIATICA: Status: RESOLVED | Noted: 2021-10-19 | Resolved: 2024-04-23

## 2024-04-23 PROBLEM — M54.41 CHRONIC BILATERAL LOW BACK PAIN WITH BILATERAL SCIATICA: Status: RESOLVED | Noted: 2021-10-19 | Resolved: 2024-04-23

## 2024-04-23 PROBLEM — N17.9 AKI (ACUTE KIDNEY INJURY) (HCC): Status: RESOLVED | Noted: 2022-10-05 | Resolved: 2024-04-23

## 2024-04-23 PROBLEM — Z11.4 SCREENING FOR HIV (HUMAN IMMUNODEFICIENCY VIRUS): Status: RESOLVED | Noted: 2023-10-23 | Resolved: 2024-04-23

## 2024-04-23 PROBLEM — Z01.00 DIABETIC EYE EXAM (HCC): Status: RESOLVED | Noted: 2021-12-13 | Resolved: 2024-04-23

## 2024-04-23 PROBLEM — M54.42 CHRONIC BILATERAL LOW BACK PAIN WITH BILATERAL SCIATICA: Status: RESOLVED | Noted: 2021-10-19 | Resolved: 2024-04-23

## 2024-04-23 PROBLEM — E11.9 DIABETIC EYE EXAM (HCC): Status: RESOLVED | Noted: 2021-12-13 | Resolved: 2024-04-23

## 2024-04-23 LAB — SL AMB POCT HEMOGLOBIN AIC: 6.4 (ref ?–6.5)

## 2024-04-23 PROCEDURE — 3044F HG A1C LEVEL LT 7.0%: CPT | Performed by: NURSE PRACTITIONER

## 2024-04-23 PROCEDURE — 83036 HEMOGLOBIN GLYCOSYLATED A1C: CPT | Performed by: NURSE PRACTITIONER

## 2024-04-23 PROCEDURE — 99214 OFFICE O/P EST MOD 30 MIN: CPT | Performed by: NURSE PRACTITIONER

## 2024-04-23 RX ORDER — ACETAMINOPHEN 500 MG
500 TABLET ORAL 2 TIMES DAILY
COMMUNITY

## 2024-04-23 NOTE — PATIENT INSTRUCTIONS
YOU ARE DUE FOR YOUR ANNUAL WELLNESS EXAM AFTER 10/25/2024.    REPEAT LABS ORDERED, PLEASE HAVE THEM DRAWN THE WEEK PRIOR TO YOUR VISIT (APPROXIMATELY 10/14/2024).    LABS HAVE BEEN ORDERED FOR YOU.   THESE LABS SHOULD BE DRAWN PRIOR TO YOUR NEXT VISIT.  YOU CAN HAVE THEM DRAWN UP TO 2 WEEKS PRIOR TO YOUR NEXT VISIT.  HAVING YOUR BLOOD WORK WHEN YOU COME TO YOUR NEXT VISIT WILL ALLOW ME TO PROVIDE THE BEST MEDICAL CARE FOR YOU WITHOUT HAVING EITHER OF US PERFORM MORE WORK THAN NECESSARY.  PLEASE BE COMPLIANT WITH THIS REQUEST.

## 2024-04-23 NOTE — ASSESSMENT & PLAN NOTE
Continue Coreg, lisinopril-hydrochlorothiazide  Patient is compliant with his medications  Will check labs to monitor kidney function  Compliant with low-salt diet

## 2024-04-23 NOTE — ASSESSMENT & PLAN NOTE
The patient is following with pain management  I personally viewed all of their office visit notes  He is also compliant with physical therapy for which I did repeat you all of their notes  He is scheduled to have an MRI of his lumbar spine  Patient is compliant with exercises and stretches

## 2024-04-23 NOTE — ASSESSMENT & PLAN NOTE
Lab Results   Component Value Date    HGBA1C 6.5 (H) 11/27/2023     In office hemoglobin A1c: 6.4  Continue glimepiride  And is compliant with current medication regimen  Patient is compliant with diabetic diet   Diabetic Waiver Form filled out, scanned into his chart and sent to the patient.

## 2024-04-23 NOTE — ASSESSMENT & PLAN NOTE
Following with pain management   Had had injections  I reviewed all of their documents  He is scheduled for an MRI on 4/25/2024  He has completed physical therapy         He complains of dermatome tracks of L1 and L2 for the hip pain  With radiation along the L3 dermatome tracks for burning pain into his thigh  Ice helps the pain   He has been taking tylenol for pain but has not been helping  He had been sleeping in a recliner chair for weeks and only partially reclined  He is now sleeping in his bed, but needs to sleep on his right side, he has increased pain if he sleeps on his left side it hurts.     IT band stretch discussed  Hip replacement exercises discussed     His brother and mother both have one leg which is shorter than the other  After reviewing his most recent hip xray   It appears as if his right hip is worse than his left hip  But I question if he does not have a shorter right leg than his left leg

## 2024-04-23 NOTE — PROGRESS NOTES
Name: Aaron Snyder      : 1965      MRN: [id number]  Encounter Provider: LAURA Campbell  Encounter Date: 2024   Encounter department: [facility]    Assessment & Plan     1. Essential hypertension  Assessment & Plan:  Continue Coreg, lisinopril-hydrochlorothiazide  Patient is compliant with his medications  Will check labs to monitor kidney function  Compliant with low-salt diet    Orders:  -     CBC and Platelet; Future; Expected date: 10/14/2024  -     Comprehensive metabolic panel; Future; Expected date: 10/14/2024    2. Type 2 diabetes mellitus with stage 2 chronic kidney disease, without long-term current use of insulin  (HCC)  Assessment & Plan:    Lab Results   Component Value Date    HGBA1C 6.5 (H) 2023     In office hemoglobin A1c: 6.4  Continue glimepiride  And is compliant with current medication regimen  Patient is compliant with diabetic diet   Diabetic Waiver Form filled out, scanned into his chart and sent to the patient.     Orders:  -     POCT hemoglobin A1c  -     Hemoglobin A1C; Future; Expected date: 10/14/2024  -     Albumin / creatinine urine ratio; Future; Expected date: 10/14/2024    3. Encounter for immunization  -     TDAP VACCINE GREATER THAN OR EQUAL TO 8YO IM  -     Zoster Vaccine Recombinant IM    4. Primary osteoarthritis of right hip  Assessment & Plan:  The patient is following with pain management  I personally viewed all of their office visit notes  He is also compliant with physical therapy for which I did repeat you all of their notes  He is scheduled to have an MRI of his lumbar spine  Patient is compliant with exercises and stretches      5. Stage 2 chronic kidney disease  Assessment & Plan:  Lab Results   Component Value Date    EGFR 79 2023    EGFR 70 2022    EGFR 76 10/21/2022    CREATININE 1.02 2023    CREATININE 1.14 2022    CREATININE 1.06 10/21/2022     He is currently stable  Will  monitor labs  He is avoiding nephrotoxic medications      6. Vitamin D deficiency  Assessment & Plan:  We will check labs in the future  He is compliant with his supplementation    Orders:  -     Vitamin D 25 hydroxy; Future; Expected date: 10/14/2024    7. Class 3 severe obesity due to excess calories without serious comorbidity with body mass index (BMI) of 40.0 to 44.9 in adult (HCC)  Assessment & Plan:  Lifestyle modification, diet and exercise discussed  He is following a low-salt diabetic diet      8. Right hip pain  Assessment & Plan:  Following with pain management   Had had injections  I reviewed all of their documents  He is scheduled for an MRI on 4/25/2024  He has completed physical therapy         He complains of dermatome tracks of L1 and L2 for the hip pain  With radiation along the L3 dermatome tracks for burning pain into his thigh  Ice helps the pain   He has been taking tylenol for pain but has not been helping  He had been sleeping in a recliner chair for weeks and only partially reclined  He is now sleeping in his bed, but needs to sleep on his right side, he has increased pain if he sleeps on his left side it hurts.     IT band stretch discussed  Hip replacement exercises discussed     His brother and mother both have one leg which is shorter than the other  After reviewing his most recent hip xray   It appears as if his right hip is worse than his left hip  But I question if he does not have a shorter right leg than his left leg       9. Screening for hyperlipidemia  -     Lipid panel; Future; Expected date: 10/14/2024    10. Screening for prostate cancer  -     PSA, Total Screen; Future; Expected date: 10/14/2024        Depression Screening and Follow-up Plan: Patient's depression screening was positive with a PHQ-2 score of 6. Their PHQ-9 score was 9. Patient assessed for underlying major depression. Brief counseling provided and recommend additional follow-up/re-evaluation next office  visit. Patient advised to follow-up with PCP for further management.         Subjective      The patient is here today to discuss his current right hip pain; needing his  diabetic waiver form filled out and his annual appointment with labs ordered. Please continue to the PORCH section of the note for details of today's visit.        Review of Systems   Constitutional:  Negative for activity change, chills, fatigue and fever.   HENT:  Negative for rhinorrhea and sore throat.    Eyes:  Negative for pain.   Respiratory:  Negative for cough and shortness of breath.    Cardiovascular:  Negative for chest pain, palpitations and leg swelling.   Gastrointestinal:  Negative for abdominal pain, constipation, diarrhea, nausea and vomiting.   Genitourinary:  Negative for difficulty urinating, flank pain, frequency and urgency.   Musculoskeletal:  Positive for arthralgias, gait problem and myalgias. Negative for joint swelling.   Skin:  Negative for color change.   Neurological:  Negative for dizziness, weakness, light-headedness and headaches.   Psychiatric/Behavioral:  Negative for sleep disturbance. The patient is nervous/anxious.    All other systems reviewed and are negative.      Current Outpatient Medications on File Prior to Visit   Medication Sig    acetaminophen (TYLENOL) 500 mg tablet Take 500 mg by mouth 2 (two) times a day    carvedilol (COREG) 12.5 mg tablet TAKE 1 TABLET TWICE A DAY    cholecalciferol (VITAMIN D3) 400 units tablet Take 400 Units by mouth daily    fexofenadine (ALLEGRA) 180 MG tablet Take 180 mg by mouth daily    glimepiride (AMARYL) 2 mg tablet TAKE 1 TABLET DAILY WITH BREAKFAST    lisinopril-hydrochlorothiazide (PRINZIDE,ZESTORETIC) 20-12.5 MG per tablet take 1 tablet by mouth twice a day    Multiple Vitamins-Minerals (multivitamin with minerals) tablet Take 1 tablet by mouth daily    [DISCONTINUED] gabapentin (NEURONTIN) 300 mg capsule Take 1 capsule (300 mg total) by mouth  daily at bedtime (Patient not taking: Reported on 4/12/2024)       Objective     /74 (BP Location: Right arm, Patient Position: Sitting)   Pulse 80   Temp 97.6 °F (36.4 °C) (Tympanic)   Ht 6' (1.829 m)   Wt (!) 147 kg (324 lb 6 oz)   SpO2 96%   BMI 43.99 kg/m²     Physical Exam  Vitals reviewed.   Constitutional:       General: He is awake.      Appearance: Normal appearance. He is well-developed and normal weight.   HENT:      Head: Normocephalic and atraumatic.      Nose: Nose normal.      Mouth/Throat:      Mouth: Mucous membranes are moist.   Eyes:      Extraocular Movements: Extraocular movements intact.   Cardiovascular:      Rate and Rhythm: Normal rate and regular rhythm.      Pulses: Normal pulses.      Heart sounds: Normal heart sounds.   Pulmonary:      Effort: Pulmonary effort is normal.      Breath sounds: Normal breath sounds.   Abdominal:      General: Bowel sounds are normal.      Palpations: Abdomen is soft.   Musculoskeletal:      Cervical back: Normal range of motion.      Right hip: Tenderness present. Decreased range of motion.      Right lower leg: No edema.      Left lower leg: No edema.        Legs:    Skin:     General: Skin is warm and dry.   Neurological:      Mental Status: He is alert and oriented to person, place, and time.   Psychiatric:         Attention and Perception: Attention normal.         Mood and Affect: Mood is depressed.         Speech: Speech normal.         Behavior: Behavior normal. Behavior is cooperative.       LAURA Campbell

## 2024-04-23 NOTE — ASSESSMENT & PLAN NOTE
Lab Results   Component Value Date    EGFR 79 11/27/2023    EGFR 70 11/29/2022    EGFR 76 10/21/2022    CREATININE 1.02 11/27/2023    CREATININE 1.14 11/29/2022    CREATININE 1.06 10/21/2022     He is currently stable  Will monitor labs  He is avoiding nephrotoxic medications

## 2024-04-25 ENCOUNTER — HOSPITAL ENCOUNTER (OUTPATIENT)
Dept: MRI IMAGING | Facility: HOSPITAL | Age: 59
End: 2024-04-25
Attending: STUDENT IN AN ORGANIZED HEALTH CARE EDUCATION/TRAINING PROGRAM
Payer: COMMERCIAL

## 2024-04-25 DIAGNOSIS — M54.16 LUMBAR RADICULITIS: ICD-10-CM

## 2024-04-25 PROCEDURE — 72148 MRI LUMBAR SPINE W/O DYE: CPT

## 2024-04-29 ENCOUNTER — TELEPHONE (OUTPATIENT)
Dept: PAIN MEDICINE | Facility: CLINIC | Age: 59
End: 2024-04-29

## 2024-04-29 NOTE — TELEPHONE ENCOUNTER
Ines Topete MD  [facility]  MRI of lumbar spine shows multilevel DDD. Possible recurrent disc herniation at L1-2. These changes could be contributing to current symptoms. Would recommend neurosurgery f/u.

## 2024-04-29 NOTE — TELEPHONE ENCOUNTER
Caller: Aaron    Doctor: Yoselyn    Reason for call: patient calling for result of MRI     Call back#: [phone]

## 2024-04-30 NOTE — TELEPHONE ENCOUNTER
Caller: windy Walker    Doctor: Yoselyn    Reason for call: pt returning nurses call    Call back#: [phone]

## 2024-04-30 NOTE — TELEPHONE ENCOUNTER
S/w pt and advised of MRI results below. Pt verbalized understanding. Pt asking if RD can rec another NS or Ortho spine sx for eval of recurrent herniation. Pt states he was not happy w/ the care he rec'd by Dr. Arceo and would like see someone else if poss. RN advised pt he can call NS and ask for another provider poss or we do have an ortho spine surgeon as well Dr. Fish that may be able to provide another opinion.    Are you able to provide ref to other NS or Ortho spine?    Pls advise. Thank you!

## 2024-05-01 ENCOUNTER — TELEPHONE (OUTPATIENT)
Age: 59
End: 2024-05-01

## 2024-05-01 DIAGNOSIS — R93.7 ABNORMAL MRI, LUMBAR SPINE: ICD-10-CM

## 2024-05-01 DIAGNOSIS — M51.26 LUMBAR DISC HERNIATION: Primary | ICD-10-CM

## 2024-05-23 PROBLEM — Z13.220 SCREENING FOR HYPERLIPIDEMIA: Status: RESOLVED | Noted: 2022-08-23 | Resolved: 2024-05-23

## 2024-05-23 PROBLEM — Z12.5 SCREENING FOR PROSTATE CANCER: Status: RESOLVED | Noted: 2023-10-23 | Resolved: 2024-05-23

## 2024-06-20 ENCOUNTER — OFFICE VISIT (OUTPATIENT)
Dept: NEUROSURGERY | Facility: CLINIC | Age: 59
End: 2024-06-20
Payer: COMMERCIAL

## 2024-06-20 VITALS
TEMPERATURE: 97.9 F | SYSTOLIC BLOOD PRESSURE: 126 MMHG | RESPIRATION RATE: 18 BRPM | HEART RATE: 80 BPM | BODY MASS INDEX: 42.66 KG/M2 | WEIGHT: 315 LBS | OXYGEN SATURATION: 95 % | DIASTOLIC BLOOD PRESSURE: 78 MMHG | HEIGHT: 72 IN

## 2024-06-20 DIAGNOSIS — M51.26 LUMBAR DISC HERNIATION: ICD-10-CM

## 2024-06-20 DIAGNOSIS — M51.36 DDD (DEGENERATIVE DISC DISEASE), LUMBAR: ICD-10-CM

## 2024-06-20 DIAGNOSIS — R25.2 SPASTICITY: ICD-10-CM

## 2024-06-20 DIAGNOSIS — R93.7 ABNORMAL MRI, LUMBAR SPINE: ICD-10-CM

## 2024-06-20 DIAGNOSIS — E66.01 MORBID OBESITY (HCC): Primary | ICD-10-CM

## 2024-06-20 DIAGNOSIS — M54.16 LUMBAR RADICULOPATHY: ICD-10-CM

## 2024-06-20 PROCEDURE — 99213 OFFICE O/P EST LOW 20 MIN: CPT | Performed by: NEUROLOGICAL SURGERY

## 2024-06-20 NOTE — PROGRESS NOTES
DISCUSSION SUMMARY   This is a 59 y.o. male with a large recurrent disc herniation and a kyphosis beginning at the level of recurrence.  There is degenerative disc disease is now symptomatic from a contralateral disc herniation indicative of a disc which is lost its cohesion.  He has failed conservative therapies including physical therapy which actually made his problems worse.  He has spasticity.  I recommended a decompression with discectomy and a fusion at this level.  He asked informed questions reflecting his understanding would like to think about this.  He will get back to us with regards to how he would like to proceed.    No follow-ups on file.      Diagnosis ICD-10-CM Associated Orders   1. Morbid obesity (HCC)  E66.01       2. Lumbar disc herniation  M51.26 Ambulatory Referral to Neurosurgery      3. Abnormal MRI, lumbar spine  R93.7 Ambulatory Referral to Neurosurgery      4. DDD (degenerative disc disease), lumbar  M51.36       5. Spasticity  R25.2       6. Lumbar radiculopathy  M54.16            Chief Complaint   Patient presents with    Follow-up     confirmed--bw  ref back last seen [facility] 12/29/21 Lumbar disc herniation MRI Lumbar 4/25/24 SL           HPIright hip and thigh pain  No back pain.  Odd jobs around the house exacerbate the pain.  Right thigh pain worsens with activitiy  Pain has been present for some time but worsened.  Leghs jump at night more right than left.  This happens spontaneously and is worsening  Has done PT but this worsened the problem  Right hip injection 3/4/24 which did nothing and possibly made the pain worse.  No BB issues  Drove [employer] all his life.  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  Sessile life style now secondary to pain  Most recent exacerbation of pain occurred while running a snowblower this winter.    Review of Systems   Constitutional: Negative.    HENT: Negative.     Eyes: Negative.    Respiratory: Negative.     Cardiovascular: Negative.     Gastrointestinal: Negative.    Endocrine: Negative.    Genitourinary: Negative.    Musculoskeletal:  Positive for back pain (pain in the right hip and thigh) and gait problem (has pain when walking for an extended period of time).   Skin: Negative.    Allergic/Immunologic: Negative.    Neurological:  Positive for tremors (legs jump at night R>L worse in the last week) and weakness (had some weakness in the left leg a few days ago). Negative for numbness.   Hematological: Negative.    Psychiatric/Behavioral: Negative.     I reviewed the ROS    Vitals:    /78 (BP Location: Left arm, Patient Position: Sitting)   Pulse 80   Temp 97.9 °F (36.6 °C) (Temporal)   Resp 18   Ht 6' (1.829 m)   Wt (!) 147 kg (324 lb)   SpO2 95%   BMI 43.94 kg/m²     MEDICAL HISTORY  Past Medical History:   Diagnosis Date    Benign arteriolar nephrosclerosis     Diabetes mellitus (HCC)     Essential hypertension     History of transfusion 2013    Hyperparathyroidism due to renal insufficiency (HCC)     Legionnaire's disease (HCC)     Pneumonia     s/p VDRF      Past Surgical History:   Procedure Laterality Date    COLONOSCOPY      OTHER SURGICAL HISTORY      Dialysis catheter     SC LAMNOTMY INCL W/DCMPRSN NRV ROOT 1 INTRSPC LUMBR Left 11/15/2021    Procedure: Left sided L1-2 microdiscectomy;  Surgeon: Andi Campoverde MD;  Location:  MAIN OR;  Service: Neurosurgery     Social History     Tobacco Use    Smoking status: Never     Passive exposure: Never    Smokeless tobacco: Never   Vaping Use    Vaping status: Never Used   Substance Use Topics    Alcohol use: Not Currently     Comment: Denies alcohol use - As per [employer]     Drug use: Never      Family History   Problem Relation Age of Onset    Hyperlipidemia Mother     Lung cancer Father     Stomach cancer Father     Cancer Father     Diabetes type II Father     Diabetes type II Brother         Current Outpatient Medications:     acetaminophen (TYLENOL) 500 mg tablet, Take  500 mg by mouth 2 (two) times a day, Disp: , Rfl:     carvedilol (COREG) 12.5 mg tablet, TAKE 1 TABLET TWICE A DAY, Disp: 180 tablet, Rfl: 3    cholecalciferol (VITAMIN D3) 400 units tablet, Take 400 Units by mouth daily, Disp: , Rfl:     fexofenadine (ALLEGRA) 180 MG tablet, Take 180 mg by mouth daily, Disp: , Rfl:     glimepiride (AMARYL) 2 mg tablet, TAKE 1 TABLET DAILY WITH BREAKFAST, Disp: 90 tablet, Rfl: 3    lisinopril-hydrochlorothiazide (PRINZIDE,ZESTORETIC) 20-12.5 MG per tablet, take 1 tablet by mouth twice a day, Disp: 180 tablet, Rfl: 1    Multiple Vitamins-Minerals (multivitamin with minerals) tablet, Take 1 tablet by mouth daily, Disp: , Rfl:      No Known Allergies     The following portions of the patient's history were updated by MA and reviewed by MD: allergies, current medications, past family history, past medical history, past social history, past surgical history, and problem list.    Physical Exam  Vitals and nursing note reviewed.   Constitutional:       General: He is not in acute distress.     Appearance: Normal appearance. He is obese. He is not ill-appearing, toxic-appearing or diaphoretic.   HENT:      Head: Normocephalic and atraumatic.      Nose: Nose normal.   Eyes:      Extraocular Movements: Extraocular movements intact.      Pupils: Pupils are equal, round, and reactive to light.   Musculoskeletal:         General: No swelling, tenderness, deformity or signs of injury. Normal range of motion.      Cervical back: Normal range of motion and neck supple.      Right lower leg: No edema.      Left lower leg: No edema.   Skin:     General: Skin is warm and dry.   Neurological:      Mental Status: He is alert and oriented to person, place, and time. Mental status is at baseline.      Cranial Nerves: No cranial nerve deficit.      Sensory: Sensory deficit (Deep vibratory sense is absent on the right and reduced on the left) present.      Motor: Weakness (Significant weakness when  attempting to step on to a stool with both legs the right is worse than the left.  With lunges he has great difficulty right worse than left.) present.      Coordination: Coordination normal.      Gait: Gait abnormal (His tandem gait is very poor with sidestepping after 2 steps).      Deep Tendon Reflexes: Reflexes abnormal (DTR on the right is absent and on the left is hyperreflexic).   Psychiatric:         Mood and Affect: Mood normal.         Behavior: Behavior normal.         Thought Content: Thought content normal.         Judgment: Judgment normal.         RESULTS/DATA  I have personally reviewed pertinent films in PACS    MRI of the LS spine is carefully reviewed.  This demonstrates a recurrent disc herniation at the L1-2 region now eccentric to the contralateral side from the previous surgery.  There is a  kyphosis over this region.  The combination of the deformity and a herniated disc with degenerative disc disease are strongly suggestive of the need of fusion.  The recurrent disc herniation appears to have regions of calcification, scar as well as recurrent disc.  All of these conspired to produce spinal stenosis as well as foraminal stenosis

## 2024-06-25 ENCOUNTER — TELEPHONE (OUTPATIENT)
Age: 59
End: 2024-06-25

## 2024-08-16 ENCOUNTER — TELEPHONE (OUTPATIENT)
Dept: INTERNAL MEDICINE CLINIC | Facility: CLINIC | Age: 59
End: 2024-08-16

## 2024-08-16 NOTE — TELEPHONE ENCOUNTER
left voicemail to call to reschedule appointment with either Lily in [place] or another provider here in our office

## 2024-08-22 ENCOUNTER — OFFICE VISIT (OUTPATIENT)
Dept: NEUROSURGERY | Facility: CLINIC | Age: 59
End: 2024-08-22
Payer: COMMERCIAL

## 2024-08-22 VITALS
TEMPERATURE: 97.7 F | DIASTOLIC BLOOD PRESSURE: 82 MMHG | HEART RATE: 57 BPM | RESPIRATION RATE: 18 BRPM | WEIGHT: 315 LBS | OXYGEN SATURATION: 95 % | HEIGHT: 72 IN | BODY MASS INDEX: 42.66 KG/M2 | SYSTOLIC BLOOD PRESSURE: 162 MMHG

## 2024-08-22 DIAGNOSIS — M51.26 LUMBAR DISC HERNIATION: Primary | ICD-10-CM

## 2024-08-22 DIAGNOSIS — E11.22 TYPE 2 DIABETES MELLITUS WITH STAGE 2 CHRONIC KIDNEY DISEASE, WITHOUT LONG-TERM CURRENT USE OF INSULIN  (HCC): Chronic | ICD-10-CM

## 2024-08-22 DIAGNOSIS — M54.16 LUMBAR RADICULOPATHY: ICD-10-CM

## 2024-08-22 DIAGNOSIS — E66.01 MORBID OBESITY (HCC): ICD-10-CM

## 2024-08-22 DIAGNOSIS — M51.36 DDD (DEGENERATIVE DISC DISEASE), LUMBAR: ICD-10-CM

## 2024-08-22 DIAGNOSIS — R93.7 ABNORMAL MRI, LUMBAR SPINE: ICD-10-CM

## 2024-08-22 DIAGNOSIS — N18.2 TYPE 2 DIABETES MELLITUS WITH STAGE 2 CHRONIC KIDNEY DISEASE, WITHOUT LONG-TERM CURRENT USE OF INSULIN  (HCC): Chronic | ICD-10-CM

## 2024-08-22 PROCEDURE — 99213 OFFICE O/P EST LOW 20 MIN: CPT | Performed by: NEUROLOGICAL SURGERY

## 2024-08-22 RX ORDER — CHLORHEXIDINE GLUCONATE ORAL RINSE 1.2 MG/ML
15 SOLUTION DENTAL ONCE
OUTPATIENT
Start: 2024-08-22 | End: 2024-08-22

## 2024-08-22 NOTE — PROGRESS NOTES
DISCUSSION SUMMARY   This is a 59 y.o. male with a multiply recurrent disc hernioation and severe lumbar arthrosis.  Because of these two facts a recurrence after a simple discektomy is inevetible at this point.  He has severe degeneration and arthrosis at the level below making a stopping point for the fusion very likely to cause severe dysfunction at the L2/3 level.  It is for these reasons the following procedure is indicated    Return for surgical intervention.      Diagnosis ICD-10-CM Associated Orders   1. Lumbar disc herniation  M51.26 Case request operating room: ROBOTIC ASSISTED DECOMPRESSION AND TRANSVERSE LUMBAR  INTERBODY FUSION L1-3 - LEFT SIDED APPROACH     Case request operating room: ROBOTIC ASSISTED DECOMPRESSION AND TRANSVERSE LUMBAR  INTERBODY FUSION L1-3 - LEFT SIDED APPROACH      2. Abnormal MRI, lumbar spine  R93.7 Case request operating room: ROBOTIC ASSISTED DECOMPRESSION AND TRANSVERSE LUMBAR  INTERBODY FUSION L1-3 - LEFT SIDED APPROACH     Case request operating room: ROBOTIC ASSISTED DECOMPRESSION AND TRANSVERSE LUMBAR  INTERBODY FUSION L1-3 - LEFT SIDED APPROACH      3. Morbid obesity ([facility])  E66.01 Case request operating room: ROBOTIC ASSISTED DECOMPRESSION AND TRANSVERSE LUMBAR  INTERBODY FUSION L1-3 - LEFT SIDED APPROACH     Case request operating room: ROBOTIC ASSISTED DECOMPRESSION AND TRANSVERSE LUMBAR  INTERBODY FUSION L1-3 - LEFT SIDED APPROACH      4. DDD (degenerative disc disease), lumbar  M51.36       5. Lumbar radiculopathy  M54.16 Case request operating room: ROBOTIC ASSISTED DECOMPRESSION AND TRANSVERSE LUMBAR  INTERBODY FUSION L1-3 - LEFT SIDED APPROACH     Case request operating room: ROBOTIC ASSISTED DECOMPRESSION AND TRANSVERSE LUMBAR  INTERBODY FUSION L1-3 - LEFT SIDED APPROACH      6. Type 2 diabetes mellitus with stage 2 chronic kidney disease, without long-term current use of insulin  ([facility])  E11.22     N18.2            Chief Complaint   Patient presents with     Follow-up     Pt wants to discuss surgical options        HPI  Patient has been seen in the recent past.  He returned today for a description of the surgical procdure.  His pain has improved moderately but ambulation is worse  Pins and needles bilaterally lower extremities   Legs jumping at night  Feels as if he will fall constantly  Because of the pain and jumping he has difficulty sleeping at night    Review of Systems   Constitutional: Negative.    HENT: Negative.  Negative for trouble swallowing.    Eyes: Negative.    Respiratory: Negative.     Cardiovascular: Negative.    Gastrointestinal: Negative.    Endocrine: Negative.    Genitourinary: Negative.  Negative for enuresis.   Musculoskeletal:  Positive for gait problem (problems walking for long periods of time, will get pain). Negative for back pain and myalgias.   Skin: Negative.    Allergic/Immunologic: Negative.    Neurological:  Positive for tremors (legs jump at night b/l, occa during the day), weakness (BLE) and numbness (intermittent in b/l feet).   Hematological: Negative.  Does not bruise/bleed easily.   Psychiatric/Behavioral:  Positive for sleep disturbance.        Vitals:    /82 (BP Location: Left arm, Patient Position: Sitting, Cuff Size: Standard)   Pulse 57   Temp 97.7 °F (36.5 °C) (Temporal)   Resp 18   Ht 6' (1.829 m)   Wt (!) 147 kg (324 lb)   SpO2 95%   BMI 43.94 kg/m²     MEDICAL HISTORY  Past Medical History:   Diagnosis Date    Benign arteriolar nephrosclerosis     Diabetes mellitus (HCC)     Essential hypertension     History of transfusion 2013    Hyperparathyroidism due to renal insufficiency (HCC)     Legionnaire's disease (HCC)     Pneumonia     s/p VDRF      Past Surgical History:   Procedure Laterality Date    COLONOSCOPY      OTHER SURGICAL HISTORY      Dialysis catheter     MS LAMNOTMY INCL W/DCMPRSN NRV ROOT 1 INTRSPC LUMBR Left 11/15/2021    Procedure: Left sided L1-2 microdiscectomy;  Surgeon: Andi Campoverde  MD;  Location:  MAIN OR;  Service: Neurosurgery     Social History     Tobacco Use    Smoking status: Never     Passive exposure: Never    Smokeless tobacco: Never   Vaping Use    Vaping status: Never Used   Substance Use Topics    Alcohol use: Not Currently     Comment: Denies alcohol use - As per [employer]     Drug use: Never      Family History   Problem Relation Age of Onset    Hyperlipidemia Mother     Lung cancer Father     Stomach cancer Father     Cancer Father     Diabetes type II Father     Diabetes type II Brother         Current Outpatient Medications:     acetaminophen (TYLENOL) 500 mg tablet, Take 500 mg by mouth 2 (two) times a day, Disp: , Rfl:     carvedilol (COREG) 12.5 mg tablet, TAKE 1 TABLET TWICE A DAY, Disp: 180 tablet, Rfl: 3    cholecalciferol (VITAMIN D3) 400 units tablet, Take 400 Units by mouth daily, Disp: , Rfl:     fexofenadine (ALLEGRA) 180 MG tablet, Take 180 mg by mouth daily, Disp: , Rfl:     glimepiride (AMARYL) 2 mg tablet, TAKE 1 TABLET DAILY WITH BREAKFAST, Disp: 90 tablet, Rfl: 3    lisinopril-hydrochlorothiazide (PRINZIDE,ZESTORETIC) 20-12.5 MG per tablet, take 1 tablet by mouth twice a day, Disp: 180 tablet, Rfl: 1    Multiple Vitamins-Minerals (multivitamin with minerals) tablet, Take 1 tablet by mouth daily, Disp: , Rfl:      No Known Allergies     The following portions of the patient's history were updated by MA and reviewed by MD: allergies, current medications, past family history, past medical history, past social history, past surgical history, and problem list.    Physical Exam  Vitals and nursing note reviewed.   Constitutional:       General: He is not in acute distress.     Appearance: Normal appearance. He is obese. He is not ill-appearing, toxic-appearing or diaphoretic.   HENT:      Head: Normocephalic and atraumatic.      Nose: Nose normal.   Eyes:      Extraocular Movements: Extraocular movements intact.      Pupils: Pupils are equal, round, and reactive to  light.   Cardiovascular:      Rate and Rhythm: Normal rate.   Pulmonary:      Effort: Pulmonary effort is normal.   Abdominal:      Comments: Obese - 324 lbs 6 feet tall   Musculoskeletal:         General: No swelling, tenderness, deformity or signs of injury. Normal range of motion.      Cervical back: Normal range of motion and neck supple.      Right lower leg: No edema.      Left lower leg: No edema.   Skin:     General: Skin is warm and dry.   Neurological:      Mental Status: He is alert and oriented to person, place, and time. Mental status is at baseline.      Cranial Nerves: No cranial nerve deficit.      Sensory: No sensory deficit.      Motor: Weakness (bilateral lower extremities) present.      Coordination: Coordination abnormal.      Gait: Gait abnormal (great difficulty in ambulation .  spastic slow wide based gait).      Deep Tendon Reflexes: Reflexes normal.   Psychiatric:         Mood and Affect: Mood normal.         Behavior: Behavior normal.         Thought Content: Thought content normal.         Judgment: Judgment normal.           RESULTS/DATA  I have personally reviewed pertinent films in PACS  MRI of the LS spine is carefully reviewed with the patient.  This demonstrates a very large disc herniation at the L1/2 level  There is compression of the conus medularis.  There is a second disc herniation and severe disc space collapse at the L2/3 level.  There is change of the cord signal.The represents severe spinal stenosis

## 2024-08-28 ENCOUNTER — TELEPHONE (OUTPATIENT)
Dept: NEUROSURGERY | Facility: CLINIC | Age: 59
End: 2024-08-28

## 2024-09-28 ENCOUNTER — LAB REQUISITION (OUTPATIENT)
Dept: LAB | Facility: HOSPITAL | Age: 59
End: 2024-09-28
Payer: COMMERCIAL

## 2024-09-28 ENCOUNTER — APPOINTMENT (OUTPATIENT)
Dept: LAB | Facility: HOSPITAL | Age: 59
End: 2024-09-28
Payer: COMMERCIAL

## 2024-09-28 ENCOUNTER — OFFICE VISIT (OUTPATIENT)
Dept: LAB | Facility: HOSPITAL | Age: 59
End: 2024-09-28
Payer: COMMERCIAL

## 2024-09-28 DIAGNOSIS — E66.01 MORBID OBESITY (HCC): ICD-10-CM

## 2024-09-28 DIAGNOSIS — M51.369 DDD (DEGENERATIVE DISC DISEASE), LUMBAR: ICD-10-CM

## 2024-09-28 DIAGNOSIS — R93.7 ABNORMAL MRI, LUMBAR SPINE: ICD-10-CM

## 2024-09-28 DIAGNOSIS — M51.369 OTHER INTERVERTEBRAL DISC DEGENERATION, LUMBAR REGION: ICD-10-CM

## 2024-09-28 DIAGNOSIS — M51.26 OTHER INTERVERTEBRAL DISC DISPLACEMENT, LUMBAR REGION: ICD-10-CM

## 2024-09-28 DIAGNOSIS — R93.7 ABNORMAL FINDINGS ON DIAGNOSTIC IMAGING OF OTHER PARTS OF MUSCULOSKELETAL SYSTEM: ICD-10-CM

## 2024-09-28 DIAGNOSIS — Z13.220 SCREENING FOR HYPERLIPIDEMIA: ICD-10-CM

## 2024-09-28 DIAGNOSIS — M51.36 DDD (DEGENERATIVE DISC DISEASE), LUMBAR: ICD-10-CM

## 2024-09-28 DIAGNOSIS — E11.22 TYPE 2 DIABETES MELLITUS WITH STAGE 2 CHRONIC KIDNEY DISEASE, WITHOUT LONG-TERM CURRENT USE OF INSULIN  (HCC): ICD-10-CM

## 2024-09-28 DIAGNOSIS — N18.2 TYPE 2 DIABETES MELLITUS WITH STAGE 2 CHRONIC KIDNEY DISEASE, WITHOUT LONG-TERM CURRENT USE OF INSULIN  (HCC): ICD-10-CM

## 2024-09-28 DIAGNOSIS — M51.26 LUMBAR DISC HERNIATION: ICD-10-CM

## 2024-09-28 DIAGNOSIS — M54.16 RADICULOPATHY, LUMBAR REGION: ICD-10-CM

## 2024-09-28 DIAGNOSIS — M54.16 LUMBAR RADICULOPATHY: ICD-10-CM

## 2024-09-28 DIAGNOSIS — E11.22 TYPE 2 DIABETES MELLITUS WITH STAGE 2 CHRONIC KIDNEY DISEASE, WITHOUT LONG-TERM CURRENT USE OF INSULIN  (HCC): Chronic | ICD-10-CM

## 2024-09-28 DIAGNOSIS — N18.2 TYPE 2 DIABETES MELLITUS WITH STAGE 2 CHRONIC KIDNEY DISEASE, WITHOUT LONG-TERM CURRENT USE OF INSULIN  (HCC): Chronic | ICD-10-CM

## 2024-09-28 DIAGNOSIS — E55.9 VITAMIN D DEFICIENCY: ICD-10-CM

## 2024-09-28 DIAGNOSIS — Z12.5 SCREENING FOR PROSTATE CANCER: ICD-10-CM

## 2024-09-28 DIAGNOSIS — I10 ESSENTIAL HYPERTENSION: Chronic | ICD-10-CM

## 2024-09-28 LAB
ABO GROUP BLD: NORMAL
ALBUMIN SERPL BCG-MCNC: 4.3 G/DL (ref 3.5–5)
ALP SERPL-CCNC: 42 U/L (ref 34–104)
ALT SERPL W P-5'-P-CCNC: 35 U/L (ref 7–52)
ANION GAP SERPL CALCULATED.3IONS-SCNC: 8 MMOL/L (ref 4–13)
APTT PPP: 26 SECONDS (ref 23–34)
AST SERPL W P-5'-P-CCNC: 25 U/L (ref 13–39)
ATRIAL RATE: 80 BPM
BASOPHILS # BLD AUTO: 0.05 THOUSANDS/ΜL (ref 0–0.1)
BASOPHILS NFR BLD AUTO: 1 % (ref 0–1)
BILIRUB SERPL-MCNC: 0.83 MG/DL (ref 0.2–1)
BILIRUB UR QL STRIP: NEGATIVE
BLD GP AB SCN SERPL QL: NEGATIVE
BUN SERPL-MCNC: 20 MG/DL (ref 5–25)
CALCIUM SERPL-MCNC: 9.6 MG/DL (ref 8.4–10.2)
CHLORIDE SERPL-SCNC: 96 MMOL/L (ref 96–108)
CHOLEST SERPL-MCNC: 282 MG/DL
CLARITY UR: CLEAR
CO2 SERPL-SCNC: 32 MMOL/L (ref 21–32)
COLOR UR: YELLOW
CREAT SERPL-MCNC: 1.05 MG/DL (ref 0.6–1.3)
EOSINOPHIL # BLD AUTO: 0.42 THOUSAND/ΜL (ref 0–0.61)
EOSINOPHIL NFR BLD AUTO: 4 % (ref 0–6)
ERYTHROCYTE [DISTWIDTH] IN BLOOD BY AUTOMATED COUNT: 12.9 % (ref 11.6–15.1)
EST. AVERAGE GLUCOSE BLD GHB EST-MCNC: 160 MG/DL
GFR SERPL CREATININE-BSD FRML MDRD: 76 ML/MIN/1.73SQ M
GLUCOSE P FAST SERPL-MCNC: 150 MG/DL (ref 65–99)
GLUCOSE UR STRIP-MCNC: NEGATIVE MG/DL
HBA1C MFR BLD: 7.2 %
HCT VFR BLD AUTO: 48.5 % (ref 36.5–49.3)
HDLC SERPL-MCNC: 36 MG/DL
HGB BLD-MCNC: 15.7 G/DL (ref 12–17)
HGB UR QL STRIP.AUTO: NEGATIVE
IMM GRANULOCYTES # BLD AUTO: 0.06 THOUSAND/UL (ref 0–0.2)
IMM GRANULOCYTES NFR BLD AUTO: 1 % (ref 0–2)
INR PPP: 0.9 (ref 0.85–1.19)
KETONES UR STRIP-MCNC: NEGATIVE MG/DL
LDLC SERPL CALC-MCNC: 185 MG/DL (ref 0–100)
LEUKOCYTE ESTERASE UR QL STRIP: NEGATIVE
LYMPHOCYTES # BLD AUTO: 2.37 THOUSANDS/ΜL (ref 0.6–4.47)
LYMPHOCYTES NFR BLD AUTO: 24 % (ref 14–44)
MCH RBC QN AUTO: 28.8 PG (ref 26.8–34.3)
MCHC RBC AUTO-ENTMCNC: 32.4 G/DL (ref 31.4–37.4)
MCV RBC AUTO: 89 FL (ref 82–98)
MONOCYTES # BLD AUTO: 0.63 THOUSAND/ΜL (ref 0.17–1.22)
MONOCYTES NFR BLD AUTO: 7 % (ref 4–12)
NEUTROPHILS # BLD AUTO: 6.21 THOUSANDS/ΜL (ref 1.85–7.62)
NEUTS SEG NFR BLD AUTO: 63 % (ref 43–75)
NITRITE UR QL STRIP: NEGATIVE
NONHDLC SERPL-MCNC: 246 MG/DL
NRBC BLD AUTO-RTO: 0 /100 WBCS
P AXIS: 11 DEGREES
PH UR STRIP.AUTO: 6 [PH]
PLATELET # BLD AUTO: 276 THOUSANDS/UL (ref 149–390)
PMV BLD AUTO: 9.9 FL (ref 8.9–12.7)
POTASSIUM SERPL-SCNC: 4.1 MMOL/L (ref 3.5–5.3)
PR INTERVAL: 144 MS
PROT SERPL-MCNC: 7.3 G/DL (ref 6.4–8.4)
PROT UR STRIP-MCNC: NEGATIVE MG/DL
PROTHROMBIN TIME: 12.7 SECONDS (ref 12.3–15)
PSA SERPL-MCNC: 0.35 NG/ML (ref 0–4)
QRS AXIS: -21 DEGREES
QRSD INTERVAL: 88 MS
QT INTERVAL: 400 MS
QTC INTERVAL: 461 MS
RBC # BLD AUTO: 5.45 MILLION/UL (ref 3.88–5.62)
RH BLD: POSITIVE
SODIUM SERPL-SCNC: 136 MMOL/L (ref 135–147)
SP GR UR STRIP.AUTO: 1.02
SPECIMEN EXPIRATION DATE: NORMAL
T WAVE AXIS: 24 DEGREES
TRIGL SERPL-MCNC: 303 MG/DL
UROBILINOGEN UR QL STRIP.AUTO: 0.2 E.U./DL
VENTRICULAR RATE: 80 BPM
WBC # BLD AUTO: 9.74 THOUSAND/UL (ref 4.31–10.16)

## 2024-09-28 PROCEDURE — 93010 ELECTROCARDIOGRAM REPORT: CPT | Performed by: INTERNAL MEDICINE

## 2024-09-28 PROCEDURE — 87081 CULTURE SCREEN ONLY: CPT

## 2024-09-28 PROCEDURE — 82043 UR ALBUMIN QUANTITATIVE: CPT

## 2024-09-28 PROCEDURE — 82306 VITAMIN D 25 HYDROXY: CPT

## 2024-09-28 PROCEDURE — 86850 RBC ANTIBODY SCREEN: CPT | Performed by: NEUROLOGICAL SURGERY

## 2024-09-28 PROCEDURE — 85610 PROTHROMBIN TIME: CPT

## 2024-09-28 PROCEDURE — 86900 BLOOD TYPING SEROLOGIC ABO: CPT | Performed by: NEUROLOGICAL SURGERY

## 2024-09-28 PROCEDURE — 81003 URINALYSIS AUTO W/O SCOPE: CPT

## 2024-09-28 PROCEDURE — 83036 HEMOGLOBIN GLYCOSYLATED A1C: CPT

## 2024-09-28 PROCEDURE — 80053 COMPREHEN METABOLIC PANEL: CPT

## 2024-09-28 PROCEDURE — 36415 COLL VENOUS BLD VENIPUNCTURE: CPT

## 2024-09-28 PROCEDURE — 80061 LIPID PANEL: CPT

## 2024-09-28 PROCEDURE — 93005 ELECTROCARDIOGRAM TRACING: CPT

## 2024-09-28 PROCEDURE — 86901 BLOOD TYPING SEROLOGIC RH(D): CPT | Performed by: NEUROLOGICAL SURGERY

## 2024-09-28 PROCEDURE — 85025 COMPLETE CBC W/AUTO DIFF WBC: CPT

## 2024-09-28 PROCEDURE — G0103 PSA SCREENING: HCPCS

## 2024-09-28 PROCEDURE — 82570 ASSAY OF URINE CREATININE: CPT

## 2024-09-28 PROCEDURE — 85730 THROMBOPLASTIN TIME PARTIAL: CPT

## 2024-09-29 LAB
25(OH)D3 SERPL-MCNC: 39.3 NG/ML (ref 30–100)
CREAT UR-MCNC: 130.1 MG/DL
MICROALBUMIN UR-MCNC: 66.5 MG/L
MICROALBUMIN/CREAT 24H UR: 51 MG/G CREATININE (ref 0–30)
MRSA NOSE QL CULT: NORMAL

## 2024-10-07 NOTE — PRE-PROCEDURE INSTRUCTIONS
Pre-Surgery Instructions:   Medication Instructions    acetaminophen (TYLENOL) 500 mg tablet Uses PRN- OK to take day of surgery    carvedilol (COREG) 12.5 mg tablet Take day of surgery.    cholecalciferol (VITAMIN D3) 400 units tablet Stop taking 7 days prior to surgery.    fexofenadine (ALLEGRA) 180 MG tablet Hold day of surgery.    glimepiride (AMARYL) 2 mg tablet Hold day of surgery.    lisinopril-hydrochlorothiazide (PRINZIDE,ZESTORETIC) 20-12.5 MG per tablet Hold day of surgery.    Multiple Vitamins-Minerals (multivitamin with minerals) tablet Stop taking 7 days prior to surgery.   Medication instructions for day surgery reviewed. Please use only a sip of water to take your instructed medications. Avoid all over the counter vitamins, supplements and NSAIDS for one week prior to surgery per anesthesia guidelines. Tylenol is ok to take as needed.     You will receive a call one business day prior to surgery with an arrival time and hospital directions. If your surgery is scheduled on a Monday, the hospital will be calling you on the Friday prior to your surgery. If you have not heard from anyone by 8pm, please call the hospital supervisor through the hospital  at [phone]. ([place] [phone] or [place] [phone]).    Do not eat or drink anything after midnight the night before your surgery, including candy, mints, lifesavers, or chewing gum. Do not drink alcohol 24hrs before your surgery. Try not to smoke at least 24hrs before your surgery.       Follow the pre surgery showering instructions as listed in the “My Surgical Experience Booklet” or otherwise provided by your surgeon's office. Do not use a blade to shave the surgical area 1 week before surgery. It is okay to use a clean electric clippers up to 24 hours before surgery. Do not apply any lotions, creams, including makeup, cologne, deodorant, or perfumes after showering on the day of your surgery. Do not use dry shampoo, hair spray,  hair gel, or any type of hair products.     No contact lenses, eye make-up, or artificial eyelashes. Remove nail polish, including gel polish, and any artificial, gel, or acrylic nails if possible. Remove all jewelry including rings and body piercing jewelry.     Wear causal clothing that is easy to take on and off. Consider your type of surgery.    Keep any valuables, jewelry, piercings at home. Please bring any specially ordered equipment (sling, braces) if indicated.    Arrange for a responsible person to drive you to and from the hospital on the day of your surgery. Please confirm the visitor policy for the day of your procedure when you receive your phone call with an arrival time.     Call the surgeon's office with any new illnesses, exposures, or additional questions prior to surgery.    Please reference your “My Surgical Experience Booklet” for additional information to prepare for your upcoming surgery.

## 2024-10-12 ENCOUNTER — HOSPITAL ENCOUNTER (OUTPATIENT)
Dept: CT IMAGING | Facility: HOSPITAL | Age: 59
Discharge: HOME | End: 2024-10-12
Attending: NEUROLOGICAL SURGERY
Payer: COMMERCIAL

## 2024-10-12 DIAGNOSIS — M51.369 DDD (DEGENERATIVE DISC DISEASE), LUMBAR: ICD-10-CM

## 2024-10-12 DIAGNOSIS — R93.7 ABNORMAL MRI, LUMBAR SPINE: ICD-10-CM

## 2024-10-12 DIAGNOSIS — E66.01 MORBID OBESITY (HCC): ICD-10-CM

## 2024-10-12 DIAGNOSIS — N18.2 TYPE 2 DIABETES MELLITUS WITH STAGE 2 CHRONIC KIDNEY DISEASE, WITHOUT LONG-TERM CURRENT USE OF INSULIN  (HCC): Chronic | ICD-10-CM

## 2024-10-12 DIAGNOSIS — M51.26 LUMBAR DISC HERNIATION: ICD-10-CM

## 2024-10-12 DIAGNOSIS — E11.22 TYPE 2 DIABETES MELLITUS WITH STAGE 2 CHRONIC KIDNEY DISEASE, WITHOUT LONG-TERM CURRENT USE OF INSULIN  (HCC): Chronic | ICD-10-CM

## 2024-10-12 DIAGNOSIS — M54.16 LUMBAR RADICULOPATHY: ICD-10-CM

## 2024-10-12 PROCEDURE — 72131 CT LUMBAR SPINE W/O DYE: CPT

## 2024-10-17 ENCOUNTER — CONSULT (OUTPATIENT)
Dept: FAMILY MEDICINE CLINIC | Facility: CLINIC | Age: 59
End: 2024-10-17
Payer: COMMERCIAL

## 2024-10-17 VITALS
TEMPERATURE: 97.6 F | OXYGEN SATURATION: 93 % | SYSTOLIC BLOOD PRESSURE: 132 MMHG | DIASTOLIC BLOOD PRESSURE: 86 MMHG | WEIGHT: 315 LBS | HEIGHT: 72 IN | HEART RATE: 84 BPM | BODY MASS INDEX: 42.66 KG/M2

## 2024-10-17 DIAGNOSIS — Z23 ENCOUNTER FOR IMMUNIZATION: ICD-10-CM

## 2024-10-17 DIAGNOSIS — N18.2 STAGE 2 CHRONIC KIDNEY DISEASE: ICD-10-CM

## 2024-10-17 DIAGNOSIS — E66.01 CLASS 3 SEVERE OBESITY DUE TO EXCESS CALORIES WITH SERIOUS COMORBIDITY AND BODY MASS INDEX (BMI) OF 45.0 TO 49.9 IN ADULT (HCC): ICD-10-CM

## 2024-10-17 DIAGNOSIS — E11.22 TYPE 2 DIABETES MELLITUS WITH STAGE 2 CHRONIC KIDNEY DISEASE, WITHOUT LONG-TERM CURRENT USE OF INSULIN  (HCC): Chronic | ICD-10-CM

## 2024-10-17 DIAGNOSIS — E78.2 MIXED HYPERLIPIDEMIA: ICD-10-CM

## 2024-10-17 DIAGNOSIS — Z01.818 PREOPERATIVE CLEARANCE: Primary | ICD-10-CM

## 2024-10-17 DIAGNOSIS — E66.813 CLASS 3 SEVERE OBESITY DUE TO EXCESS CALORIES WITH SERIOUS COMORBIDITY AND BODY MASS INDEX (BMI) OF 45.0 TO 49.9 IN ADULT (HCC): ICD-10-CM

## 2024-10-17 DIAGNOSIS — I10 ESSENTIAL HYPERTENSION: Chronic | ICD-10-CM

## 2024-10-17 DIAGNOSIS — N18.2 TYPE 2 DIABETES MELLITUS WITH STAGE 2 CHRONIC KIDNEY DISEASE, WITHOUT LONG-TERM CURRENT USE OF INSULIN  (HCC): Chronic | ICD-10-CM

## 2024-10-17 PROBLEM — M54.16 LUMBAR RADICULOPATHY: Chronic | Status: ACTIVE | Noted: 2024-06-20

## 2024-10-17 PROBLEM — Z12.11 COLON CANCER SCREENING: Chronic | Status: ACTIVE | Noted: 2021-09-16

## 2024-10-17 PROBLEM — Z12.5 SCREENING FOR PROSTATE CANCER: Chronic | Status: RESOLVED | Noted: 2023-10-23 | Resolved: 2024-05-23

## 2024-10-17 PROBLEM — M51.369 DDD (DEGENERATIVE DISC DISEASE), LUMBAR: Chronic | Status: ACTIVE | Noted: 2021-10-19

## 2024-10-17 PROBLEM — Z28.21 REFUSED DIPHTHERIA-PERTUSSIS-TETANUS (DPT) VACCINATION: Status: RESOLVED | Noted: 2023-10-23 | Resolved: 2024-10-17

## 2024-10-17 PROBLEM — R93.5 ABNORMAL CT OF THE ABDOMEN: Chronic | Status: ACTIVE | Noted: 2022-10-18

## 2024-10-17 PROBLEM — Z86.19 HISTORY OF LEGIONNAIRE'S DISEASE: Chronic | Status: ACTIVE | Noted: 2022-10-26

## 2024-10-17 PROBLEM — E11.9 ENCOUNTER FOR DIABETIC FOOT EXAM (HCC): Chronic | Status: ACTIVE | Noted: 2022-12-05

## 2024-10-17 PROBLEM — Z00.00 ANNUAL PHYSICAL EXAM: Chronic | Status: ACTIVE | Noted: 2021-06-07

## 2024-10-17 PROBLEM — R93.7 ABNORMAL MRI, LUMBAR SPINE: Chronic | Status: ACTIVE | Noted: 2024-05-01

## 2024-10-17 PROBLEM — E11.9 DIABETIC EYE EXAM (HCC): Chronic | Status: ACTIVE | Noted: 2021-12-13

## 2024-10-17 PROBLEM — Z01.00 DIABETIC EYE EXAM (HCC): Chronic | Status: RESOLVED | Noted: 2021-12-13 | Resolved: 2024-04-23

## 2024-10-17 PROBLEM — M16.11 PRIMARY OSTEOARTHRITIS OF RIGHT HIP: Chronic | Status: ACTIVE | Noted: 2024-03-04

## 2024-10-17 PROBLEM — Z01.00 DIABETIC EYE EXAM (HCC): Chronic | Status: ACTIVE | Noted: 2021-12-13

## 2024-10-17 PROBLEM — Z12.11 COLON CANCER SCREENING: Chronic | Status: RESOLVED | Noted: 2021-09-16 | Resolved: 2023-12-22

## 2024-10-17 PROBLEM — E11.9 DIABETIC EYE EXAM (HCC): Chronic | Status: RESOLVED | Noted: 2021-12-13 | Resolved: 2024-04-23

## 2024-10-17 PROBLEM — Z12.5 SCREENING FOR PROSTATE CANCER: Chronic | Status: ACTIVE | Noted: 2023-10-23

## 2024-10-17 PROBLEM — E55.9 VITAMIN D DEFICIENCY: Chronic | Status: ACTIVE | Noted: 2023-10-23

## 2024-10-17 PROBLEM — Z13.220 SCREENING FOR HYPERLIPIDEMIA: Chronic | Status: ACTIVE | Noted: 2022-08-23

## 2024-10-17 PROBLEM — Z13.220 SCREENING FOR HYPERLIPIDEMIA: Chronic | Status: RESOLVED | Noted: 2022-08-23 | Resolved: 2024-05-23

## 2024-10-17 PROCEDURE — 99215 OFFICE O/P EST HI 40 MIN: CPT | Performed by: NURSE PRACTITIONER

## 2024-10-17 NOTE — ASSESSMENT & PLAN NOTE
Lab Results   Component Value Date    EGFR 76 09/28/2024    EGFR 79 11/27/2023    EGFR 70 11/29/2022    CREATININE 1.05 09/28/2024    CREATININE 1.02 11/27/2023    CREATININE 1.14 11/29/2022

## 2024-10-17 NOTE — ASSESSMENT & PLAN NOTE
Lifestyle modification, diet and exercise discussed  He is following a low-salt diabetic diet          Treatment planning:    Recommendations will be written in the providers note for your Primary Care provider (OR other providers in your care team) to review and make changes to your therapies based on their discretion.       Recommended Follow up:      Follow up as needed.       To speak with a nurse, schedule/reschedule/cancel a clinic appointment, or request a medication refill call: [phone].    You can also reach us by [facility]: [email or web address]

## 2024-10-17 NOTE — ASSESSMENT & PLAN NOTE
This is a chronic disease process   continue   Coreg   Lisinopril-hydrochlorothiazide  Patient is compliant with his medications  Will check labs to monitor kidney function  Compliant with low-salt diet

## 2024-10-17 NOTE — ASSESSMENT & PLAN NOTE
Lab Results   Component Value Date    HGBA1C 7.2 (H) 09/28/2024     Continue glimepiride  And is compliant with current medication regimen  Patient is compliant with diabetic diet    Orders:    Diabetic foot exam; Future

## 2024-10-17 NOTE — PROGRESS NOTES
Pre-operative Clearance  Name: Aaron Snyder      : 1965      MRN: [id number]  Encounter Provider: LAURA Campbell  Encounter Date: 10/17/2024   Encounter department: [facility]    Assessment & Plan  Encounter for immunization         Stage 2 chronic kidney disease  Lab Results   Component Value Date    EGFR 76 2024    EGFR 79 2023    EGFR 70 2022    CREATININE 1.05 2024    CREATININE 1.02 2023    CREATININE 1.14 2022            Type 2 diabetes mellitus with stage 2 chronic kidney disease, without long-term current use of insulin  ([facility])    Lab Results   Component Value Date    HGBA1C 7.2 (H) 2024     Continue glimepiride  And is compliant with current medication regimen  Patient is compliant with diabetic diet    Orders:    Diabetic foot exam; Future    Preoperative clearance  The patient is here today for preoperative clearance for robotic assisted decompression and transverse lumbar interbody fusion L1-3-the left side approach (left: Spine lumbar), due to lumbar disc herniation, abnormal MRI lumbar spine, morbid obesity, lumbar radiculopathy, by Dr. Venancio Ritchie, to be performed on 10/25/2024.  The patient is medically stable for this procedure as planned.  2024  EKG:  Sinus rhythm with frequent Premature ventricular complexes  Low voltage QRS  Borderline ECG  When compared with ECG of 05-OCT-2022 17:19,  Premature ventricular complexes are now Present         Class 3 severe obesity due to excess calories with serious comorbidity and body mass index (BMI) of 45.0 to 49.9 in adult ([facility])      Lifestyle modification, diet and exercise discussed  He is following a low-salt diabetic diet         Essential hypertension  This is a chronic disease process   continue   Coreg   Lisinopril-hydrochlorothiazide  Patient is compliant with his medications  Will check labs to monitor kidney function  Compliant with low-salt diet          Mixed hyperlipidemia  Component  Ref Range & Units 9/28/24  7:40 AM 11/27/23  8:23 AM 8/24/22  8:43 AM   Cholesterol  See Comment mg/dL 282 High  243 High   High  CM   Comment: Cholesterol:        Pediatric <18 Years        Desirable          <170 mg/dL      Borderline High    170-199 mg/dL      High               >=200 mg/dL        Adult >=18 Years           Desirable         <200 mg/dL      Borderline High   200-239 mg/dL      High             >239 mg/dL   Triglycerides  See Comment mg/dL 303 High  226 High   High  CM   Comment: Triglyceride:     0-9Y            <75mg/dL     10Y-17Y         <90 mg/dL       >=18Y     Normal          <150 mg/dL     Borderline High 150-199 mg/dL     High            200-499 mg/dL       Very High       >499 mg/dL    Specimen collection should occur prior to Metamizole administration due to the potential for falsely depressed results.   HDL, Direct  >=40 mg/dL 36 Low  35 Low  34 Low  CM   LDL Calculated  0 - 100 mg/dL 185 High  163 High   High  CM   Comment: LDL Cholesterol:    Optimal           <100 mg/dl    Near Optimal      100-129 mg/dl    Above Optimal      Borderline High 130-159 mg/dl      High            160-189 mg/dl      Very High       >189 mg/dl     This is a chronic and unstable disease process  Discussed diet and exercise  Patient should be on medication           Pre-operative Clearance:     Medication Instructions:   - ACE Inhibitors or ARBs: Continue this medication up to the evening before surgery/procedure, but do not take the morning of the day of surgery.  - Beta blockers:  Continue to take this medication on your normal schedule.      Medicine Instructions for Adults with Diabetes (NO Bowel Prep)    Follow these instructions when a BOWEL PREP is NOT required for your procedure or surgery!    NOTE:  GLP Agonists taken weekly: do not take in the 7 days before your procedure. **Bariatric surgery: do not take 4 weeks prior to your  procedure.    SGLT-2 Inhibitors: do not take in the 4 days before your procedure    On the Day Before Surgery/Procedure  If you are having a procedure (e.g., Colonoscopy) or surgery which DOES NOT require a bowel prep, follow the directions below based on the type of medicine you take for your diabetes.  Type of Medicine You Take Examples What to Do   Pre-Mixed Insulin Intermediate  Bpnpznl21/25, Sxdudnq08/30, Novolog 70/30, Regular Insulin Take 1/2 your regular dose the evening before our procedure.   Rapid/Fast Acting  Insulin and/or Long-Acting Insulin Humalog U200, NovoLog, Apidra,  Lantus, Levemir, Tresiba, Toujeo,  Fias, Basaglar Take your FULL regular dose the day before procedure.   Oral Diabetic Medicines (sulfonylurea) Glipizide/Glimepiride/  Glucotrol Take your regular dose with dinner the evening before your procedure.   Other Oral Diabetic Medicines Metformin, Glucophage, Glucophage  XR, Riomet, Glumetza, Actose,  Avandia, Gl set, Prandin Take your regular dose with dinner the evening before your procedure   GLP Agonists Adlyxin, Byetta, Bydureon,  Ozempic, Soliqua, Tanzeum,  Trulicity, Victoza, Saxenda,  Rybelsus, Wegovy, Mounjaro, Zepbound If taken daily, take as normal  If taken weekly, do not take this medicine for 7 days before your procedure including the day of the procedure (resume taking after the procedure). **Bariatric surgery: do not take 4 weeks prior to procedure   SGLT-2 Inhibitors Jardiance, Invokana, Farxiga, Steglatro, Brenzavvy, Qtern, Segluromet Glyxambi, Synjardy, Synjardy XR, Invokamet, InvokametXR, Trijary XR, Xigduo X Do not take for 4 days before your procedure including the day of the procedure (resume taking after the procedure)   This educational material has been approved by the Patient Education Advisory Committee.    On the Day of Surgery/Procedure  Follow the directions below based on the type of medicine you take for your diabetes.  Type of Medicine You Take  Examples  What to Do   Long-Acting Insulin Lantus, Levemir, Tresiba,  Toujeo, Basaglar, Semglee If you normally take your Long Acting Insulin in the morning, take the full dose as scheduled.   GLP-I Agonists Adlyxin, Byetta, Bydureon,  Ozempic, Soliqua, Tanzeum,  Trulicity, Victoza, Saxenda,  Rybelsus, Mounjaro Do NOT take this medicine on the day of your procedure (resume taking after the procedure)   Except for the morning Long-Acting Insulin, DO NOT take ANY diabetic medicine on the day of your procedure unless you were instructed by the doctor who manages your diabetes medicines.  Continue to check your blood sugars.  If you have an insulin pump, ask your endocrinologist for instructions at least 3 days before your procedure. NOTE: If you are not able to ask your endocrinologist in advance, on the day of the procedure set your insulin pump to your basal rate only. Bring your insulin pump supplies to the hospital.         History of Present Illness     · The patient is here today for preoperative clearance for robotic assisted decompression and transverse lumbar interbody fusion L1-3-the left side approach (left: Spine lumbar), due to lumbar disc herniation, abnormal MRI lumbar spine, morbid obesity, lumbar radiculopathy, by Dr. Venancio Ritchie, to be performed on 10/25/2024.  · The patient is medically stable for this procedure as planned.  · 9/28/2024  · EKG:  Sinus rhythm with frequent Premature ventricular complexes  Low voltage QRS  Borderline ECG  When compared with ECG of 05-OCT-2022 17:19,  Premature ventricular complexes are now Present    [place] paperwork filled out          Review of Systems   Constitutional:  Negative for activity change, chills, fatigue and fever.   HENT:  Negative for rhinorrhea and sore throat.    Eyes:  Negative for pain.   Respiratory:  Negative for cough and shortness of breath.    Cardiovascular:  Negative for chest pain, palpitations and leg swelling.   Gastrointestinal:  Negative for  abdominal pain, constipation, diarrhea, nausea and vomiting.   Genitourinary:  Negative for difficulty urinating, flank pain, frequency and urgency.   Musculoskeletal:  Positive for arthralgias, back pain, gait problem and myalgias. Negative for joint swelling.   Skin:  Negative for color change.   Neurological:  Negative for dizziness, weakness, light-headedness and headaches.   Psychiatric/Behavioral:  Negative for sleep disturbance. The patient is nervous/anxious.    All other systems reviewed and are negative.    Past Medical History   Past Medical History:   Diagnosis Date    Allergic     Benign arteriolar nephrosclerosis     Colon polyp     Diabetes mellitus (HCC)     Essential hypertension     History of transfusion 2013    Hyperparathyroidism due to renal insufficiency (HCC)     Legionnaire's disease (HCC)     Pneumonia     s/p VDRF      Past Surgical History:   Procedure Laterality Date    COLONOSCOPY      OTHER SURGICAL HISTORY      Dialysis catheter     [place] LAMNOTMY INCL W/DCMPRSN NRV ROOT 1 INTRSPC LUMBR Left 11/15/2021    Procedure: Left sided L1-2 microdiscectomy;  Surgeon: Andi Campoverde MD;  Location:  MAIN OR;  Service: Neurosurgery     Family History   Problem Relation Age of Onset    Hyperlipidemia Mother     Lung cancer Father     Stomach cancer Father     Cancer Father     Diabetes type II Father     Diabetes type II Brother      Social History     Tobacco Use    Smoking status: Never     Passive exposure: Never    Smokeless tobacco: Never   Vaping Use    Vaping status: Never Used   Substance and Sexual Activity    Alcohol use: Not Currently     Comment: Denies alcohol use - As per [facility]     Drug use: Never    Sexual activity: Yes     Partners: Female     Birth control/protection: None     Current Outpatient Medications on File Prior to Visit   Medication Sig    acetaminophen (TYLENOL) 500 mg tablet Take 500 mg by mouth 2 (two) times a day    carvedilol (COREG) 12.5 mg tablet TAKE 1  TABLET TWICE A DAY    cholecalciferol (VITAMIN D3) 400 units tablet Take 400 Units by mouth daily    fexofenadine (ALLEGRA) 180 MG tablet Take 180 mg by mouth daily    glimepiride (AMARYL) 2 mg tablet TAKE 1 TABLET DAILY WITH BREAKFAST    lisinopril-hydrochlorothiazide (PRINZIDE,ZESTORETIC) 20-12.5 MG per tablet take 1 tablet by mouth twice a day    Multiple Vitamins-Minerals (multivitamin with minerals) tablet Take 1 tablet by mouth daily     No Known Allergies  Objective     /86 (BP Location: Left arm, Patient Position: Sitting, Cuff Size: Large)   Pulse 84   Temp 97.6 °F (36.4 °C) (Tympanic)   Ht 6' (1.829 m)   Wt (!) 157 kg (345 lb 12.8 oz)   SpO2 93%   BMI 46.90 kg/m²     Physical Exam  Vitals and nursing note reviewed.   Constitutional:       General: He is awake.      Appearance: Normal appearance. He is well-developed.   HENT:      Head: Normocephalic and atraumatic.      Nose: Nose normal.      Mouth/Throat:      Mouth: Mucous membranes are moist.   Eyes:      Conjunctiva/sclera: Conjunctivae normal.   Cardiovascular:      Rate and Rhythm: Normal rate and regular rhythm.      Pulses: Normal pulses.      Heart sounds: Normal heart sounds. No murmur heard.  Pulmonary:      Effort: Pulmonary effort is normal. No respiratory distress.      Breath sounds: Normal breath sounds.   Abdominal:      General: Bowel sounds are normal.      Palpations: Abdomen is soft.      Tenderness: There is no abdominal tenderness.   Musculoskeletal:      Cervical back: Neck supple.      Lumbar back: Spasms and tenderness present. Decreased range of motion.      Right lower leg: No edema.      Left lower leg: No edema.      Comments: Chronic low back pain    Skin:     General: Skin is warm and dry.   Neurological:      Mental Status: He is alert and oriented to person, place, and time.      Coordination: Coordination is intact.      Gait: Gait abnormal.   Psychiatric:         Attention and Perception: Attention normal.          Mood and Affect: Mood normal.         Speech: Speech normal.         Behavior: Behavior normal. Behavior is cooperative.         Thought Content: Thought content normal.         Cognition and Memory: Cognition normal.         Judgment: Judgment normal.       Administrative Statements   I have spent a total time of 45 minutes in caring for this patient on the day of the visit/encounter including Diagnostic results, Prognosis, Risks and benefits of tx options, Instructions for management, Patient and family education, Importance of tx compliance, Risk factor reductions, Impressions, Counseling / Coordination of care, Documenting in the medical record, Reviewing / ordering tests, medicine, procedures  , and Obtaining or reviewing history  .    LAURA Campbell

## 2024-10-17 NOTE — PROGRESS NOTES
Diabetic Foot Exam    Patient's shoes and socks removed.    Right Foot/Ankle   Right Foot Inspection  Skin Exam: skin normal and skin intact. No dry skin, no warmth, no callus, no erythema, no maceration, no abnormal color, no pre-ulcer, no ulcer and no callus.     Toe Exam: ROM and strength within normal limits.     Sensory   Monofilament testing: diminished    Vascular  The right DP pulse is 2+. The right PT pulse is 2+.     Left Foot/Ankle  Left Foot Inspection  Skin Exam: skin normal and skin intact. No dry skin, no warmth, no erythema, no maceration, normal color, no pre-ulcer, no ulcer and no callus.     Toe Exam: ROM and strength within normal limits.     Sensory   Monofilament testing: diminished    Vascular  The left DP pulse is 2+. The left PT pulse is 2+.     Assign Risk Category  No deformity present  No loss of protective sensation  No weak pulses  Risk: 0

## 2024-10-17 NOTE — ASSESSMENT & PLAN NOTE
Component  Ref Range & Units 9/28/24  7:40 AM 11/27/23  8:23 AM 8/24/22  8:43 AM   Cholesterol  See Comment mg/dL 282 High  243 High   High  CM   Comment: Cholesterol:        Pediatric <18 Years        Desirable          <170 mg/dL      Borderline High    170-199 mg/dL      High               >=200 mg/dL        Adult >=18 Years           Desirable         <200 mg/dL      Borderline High   200-239 mg/dL      High             >239 mg/dL   Triglycerides  See Comment mg/dL 303 High  226 High   High  CM   Comment: Triglyceride:     0-9Y            <75mg/dL     10Y-17Y         <90 mg/dL       >=18Y     Normal          <150 mg/dL     Borderline High 150-199 mg/dL     High            200-499 mg/dL       Very High       >499 mg/dL    Specimen collection should occur prior to Metamizole administration due to the potential for falsely depressed results.   HDL, Direct  >=40 mg/dL 36 Low  35 Low  34 Low  CM   LDL Calculated  0 - 100 mg/dL 185 High  163 High   High  CM   Comment: LDL Cholesterol:    Optimal           <100 mg/dl    Near Optimal      100-129 mg/dl    Above Optimal      Borderline High 130-159 mg/dl      High            160-189 mg/dl      Very High       >189 mg/dl     This is a chronic and unstable disease process  Discussed diet and exercise  Patient should be on medication

## 2024-10-17 NOTE — H&P (VIEW-ONLY)
Pre-operative Clearance  Name: Aaron Snyder      : 1965      MRN: [id number]  Encounter Provider: LAURA Campbell  Encounter Date: 10/17/2024   Encounter department: [facility]    Assessment & Plan  Encounter for immunization         Stage 2 chronic kidney disease  Lab Results   Component Value Date    EGFR 76 2024    EGFR 79 2023    EGFR 70 2022    CREATININE 1.05 2024    CREATININE 1.02 2023    CREATININE 1.14 2022            Type 2 diabetes mellitus with stage 2 chronic kidney disease, without long-term current use of insulin  ([facility])    Lab Results   Component Value Date    HGBA1C 7.2 (H) 2024     Continue glimepiride  And is compliant with current medication regimen  Patient is compliant with diabetic diet    Orders:    Diabetic foot exam; Future    Preoperative clearance  The patient is here today for preoperative clearance for robotic assisted decompression and transverse lumbar interbody fusion L1-3-the left side approach (left: Spine lumbar), due to lumbar disc herniation, abnormal MRI lumbar spine, morbid obesity, lumbar radiculopathy, by Dr. Venancio Ritchie, to be performed on 10/25/2024.  The patient is medically stable for this procedure as planned.  2024  EKG:  Sinus rhythm with frequent Premature ventricular complexes  Low voltage QRS  Borderline ECG  When compared with ECG of 05-OCT-2022 17:19,  Premature ventricular complexes are now Present         Class 3 severe obesity due to excess calories with serious comorbidity and body mass index (BMI) of 45.0 to 49.9 in adult ([facility])      Lifestyle modification, diet and exercise discussed  He is following a low-salt diabetic diet         Essential hypertension  This is a chronic disease process   continue   Coreg   Lisinopril-hydrochlorothiazide  Patient is compliant with his medications  Will check labs to monitor kidney function  Compliant with low-salt diet          Mixed hyperlipidemia  Component  Ref Range & Units 9/28/24  7:40 AM 11/27/23  8:23 AM 8/24/22  8:43 AM   Cholesterol  See Comment mg/dL 282 High  243 High   High  CM   Comment: Cholesterol:        Pediatric <18 Years        Desirable          <170 mg/dL      Borderline High    170-199 mg/dL      High               >=200 mg/dL        Adult >=18 Years           Desirable         <200 mg/dL      Borderline High   200-239 mg/dL      High             >239 mg/dL   Triglycerides  See Comment mg/dL 303 High  226 High   High  CM   Comment: Triglyceride:     0-9Y            <75mg/dL     10Y-17Y         <90 mg/dL       >=18Y     Normal          <150 mg/dL     Borderline High 150-199 mg/dL     High            200-499 mg/dL       Very High       >499 mg/dL    Specimen collection should occur prior to Metamizole administration due to the potential for falsely depressed results.   HDL, Direct  >=40 mg/dL 36 Low  35 Low  34 Low  CM   LDL Calculated  0 - 100 mg/dL 185 High  163 High   High  CM   Comment: LDL Cholesterol:    Optimal           <100 mg/dl    Near Optimal      100-129 mg/dl    Above Optimal      Borderline High 130-159 mg/dl      High            160-189 mg/dl      Very High       >189 mg/dl     This is a chronic and unstable disease process  Discussed diet and exercise  Patient should be on medication           Pre-operative Clearance:     Medication Instructions:   - ACE Inhibitors or ARBs: Continue this medication up to the evening before surgery/procedure, but do not take the morning of the day of surgery.  - Beta blockers:  Continue to take this medication on your normal schedule.      Medicine Instructions for Adults with Diabetes (NO Bowel Prep)    Follow these instructions when a BOWEL PREP is NOT required for your procedure or surgery!    NOTE:  GLP Agonists taken weekly: do not take in the 7 days before your procedure. **Bariatric surgery: do not take 4 weeks prior to your  procedure.    SGLT-2 Inhibitors: do not take in the 4 days before your procedure    On the Day Before Surgery/Procedure  If you are having a procedure (e.g., Colonoscopy) or surgery which DOES NOT require a bowel prep, follow the directions below based on the type of medicine you take for your diabetes.  Type of Medicine You Take Examples What to Do   Pre-Mixed Insulin Intermediate  Gtixuem63/25, Uvotiie76/30, Novolog 70/30, Regular Insulin Take 1/2 your regular dose the evening before our procedure.   Rapid/Fast Acting  Insulin and/or Long-Acting Insulin Humalog U200, NovoLog, Apidra,  Lantus, Levemir, Tresiba, Toujeo,  Fias, Basaglar Take your FULL regular dose the day before procedure.   Oral Diabetic Medicines (sulfonylurea) Glipizide/Glimepiride/  Glucotrol Take your regular dose with dinner the evening before your procedure.   Other Oral Diabetic Medicines Metformin, Glucophage, Glucophage  XR, Riomet, Glumetza, Actose,  Avandia, Gl set, Prandin Take your regular dose with dinner the evening before your procedure   GLP Agonists Adlyxin, Byetta, Bydureon,  Ozempic, Soliqua, Tanzeum,  Trulicity, Victoza, Saxenda,  Rybelsus, Wegovy, Mounjaro, Zepbound If taken daily, take as normal  If taken weekly, do not take this medicine for 7 days before your procedure including the day of the procedure (resume taking after the procedure). **Bariatric surgery: do not take 4 weeks prior to procedure   SGLT-2 Inhibitors Jardiance, Invokana, Farxiga, Steglatro, Brenzavvy, Qtern, Segluromet Glyxambi, Synjardy, Synjardy XR, Invokamet, InvokametXR, Trijary XR, Xigduo X Do not take for 4 days before your procedure including the day of the procedure (resume taking after the procedure)   This educational material has been approved by the Patient Education Advisory Committee.    On the Day of Surgery/Procedure  Follow the directions below based on the type of medicine you take for your diabetes.  Type of Medicine You Take  Examples  What to Do   Long-Acting Insulin Lantus, Levemir, Tresiba,  Toujeo, Basaglar, Semglee If you normally take your Long Acting Insulin in the morning, take the full dose as scheduled.   GLP-I Agonists Adlyxin, Byetta, Bydureon,  Ozempic, Soliqua, Tanzeum,  Trulicity, Victoza, Saxenda,  Rybelsus, Mounjaro Do NOT take this medicine on the day of your procedure (resume taking after the procedure)   Except for the morning Long-Acting Insulin, DO NOT take ANY diabetic medicine on the day of your procedure unless you were instructed by the doctor who manages your diabetes medicines.  Continue to check your blood sugars.  If you have an insulin pump, ask your endocrinologist for instructions at least 3 days before your procedure. NOTE: If you are not able to ask your endocrinologist in advance, on the day of the procedure set your insulin pump to your basal rate only. Bring your insulin pump supplies to the hospital.         History of Present Illness     · The patient is here today for preoperative clearance for robotic assisted decompression and transverse lumbar interbody fusion L1-3-the left side approach (left: Spine lumbar), due to lumbar disc herniation, abnormal MRI lumbar spine, morbid obesity, lumbar radiculopathy, by Dr. Venancio Ritchie, to be performed on 10/25/2024.  · The patient is medically stable for this procedure as planned.  · 9/28/2024  · EKG:  Sinus rhythm with frequent Premature ventricular complexes  Low voltage QRS  Borderline ECG  When compared with ECG of 05-OCT-2022 17:19,  Premature ventricular complexes are now Present    [place] paperwork filled out          Review of Systems   Constitutional:  Negative for activity change, chills, fatigue and fever.   HENT:  Negative for rhinorrhea and sore throat.    Eyes:  Negative for pain.   Respiratory:  Negative for cough and shortness of breath.    Cardiovascular:  Negative for chest pain, palpitations and leg swelling.   Gastrointestinal:  Negative for  abdominal pain, constipation, diarrhea, nausea and vomiting.   Genitourinary:  Negative for difficulty urinating, flank pain, frequency and urgency.   Musculoskeletal:  Positive for arthralgias, back pain, gait problem and myalgias. Negative for joint swelling.   Skin:  Negative for color change.   Neurological:  Negative for dizziness, weakness, light-headedness and headaches.   Psychiatric/Behavioral:  Negative for sleep disturbance. The patient is nervous/anxious.    All other systems reviewed and are negative.    Past Medical History   Past Medical History:   Diagnosis Date    Allergic     Benign arteriolar nephrosclerosis     Colon polyp     Diabetes mellitus (HCC)     Essential hypertension     History of transfusion 2013    Hyperparathyroidism due to renal insufficiency (HCC)     Legionnaire's disease (HCC)     Pneumonia     s/p VDRF      Past Surgical History:   Procedure Laterality Date    COLONOSCOPY      OTHER SURGICAL HISTORY      Dialysis catheter     MA LAMNOTMY INCL W/DCMPRSN NRV ROOT 1 INTRSPC LUMBR Left 11/15/2021    Procedure: Left sided L1-2 microdiscectomy;  Surgeon: Andi Campoverde MD;  Location:  MAIN OR;  Service: Neurosurgery     Family History   Problem Relation Age of Onset    Hyperlipidemia Mother     Lung cancer Father     Stomach cancer Father     Cancer Father     Diabetes type II Father     Diabetes type II Brother      Social History     Tobacco Use    Smoking status: Never     Passive exposure: Never    Smokeless tobacco: Never   Vaping Use    Vaping status: Never Used   Substance and Sexual Activity    Alcohol use: Not Currently     Comment: Denies alcohol use - As per [employer]     Drug use: Never    Sexual activity: Yes     Partners: Female     Birth control/protection: None     Current Outpatient Medications on File Prior to Visit   Medication Sig    acetaminophen (TYLENOL) 500 mg tablet Take 500 mg by mouth 2 (two) times a day    carvedilol (COREG) 12.5 mg tablet TAKE 1  TABLET TWICE A DAY    cholecalciferol (VITAMIN D3) 400 units tablet Take 400 Units by mouth daily    fexofenadine (ALLEGRA) 180 MG tablet Take 180 mg by mouth daily    glimepiride (AMARYL) 2 mg tablet TAKE 1 TABLET DAILY WITH BREAKFAST    lisinopril-hydrochlorothiazide (PRINZIDE,ZESTORETIC) 20-12.5 MG per tablet take 1 tablet by mouth twice a day    Multiple Vitamins-Minerals (multivitamin with minerals) tablet Take 1 tablet by mouth daily     No Known Allergies  Objective     /86 (BP Location: Left arm, Patient Position: Sitting, Cuff Size: Large)   Pulse 84   Temp 97.6 °F (36.4 °C) (Tympanic)   Ht 6' (1.829 m)   Wt (!) 157 kg (345 lb 12.8 oz)   SpO2 93%   BMI 46.90 kg/m²     Physical Exam  Vitals and nursing note reviewed.   Constitutional:       General: He is awake.      Appearance: Normal appearance. He is well-developed.   HENT:      Head: Normocephalic and atraumatic.      Nose: Nose normal.      Mouth/Throat:      Mouth: Mucous membranes are moist.   Eyes:      Conjunctiva/sclera: Conjunctivae normal.   Cardiovascular:      Rate and Rhythm: Normal rate and regular rhythm.      Pulses: Normal pulses.      Heart sounds: Normal heart sounds. No murmur heard.  Pulmonary:      Effort: Pulmonary effort is normal. No respiratory distress.      Breath sounds: Normal breath sounds.   Abdominal:      General: Bowel sounds are normal.      Palpations: Abdomen is soft.      Tenderness: There is no abdominal tenderness.   Musculoskeletal:      Cervical back: Neck supple.      Lumbar back: Spasms and tenderness present. Decreased range of motion.      Right lower leg: No edema.      Left lower leg: No edema.      Comments: Chronic low back pain    Skin:     General: Skin is warm and dry.   Neurological:      Mental Status: He is alert and oriented to person, place, and time.      Coordination: Coordination is intact.      Gait: Gait abnormal.   Psychiatric:         Attention and Perception: Attention normal.          Mood and Affect: Mood normal.         Speech: Speech normal.         Behavior: Behavior normal. Behavior is cooperative.         Thought Content: Thought content normal.         Cognition and Memory: Cognition normal.         Judgment: Judgment normal.       Administrative Statements   I have spent a total time of 45 minutes in caring for this patient on the day of the visit/encounter including Diagnostic results, Prognosis, Risks and benefits of tx options, Instructions for management, Patient and family education, Importance of tx compliance, Risk factor reductions, Impressions, Counseling / Coordination of care, Documenting in the medical record, Reviewing / ordering tests, medicine, procedures  , and Obtaining or reviewing history  .    LAURA Campbell

## 2024-10-17 NOTE — ASSESSMENT & PLAN NOTE
The patient is here today for preoperative clearance for robotic assisted decompression and transverse lumbar interbody fusion L1-3-the left side approach (left: Spine lumbar), due to lumbar disc herniation, abnormal MRI lumbar spine, morbid obesity, lumbar radiculopathy, by Dr. Venancio Ritchie, to be performed on 10/25/2024.  The patient is medically stable for this procedure as planned.  9/28/2024  EKG:  Sinus rhythm with frequent Premature ventricular complexes  Low voltage QRS  Borderline ECG  When compared with ECG of 05-OCT-2022 17:19,  Premature ventricular complexes are now Present

## 2024-10-21 ENCOUNTER — TELEPHONE (OUTPATIENT)
Age: 59
End: 2024-10-21

## 2024-10-21 NOTE — TELEPHONE ENCOUNTER
Patient phoned to inquire if this office received his FMLA paperwork to be completed.  This RN was able to view blank FMLA paperwork scanned into [employer] on 10/17/2024.  This RN informed patient of this.  Patient stated he is having sx on Friday and was attempting to have this completed prior.  This RN informed patient that we ask for 7-10 buisiness days to complete paperwork but will do our best.  This RN also sent message to provider, Medical Assistant Gama to ensure that she is aware of this request and has access to the info and is aware of patients desired timeline.

## 2024-10-24 ENCOUNTER — ANESTHESIA EVENT (OUTPATIENT)
Dept: PERIOP | Facility: HOSPITAL | Age: 59
DRG: 460 | End: 2024-10-24
Payer: COMMERCIAL

## 2024-10-24 LAB
ABO GROUP BLD: NORMAL
RH BLD: POSITIVE

## 2024-10-24 NOTE — ANESTHESIA PREPROCEDURE EVALUATION
Procedure:  ROBOTIC ASSISTED DECOMPRESSION AND TRANSVERSE LUMBAR  INTERBODY FUSION L1-3 - LEFT SIDED APPROACH (Left: Spine Lumbar)    Relevant Problems   ANESTHESIA (within normal limits)   (-) History of anesthesia complications      CARDIO   (+) Essential hypertension   (+) Mixed hyperlipidemia      ENDO   (+) Type 2 diabetes mellitus with stage 2 chronic kidney disease, without long-term current use of insulin  ([facility])      GI/HEPATIC (within normal limits)      /RENAL   (+) Stage 2 chronic kidney disease      GYN (within normal limits)      HEMATOLOGY (within normal limits)      MUSCULOSKELETAL   (+) DDD (degenerative disc disease), lumbar   (+) Primary osteoarthritis of right hip      NEURO/PSYCH (within normal limits)      PULMONARY (within normal limits)      Rheumatology   (+) Lumbar disc herniation      Orthopedic/Musculoskeletal   (+) Lumbar radiculopathy      Other   (+) Class 3 severe obesity due to excess calories with serious comorbidity and body mass index (BMI) of 45.0 to 49.9 in adult ([facility])      Prev Airway (11/2021):   Technique: Direct laryngoscopy, Rapid sequence, Stylet; Type: Cuffed, Oral, Inflated; Tube Size: 8 mm; Laryngoscope: Rosario; Blade Size: 3; Location: Oral; Grade View: 1; Insertion Attempts: 1     --------  Had carvedilol this morning.   Npo > 8 hours        Physical Exam    Airway    Mallampati score: II  TM Distance: >3 FB  Neck ROM: full     Dental        Cardiovascular  Rhythm: regular, Rate: normal, Cardiovascular exam normal    Pulmonary  Pulmonary exam normal Breath sounds clear to auscultation    Other Findings        Anesthesia Plan  ASA Score- 3     Anesthesia Type- general with ASA Monitors.         Additional Monitors: arterial line.    Airway Plan: ETT.           Plan Factors-Exercise tolerance (METS): >4 METS.    Chart reviewed. EKG reviewed. Imaging results reviewed. Existing labs reviewed. Patient summary reviewed.                  Induction-  intravenous.    Postoperative Plan- Plan for postoperative opioid use. Planned trial extubation        Informed Consent- Anesthetic plan and risks discussed with patient.  I personally reviewed this patient with the CRNA. Discussed and agreed on the Anesthesia Plan with the CRNA..

## 2024-10-25 ENCOUNTER — HOSPITAL ENCOUNTER (INPATIENT)
Facility: HOSPITAL | Age: 59
LOS: 4 days | Discharge: HOME | DRG: 460 | End: 2024-10-29
Attending: NEUROLOGICAL SURGERY | Admitting: NEUROLOGICAL SURGERY
Payer: COMMERCIAL

## 2024-10-25 ENCOUNTER — APPOINTMENT (OUTPATIENT)
Dept: RADIOLOGY | Facility: HOSPITAL | Age: 59
DRG: 460 | End: 2024-10-25
Payer: COMMERCIAL

## 2024-10-25 ENCOUNTER — ANESTHESIA (OUTPATIENT)
Dept: PERIOP | Facility: HOSPITAL | Age: 59
DRG: 460 | End: 2024-10-25
Payer: COMMERCIAL

## 2024-10-25 DIAGNOSIS — R93.7 ABNORMAL MRI, LUMBAR SPINE: ICD-10-CM

## 2024-10-25 DIAGNOSIS — E66.01 MORBID OBESITY (HCC): ICD-10-CM

## 2024-10-25 DIAGNOSIS — M54.16 LUMBAR RADICULOPATHY: ICD-10-CM

## 2024-10-25 DIAGNOSIS — Z98.890 POST-OPERATIVE STATE: Primary | ICD-10-CM

## 2024-10-25 DIAGNOSIS — M51.26 LUMBAR DISC HERNIATION: ICD-10-CM

## 2024-10-25 DIAGNOSIS — E11.22 TYPE 2 DIABETES MELLITUS WITH STAGE 2 CHRONIC KIDNEY DISEASE, WITHOUT LONG-TERM CURRENT USE OF INSULIN  (HCC): Chronic | ICD-10-CM

## 2024-10-25 DIAGNOSIS — N18.2 TYPE 2 DIABETES MELLITUS WITH STAGE 2 CHRONIC KIDNEY DISEASE, WITHOUT LONG-TERM CURRENT USE OF INSULIN  (HCC): Chronic | ICD-10-CM

## 2024-10-25 DIAGNOSIS — M51.369 DDD (DEGENERATIVE DISC DISEASE), LUMBAR: ICD-10-CM

## 2024-10-25 LAB
GLUCOSE SERPL-MCNC: 159 MG/DL (ref 65–140)
GLUCOSE SERPL-MCNC: 199 MG/DL (ref 65–140)
PLATELET # BLD AUTO: 231 THOUSANDS/UL (ref 149–390)
PMV BLD AUTO: 9.3 FL (ref 8.9–12.7)

## 2024-10-25 PROCEDURE — C1713 ANCHOR/SCREW BN/BN,TIS/BN: HCPCS | Performed by: NEUROLOGICAL SURGERY

## 2024-10-25 PROCEDURE — 20930 SP BONE ALGRFT MORSEL ADD-ON: CPT | Performed by: NEUROLOGICAL SURGERY

## 2024-10-25 PROCEDURE — 72100 X-RAY EXAM L-S SPINE 2/3 VWS: CPT

## 2024-10-25 PROCEDURE — 8E0W0CZ ROBOTIC ASSISTED PROCEDURE OF TRUNK REGION, OPEN APPROACH: ICD-10-PCS | Performed by: NEUROLOGICAL SURGERY

## 2024-10-25 PROCEDURE — 0SB20ZZ EXCISION OF LUMBAR VERTEBRAL DISC, OPEN APPROACH: ICD-10-PCS | Performed by: NEUROLOGICAL SURGERY

## 2024-10-25 PROCEDURE — 22634 ARTHRD CMBN 1NTRSPC EA ADDL: CPT | Performed by: NEUROLOGICAL SURGERY

## 2024-10-25 PROCEDURE — 4A11X4G MONITORING OF PERIPHERAL NERVOUS ELECTRICAL ACTIVITY, INTRAOPERATIVE, EXTERNAL APPROACH: ICD-10-PCS | Performed by: NEUROLOGICAL SURGERY

## 2024-10-25 PROCEDURE — 99223 1ST HOSP IP/OBS HIGH 75: CPT | Performed by: PHYSICIAN ASSISTANT

## 2024-10-25 PROCEDURE — 0SG10AJ FUSION OF 2 OR MORE LUMBAR VERTEBRAL JOINTS WITH INTERBODY FUSION DEVICE, POSTERIOR APPROACH, ANTERIOR COLUMN, OPEN APPROACH: ICD-10-PCS | Performed by: NEUROLOGICAL SURGERY

## 2024-10-25 PROCEDURE — 22853 INSJ BIOMECHANICAL DEVICE: CPT | Performed by: NEUROLOGICAL SURGERY

## 2024-10-25 PROCEDURE — 63053 LAM FACTC/FRMT ARTHRD LUM EA: CPT | Performed by: NEUROLOGICAL SURGERY

## 2024-10-25 PROCEDURE — 85049 AUTOMATED PLATELET COUNT: CPT | Performed by: PHYSICIAN ASSISTANT

## 2024-10-25 PROCEDURE — 63052 LAM FACETC/FRMT ARTHRD LUM 1: CPT | Performed by: NEUROLOGICAL SURGERY

## 2024-10-25 PROCEDURE — 4A133B1 MONITORING OF ARTERIAL PRESSURE, PERIPHERAL, PERCUTANEOUS APPROACH: ICD-10-PCS | Performed by: NEUROLOGICAL SURGERY

## 2024-10-25 PROCEDURE — 01NB0ZZ RELEASE LUMBAR NERVE, OPEN APPROACH: ICD-10-PCS | Performed by: NEUROLOGICAL SURGERY

## 2024-10-25 PROCEDURE — 4A133J1 MONITORING OF ARTERIAL PULSE, PERIPHERAL, PERCUTANEOUS APPROACH: ICD-10-PCS | Performed by: NEUROLOGICAL SURGERY

## 2024-10-25 PROCEDURE — 03HY32Z INSERTION OF MONITORING DEVICE INTO UPPER ARTERY, PERCUTANEOUS APPROACH: ICD-10-PCS | Performed by: NEUROLOGICAL SURGERY

## 2024-10-25 PROCEDURE — 20936 SP BONE AGRFT LOCAL ADD-ON: CPT | Performed by: NEUROLOGICAL SURGERY

## 2024-10-25 PROCEDURE — 22633 ARTHRD CMBN 1NTRSPC LUMBAR: CPT | Performed by: NEUROLOGICAL SURGERY

## 2024-10-25 PROCEDURE — 61783 SCAN PROC SPINAL: CPT | Performed by: NEUROLOGICAL SURGERY

## 2024-10-25 PROCEDURE — C1781 MESH (IMPLANTABLE): HCPCS | Performed by: NEUROLOGICAL SURGERY

## 2024-10-25 PROCEDURE — 86923 COMPATIBILITY TEST ELECTRIC: CPT

## 2024-10-25 PROCEDURE — 00NY0ZZ RELEASE LUMBAR SPINAL CORD, OPEN APPROACH: ICD-10-PCS | Performed by: NEUROLOGICAL SURGERY

## 2024-10-25 PROCEDURE — 82948 REAGENT STRIP/BLOOD GLUCOSE: CPT

## 2024-10-25 PROCEDURE — 22842 INSERT SPINE FIXATION DEVICE: CPT | Performed by: NEUROLOGICAL SURGERY

## 2024-10-25 DEVICE — SPACER 6068073 CATALYFT PL SHORT 7MM
Type: IMPLANTABLE DEVICE | Site: SPINE LUMBAR | Status: FUNCTIONAL
Brand: CATALYFT PL EXPANDABLE INTERBODY SYSTEM

## 2024-10-25 DEVICE — SCREW 54850045545 4.75VOYAGER ATS 5.5X45
Type: IMPLANTABLE DEVICE | Site: SPINE LUMBAR | Status: FUNCTIONAL
Brand: CD HORIZON® SOLERA® VOYAGER™ SPINAL SYSTEM

## 2024-10-25 DEVICE — ROD 641000090 90MM PERC ROD 4.75MM CCM
Type: IMPLANTABLE DEVICE | Site: SPINE LUMBAR | Status: FUNCTIONAL
Brand: CD HORIZON® SOLERA® SPINAL SYSTEM

## 2024-10-25 DEVICE — GRAFT DBF WITH DELIVERY TUBES 12ML: Type: IMPLANTABLE DEVICE | Site: SPINE LUMBAR | Status: FUNCTIONAL

## 2024-10-25 DEVICE — SCREW SET 4.75MM SOLERA PERC: Type: IMPLANTABLE DEVICE | Site: SPINE LUMBAR | Status: FUNCTIONAL

## 2024-10-25 RX ORDER — HYDROMORPHONE HCL IN WATER/PF 6 MG/30 ML
0.2 PATIENT CONTROLLED ANALGESIA SYRINGE INTRAVENOUS EVERY 4 HOURS PRN
Status: DISCONTINUED | OUTPATIENT
Start: 2024-10-25 | End: 2024-10-28

## 2024-10-25 RX ORDER — OXYCODONE HYDROCHLORIDE 5 MG/1
5 TABLET ORAL EVERY 4 HOURS PRN
Status: DISCONTINUED | OUTPATIENT
Start: 2024-10-25 | End: 2024-10-27

## 2024-10-25 RX ORDER — PROPOFOL 10 MG/ML
INJECTION, EMULSION INTRAVENOUS AS NEEDED
Status: DISCONTINUED | OUTPATIENT
Start: 2024-10-25 | End: 2024-10-25

## 2024-10-25 RX ORDER — MAGNESIUM HYDROXIDE 1200 MG/15ML
LIQUID ORAL AS NEEDED
Status: DISCONTINUED | OUTPATIENT
Start: 2024-10-25 | End: 2024-10-25 | Stop reason: HOSPADM

## 2024-10-25 RX ORDER — SODIUM CHLORIDE 9 MG/ML
75 INJECTION, SOLUTION INTRAVENOUS CONTINUOUS
Status: DISCONTINUED | OUTPATIENT
Start: 2024-10-25 | End: 2024-10-26

## 2024-10-25 RX ORDER — METHADONE HYDROCHLORIDE 5 MG/1
5 TABLET ORAL EVERY 12 HOURS SCHEDULED
Status: COMPLETED | OUTPATIENT
Start: 2024-10-27 | End: 2024-10-28

## 2024-10-25 RX ORDER — SODIUM CHLORIDE 9 MG/ML
INJECTION, SOLUTION INTRAVENOUS CONTINUOUS PRN
Status: DISCONTINUED | OUTPATIENT
Start: 2024-10-25 | End: 2024-10-25

## 2024-10-25 RX ORDER — SUCCINYLCHOLINE/SOD CL,ISO/PF 100 MG/5ML
SYRINGE (ML) INTRAVENOUS AS NEEDED
Status: DISCONTINUED | OUTPATIENT
Start: 2024-10-25 | End: 2024-10-25

## 2024-10-25 RX ORDER — SODIUM CHLORIDE, SODIUM LACTATE, POTASSIUM CHLORIDE, CALCIUM CHLORIDE 600; 310; 30; 20 MG/100ML; MG/100ML; MG/100ML; MG/100ML
INJECTION, SOLUTION INTRAVENOUS CONTINUOUS PRN
Status: DISCONTINUED | OUTPATIENT
Start: 2024-10-25 | End: 2024-10-25

## 2024-10-25 RX ORDER — GLIMEPIRIDE 2 MG/1
2 TABLET ORAL
Status: DISCONTINUED | OUTPATIENT
Start: 2024-10-26 | End: 2024-10-29 | Stop reason: HOSPADM

## 2024-10-25 RX ORDER — LIDOCAINE 50 MG/G
2 PATCH TOPICAL DAILY
Status: DISCONTINUED | OUTPATIENT
Start: 2024-10-25 | End: 2024-10-29 | Stop reason: HOSPADM

## 2024-10-25 RX ORDER — METHADONE HYDROCHLORIDE 5 MG/1
5 TABLET ORAL EVERY 8 HOURS SCHEDULED
Status: COMPLETED | OUTPATIENT
Start: 2024-10-25 | End: 2024-10-26

## 2024-10-25 RX ORDER — CEFAZOLIN SODIUM 2 G/50ML
2000 SOLUTION INTRAVENOUS EVERY 8 HOURS
Status: COMPLETED | OUTPATIENT
Start: 2024-10-25 | End: 2024-10-26

## 2024-10-25 RX ORDER — METHADONE HYDROCHLORIDE 10 MG/ML
20 INJECTION, SOLUTION INTRAMUSCULAR; INTRAVENOUS; SUBCUTANEOUS ONCE
Status: COMPLETED | OUTPATIENT
Start: 2024-10-25 | End: 2024-10-25

## 2024-10-25 RX ORDER — LIDOCAINE HYDROCHLORIDE AND EPINEPHRINE 10; 10 MG/ML; UG/ML
INJECTION, SOLUTION INFILTRATION; PERINEURAL AS NEEDED
Status: DISCONTINUED | OUTPATIENT
Start: 2024-10-25 | End: 2024-10-25 | Stop reason: HOSPADM

## 2024-10-25 RX ORDER — BUPIVACAINE HYDROCHLORIDE AND EPINEPHRINE 5; 5 MG/ML; UG/ML
INJECTION, SOLUTION EPIDURAL; INTRACAUDAL; PERINEURAL AS NEEDED
Status: DISCONTINUED | OUTPATIENT
Start: 2024-10-25 | End: 2024-10-25 | Stop reason: HOSPADM

## 2024-10-25 RX ORDER — LIDOCAINE HYDROCHLORIDE 10 MG/ML
INJECTION, SOLUTION EPIDURAL; INFILTRATION; INTRACAUDAL; PERINEURAL AS NEEDED
Status: DISCONTINUED | OUTPATIENT
Start: 2024-10-25 | End: 2024-10-25

## 2024-10-25 RX ORDER — METHOCARBAMOL 750 MG/1
750 TABLET, FILM COATED ORAL EVERY 6 HOURS SCHEDULED
Status: DISCONTINUED | OUTPATIENT
Start: 2024-10-25 | End: 2024-10-29 | Stop reason: HOSPADM

## 2024-10-25 RX ORDER — CEFAZOLIN SODIUM 2 G/50ML
SOLUTION INTRAVENOUS AS NEEDED
Status: DISCONTINUED | OUTPATIENT
Start: 2024-10-25 | End: 2024-10-25

## 2024-10-25 RX ORDER — FENTANYL CITRATE 50 UG/ML
INJECTION, SOLUTION INTRAMUSCULAR; INTRAVENOUS AS NEEDED
Status: DISCONTINUED | OUTPATIENT
Start: 2024-10-25 | End: 2024-10-25

## 2024-10-25 RX ORDER — BISACODYL 10 MG
10 SUPPOSITORY, RECTAL RECTAL DAILY PRN
Status: DISCONTINUED | OUTPATIENT
Start: 2024-10-25 | End: 2024-10-29 | Stop reason: HOSPADM

## 2024-10-25 RX ORDER — ONDANSETRON 2 MG/ML
4 INJECTION INTRAMUSCULAR; INTRAVENOUS EVERY 4 HOURS PRN
Status: DISCONTINUED | OUTPATIENT
Start: 2024-10-25 | End: 2024-10-29 | Stop reason: HOSPADM

## 2024-10-25 RX ORDER — MIDAZOLAM HYDROCHLORIDE 2 MG/2ML
INJECTION, SOLUTION INTRAMUSCULAR; INTRAVENOUS AS NEEDED
Status: DISCONTINUED | OUTPATIENT
Start: 2024-10-25 | End: 2024-10-25

## 2024-10-25 RX ORDER — ACETAMINOPHEN 325 MG/1
975 TABLET ORAL EVERY 8 HOURS SCHEDULED
Status: DISCONTINUED | OUTPATIENT
Start: 2024-10-25 | End: 2024-10-29 | Stop reason: HOSPADM

## 2024-10-25 RX ORDER — CARVEDILOL 12.5 MG/1
12.5 TABLET ORAL 2 TIMES DAILY
Status: DISCONTINUED | OUTPATIENT
Start: 2024-10-25 | End: 2024-10-29 | Stop reason: HOSPADM

## 2024-10-25 RX ORDER — EPHEDRINE SULFATE 50 MG/ML
INJECTION INTRAVENOUS AS NEEDED
Status: DISCONTINUED | OUTPATIENT
Start: 2024-10-25 | End: 2024-10-25

## 2024-10-25 RX ORDER — CHLORHEXIDINE GLUCONATE ORAL RINSE 1.2 MG/ML
15 SOLUTION DENTAL ONCE
Status: COMPLETED | OUTPATIENT
Start: 2024-10-25 | End: 2024-10-25

## 2024-10-25 RX ORDER — ONDANSETRON 2 MG/ML
INJECTION INTRAMUSCULAR; INTRAVENOUS AS NEEDED
Status: DISCONTINUED | OUTPATIENT
Start: 2024-10-25 | End: 2024-10-25

## 2024-10-25 RX ORDER — HEPARIN SODIUM 5000 [USP'U]/ML
5000 INJECTION, SOLUTION INTRAVENOUS; SUBCUTANEOUS EVERY 8 HOURS SCHEDULED
Status: DISCONTINUED | OUTPATIENT
Start: 2024-10-26 | End: 2024-10-29 | Stop reason: HOSPADM

## 2024-10-25 RX ORDER — SENNOSIDES 8.6 MG
1 TABLET ORAL DAILY
Status: DISCONTINUED | OUTPATIENT
Start: 2024-10-25 | End: 2024-10-29 | Stop reason: HOSPADM

## 2024-10-25 RX ORDER — PROPOFOL 10 MG/ML
INJECTION, EMULSION INTRAVENOUS CONTINUOUS PRN
Status: DISCONTINUED | OUTPATIENT
Start: 2024-10-25 | End: 2024-10-25

## 2024-10-25 RX ORDER — DEXAMETHASONE SODIUM PHOSPHATE 10 MG/ML
INJECTION, SOLUTION INTRAMUSCULAR; INTRAVENOUS AS NEEDED
Status: DISCONTINUED | OUTPATIENT
Start: 2024-10-25 | End: 2024-10-25

## 2024-10-25 RX ORDER — HYDROCHLOROTHIAZIDE 12.5 MG/1
12.5 TABLET ORAL 2 TIMES DAILY
Status: DISCONTINUED | OUTPATIENT
Start: 2024-10-25 | End: 2024-10-29 | Stop reason: HOSPADM

## 2024-10-25 RX ORDER — KETAMINE HYDROCHLORIDE 100 MG/ML
INJECTION, SOLUTION INTRAMUSCULAR; INTRAVENOUS AS NEEDED
Status: DISCONTINUED | OUTPATIENT
Start: 2024-10-25 | End: 2024-10-25

## 2024-10-25 RX ORDER — ONDANSETRON 2 MG/ML
4 INJECTION INTRAMUSCULAR; INTRAVENOUS ONCE AS NEEDED
Status: COMPLETED | OUTPATIENT
Start: 2024-10-25 | End: 2024-10-25

## 2024-10-25 RX ORDER — LISINOPRIL 20 MG/1
20 TABLET ORAL 2 TIMES DAILY
Status: DISCONTINUED | OUTPATIENT
Start: 2024-10-25 | End: 2024-10-29 | Stop reason: HOSPADM

## 2024-10-25 RX ORDER — SODIUM CHLORIDE, SODIUM LACTATE, POTASSIUM CHLORIDE, CALCIUM CHLORIDE 600; 310; 30; 20 MG/100ML; MG/100ML; MG/100ML; MG/100ML
100 INJECTION, SOLUTION INTRAVENOUS CONTINUOUS
Status: DISCONTINUED | OUTPATIENT
Start: 2024-10-25 | End: 2024-10-26

## 2024-10-25 RX ORDER — DOCUSATE SODIUM 100 MG/1
100 CAPSULE, LIQUID FILLED ORAL 2 TIMES DAILY
Status: DISCONTINUED | OUTPATIENT
Start: 2024-10-25 | End: 2024-10-29 | Stop reason: HOSPADM

## 2024-10-25 RX ADMIN — DEXAMETHASONE SODIUM PHOSPHATE 10 MG: 10 INJECTION, SOLUTION INTRAMUSCULAR; INTRAVENOUS at 07:41

## 2024-10-25 RX ADMIN — SODIUM CHLORIDE 75 ML/HR: 0.9 INJECTION, SOLUTION INTRAVENOUS at 16:30

## 2024-10-25 RX ADMIN — CHLORHEXIDINE GLUCONATE 0.12% ORAL RINSE 15 ML: 1.2 LIQUID ORAL at 06:34

## 2024-10-25 RX ADMIN — CEFAZOLIN SODIUM 2000 MG: 2 SOLUTION INTRAVENOUS at 16:30

## 2024-10-25 RX ADMIN — METHADONE HYDROCHLORIDE 5 MG: 5 TABLET ORAL at 16:30

## 2024-10-25 RX ADMIN — PROPOFOL 100 MCG/KG/MIN: 10 INJECTION, EMULSION INTRAVENOUS at 07:58

## 2024-10-25 RX ADMIN — LIDOCAINE 2 PATCH: 50 PATCH TOPICAL at 16:30

## 2024-10-25 RX ADMIN — KETAMINE HYDROCHLORIDE 50 MG: 100 INJECTION, SOLUTION, CONCENTRATE INTRAMUSCULAR; INTRAVENOUS at 09:46

## 2024-10-25 RX ADMIN — EPHEDRINE SULFATE 10 MG: 50 INJECTION, SOLUTION INTRAVENOUS at 08:34

## 2024-10-25 RX ADMIN — PHENYLEPHRINE HYDROCHLORIDE 20 MCG/MIN: 10 INJECTION INTRAVENOUS at 07:58

## 2024-10-25 RX ADMIN — ACETAMINOPHEN 975 MG: 325 TABLET, FILM COATED ORAL at 16:30

## 2024-10-25 RX ADMIN — CARVEDILOL 12.5 MG: 12.5 TABLET, FILM COATED ORAL at 17:15

## 2024-10-25 RX ADMIN — ONDANSETRON 4 MG: 2 INJECTION INTRAMUSCULAR; INTRAVENOUS at 13:25

## 2024-10-25 RX ADMIN — METHOCARBAMOL 750 MG: 750 TABLET ORAL at 17:15

## 2024-10-25 RX ADMIN — ONDANSETRON 4 MG: 2 INJECTION INTRAMUSCULAR; INTRAVENOUS at 10:57

## 2024-10-25 RX ADMIN — Medication 160 MG: at 07:41

## 2024-10-25 RX ADMIN — ACETAMINOPHEN 975 MG: 325 TABLET, FILM COATED ORAL at 22:00

## 2024-10-25 RX ADMIN — DOCUSATE SODIUM 100 MG: 100 CAPSULE, LIQUID FILLED ORAL at 17:15

## 2024-10-25 RX ADMIN — LISINOPRIL 20 MG: 20 TABLET ORAL at 17:15

## 2024-10-25 RX ADMIN — EPHEDRINE SULFATE 5 MG: 50 INJECTION, SOLUTION INTRAVENOUS at 07:59

## 2024-10-25 RX ADMIN — HYDROCHLOROTHIAZIDE 12.5 MG: 12.5 TABLET ORAL at 17:15

## 2024-10-25 RX ADMIN — Medication 20 MG: at 08:19

## 2024-10-25 RX ADMIN — METHADONE HYDROCHLORIDE 5 MG: 5 TABLET ORAL at 22:00

## 2024-10-25 RX ADMIN — PROPOFOL 300 MG: 10 INJECTION, EMULSION INTRAVENOUS at 07:41

## 2024-10-25 RX ADMIN — SODIUM CHLORIDE 0.15 MCG/KG/MIN: 900 INJECTION INTRAVENOUS at 07:58

## 2024-10-25 RX ADMIN — SODIUM CHLORIDE, SODIUM LACTATE, POTASSIUM CHLORIDE, AND CALCIUM CHLORIDE: .6; .31; .03; .02 INJECTION, SOLUTION INTRAVENOUS at 07:21

## 2024-10-25 RX ADMIN — LIDOCAINE HYDROCHLORIDE 50 MG: 10 INJECTION, SOLUTION EPIDURAL; INFILTRATION; INTRACAUDAL; PERINEURAL at 07:41

## 2024-10-25 RX ADMIN — DEXMEDETOMIDINE HYDROCHLORIDE 0.3 MCG/KG/HR: 100 INJECTION, SOLUTION INTRAVENOUS at 07:58

## 2024-10-25 RX ADMIN — SENNOSIDES 8.6 MG: 8.6 TABLET, FILM COATED ORAL at 16:30

## 2024-10-25 RX ADMIN — CEFAZOLIN SODIUM 3000 MG: 2 SOLUTION INTRAVENOUS at 08:48

## 2024-10-25 RX ADMIN — MIDAZOLAM 2 MG: 1 INJECTION INTRAMUSCULAR; INTRAVENOUS at 07:36

## 2024-10-25 RX ADMIN — FENTANYL CITRATE 100 MCG: 50 INJECTION INTRAMUSCULAR; INTRAVENOUS at 07:41

## 2024-10-25 RX ADMIN — SODIUM CHLORIDE: 0.9 INJECTION, SOLUTION INTRAVENOUS at 07:47

## 2024-10-25 NOTE — ANESTHESIA POSTPROCEDURE EVALUATION
Post-Op Assessment Note    CV Status:  Stable    Pain management: adequate       Mental Status:  Sleepy   Hydration Status:  Euvolemic   PONV Controlled:  Controlled   Airway Patency:  Patent     Post Op Vitals Reviewed: Yes    No anethesia notable event occurred.    Staff: CRNA           Last Filed PACU Vitals:  Vitals Value Taken Time   Temp 98 °F (36.7 °C) 10/25/24 1254   Pulse 81 10/25/24 1256   /69 10/25/24 1255   Resp 12 10/25/24 1256   SpO2 96 % FM 6l 10/25/24 1256   Vitals shown include unfiled device data.    Modified Gissell:  No data recorded

## 2024-10-25 NOTE — OP NOTE
OPERATIVE REPORT  PATIENT NAME: Aaron Snyder    :  1965  MRN: [id number]  Pt Location: [facility] OR ROOM 17    SURGERY DATE: 10/25/2024    Surgeons and Role:     * Venancio Ritchie MD - Primary    Preop Diagnosis:  Lumbar disc herniation [M51.26]  Abnormal MRI, lumbar spine [R93.7]  Morbid obesity (HCC) [E66.01]  Lumbar radiculopathy [M54.16]    Post-Op Diagnosis Codes:     * Lumbar disc herniation [M51.26]     * Abnormal MRI, lumbar spine [R93.7]     * Morbid obesity (HCC) [E66.01]     * Lumbar radiculopathy [M54.16]    Procedure(s):  Left - ROBOTIC ASSISTED DECOMPRESSIVE LAMINECTOMY . DISKECTOMY AND OSTEOPHYTECTOMY l1/2. AND l2.3 AND TRANSVERSE LUMBAR  INTERBODY FUSION L1/2 AND l2/3 - LEFT SIDED APPROACH AND PEDICLE SCREW FIXATION L1-3    Specimen(s):  * No specimens in log *    Estimated Blood Loss:   Minimal    Drains:  [REMOVED] Urethral Catheter Latex 16 Fr. (Removed)   Number of days: 0       Anesthesia Type:   General    Operative Indications:  Lumbar disc herniation [M51.26]  Abnormal MRI, lumbar spine [R93.7]  Morbid obesity (HCC) [E66.01]  Lumbar radiculopathy [M54.16]      Operative Findings:  LARGE OSTEOPHYTES L1/2 AND DISC HERNIATION      Complications:   None    Procedure and Technique:  After adequate general endotracheal anesthesia the patient was placed prone on the Dandre table.  The back was prepped with Betadine soap then DuraPrep.  Double layer drapes were placed in normal fashion and a 3M Betadine premade sticky drape was placed over these.    The doForms robotic system was registered and calibrated.  Preloaded images utilizing an O arm spin performed intraoperatively imported into the robotic system and then calibrated against the registration of the Hitlantisor imagage guidance.  Each of the instruments was registered and calibrated in preparation for the procedure.  The system allowed for intraoperative planning as well as guided placement of screw positions.    Timeout was called and all  parameters of timeout were followed.    The procedure began by using the image guidance to identify trajectories to the underlying pedicles.  In these regions and was infiltrated with 1% lidocaine with 1 100,000 epinephrine and incisions were made with a 15 blade.  Bovie and bipolar used throughout the procedure to maintain hemostasis.  Bovie and bipolar used throughout the procedure to maintain hemostasis.  Dissection was carried out to the underlying fascia.    The robotic system was then utilized to introduce screws through the fascia into the pedicles of L1 and L2 and L3 bilaterally.  5.5 x 45 mm screws were placed at L1 and L2 and 6.5 by 45 mm screws were placed at L3.  These were then checked in the AP and lateral trajectories to ensure that they were in appropriate position.    Next a intepedicular retractor system was utilized to expose the ipsilateral lamina and the intersection between the lamina and the spinous process.  The intraoperative microscope was used at various degrees of magnification to aid in identification, illumination, and microdissection necessary to perform this procedure.  A careful dissection then ensued to allow for a decompression of the contralateral side as well as the ipsilateral lamina.  The ligament was removed and the thecal sac decompressed.  There were no CSF leaks.    A medial facetectomy was performed at the L1/2 level as well as a completion hemilaminectomy and an undercutting of the contralateral side with foraminotomy rongeurs.  Large posterior projecting osteophytes were removed and multiple large disc fragments above the fascia were dissected and removed.   on the left side and the disc space was identified.    An annulotomy was performed and the disc base with a series of curettes pituitary rongeurs ring curettes and rasp were used to remove the disc and its cartilaginous endplates.  Next locally harvested bone was mixed with demineralized bone matrix and then inserted  into the contralateral side at this space.  Under fluoroscopic guidance an 7 x 23 expandable cage was impacted into position.  This was angled medially.    Nextt, A medial facetectomy was performed at the L2/3 level as well as a hemilaminectomy and an undercutting of the contralateral side with foraminotomy rongeurs.    On the left side the disc space was identified.    An annulotomy was performed and the disc base with a series of curettes pituitary rongeurs ring curettes and rasp were used to remove the disc and its cartilaginous endplates.  Next locally harvested bone was mixed with demineralized bone matrix and then inserted into the contralateral side at this space.  Under fluoroscopic guidance an 7 x 23 expandable cage was impacted into position.  This was angled medially.    Rods were placed into the extensions of the screws and xchecked in the AP and lateral planes.  These were then locked into position both on the left and the right.  90 mm rods were utilized for this purpose.  The screws were tightened to the breakoff point and the extensors were broken off.  Copious quantities of antibiotic irrigation were used to cleanse out the region.  Next the fascia was approximated with interrupted inverted 2-0 Vicryl suture.  The skin was closed with interrupted inverted 2-0 Vicryl suture in interrupted inverted 3-0 Vicryl suture and the epidermis.  0.5% bupivacaine with out epinephrine was injected into the surrounding tissues.  Staples were used to close the skin.  Mepilex dressings were placed.    Somatosensory evoked potentials were carefully monitored.  There was some minor alteration in the motor evoked potential of the anterior tibialis but this was thought to be within normal limits of expectation for the procedure.    The patient was taken to the recovery room having tolerated the procedure well.   I was present for the entire procedure.    Patient Disposition:  PACU              SIGNATURE: Venancio Mars  MD Erma  DATE: October 25, 2024  TIME: 12:57 PM

## 2024-10-25 NOTE — CONSULTS
Consultation - Acute Pain   Name: Aaron Snyder 59 y.o. male I MRN: [id number]  Unit/Bed#: [facility] 628-01 I Date of Admission: 10/25/2024   Date of Service: 10/25/2024 I Hospital Day: 0   Inpatient consult to Acute Pain Service  Consult performed by: Deondre Ruggiero PA-C  Consult ordered by: John Anders MD        Physician Requesting Evaluation: Venancio Ritchie MD   Reason for Evaluation / Principal Problem: Status post lumbar decompressive laminectomy/fusion    Assessment & Plan  Lumbar radiculopathy  Acetaminophen 975 mg p.o. every 8 hours scheduled.  Lidocaine patch x 2, on for 12 hours and off for 12 hours.  Methocarbamol 750 mg p.o. every 6 hours scheduled.  Oxycodone 2.5 mg p.o. every 4 hours as needed moderate pain or 5 mg p.o. every 4 hours as needed severe pain.  Dilaudid 0.2 mg IV every 4 hours as needed breakthrough pain.  Will start methadone taper as follows:  Methadone 5 mg p.o. every 8 hours scheduled x 5 doses (to end of day 10/26/2024) followed by  Methadone 5 mg p.o. every 12 hours x 3 doses (2 AM of 10/28/2024).  Bowel regimen to avoid opioid-induced constipation while on opioid pain medication.  Add Narcan as needed in the event that opioid reversal becomes necessary.  I have discussed the above management plan in detail with the primary service.   Please contact the [employer] [place] role for the Acute Pain service with any questions/concerns.      APS will continue to follow. Please contact Acute Pain Service - via [employer] from [phone] with additional questions or concerns. See [employer] or Department of Health and Human Services for additional contacts and after hours information.     History of Present Illness    HPI: Aaron Snyder is a 59 y.o. year old male who presents with 6 out of 10 lower back pain following robotic assisted decompressive laminectomy/discectomy, osteophytectomy of L1-2 and L2-3 with transverse lumbar interbody fusion L1-2 and L2-3 and pedicle screw fixation L1-3 today.  Patient had 20 mg IntraOp IV  methadone.  Patient with history of minor intermittent lower back pain and recurrent disc herniations.  Currently lying in bed and appears relatively comfortable.  Denies any radiation of pain, numbness, tingling or weakness.  Has not required any postoperative opioid pain medication to this point.    Current pain location(s): Pain Score: 2  Pain Location/Orientation: Orientation: Lower, Location: Back  Pain Scale: Pain Assessment Tool: 0-10  Current Analgesic regimen:  Acetaminophen 975 mg p.o. every 8 hours scheduled.  Oxycodone 2.5 mg p.o. every 4 hours as needed moderate pain or 5 mg p.o. every 4 hours as needed severe pain.  Dilaudid 0.2 mg IV every 4 hours as needed breakthrough pain.  Lidocaine patch, on for 12 hours and off for 12 hours.  Robaxin 750 mg p.o. every 6 hours scheduled.     Pain History: None  Pain Management Physician: None  I have reviewed the patient's controlled substance dispensing history in the Prescription Drug Monitoring Program in compliance with the [facility] regulations before prescribing any controlled substances.     Review of Systems   Musculoskeletal:  Positive for back pain.   All other systems reviewed and are negative.    I have reviewed the patient's PMH, PSH, Social History, Family History, Meds, and Allergies  Historical Information   Past Medical History:   Diagnosis Date    Allergic     Benign arteriolar nephrosclerosis     Colon polyp     Diabetes mellitus (HCC)     Essential hypertension     History of transfusion 2013    Hyperparathyroidism due to renal insufficiency (HCC)     Legionnaire's disease (HCC)     Pneumonia     s/p VDRF      Past Surgical History:   Procedure Laterality Date    COLONOSCOPY      OTHER SURGICAL HISTORY      Dialysis catheter     HI LAMNOTMY INCL W/DCMPRSN NRV ROOT 1 INTRSPC LUMBR Left 11/15/2021    Procedure: Left sided L1-2 microdiscectomy;  Surgeon: Andi Campoverde MD;  Location:  MAIN OR;  Service: Neurosurgery     Social History      Tobacco Use    Smoking status: Never     Passive exposure: Never    Smokeless tobacco: Never   Vaping Use    Vaping status: Never Used   Substance and Sexual Activity    Alcohol use: Not Currently     Comment: Denies alcohol use - As per [employer]     Drug use: Never    Sexual activity: Yes     Partners: Female     Birth control/protection: None     E-Cigarette/Vaping    E-Cigarette Use Never User      E-Cigarette/Vaping Substances    Nicotine No     THC No     CBD No     Flavoring No     Other No     Unknown No      Family History   Problem Relation Age of Onset    Hyperlipidemia Mother     Lung cancer Father     Stomach cancer Father     Cancer Father     Diabetes type II Father     Diabetes type II Brother      Social History     Tobacco Use    Smoking status: Never     Passive exposure: Never    Smokeless tobacco: Never   Vaping Use    Vaping status: Never Used   Substance and Sexual Activity    Alcohol use: Not Currently     Comment: Denies alcohol use - As per [employer]     Drug use: Never    Sexual activity: Yes     Partners: Female     Birth control/protection: None       Current Facility-Administered Medications:     acetaminophen (TYLENOL) tablet 975 mg, Q8H DAYNA    bisacodyl (DULCOLAX) rectal suppository 10 mg, Daily PRN    carvedilol (COREG) tablet 12.5 mg, BID    ceFAZolin (ANCEF) IVPB (premix in dextrose) 2,000 mg 50 mL, Q8H    docusate sodium (COLACE) capsule 100 mg, BID    [START ON 10/26/2024] glimepiride (AMARYL) tablet 2 mg, Daily With Breakfast    [START ON 10/26/2024] heparin (porcine) subcutaneous injection 5,000 Units, Q8H DAYNA **AND** Platelet count, Once    lisinopril (ZESTRIL) tablet 20 mg, BID **AND** hydroCHLOROthiazide tablet 12.5 mg, BID    HYDROmorphone HCl (DILAUDID) injection 0.2 mg, Q4H PRN    lactated ringers infusion, Continuous    lidocaine (LIDODERM) 5 % patch 2 patch, Daily    methadone (Dolophine) tablet 5 mg, Q8H DAYNA **FOLLOWED BY** [START ON 10/27/2024] methadone (Dolophine)  tablet 5 mg, Q12H DAYNA    methocarbamol (ROBAXIN) tablet 750 mg, Q6H DAYNA    naloxone (NARCAN) 0.04 mg/mL syringe 0.04 mg, Q1MIN PRN    ondansetron (ZOFRAN) injection 4 mg, Q4H PRN    oxyCODONE (ROXICODONE) split tablet 2.5 mg, Q4H PRN **OR** oxyCODONE (ROXICODONE) IR tablet 5 mg, Q4H PRN    senna (SENOKOT) tablet 8.6 mg, Daily    sodium chloride 0.9 % infusion, Continuous  Prior to Admission Medications   Prescriptions Last Dose Informant Patient Reported? Taking?   Multiple Vitamins-Minerals (multivitamin with minerals) tablet 10/18/2024  Yes Yes   Sig: Take 1 tablet by mouth daily   acetaminophen (TYLENOL) 500 mg tablet 10/24/2024  Yes Yes   Sig: Take 500 mg by mouth 2 (two) times a day   carvedilol (COREG) 12.5 mg tablet 10/25/2024 at 0430  No Yes   Sig: TAKE 1 TABLET TWICE A DAY   cholecalciferol (VITAMIN D3) 400 units tablet 10/23/2024  Yes Yes   Sig: Take 400 Units by mouth daily   fexofenadine (ALLEGRA) 180 MG tablet 10/24/2024 Self Yes Yes   Sig: Take 180 mg by mouth daily   glimepiride (AMARYL) 2 mg tablet 10/24/2024  No Yes   Sig: TAKE 1 TABLET DAILY WITH BREAKFAST   lisinopril-hydrochlorothiazide (PRINZIDE,ZESTORETIC) 20-12.5 MG per tablet 10/24/2024  No Yes   Sig: take 1 tablet by mouth twice a day      Facility-Administered Medications: None     Ozempic (0.25 or 0.5 mg-dose) [semaglutide(0.25 or 0.5mg-dos)], Glipizide, and Metformin    Objective :  Temp:  [96.2 °F (35.7 °C)-98.3 °F (36.8 °C)] 97.1 °F (36.2 °C)  HR:  [66-83] 74  BP: (106-145)/(58-97) 129/78  Resp:  [13-20] 16  SpO2:  [94 %-97 %] 94 %  O2 Device: Nasal cannula  Nasal Cannula O2 Flow Rate (L/min):  [4 L/min] 4 L/min    Physical Exam  Vitals and nursing note reviewed.   Constitutional:       General: He is awake. He is not in acute distress.     Appearance: He is not ill-appearing, toxic-appearing or diaphoretic.   Skin:     General: Skin is warm and dry.   Neurological:      Mental Status: He is alert and oriented to person, place, and  time.      GCS: GCS eye subscore is 4. GCS verbal subscore is 5. GCS motor subscore is 6.   Psychiatric:         Attention and Perception: Attention normal.         Speech: Speech normal.         Behavior: Behavior normal. Behavior is cooperative.          Lab Results: I have reviewed the following results:  CrCl cannot be calculated (Patient's most recent lab result is older than the maximum 7 days allowed.).  Lab Results   Component Value Date    WBC 9.74 09/28/2024    HGB 15.7 09/28/2024    HCT 48.5 09/28/2024     09/28/2024         Component Value Date/Time    K 4.1 09/28/2024 0740    CL 96 09/28/2024 0740    CO2 32 09/28/2024 0740    BUN 20 09/28/2024 0740    CREATININE 1.05 09/28/2024 0740         Component Value Date/Time    CALCIUM 9.6 09/28/2024 0740    ALKPHOS 42 09/28/2024 0740    AST 25 09/28/2024 0740    ALT 35 09/28/2024 0740    TP 7.3 09/28/2024 0740    ALB 4.3 09/28/2024 0740       Imaging Results Review: No pertinent imaging studies reviewed.  Other Study Results Review: No additional pertinent studies reviewed.    Administrative Statements   I have spent a total time of 46 minutes in caring for this patient on the day of the visit/encounter including Risks and benefits of tx options, Instructions for management, Patient and family education, Importance of tx compliance, Risk factor reductions, Counseling / Coordination of care, Documenting in the medical record, Reviewing / ordering tests, medicine, procedures  , Obtaining or reviewing history  , and Communicating with other healthcare professionals .

## 2024-10-25 NOTE — ASSESSMENT & PLAN NOTE
Acetaminophen 975 mg p.o. every 8 hours scheduled.  Lidocaine patch x 2, on for 12 hours and off for 12 hours.  Methocarbamol 750 mg p.o. every 6 hours scheduled.  Oxycodone 2.5 mg p.o. every 4 hours as needed moderate pain or 5 mg p.o. every 4 hours as needed severe pain.  Dilaudid 0.2 mg IV every 4 hours as needed breakthrough pain.  Will start methadone taper as follows:  Methadone 5 mg p.o. every 8 hours scheduled x 5 doses (to end of day 10/26/2024) followed by  Methadone 5 mg p.o. every 12 hours x 3 doses (2 AM of 10/28/2024).  Bowel regimen to avoid opioid-induced constipation while on opioid pain medication.  Add Narcan as needed in the event that opioid reversal becomes necessary.

## 2024-10-25 NOTE — ANESTHESIA PROCEDURE NOTES
"Arterial Line Insertion    Performed by: John Anders MD  Authorized by: John Anders MD  Consent: Verbal consent obtained. Written consent obtained.  Risks and benefits: risks, benefits and alternatives were discussed  Consent given by: patient  Required items: required blood products, implants, devices, and special equipment available  Patient identity confirmed: arm band  Time out: Immediately prior to procedure a \"time out\" was called to verify the correct patient, procedure, equipment, support staff and site/side marked as required.  Preparation: Patient was prepped and draped in the usual sterile fashion.  Indications: hemodynamic monitoring  Orientation:  Right  Location: radial artery  Sedation:  Patient sedated: under general anesthesia.    Procedure Details:  Needle gauge: 20  Seldinger technique: Seldinger technique used  Number of attempts: 2    Post-procedure:  Post-procedure: dressing applied  Waveform: good waveform  Patient tolerance: Patient tolerated the procedure well with no immediate complications and patient tolerated the procedure well with no immediate complications          "

## 2024-10-26 ENCOUNTER — APPOINTMENT (INPATIENT)
Dept: RADIOLOGY | Facility: HOSPITAL | Age: 59
DRG: 460 | End: 2024-10-26
Payer: COMMERCIAL

## 2024-10-26 LAB
ABO GROUP BLD BPU: NORMAL
ABO GROUP BLD BPU: NORMAL
ANION GAP SERPL CALCULATED.3IONS-SCNC: 12 MMOL/L (ref 4–13)
APTT PPP: 26 SECONDS (ref 23–34)
BPU ID: NORMAL
BPU ID: NORMAL
BUN SERPL-MCNC: 24 MG/DL (ref 5–25)
CALCIUM SERPL-MCNC: 8.2 MG/DL (ref 8.4–10.2)
CHLORIDE SERPL-SCNC: 99 MMOL/L (ref 96–108)
CO2 SERPL-SCNC: 27 MMOL/L (ref 21–32)
CREAT SERPL-MCNC: 1.06 MG/DL (ref 0.6–1.3)
CROSSMATCH: NORMAL
CROSSMATCH: NORMAL
ERYTHROCYTE [DISTWIDTH] IN BLOOD BY AUTOMATED COUNT: 12.9 % (ref 11.6–15.1)
GFR SERPL CREATININE-BSD FRML MDRD: 75 ML/MIN/1.73SQ M
GLUCOSE SERPL-MCNC: 177 MG/DL (ref 65–140)
HCT VFR BLD AUTO: 42.5 % (ref 36.5–49.3)
HGB BLD-MCNC: 14.2 G/DL (ref 12–17)
INR PPP: 0.99 (ref 0.85–1.19)
MCH RBC QN AUTO: 29.6 PG (ref 26.8–34.3)
MCHC RBC AUTO-ENTMCNC: 33.4 G/DL (ref 31.4–37.4)
MCV RBC AUTO: 89 FL (ref 82–98)
PLATELET # BLD AUTO: 252 THOUSANDS/UL (ref 149–390)
PMV BLD AUTO: 9.7 FL (ref 8.9–12.7)
POTASSIUM SERPL-SCNC: 3.6 MMOL/L (ref 3.5–5.3)
PROTHROMBIN TIME: 13.4 SECONDS (ref 12.3–15)
RBC # BLD AUTO: 4.8 MILLION/UL (ref 3.88–5.62)
SODIUM SERPL-SCNC: 138 MMOL/L (ref 135–147)
UNIT DISPENSE STATUS: NORMAL
UNIT DISPENSE STATUS: NORMAL
UNIT PRODUCT CODE: NORMAL
UNIT PRODUCT CODE: NORMAL
UNIT PRODUCT VOLUME: 350 ML
UNIT PRODUCT VOLUME: 350 ML
UNIT RH: NORMAL
UNIT RH: NORMAL
WBC # BLD AUTO: 13.41 THOUSAND/UL (ref 4.31–10.16)

## 2024-10-26 PROCEDURE — 97167 OT EVAL HIGH COMPLEX 60 MIN: CPT

## 2024-10-26 PROCEDURE — 85610 PROTHROMBIN TIME: CPT | Performed by: PHYSICIAN ASSISTANT

## 2024-10-26 PROCEDURE — 99024 POSTOP FOLLOW-UP VISIT: CPT | Performed by: NEUROLOGICAL SURGERY

## 2024-10-26 PROCEDURE — 99232 SBSQ HOSP IP/OBS MODERATE 35: CPT | Performed by: ANESTHESIOLOGY

## 2024-10-26 PROCEDURE — 97163 PT EVAL HIGH COMPLEX 45 MIN: CPT

## 2024-10-26 PROCEDURE — 85027 COMPLETE CBC AUTOMATED: CPT | Performed by: PHYSICIAN ASSISTANT

## 2024-10-26 PROCEDURE — 80048 BASIC METABOLIC PNL TOTAL CA: CPT | Performed by: PHYSICIAN ASSISTANT

## 2024-10-26 PROCEDURE — 85730 THROMBOPLASTIN TIME PARTIAL: CPT | Performed by: PHYSICIAN ASSISTANT

## 2024-10-26 RX ADMIN — DOCUSATE SODIUM 100 MG: 100 CAPSULE, LIQUID FILLED ORAL at 18:04

## 2024-10-26 RX ADMIN — METHOCARBAMOL 750 MG: 750 TABLET ORAL at 00:57

## 2024-10-26 RX ADMIN — DOCUSATE SODIUM 100 MG: 100 CAPSULE, LIQUID FILLED ORAL at 10:05

## 2024-10-26 RX ADMIN — HYDROCHLOROTHIAZIDE 12.5 MG: 12.5 TABLET ORAL at 18:04

## 2024-10-26 RX ADMIN — METHOCARBAMOL 750 MG: 750 TABLET ORAL at 18:04

## 2024-10-26 RX ADMIN — CARVEDILOL 12.5 MG: 12.5 TABLET, FILM COATED ORAL at 10:05

## 2024-10-26 RX ADMIN — CARVEDILOL 12.5 MG: 12.5 TABLET, FILM COATED ORAL at 18:04

## 2024-10-26 RX ADMIN — OXYCODONE HYDROCHLORIDE 5 MG: 5 TABLET ORAL at 12:35

## 2024-10-26 RX ADMIN — ACETAMINOPHEN 975 MG: 325 TABLET, FILM COATED ORAL at 21:24

## 2024-10-26 RX ADMIN — METHOCARBAMOL 750 MG: 750 TABLET ORAL at 05:34

## 2024-10-26 RX ADMIN — HYDROCHLOROTHIAZIDE 12.5 MG: 12.5 TABLET ORAL at 10:05

## 2024-10-26 RX ADMIN — METHOCARBAMOL 750 MG: 750 TABLET ORAL at 23:49

## 2024-10-26 RX ADMIN — ACETAMINOPHEN 975 MG: 325 TABLET, FILM COATED ORAL at 05:34

## 2024-10-26 RX ADMIN — CEFAZOLIN SODIUM 2000 MG: 2 SOLUTION INTRAVENOUS at 10:06

## 2024-10-26 RX ADMIN — GLIMEPIRIDE 2 MG: 2 TABLET ORAL at 10:06

## 2024-10-26 RX ADMIN — METHADONE HYDROCHLORIDE 5 MG: 5 TABLET ORAL at 21:24

## 2024-10-26 RX ADMIN — METHADONE HYDROCHLORIDE 5 MG: 5 TABLET ORAL at 05:34

## 2024-10-26 RX ADMIN — HEPARIN SODIUM 5000 UNITS: 5000 INJECTION, SOLUTION INTRAVENOUS; SUBCUTANEOUS at 21:24

## 2024-10-26 RX ADMIN — ACETAMINOPHEN 975 MG: 325 TABLET, FILM COATED ORAL at 14:08

## 2024-10-26 RX ADMIN — METHOCARBAMOL 750 MG: 750 TABLET ORAL at 12:35

## 2024-10-26 RX ADMIN — SENNOSIDES 8.6 MG: 8.6 TABLET, FILM COATED ORAL at 10:05

## 2024-10-26 RX ADMIN — HEPARIN SODIUM 5000 UNITS: 5000 INJECTION, SOLUTION INTRAVENOUS; SUBCUTANEOUS at 14:08

## 2024-10-26 RX ADMIN — METHADONE HYDROCHLORIDE 5 MG: 5 TABLET ORAL at 14:08

## 2024-10-26 RX ADMIN — LISINOPRIL 20 MG: 20 TABLET ORAL at 10:05

## 2024-10-26 RX ADMIN — LISINOPRIL 20 MG: 20 TABLET ORAL at 18:04

## 2024-10-26 RX ADMIN — CEFAZOLIN SODIUM 2000 MG: 2 SOLUTION INTRAVENOUS at 00:57

## 2024-10-26 NOTE — UTILIZATION REVIEW
Initial Clinical Review    Elective IP surgical procedure  Age/Sex: 59 y.o. male  Surgery Date: 10/25/2024  Procedure:   Left - ROBOTIC ASSISTED DECOMPRESSIVE LAMINECTOMY . DISKECTOMY AND OSTEOPHYTECTOMY l1/2. AND l2.3 AND TRANSVERSE LUMBAR INTERBODY FUSION L1/2 AND l2/3 - LEFT SIDED APPROACH AND PEDICLE SCREW FIXATION L1-3   Anesthesia: General  Operative Findings: LARGE OSTEOPHYTES L1/2 AND DISC HERNIATION       POD#1 Progress Note: Pt reports incisional back pain that he rates as 7/10 with activity and movement and decreases at rest.  Denies radicular pain down b/l LE.  Reports he continues to have R>L LE weakness.  Denies numbness in b/l LE at this time.  He reports that he had numbness in his feet preop. Upright x-rays of the lumbar spine to be completed. No brace required. Denies N/V, tolerating reg diet. Ambulating in halls with PT today. Continue analgesics, bowel regimen, pta po meds       Admission Orders: Date/Time/Statement:   Admission Orders (From admission, onward)       Ordered        10/25/24 0820  Inpatient Admission  Once                          Orders Placed This Encounter   Procedures    Inpatient Admission     Standing Status:   Standing     Number of Occurrences:   1     Order Specific Question:   Level of Care     Answer:   Med Surg [16]     Order Specific Question:   Estimated length of stay     Answer:   Inpatient Only Surgery     Diet: cons carb diet  Mobility: OOB/ambulate  DVT Prophylaxis: SCDs, hep sq    Medications/Pain Control:   Scheduled Medications:  acetaminophen, 975 mg, Oral, Q8H [facility]  carvedilol, 12.5 mg, Oral, BID  docusate sodium, 100 mg, Oral, BID  glimepiride, 2 mg, Oral, Daily With Breakfast  heparin (porcine), 5,000 Units, Subcutaneous, Q8H [facility]  lisinopril, 20 mg, Oral, BID   And  hydroCHLOROthiazide, 12.5 mg, Oral, BID  lidocaine, 2 patch, Topical, Daily  methadone, 5 mg, Oral, Q8H [facility]   Followed by  [START ON 10/27/2024] methadone, 5 mg, Oral, Q12H [facility]  methocarbamol,  750 mg, Oral, Q6H DAYNA  senna, 1 tablet, Oral, Daily    PRN Meds:  bisacodyl, 10 mg, Rectal, Daily PRN  HYDROmorphone, 0.2 mg, Intravenous, Q4H PRN  naloxone, 0.04 mg, Intravenous, Q1MIN PRN  ondansetron, 4 mg, Intravenous, Q4H PRN  oxyCODONE, 2.5 mg, Oral, Q4H PRN   Or  oxyCODONE, 5 mg, Oral, Q4H PRN      Vital Signs (last 3 days)       Date/Time Temp Pulse Resp BP MAP (mmHg) Arterial Line BP MAP SpO2 Calculated FIO2 (%) - Nasal Cannula O2 Flow Rate (L/min) Nasal Cannula O2 Flow Rate (L/min) O2 Device [place] Coma Scale Score Pain    10/26/24 10:05:05 97.3 °F (36.3 °C) 86 16 139/85 103 -- -- 91 % -- -- -- None (Room air) -- --    10/26/24 0534 -- -- -- -- -- -- -- -- -- -- -- -- -- 7    10/26/24 0400 -- -- -- -- -- -- -- -- -- -- -- -- 15 --    10/25/24 22:26:32 97.8 °F (36.6 °C) 100 16 152/92 112 -- -- 92 % -- -- -- -- -- --    10/25/24 2200 -- -- -- -- -- -- -- -- -- -- -- -- -- 2    10/25/24 1910 97.6 °F (36.4 °C) -- -- -- -- -- -- -- -- -- -- -- -- --    10/25/24 19:09:47 97.2 °F (36.2 °C) 82 16 168/92 117 -- -- 96 % -- -- -- -- -- --    10/25/24 17:14:51 97.8 °F (36.6 °C) 81 -- 140/84 103 -- -- 95 % -- -- -- -- -- --    10/25/24 16:02:55 -- 77 16 136/83 101 -- -- 96 % -- -- -- -- -- --    10/25/24 1550 -- -- -- -- -- -- -- -- 28 -- 2 L/min Nasal cannula 15 3    10/25/24 1446 97.1 °F (36.2 °C) 74 -- 129/78 95 -- -- 94 % -- -- -- -- -- --    10/25/24 1345 98.3 °F (36.8 °C) 75 16 113/62 83 -- -- 97 % 36 -- 4 L/min Nasal cannula 15 2    10/25/24 1330 -- 77 17 109/58 79 -- -- 96 % 36 -- 4 L/min Nasal cannula 14 2    10/25/24 1315 -- 81 16 116/60 81 -- -- 95 % 36 -- 4 L/min Nasal cannula 14 2    10/25/24 1300 -- 83 20 106/67 81 -- -- 95 % 36 -- 4 L/min Nasal cannula 14 No Pain    10/25/24 1254 98 °F (36.7 °C) 82 13 109/69 83 104/59 77 mmHg 95 % -- 6 L/min -- Simple mask -- --    10/25/24 0634 96.2 °F (35.7 °C) 66 18 145/97 -- -- -- 94 % -- -- -- None (Room air) -- 2          Weight (last 2 days)       Date/Time  Weight    10/26/24 0535 159 (349.43)    10/25/24 2200 156 (345)    10/25/24 0634 156 (345)            Pertinent Labs/Diagnostic Test Results:   Radiology:  XR spine lumbar 2 or 3 views injury   Final Interpretation by Urbano Briscoe MD (10/25 1256)      Intraoperative films as above.         Computerized Assisted Algorithm (CAA) may have been used to analyze all applicable images.         Workstation performed: [id number]         XR lumbar spine 2 or 3 views    (Results Pending)     Results from last 7 days   Lab Units 10/26/24  0549 10/25/24  1835   WBC Thousand/uL 13.41*  --    HEMOGLOBIN g/dL 14.2  --    HEMATOCRIT % 42.5  --    PLATELETS Thousands/uL 252 231     Results from last 7 days   Lab Units 10/26/24  0549   SODIUM mmol/L 138   POTASSIUM mmol/L 3.6   CHLORIDE mmol/L 99   CO2 mmol/L 27   ANION GAP mmol/L 12   BUN mg/dL 24   CREATININE mg/dL 1.06   EGFR ml/min/1.73sq m 75   CALCIUM mg/dL 8.2*     Results from last 7 days   Lab Units 10/26/24  0549   GLUCOSE RANDOM mg/dL 177*     Results from last 7 days   Lab Units 10/26/24  0549   PROTIME seconds 13.4   INR  0.99   PTT seconds 26     Results from last 7 days   Lab Units 10/26/24  0738   UNIT PRODUCT CODE  [id number]  [id number]   UNIT NUMBER  [id number]  [id number]   UNITABO  A  A   UNITRH  POS  POS   CROSSMATCH  Compatible  Compatible   UNIT DISPENSE STATUS  Return to Inv  Return to Inv   UNIT PRODUCT VOL mL 350  350         Network Utilization Review Department  ATTENTION: Please call with any questions or concerns to [phone] and carefully listen to the prompts so that you are directed to the right person. All voicemails are confidential.   For Discharge needs, contact Care Management DC Support Team at [phone] opt. [phone]  Send all requests for admission clinical reviews, approved or denied determinations and any other requests to dedicated fax number below belonging to the campus where the patient is receiving treatment. List of  dedicated fax numbers for the Facilities:  FACILITY NAME UR FAX NUMBER   ADMISSION DENIALS (Administrative/Medical Necessity) [phone]   DISCHARGE SUPPORT TEAM (NETWORK) [phone]   PARENT CHILD HEALTH (Maternity/NICU/Pediatrics) [phone]   [facility] [phone]   [facility] [phone]   [facility] [phone]   [facility] [phone]   Critical access hospital [phone]   [facility] [phone]   [facility] [phone]   [facility] [phone]   [facility] [phone]   [facility] [phone]   [facility] [phone]   [facility] [phone]

## 2024-10-26 NOTE — PROGRESS NOTES
Progress Note - Acute Pain   Name: Aaron Snyder 59 y.o. male I MRN: [id number]  [facility] Date of Admission: 10/25/2024   Date of Service: 10/26/2024 I Hospital Day: 1    Assessment & Plan  Lumbar radiculopathy  Acetaminophen 975 mg p.o. every 8 hours scheduled.  Lidocaine patch x 2, on for 12 hours and off for 12 hours.  Methocarbamol 750 mg p.o. every 6 hours scheduled.  Oxycodone 2.5 mg p.o. every 4 hours as needed moderate pain or 5 mg p.o. every 4 hours as needed severe pain.  Dilaudid 0.2 mg IV every 4 hours as needed breakthrough pain.  Will start methadone taper as follows:  Methadone 5 mg p.o. every 8 hours scheduled x 5 doses (to end of day 10/26/2024) followed by  Methadone 5 mg p.o. every 12 hours x 3 doses (2 AM of 10/28/2024).  Bowel regimen to avoid opioid-induced constipation while on opioid pain medication.  Add Narcan as needed in the event that opioid reversal becomes necessary.  Type 2 diabetes mellitus with stage 2 chronic kidney disease, without long-term current use of insulin  ([facility])  Lab Results   Component Value Date    HGBA1C 7.2 (H) 09/28/2024       Recent Labs     10/25/24  0625 10/25/24  1255   POCGLU 159* 199*       Blood Sugar Average: Last 72 hrs:  (P) 179      APS will continue to follow. Please contact Acute Pain Service - via SecureChat from [phone] with additional questions or concerns. See [employer] or [employer] for additional contacts and after hours information.     Subjective   Aaron Snyder is a 59 y.o. male seen lying in bed this morning, states pain is well controlled on his current regimen. He has little to no pain while at rest but he recently stood to use the restroom and noticed a significant exacerbation. I encouraged him to request his PRN oxycodone since he hasn't used any to this point to help treat exacerbations and before doing activities that he knows will aggravate his pain. He is tolerating his methadone taper well without issue.    Pain  History  Current pain location(s):  Pain Score: 7  Pain Location/Orientation: Location: Back  Pain Scale: Pain Assessment Tool: 0-10    Meds/Allergies   all current active meds have been reviewed  Allergies   Allergen Reactions    Ozempic (0.25 Or 0.5 Mg-Dose) [Semaglutide(0.25 Or 0.5mg-Dos)] Diarrhea    Glipizide Diarrhea    Metformin Diarrhea     Objective :  Temp:  [97.1 °F (36.2 °C)-98.3 °F (36.8 °C)] 97.8 °F (36.6 °C)  HR:  [] 100  BP: (106-168)/(58-92) 152/92  Resp:  [13-20] 16  SpO2:  [92 %-97 %] 92 %  O2 Device: Nasal cannula  Nasal Cannula O2 Flow Rate (L/min):  [2 L/min-4 L/min] 2 L/min    Physical Exam  Constitutional:       Appearance: Normal appearance. He is obese.   HENT:      Head: Normocephalic and atraumatic.      Right Ear: External ear normal.      Left Ear: External ear normal.      Nose: Nose normal.      Mouth/Throat:      Mouth: Mucous membranes are moist.      Pharynx: Oropharynx is clear.   Eyes:      Conjunctiva/sclera: Conjunctivae normal.   Cardiovascular:      Rate and Rhythm: Normal rate and regular rhythm.      Pulses: Normal pulses.      Heart sounds: Normal heart sounds.   Pulmonary:      Effort: Pulmonary effort is normal.      Breath sounds: Normal breath sounds.   Abdominal:      General: Bowel sounds are normal.      Palpations: Abdomen is soft.   Musculoskeletal:         General: Normal range of motion.      Cervical back: Normal range of motion and neck supple.   Skin:     General: Skin is warm and dry.      Capillary Refill: Capillary refill takes less than 2 seconds.   Neurological:      General: No focal deficit present.      Mental Status: He is alert and oriented to person, place, and time. Mental status is at baseline.   Psychiatric:         Mood and Affect: Mood normal.          Lab Results: I have reviewed the following results:  Estimated Creatinine Clearance: 113.9 mL/min (by C-G formula based on SCr of 1.06 mg/dL).  Lab Results   Component Value Date    WBC  13.41 (H) 10/26/2024    HGB 14.2 10/26/2024    HCT 42.5 10/26/2024     10/26/2024         Component Value Date/Time    K 3.6 10/26/2024 0549    CL 99 10/26/2024 0549    CO2 27 10/26/2024 0549    BUN 24 10/26/2024 0549    CREATININE 1.06 10/26/2024 0549         Component Value Date/Time    CALCIUM 8.2 (L) 10/26/2024 0549    ALKPHOS 42 09/28/2024 0740    AST 25 09/28/2024 0740    ALT 35 09/28/2024 0740    TP 7.3 09/28/2024 0740    ALB 4.3 09/28/2024 0740

## 2024-10-26 NOTE — ASSESSMENT & PLAN NOTE
1 Day Post-Op Procedure(s):  ROBOTIC ASSISTED DECOMPRESSIVE LAMINECTOMY , DISKECTOMY AND OSTEOPHYTECTOMY l1/2, AND l2.3 AND TRANSVERSE LUMBAR  INTERBODY FUSION L1/2 AND l2/3 - LEFT SIDED APPROACH AND PEDICLE SCREW FIXATION L1-3    Imaging   Upright x-rays of the lumbar spine to be completed.    Plan    Continue regular neurologic checks  No drains were inserted.  Pain control: patient received IntraOp methadone.  Appreciate APS recommendations for multimodal pain regimen.  Acetaminophen 975 mg p.o. every 8 hours scheduled.  Lidocaine patch x 2, on for 12 hours and off for 12 hours.  Methocarbamol 750 mg p.o. every 6 hours scheduled.  Oxycodone 2.5 mg p.o. every 4 hours as needed moderate pain or 5 mg p.o. every 4 hours as needed severe pain.  Dilaudid 0.2 mg IV every 4 hours as needed breakthrough pain.  Will start methadone taper as follows:  Methadone 5 mg p.o. every 8 hours scheduled x 5 doses (to end of day 10/26/2024) followed by  Methadone 5 mg p.o. every 12 hours x 3 doses (2 AM of 10/28/2024).  Bowel regimen with Colace and senna.  Eval and mobilize per PT/OT  No brace required.  DVT ppx: SCDs, subcu heparin starting this afternoon.  Vital stable this a.m.  Labs reviewed and acceptable.    Rounding completed with Richard.    Neurosurgery will continue to follow as primary. Please contact us with any questions or concerns.

## 2024-10-26 NOTE — CASE MANAGEMENT
Case Management Assessment & Discharge Planning Note    Patient name Aaron Snyder  Location [facility] 628/[facility] 628-01 MRN [id number]  : 1965 Date 10/26/2024       Current Admission Date: 10/25/2024  Current Admission Diagnosis:Lumbar radiculopathy   Patient Active Problem List    Diagnosis Date Noted Date Diagnosed    Preoperative clearance 10/17/2024     Spasticity 2024     Lumbar radiculopathy 2024     Lumbar disc herniation 2024     Abnormal MRI, lumbar spine 2024     Right hip pain 2024     Primary osteoarthritis of right hip 2024     Encounter for immunization 10/23/2023     Vitamin D deficiency 10/23/2023     Screening for prostate cancer 10/23/2023     Encounter for diabetic foot exam (HCC) 2022     History of Legionnaire's disease 10/26/2022     Abnormal CT of the abdomen 10/18/2022     Encounter for vitamin deficiency screening 2022     Mixed hyperlipidemia 2022     Diabetic eye exam (HCC) 2021     DDD (degenerative disc disease), lumbar 10/19/2021     Type 2 diabetes mellitus with stage 2 chronic kidney disease, without long-term current use of insulin  (HCC) 10/19/2021     Colon cancer screening 2021     Seasonal allergies 2021     Generalized muscle ache 2021     Annual physical exam 2021     Essential hypertension      Stage 2 chronic kidney disease      Class 3 severe obesity due to excess calories with serious comorbidity and body mass index (BMI) of 45.0 to 49.9 in adult ([facility])        LOS (days): 1  Geometric Mean LOS (GMLOS) (days): 3.2  Days to GMLOS:2     OBJECTIVE:    Risk of Unplanned Readmission Score: 9.49         Current admission status: Inpatient       Preferred Pharmacy:   [facility] - [address] [address]  Phone: [phone] Fax: [phone]    [id number] - [place] - [id number]  [place] [address]  Phone: [phone] Fax: [phone]    [facility] [place] - [address] [facility] [address]  [place] [address]  Phone: [phone] Fax: [phone]    Primary Care Provider: LAURA Campbell    Primary Insurance: [employer]  Secondary Insurance:     ASSESSMENT:  Active Health Care Proxies       Jose Snydera Health Care Representative - Spouse   Primary Phone: [phone] (Mobile)                 Patient Information  Admitted from:: Home  Mental Status: Alert  During Assessment patient was accompanied by: Not accompanied during assessment  Assessment information provided by:: Patient  Primary Caregiver: Self  Support Systems: Spouse/significant other, Family members  County of Residence: [facility] do you live in?: [place]  Home entry access options. Select all that apply.: Ramp, Stairs  Number of steps to enter home.: 2  Type of Current Residence: [facility]  Living Arrangements: Lives w/ Spouse/significant other, Lives w/ Children  Is patient a ?: No    Activities of Daily Living Prior to Admission  Functional Status: Independent  Completes ADLs independently?: Yes  Ambulates independently?: Yes  Does patient have a history of Outpatient Therapy (PT/OT)?: Yes  Does the patient have a history of Short-Term Rehab?: Yes (? [facility] [place])  Does patient have a history of HHC?: No  Does patient currently have HHC?: No    Patient Information Continued  Income Source: Employed  Does patient have prescription coverage?: Yes  Does patient receive dialysis treatments?: No  Does patient have a history of substance abuse?: No  Does patient have a history of Mental Health Diagnosis?: No    Means of Transportation  Means of Transport to Appts:: Drives Self          DISCHARGE DETAILS:    Discharge planning discussed with:: Patient  Freedom of Choice: Yes  Comments - Freedom of Choice: Discussed FOC  ELOY  contacted family/caregiver?: No- see comments (declined)     This CM introduced self and role, lives with spouse in ranch style home, [address], has ramp access.  IADLS, employed, drives self

## 2024-10-26 NOTE — PROGRESS NOTES
Progress Note - Neurosurgery   Name: Aaron Snyder 59 y.o. male I MRN: [id number]  [facility] I Date of Admission: 10/25/2024   Date of Service: 10/26/2024 I Hospital Day: 1    Assessment & Plan  Lumbar radiculopathy  1 Day Post-Op Procedure(s):  ROBOTIC ASSISTED DECOMPRESSIVE LAMINECTOMY , DISKECTOMY AND OSTEOPHYTECTOMY l1/2, AND l2.3 AND TRANSVERSE LUMBAR  INTERBODY FUSION L1/2 AND l2/3 - LEFT SIDED APPROACH AND PEDICLE SCREW FIXATION L1-3    Imaging   Upright x-rays of the lumbar spine to be completed.    Plan    Continue regular neurologic checks  No drains were inserted.  Pain control: patient received IntraOp methadone.  Appreciate APS recommendations for multimodal pain regimen.  Acetaminophen 975 mg p.o. every 8 hours scheduled.  Lidocaine patch x 2, on for 12 hours and off for 12 hours.  Methocarbamol 750 mg p.o. every 6 hours scheduled.  Oxycodone 2.5 mg p.o. every 4 hours as needed moderate pain or 5 mg p.o. every 4 hours as needed severe pain.  Dilaudid 0.2 mg IV every 4 hours as needed breakthrough pain.  Will start methadone taper as follows:  Methadone 5 mg p.o. every 8 hours scheduled x 5 doses (to end of day 10/26/2024) followed by  Methadone 5 mg p.o. every 12 hours x 3 doses (2 AM of 10/28/2024).  Bowel regimen with Colace and senna.  Eval and mobilize per PT/OT  No brace required.  DVT ppx: SCDs, subcu heparin starting this afternoon.  Vital stable this a.m.  Labs reviewed and acceptable.    Rounding completed with Richard.    Neurosurgery will continue to follow as primary. Please contact us with any questions or concerns.     Type 2 diabetes mellitus with stage 2 chronic kidney disease, without long-term current use of insulin  ([facility])  Lab Results   Component Value Date    HGBA1C 7.2 (H) 09/28/2024       Recent Labs     10/25/24  0625 10/25/24  1255   POCGLU 159* 199*       Blood Sugar Average: Last 72 hrs:  (P) 179      Subjective     Patient reports incisional back pain that he  rates as 7/10 with activity and movement and decreases at rest.  Patient denies radicular pain down bilateral lower extremities.  Patient reports he continues to have right greater than left lower extremity weakness.  Patient denies numbness in bilateral lower extremities at this time.  He reports that he had numbness in his feet preop.  Patient denies any new changes in his bowel or bladder function.  He reports that he is tolerating oral intake without nausea or vomiting.  Patient reports he was able to mobilize with physical therapy in the hallway today.      Objective :  Temp:  [97.1 °F (36.2 °C)-98.3 °F (36.8 °C)] 97.3 °F (36.3 °C)  HR:  [] 86  BP: (106-168)/(58-92) 139/85  Resp:  [13-20] 16  SpO2:  [91 %-97 %] 91 %  O2 Device: None (Room air)  Nasal Cannula O2 Flow Rate (L/min):  [2 L/min-4 L/min] 2 L/min    I/O         10/24 0701  10/25 0700 10/25 0701  10/26 0700 10/26 0701  10/27 0700    P.O.  480     I.V. (mL/kg)  1476.3 (9.3)     IV Piggyback  100     Total Intake(mL/kg)  2056.3 (13)     Urine (mL/kg/hr)  1560 (0.4)     Total Output  1560     Net  +496.3            Unmeasured Urine Occurrence   1 x          Physical Exam  Constitutional:       General: He is not in acute distress.     Appearance: He is well-developed.   HENT:      Head: Normocephalic.   Eyes:      General: No scleral icterus.     Conjunctiva/sclera: Conjunctivae normal.      Pupils: Pupils are equal, round, and reactive to light.   Neck:      Trachea: No tracheal deviation.   Cardiovascular:      Rate and Rhythm: Normal rate.   Pulmonary:      Effort: Pulmonary effort is normal. No respiratory distress.   Abdominal:      Palpations: Abdomen is soft.      Tenderness: There is no abdominal tenderness. There is no guarding.   Musculoskeletal:      Cervical back: Normal range of motion and neck supple.      Comments: Lumbar incision CDI.    Skin:     General: Skin is warm and dry.      Coloration: Skin is not pale.      Findings: No  rash.   Neurological:      Mental Status: He is alert and oriented to person, place, and time.      Comments: GCS 15, Awake, Alert, Oriented x 3    Motor: FREEMAN, strength 5/5 BUE, BLE 4+/5 except bilat HF 4/5, right DF 3-4/5, PF 4/5.     Sensation:  intact to LT X 4     Reflexes:  no hurley's or clonus     Coordination: no drift bilateral upper extremities.    Psychiatric:         Behavior: Behavior normal.      Neurological Exam  Mental Status  Alert. Oriented to person, place, and time.    Cranial Nerves  CN III, IV, VI: Pupils equal round and reactive to light bilaterally.  GCS 15, Awake, Alert, Oriented x 3    Motor: FREEMAN, strength 5/5 BUE, BLE 4+/5 except bilat HF 4/5, right DF 3-4/5, PF 4/5.     Sensation:  intact to LT X 4     Reflexes:  no hurley's or clonus     Coordination: no drift bilateral upper extremities. .        Lab Results: I have reviewed the following results:  Recent Labs     10/26/24  0549   WBC 13.41*   HGB 14.2   HCT 42.5      SODIUM 138   K 3.6   CL 99   CO2 27   BUN 24   CREATININE 1.06   GLUC 177*   PTT 26   INR 0.99       Sequential compression device (Venodyne)  and Heparin

## 2024-10-26 NOTE — PLAN OF CARE
Problem: OCCUPATIONAL THERAPY ADULT  Goal: Performs self-care activities at highest level of function for planned discharge setting.  See evaluation for individualized goals.  Description: Treatment Interventions: ADL retraining, Functional transfer training, Endurance training, Patient/family training, Equipment evaluation/education, Continued evaluation          See flowsheet documentation for full assessment, interventions and recommendations.   Outcome: Progressing  Note: Limitation: Decreased ADL status, Decreased endurance, Decreased self-care trans, Decreased high-level ADLs  Prognosis: Good  Assessment: Pt is a 59 y.o. male who was admitted to [facility] on 10/25/2024 with Lumbar radiculopathy, s/p robotic assisted decompression and transverse lumbar interbody fusion L1-3, L sided approach.. Pt seen for an OT evaluation per active OT orders.  Pt  has a past medical history of Allergic, Benign arteriolar nephrosclerosis, Colon polyp, Diabetes mellitus (HCC), Essential hypertension, History of transfusion, Hyperparathyroidism due to renal insufficiency (HCC), Legionnaire's disease (HCC), and Pneumonia. Pt lives in a [facility] with ramped entrance, uses a tub shower and raised toilet. Pta, pt was independent w/ ADL and functional mobility, had assist for IADL, was (+) driving. Currently, pt is Min Ax1 for UB ADL, Min-Mod Ax1 for LB ADL, and completed transfers/FM w Min-Mod Ax1 with RW. Pt currently presents with impairments in the following categories -difficulty performing ADLS activity tolerance and endurance. These impairments, as well as pt's fatigue, pain, spinal precautions, and risk for falls  limit pt's ability to safely engage in all baseline areas of occupation, includinggrooming, bathing, dressing, toileting, functional mobility/transfers, and community mobility.  The patient's raw score on the -PAC Daily Activity Inpatient Short Form is 18. A raw score of greater than or equal to 19  suggests the patient may benefit from discharge to home. Please refer to the recommendation of the Occupational Therapist for safe discharge planning. Although AMPAC is less than 19, pt is expected to progress and has adequate assist at home. Pt would benefit from continued acute OT services throughout hospital course and following D/C. Plan for OT interventions 3-5x per week. From OT standpoint, recommend home with increased social support, level III services upon D/C. Pt was left seated in bedside chair with chair alarm on and all needs within reach.     Rehab Resource Intensity Level, OT: III (Minimum Resource Intensity)

## 2024-10-26 NOTE — PHYSICAL THERAPY NOTE
Physical Therapy Evaluation     Patient's Name: Aaron Snyder    Admitting Diagnosis  Lumbar disc herniation [M51.26]  Abnormal MRI, lumbar spine [R93.7]  Morbid obesity (HCC) [E66.01]  Lumbar radiculopathy [M54.16]    Problem List  Patient Active Problem List   Diagnosis    Annual physical exam    Essential hypertension    Stage 2 chronic kidney disease    Class 3 severe obesity due to excess calories with serious comorbidity and body mass index (BMI) of 45.0 to 49.9 in adult (HCC)    Seasonal allergies    Generalized muscle ache    Colon cancer screening    DDD (degenerative disc disease), lumbar    Type 2 diabetes mellitus with stage 2 chronic kidney disease, without long-term current use of insulin  ([facility])    Diabetic eye exam ([facility])    Encounter for vitamin deficiency screening    Mixed hyperlipidemia    Abnormal CT of the abdomen    History of Legionnaire's disease    Encounter for diabetic foot exam ([facility])    Encounter for immunization    Vitamin D deficiency    Screening for prostate cancer    Right hip pain    Primary osteoarthritis of right hip    Lumbar disc herniation    Abnormal MRI, lumbar spine    Spasticity    Lumbar radiculopathy    Preoperative clearance       Past Medical History  Past Medical History:   Diagnosis Date    Allergic     Benign arteriolar nephrosclerosis     Colon polyp     Diabetes mellitus (HCC)     Essential hypertension     History of transfusion 2013    Hyperparathyroidism due to renal insufficiency (HCC)     Legionnaire's disease (HCC)     Pneumonia     s/p VDRF        Past Surgical History  Past Surgical History:   Procedure Laterality Date    COLONOSCOPY      OTHER SURGICAL HISTORY      Dialysis catheter     IA LAMNOTMY INCL W/DCMPRSN NRV ROOT 1 INTRSPC LUMBR Left 11/15/2021    Procedure: Left sided L1-2 microdiscectomy;  Surgeon: Andi Campoverde MD;  Location:  MAIN OR;  Service: Neurosurgery        10/26/24 1000   PT Last Visit   PT Visit Date 10/26/24   Note Type    Note type Evaluation   Pain Assessment   Pain Assessment Tool FLACC   Pain Score 8   Pain Location/Orientation Location: Back;Location: Incision   Pain Onset/Description Onset: Ongoing;Frequency: Intermittent;Descriptor: Aching;Descriptor: Discomfort   Patient's Stated Pain Goal No pain   Hospital Pain Intervention(s) Repositioned;Ambulation/increased activity;Emotional support  (PCA in place)   Pain Rating: FLACC (Rest) - Face 0   Pain Rating: FLACC (Rest) - Legs 0   Pain Rating: FLACC (Rest) - Activity 0   Pain Rating: FLACC (Rest) - Cry 0   Pain Rating: FLACC (Rest) - Consolability 0   Score: FLACC (Rest) 0   Pain Rating: FLACC (Activity) - Face 1   Pain Rating: FLACC (Activity) - Legs 0   Pain Rating: FLACC (Activity) - Activity 0   Pain Rating: FLACC (Activity) - Cry 1   Pain Rating: FLACC (Activity) - Consolability 1   Score: FLACC (Activity) 3   Restrictions/Precautions   Braces or Orthoses   (denies)   Other Precautions Multiple lines;Telemetry;Pain;Spinal precautions;Fall Risk;Chair Alarm   Home Living   Type of Home House   Home Layout One level;Ramped entrance   Home Equipment Walker;Cane   Prior Function   Level of [place] Independent with functional mobility  (amb w/o AD)   Lives With Spouse  (home PT)   Receives Help From Family   General   Additional Pertinent History cleared for assessment by tio   Cognition   Overall Cognitive Status WFL   Arousal/Participation Alert   Orientation Level Oriented to person;Oriented to place;Oriented to situation   Memory Decreased recall of precautions   Following Commands Follows one step commands without difficulty   Subjective   Subjective Alert; in bed; reports he has been OOB earlier; agreeable to mobilize   RUE Assessment   RUE Assessment WFL  (AROM)   LUE Assessment   LUE Assessment WFL  (AROM)   RLE Assessment   RLE Assessment WFL  (AROM)   Strength RLE   RLE Overall Strength   (fair +/ good -)   LLE Assessment   LLE Assessment WFL  (AROM)   Strength  LLE   LLE Overall Strength   (fair +/ good -)   Bed Mobility   Supine to Sit 4  Minimal assistance   Additional items Assist x 1;Increased time required;Verbal cues;LE management   Transfers   Sit to Stand 3  Moderate assistance   Additional items Assist x 1;Increased time required;Verbal cues  (from elevated bed surface)   Stand to Sit 3  Moderate assistance   Additional items Assist x 1;Trapeze bar;Verbal cues   Ambulation/Elevation   Gait pattern Excessively slow;Short stride;Inconsistent elías   Gait Assistance 4  Minimal assist   Additional items Assist x 1;Verbal cues;Tactile cues   Assistive Device Bariatric Rolling walker   Distance 60 ft   Balance   Static Sitting Fair -   Dynamic Sitting Poor +   Static Standing Poor +   Dynamic Standing Poor +   Ambulatory Poor +   Activity Tolerance   Activity Tolerance Patient limited by fatigue;Patient limited by pain   Medical Staff Made Aware Co-eval performed w/ OTR due to complexity of medical status and multiple comorbidities   Nurse Made Aware spoke to ALBERTO Byrne   Assessment   Prognosis Good   Problem List Decreased strength;Decreased endurance;Impaired balance;Decreased mobility;Obesity;Pain   Assessment Pt is 59 y.o. male admitted with Dx of, Lumbar disc herniation and Lumbar radiculopathy and underwent Left - ROBOTIC ASSISTED DECOMPRESSIVE LAMINECTOMY . DISKECTOMY AND OSTEOPHYTECTOMY l1/2. AND l2.3 AND TRANSVERSE LUMBAR INTERBODY FUSION L1/2 AND l2/3 - LEFT SIDED APPROACH AND PEDICLE SCREW FIXATION L1-3 on 10/25/2024. Pt 's comorbidities affecting POC include: Diabetes mellitus (HCC), Essential hypertension, CKD, Primary osteoarthritis of right hip and Class 3 severe obesity. Pt's clinical presentation is currently  unstable/unpredictable which is evident in ongoing post op management w/ acute pain mgm following, telem monitoring and need for assist w/ all phases of mobility when usually mobilizing independently. Pt presents w/ postop discomfort and  guarding, overall weakness, incl decreased LE strength, decreased functional endurance, and impaired balance w/ associated gait deviations requiring use of rw at this time. Will cont to follow pt in PT for progressive mobilization to address above functional deficits and to max level of (I), endurance, and safety. D/C recommendations are outlined below. Will cont to follow until then.   Goals   Patient Goals to get better and to go home   STG Expiration Date 11/02/24   Short Term Goal #1 5-7 days. Pt will amb 150 ft w/ rw <--> SPC, mod (I) in order to facilitate safe return to premorbid environment and to initiate return to community amb status. Pt will achieve mod (I) level w/ bed mob in order to facilitate safety with OOB and back to bed transitions in own living environment. Pt will perform transfers w/ mod (I) to assure (I) and safety w/ functional mobility/transitions w/ all aspects of mobility/locomotion. Pt will participate in LE therex and balance activities to max progression w/ mobility skills.   PT Treatment Day 0   Plan   Treatment/Interventions Functional transfer training;LE strengthening/ROM;Therapeutic exercise;Endurance training;Equipment eval/education;Bed mobility;Gait training;Spoke to nursing;OT   PT Frequency 3-5x/wk   Discharge Recommendation   Rehab Resource Intensity Level, PT III (Minimum Resource Intensity)   Equipment Recommended Walker   Walker Package Recommended HD Bariatric wheeled walker   AM-PAC Basic Mobility Inpatient   Turning in Flat Bed Without Bedrails 3   Lying on Back to Sitting on Edge of Flat Bed Without Bedrails 3   Moving Bed to Chair 2   Standing Up From Chair Using Arms 2   Walk in Room 3   Climb 3-5 Stairs With Railing 2   Basic Mobility Inpatient Raw Score 15   Basic Mobility Standardized Score 36.97   [facility] Highest Level Of Mobility   -[facility] Goal 4: Move to chair/commode   -M Achieved 7: Walk 25 feet or more   Modified Story Scale   Modified Story Scale  4   End of Consult   Patient Position at End of Consult Bedside chair;Bed/Chair alarm activated;All needs within reach           Ubaldo Zimmer, PT

## 2024-10-26 NOTE — ASSESSMENT & PLAN NOTE
Lab Results   Component Value Date    HGBA1C 7.2 (H) 09/28/2024       Recent Labs     10/25/24  0625 10/25/24  1255   POCGLU 159* 199*       Blood Sugar Average: Last 72 hrs:  (P) 179

## 2024-10-26 NOTE — ANESTHESIA POSTPROCEDURE EVALUATION
Post-Op Assessment Note    CV Status:  Stable    Pain management: adequate       Mental Status:  Alert and awake   Hydration Status:  Euvolemic   PONV Controlled:  Controlled   Airway Patency:  Patent     Post Op Vitals Reviewed: Yes    No anethesia notable event occurred.    Staff: Anesthesiologist, CRNA           Last Filed PACU Vitals:  Vitals Value Taken Time   Temp 98.3 °F (36.8 °C) 10/25/24 1345   Pulse 74 10/25/24 1354   /62 10/25/24 1345   Resp 17 10/25/24 1349   SpO2 95 % 10/25/24 1354   Vitals shown include unfiled device data.    Modified Gissell:  Activity: 2 (10/25/2024  1:45 PM)  Respiration: 2 (10/25/2024  1:45 PM)  Circulation: 1 (10/25/2024  1:45 PM)  Consciousness: 2 (10/25/2024  1:45 PM)  Oxygen Saturation: 1 (10/25/2024  1:45 PM)  Modified Gissell Score: 8 (10/25/2024  1:45 PM)

## 2024-10-26 NOTE — PLAN OF CARE
Problem: Prexisting or High Potential for Compromised Skin Integrity  Goal: Skin integrity is maintained or improved  Description: INTERVENTIONS:  - Identify patients at risk for skin breakdown  - Assess and monitor skin integrity  - Assess and monitor nutrition and hydration status  - Monitor labs   - Assess for incontinence   - Turn and reposition patient  - Assist with mobility/ambulation  - Relieve pressure over bony prominences  - Avoid friction and shearing  - Provide appropriate hygiene as needed including keeping skin clean and dry  - Evaluate need for skin moisturizer/barrier cream  - Collaborate with interdisciplinary team   - Patient/family teaching  - Consider wound care consult   10/25/2024 2319 by Emma Rosario, RN  Outcome: Progressing  10/25/2024 2318 by Emma Rosario RN  Outcome: Progressing     Problem: PAIN - ADULT  Goal: Verbalizes/displays adequate comfort level or baseline comfort level  Description: Interventions:  - Encourage patient to monitor pain and request assistance  - Assess pain using appropriate pain scale  - Administer analgesics based on type and severity of pain and evaluate response  - Implement non-pharmacological measures as appropriate and evaluate response  - Consider cultural and social influences on pain and pain management  - Notify physician/advanced practitioner if interventions unsuccessful or patient reports new pain  Outcome: Progressing     Problem: INFECTION - ADULT  Goal: Absence or prevention of progression during hospitalization  Description: INTERVENTIONS:  - Assess and monitor for signs and symptoms of infection  - Monitor lab/diagnostic results  - Monitor all insertion sites, i.e. indwelling lines, tubes, and drains  - Monitor endotracheal if appropriate and nasal secretions for changes in amount and color  - [place] appropriate cooling/warming therapies per order  - Administer medications as ordered  - Instruct and encourage patient and family to  use good hand hygiene technique  - Identify and instruct in appropriate isolation precautions for identified infection/condition  Outcome: Progressing  Goal: Absence of fever/infection during neutropenic period  Description: INTERVENTIONS:  - Monitor WBC    Outcome: Progressing     Problem: SAFETY ADULT  Goal: Patient will remain free of falls  Description: INTERVENTIONS:  - Educate patient/family on patient safety including physical limitations  - Instruct patient to call for assistance with activity   - Consult OT/PT to assist with strengthening/mobility   - Keep Call bell within reach  - Keep bed low and locked with side rails adjusted as appropriate  - Keep care items and personal belongings within reach  - Initiate and maintain comfort rounds  - Make Fall Risk Sign visible to staff  - Offer Toileting every 2 Hours, in advance of need  - Initiate/Maintain bed alarm  - Obtain necessary fall risk management equipment  - Apply yellow socks and bracelet for high fall risk patients  - Consider moving patient to room near nurses station  Outcome: Progressing  Goal: Maintain or return to baseline ADL function  Description: INTERVENTIONS:  -  Assess patient's ability to carry out ADLs; assess patient's baseline for ADL function and identify physical deficits which impact ability to perform ADLs (bathing, care of mouth/teeth, toileting, grooming, dressing, etc.)  - Assess/evaluate cause of self-care deficits   - Assess range of motion  - Assess patient's mobility; develop plan if impaired  - Assess patient's need for assistive devices and provide as appropriate  - Encourage maximum independence but intervene and supervise when necessary  - Involve family in performance of ADLs  - Assess for home care needs following discharge   - Consider OT consult to assist with ADL evaluation and planning for discharge  - Provide patient education as appropriate  Outcome: Progressing  Goal: Maintains/Returns to pre admission  functional level  Description: INTERVENTIONS:  - Perform AM-PAC 6 Click Basic Mobility/ Daily Activity assessment daily.  - Set and communicate daily mobility goal to care team and patient/family/caregiver.   - Collaborate with rehabilitation services on mobility goals if consulted  - Reposition patient   - Out of bed for toileting  - Record patient progress and toleration of activity level   Outcome: Progressing     Problem: DISCHARGE PLANNING  Goal: Discharge to home or other facility with appropriate resources  Description: INTERVENTIONS:  - Identify barriers to discharge w/patient and caregiver  - Arrange for needed discharge resources and transportation as appropriate  - Identify discharge learning needs (meds, wound care, etc.)  - Arrange for interpretive services to assist at discharge as needed  - Refer to Case Management Department for coordinating discharge planning if the patient needs post-hospital services based on physician/advanced practitioner order or complex needs related to functional status, cognitive ability, or social support system  Outcome: Progressing     Problem: Knowledge Deficit  Goal: Patient/family/caregiver demonstrates understanding of disease process, treatment plan, medications, and discharge instructions  Description: Complete learning assessment and assess knowledge base.  Interventions:  - Provide teaching at level of understanding  - Provide teaching via preferred learning methods  Outcome: Progressing

## 2024-10-26 NOTE — OCCUPATIONAL THERAPY NOTE
Occupational Therapy Evaluation     Patient Name: Aaron Snyder  Today's Date: 10/26/2024  Problem List  Principal Problem:    Lumbar radiculopathy  Active Problems:    Type 2 diabetes mellitus with stage 2 chronic kidney disease, without long-term current use of insulin  (HCC)    Past Medical History  Past Medical History:   Diagnosis Date    Allergic     Benign arteriolar nephrosclerosis     Colon polyp     Diabetes mellitus (HCC)     Essential hypertension     History of transfusion 2013    Hyperparathyroidism due to renal insufficiency (HCC)     Legionnaire's disease (HCC)     Pneumonia     s/p VDRF      Past Surgical History  Past Surgical History:   Procedure Laterality Date    COLONOSCOPY      OTHER SURGICAL HISTORY      Dialysis catheter     MT LAMNOTMY INCL W/DCMPRSN NRV ROOT 1 INTRSPC LUMBR Left 11/15/2021    Procedure: Left sided L1-2 microdiscectomy;  Surgeon: Andi Campoverde MD;  Location:  MAIN OR;  Service: Neurosurgery           10/26/24 0959   OT Last Visit   OT Visit Date 10/26/24   Note Type   Note type Evaluation   Pain Assessment   Pain Assessment Tool 0-10   Pain Score 7   Pain Location/Orientation Location: Back   Patient's Stated Pain Goal No pain   Hospital Pain Intervention(s) Repositioned;Ambulation/increased activity   Restrictions/Precautions   Other Precautions Multiple lines;Pain;Spinal precautions;Fall Risk;Chair Alarm   Home Living   Type of Home House   Home Layout One level;Ramped entrance;Stairs to enter with rails   Bathroom Shower/Tub Tub/shower unit   Bathroom Toilet Raised   Bathroom Accessibility Accessible   Home Equipment Walker;Cane  (rollator)   Additional Comments Pt lives in a 1  with 2 NICKY vs ramped entrance   Prior Function   Level of [place] Independent with ADLs;Independent with functional mobility;Needs assistance with IADLS   Lives With Spouse   Receives Help From Family   IADLs Independent with driving;Family/Friend/Other provides  "meals;Independent with medication management   Falls in the last 6 months 0   Vocational Full time employment   Lifestyle   Autonomy Pta pt I with ADL and functional mobility, has assist for some IADL, (+)    Reciprocal Relationships supportive spouse who works as a home health PT   Service to Others works as    Intrinsic Gratification enjoys working   Subjective   Subjective \"My wife is a PT\"   ADL   Where Assessed Chair   Eating Assistance 6  Modified independent   Grooming Assistance 5  Supervision/Setup   UB Bathing Assistance 4  Minimal Assistance   LB Bathing Assistance 3  Moderate Assistance   UB Dressing Assistance 4  Minimal Assistance   LB Dressing Assistance 3  Moderate Assistance   Toileting Assistance  4  Minimal Assistance   Functional Assistance 4  Minimal Assistance   Bed Mobility   Supine to Sit 4  Minimal assistance   Additional items Assist x 1;Increased time required;Verbal cues   Additional Comments Pt greeted in bed, left in chair with alarm on and all needs within reach.   Transfers   Sit to Stand 3  Moderate assistance  (bed elevated)   Additional items Assist x 1;Increased time required;Verbal cues   Stand to Sit 3  Moderate assistance   Additional items Assist x 1;Increased time required;Verbal cues   Additional Comments with lou RW   Functional Mobility   Functional Mobility 4  Minimal assistance   Additional Comments Pt performs short household distance mobility with MIN A x 1 with RW.   Additional items Rolling walker   Balance   Static Sitting Fair   Dynamic Sitting Fair -   Static Standing Poor +   Dynamic Standing Poor +   Ambulatory Poor +   Activity Tolerance   Activity Tolerance Patient limited by fatigue;Patient limited by pain   Medical Staff Made Aware Co-eval with DPT due to medical complexity   Nurse Made Aware RN cleared for therapy   RUE Assessment   RUE Assessment WFL   LUE Assessment   LUE Assessment WFL   Hand Function   Gross Motor Coordination " Functional   Fine Motor Coordination Functional   Sensation   Light Touch No apparent deficits   Cognition   Overall Cognitive Status WFL   Arousal/Participation Alert;Cooperative   Attention Within functional limits   Orientation Level Oriented to person;Oriented to place;Oriented to situation   Memory Decreased recall of precautions   Following Commands Follows one step commands without difficulty   Comments Pt cooperative to therapy   Assessment   Limitation Decreased ADL status;Decreased endurance;Decreased self-care trans;Decreased high-level ADLs   Prognosis Good   Assessment Pt is a 59 y.o. male who was admitted to [facility] on 10/25/2024 with Lumbar radiculopathy, s/p robotic assisted decompression and transverse lumbar interbody fusion L1-3, L sided approach.. Pt seen for an OT evaluation per active OT orders.  Pt  has a past medical history of Allergic, Benign arteriolar nephrosclerosis, Colon polyp, Diabetes mellitus (HCC), Essential hypertension, History of transfusion, Hyperparathyroidism due to renal insufficiency (HCC), Legionnaire's disease (HCC), and Pneumonia. Pt lives in a [facility] with ramped entrance, uses a tub shower and raised toilet. Pta, pt was independent w/ ADL and functional mobility, had assist for IADL, was (+) driving. Currently, pt is Min Ax1 for UB ADL, Min-Mod Ax1 for LB ADL, and completed transfers/FM w Min-Mod Ax1 with RW. Pt currently presents with impairments in the following categories -difficulty performing ADLS activity tolerance and endurance. These impairments, as well as pt's fatigue, pain, spinal precautions, and risk for falls  limit pt's ability to safely engage in all baseline areas of occupation, includinggrooming, bathing, dressing, toileting, functional mobility/transfers, and community mobility.  The patient's raw score on the -PAC Daily Activity Inpatient Short Form is 18. A raw score of greater than or equal to 19 suggests the patient may benefit from  discharge to home. Please refer to the recommendation of the Occupational Therapist for safe discharge planning. Although AMPAC is less than 19, pt is expected to progress and has adequate assist at home. Pt would benefit from continued acute OT services throughout hospital course and following D/C. Plan for OT interventions 3-5x per week. From OT standpoint, recommend home with increased social support, level III services upon D/C. Pt was left seated in bedside chair with chair alarm on and all needs within reach.   Goals   Patient Goals to get better   Plan   Treatment Interventions ADL retraining;Functional transfer training;Endurance training;Patient/family training;Equipment evaluation/education;Continued evaluation   Goal Expiration Date 11/09/24   OT Frequency 3-5x/wk   Discharge Recommendation   Rehab Resource Intensity Level, OT III (Minimum Resource Intensity)   AM-PAC Daily Activity Inpatient   Lower Body Dressing 2   Bathing 3   Toileting 3   Upper Body Dressing 3   Grooming 3   Eating 4   Daily Activity Raw Score 18   Daily Activity Standardized Score (Calc for Raw Score >=11) 38.66   AM-PAC Applied Cognition Inpatient   Following a Speech/Presentation 4   Understanding Ordinary Conversation 4   Taking Medications 4   Remembering Where Things Are Placed or Put Away 4   Remembering List of 4-5 Errands 4   Taking Care of Complicated Tasks 3   Applied Cognition Raw Score 23   Applied Cognition Standardized Score 53.08   Modified Delilah Scale   Modified Stark Scale 4   End of Consult   Education Provided Yes   Patient Position at End of Consult Bedside chair;Bed/Chair alarm activated;All needs within reach   Nurse Communication Nurse aware of consult       OT Goals    - Pt will be Supervision with LB ADL by end of hospital course.    - Pt will be Mod I with UB ADL by end of hospital course.    - Pt will be Supervision with all functional transfers required for patient safety by end of hospital course.    -  Pt will be Supervision with functional mobility to/from bathroom for increased independence with toileting tasks.    - Pt will independently recall and implement safety/spinal precautions during OT sessions.     - Pt activity tolerance will increase to 30 minutes in order to safely engage in ADL and transfers.     - Pt standing tolerance will increase to 5 minutes  in order to promote sink side ADLs and IADL activities.          Jazmyn Avendano, GEORGE, OTR/L

## 2024-10-26 NOTE — PLAN OF CARE
Problem: PHYSICAL THERAPY ADULT  Goal: Performs mobility at highest level of function for planned discharge setting.  See evaluation for individualized goals.  Description: Treatment/Interventions: Functional transfer training, LE strengthening/ROM, Therapeutic exercise, Endurance training, Equipment eval/education, Bed mobility, Gait training, Spoke to nursing, OT  Equipment Recommended: Walker       See flowsheet documentation for full assessment, interventions and recommendations.  Note: Prognosis: Good  Problem List: Decreased strength, Decreased endurance, Impaired balance, Decreased mobility, Obesity, Pain  Assessment: Pt is 59 y.o. male admitted with Dx of, Lumbar disc herniation and Lumbar radiculopathy and underwent Left - ROBOTIC ASSISTED DECOMPRESSIVE LAMINECTOMY . DISKECTOMY AND OSTEOPHYTECTOMY l1/2. AND l2.3 AND TRANSVERSE LUMBAR INTERBODY FUSION L1/2 AND l2/3 - LEFT SIDED APPROACH AND PEDICLE SCREW FIXATION L1-3 on 10/25/2024. Pt 's comorbidities affecting POC include: Diabetes mellitus (HCC), Essential hypertension, CKD, Primary osteoarthritis of right hip and Class 3 severe obesity. Pt's clinical presentation is currently  unstable/unpredictable which is evident in ongoing post op management w/ acute pain mgm following, telem monitoring and need for assist w/ all phases of mobility when usually mobilizing independently. Pt presents w/ postop discomfort and guarding, overall weakness, incl decreased LE strength, decreased functional endurance, and impaired balance w/ associated gait deviations requiring use of rw at this time. Will cont to follow pt in PT for progressive mobilization to address above functional deficits and to max level of (I), endurance, and safety. D/C recommendations are outlined below. Will cont to follow until then.        Rehab Resource Intensity Level, PT: III (Minimum Resource Intensity)    See flowsheet documentation for full assessment.

## 2024-10-27 LAB
GLUCOSE SERPL-MCNC: 158 MG/DL (ref 65–140)
GLUCOSE SERPL-MCNC: 170 MG/DL (ref 65–140)

## 2024-10-27 PROCEDURE — 82948 REAGENT STRIP/BLOOD GLUCOSE: CPT

## 2024-10-27 PROCEDURE — 99024 POSTOP FOLLOW-UP VISIT: CPT | Performed by: NEUROLOGICAL SURGERY

## 2024-10-27 RX ORDER — OXYCODONE HYDROCHLORIDE 10 MG/1
10 TABLET ORAL EVERY 4 HOURS PRN
Status: DISCONTINUED | OUTPATIENT
Start: 2024-10-27 | End: 2024-10-29 | Stop reason: HOSPADM

## 2024-10-27 RX ORDER — GABAPENTIN 100 MG/1
100 CAPSULE ORAL 3 TIMES DAILY
Status: DISCONTINUED | OUTPATIENT
Start: 2024-10-27 | End: 2024-10-29 | Stop reason: HOSPADM

## 2024-10-27 RX ORDER — OXYCODONE HYDROCHLORIDE 5 MG/1
5 TABLET ORAL EVERY 4 HOURS PRN
Status: DISCONTINUED | OUTPATIENT
Start: 2024-10-27 | End: 2024-10-29 | Stop reason: HOSPADM

## 2024-10-27 RX ADMIN — OXYCODONE HYDROCHLORIDE 5 MG: 5 TABLET ORAL at 13:43

## 2024-10-27 RX ADMIN — GLIMEPIRIDE 2 MG: 2 TABLET ORAL at 09:57

## 2024-10-27 RX ADMIN — HYDROMORPHONE HYDROCHLORIDE 0.2 MG: 0.2 INJECTION, SOLUTION INTRAMUSCULAR; INTRAVENOUS; SUBCUTANEOUS at 22:02

## 2024-10-27 RX ADMIN — Medication 2.5 MG: at 05:36

## 2024-10-27 RX ADMIN — CARVEDILOL 12.5 MG: 12.5 TABLET, FILM COATED ORAL at 18:16

## 2024-10-27 RX ADMIN — LISINOPRIL 20 MG: 20 TABLET ORAL at 18:16

## 2024-10-27 RX ADMIN — ACETAMINOPHEN 975 MG: 325 TABLET, FILM COATED ORAL at 05:33

## 2024-10-27 RX ADMIN — OXYCODONE HYDROCHLORIDE 10 MG: 10 TABLET ORAL at 18:14

## 2024-10-27 RX ADMIN — METHOCARBAMOL 750 MG: 750 TABLET ORAL at 13:42

## 2024-10-27 RX ADMIN — ACETAMINOPHEN 975 MG: 325 TABLET, FILM COATED ORAL at 22:01

## 2024-10-27 RX ADMIN — HYDROCHLOROTHIAZIDE 12.5 MG: 12.5 TABLET ORAL at 18:16

## 2024-10-27 RX ADMIN — ACETAMINOPHEN 975 MG: 325 TABLET, FILM COATED ORAL at 13:42

## 2024-10-27 RX ADMIN — DOCUSATE SODIUM 100 MG: 100 CAPSULE, LIQUID FILLED ORAL at 09:54

## 2024-10-27 RX ADMIN — METHADONE HYDROCHLORIDE 5 MG: 5 TABLET ORAL at 09:56

## 2024-10-27 RX ADMIN — SENNOSIDES 8.6 MG: 8.6 TABLET, FILM COATED ORAL at 09:57

## 2024-10-27 RX ADMIN — HEPARIN SODIUM 5000 UNITS: 5000 INJECTION, SOLUTION INTRAVENOUS; SUBCUTANEOUS at 05:34

## 2024-10-27 RX ADMIN — LISINOPRIL 20 MG: 20 TABLET ORAL at 09:53

## 2024-10-27 RX ADMIN — HEPARIN SODIUM 5000 UNITS: 5000 INJECTION, SOLUTION INTRAVENOUS; SUBCUTANEOUS at 22:01

## 2024-10-27 RX ADMIN — HYDROCHLOROTHIAZIDE 12.5 MG: 12.5 TABLET ORAL at 09:53

## 2024-10-27 RX ADMIN — METHOCARBAMOL 750 MG: 750 TABLET ORAL at 18:16

## 2024-10-27 RX ADMIN — DOCUSATE SODIUM 100 MG: 100 CAPSULE, LIQUID FILLED ORAL at 18:16

## 2024-10-27 RX ADMIN — GABAPENTIN 100 MG: 100 CAPSULE ORAL at 18:16

## 2024-10-27 RX ADMIN — HEPARIN SODIUM 5000 UNITS: 5000 INJECTION, SOLUTION INTRAVENOUS; SUBCUTANEOUS at 13:42

## 2024-10-27 RX ADMIN — CARVEDILOL 12.5 MG: 12.5 TABLET, FILM COATED ORAL at 09:53

## 2024-10-27 RX ADMIN — METHOCARBAMOL 750 MG: 750 TABLET ORAL at 05:35

## 2024-10-27 RX ADMIN — METHADONE HYDROCHLORIDE 5 MG: 5 TABLET ORAL at 22:01

## 2024-10-27 RX ADMIN — GABAPENTIN 100 MG: 100 CAPSULE ORAL at 22:01

## 2024-10-27 NOTE — ASSESSMENT & PLAN NOTE
2 Days Post-Op Procedure(s):  ROBOTIC ASSISTED DECOMPRESSIVE LAMINECTOMY , DISKECTOMY AND OSTEOPHYTECTOMY l1/2, AND l2.3 AND TRANSVERSE LUMBAR  INTERBODY FUSION L1/2 AND l2/3 - LEFT SIDED APPROACH AND PEDICLE SCREW FIXATION L1-3    Imaging   Upright x-rays of the lumbar spine to be completed.    Plan    Continue regular neurologic checks  No drains were inserted.  Pain control: patient received IntraOp methadone.  Appreciate APS recommendations for multimodal pain regimen.  Acetaminophen 975 mg p.o. every 8 hours scheduled.  Lidocaine patch x 2, on for 12 hours and off for 12 hours.  Methocarbamol 750 mg p.o. every 6 hours scheduled.  Increased oxycodone to 5 and 10 mg p.o. every 4 hours as needed for moderate to severe pain.  Dilaudid 0.2 mg IV every 4 hours as needed breakthrough pain.  Methadone taper as ordered.  Patient will complete his methadone taper tomorrow morning 10/28/2024.  Bowel regimen with Colace and senna.  Eval and mobilize per PT/OT  No brace required.  DVT ppx: SCDs, subcu heparin starting this afternoon.  Vital stable this a.m.  Labs reviewed and acceptable.    Rounding completed with RN    Neurosurgery will continue to follow as primary. Please contact us with any questions or concerns.

## 2024-10-27 NOTE — PROGRESS NOTES
Progress Note - Neurosurgery   Name: Aaron Snyder 59 y.o. male I MRN: [id number]  [facility] Date of Admission: 10/25/2024   Date of Service: 10/27/2024 I Hospital Day: 2    Assessment & Plan  Lumbar radiculopathy  2 Days Post-Op Procedure(s):  ROBOTIC ASSISTED DECOMPRESSIVE LAMINECTOMY , DISKECTOMY AND OSTEOPHYTECTOMY l1/2, AND l2.3 AND TRANSVERSE LUMBAR  INTERBODY FUSION L1/2 AND l2/3 - LEFT SIDED APPROACH AND PEDICLE SCREW FIXATION L1-3    Imaging   Upright x-rays of the lumbar spine to be completed.    Plan    Continue regular neurologic checks  No drains were inserted.  Pain control: patient received IntraOp methadone.  Appreciate APS recommendations for multimodal pain regimen.  Acetaminophen 975 mg p.o. every 8 hours scheduled.  Lidocaine patch x 2, on for 12 hours and off for 12 hours.  Methocarbamol 750 mg p.o. every 6 hours scheduled.  Increased oxycodone to 5 and 10 mg p.o. every 4 hours as needed for moderate to severe pain.  Dilaudid 0.2 mg IV every 4 hours as needed breakthrough pain.  Methadone taper as ordered.  Patient will complete his methadone taper tomorrow morning 10/28/2024.  Bowel regimen with Colace and senna.  Eval and mobilize per PT/OT  No brace required.  DVT ppx: SCDs, subcu heparin starting this afternoon.  Vital stable this a.m.  Labs reviewed and acceptable.    Rounding completed with RN    Neurosurgery will continue to follow as primary. Please contact us with any questions or concerns.     Type 2 diabetes mellitus with stage 2 chronic kidney disease, without long-term current use of insulin  ([facility])  Lab Results   Component Value Date    HGBA1C 7.2 (H) 09/28/2024       Recent Labs     10/25/24  0625 10/25/24  1255 10/27/24  0744 10/27/24  1202   POCGLU 159* 199* 158* 170*       Blood Sugar Average: Last 72 hrs:  (P) 171.5      Subjective     Patient reports incisional back pain that he rates as 7/10 with activity and movement and decreases at rest.  Patient reports  that he earlier when he got up he had severe pain up to 10/10 on the pain scale.  Patient denies radicular pain down bilateral lower extremities.  Patient reports he continues to have right greater than left lower extremity weakness.  Patient denies numbness in bilateral lower extremities at this time.  He reports that he had numbness in his feet preop.  Patient denies any new changes in his bowel or bladder function.  He reports that he is tolerating oral intake without nausea or vomiting.       Objective :  Temp:  [97.2 °F (36.2 °C)-97.6 °F (36.4 °C)] 97.3 °F (36.3 °C)  HR:  [78-89] 78  BP: (149-153)/(90-91) 152/91  Resp:  [16-23] 23  SpO2:  [89 %-95 %] 91 %  O2 Device: None (Room air)    I/O         10/24 0701  10/25 0700 10/25 0701  10/26 0700 10/26 0701  10/27 0700    P.O.  480     I.V. (mL/kg)  1476.3 (9.3)     IV Piggyback  100     Total Intake(mL/kg)  2056.3 (13)     Urine (mL/kg/hr)  1560 (0.4)     Total Output  1560     Net  +496.3            Unmeasured Urine Occurrence   1 x          Physical Exam  Constitutional:       General: He is not in acute distress.     Appearance: He is well-developed.   HENT:      Head: Normocephalic.   Eyes:      General: No scleral icterus.     Conjunctiva/sclera: Conjunctivae normal.      Pupils: Pupils are equal, round, and reactive to light.   Neck:      Trachea: No tracheal deviation.   Cardiovascular:      Rate and Rhythm: Normal rate.   Pulmonary:      Effort: Pulmonary effort is normal. No respiratory distress.   Abdominal:      Palpations: Abdomen is soft.      Tenderness: There is no abdominal tenderness. There is no guarding.   Musculoskeletal:      Cervical back: Normal range of motion and neck supple.      Comments: Lumbar incision CDI.    Skin:     General: Skin is warm and dry.      Coloration: Skin is not pale.      Findings: No rash.   Neurological:      Mental Status: He is alert and oriented to person, place, and time.      Comments: GCS 15, Awake, Alert,  Oriented x 3    Motor: FREEMAN, strength 5/5 BUE, BLE 4+/5 except bilat HF 4/5, right DF 3-/5, PF 4/5.     Sensation:  intact to LT X 4     Reflexes:  no hurley's or clonus     Coordination: no drift bilateral upper extremities.    Psychiatric:         Behavior: Behavior normal.      Neurological Exam  Mental Status  Alert. Oriented to person, place, and time.    Cranial Nerves  CN III, IV, VI: Pupils equal round and reactive to light bilaterally.  GCS 15, Awake, Alert, Oriented x 3    Motor: FREEMAN, strength 5/5 BUE, BLE 4+/5 except bilat HF 4/5, right DF 3-/5, PF 4/5.     Sensation:  intact to LT X 4     Reflexes:  no hurley's or clonus     Coordination: no drift bilateral upper extremities. .        Lab Results: I have reviewed the following results:  Recent Labs     10/26/24  0549   WBC 13.41*   HGB 14.2   HCT 42.5      SODIUM 138   K 3.6   CL 99   CO2 27   BUN 24   CREATININE 1.06   GLUC 177*   PTT 26   INR 0.99       Sequential compression device (Venodyne)  and Heparin

## 2024-10-27 NOTE — UTILIZATION REVIEW
NOTIFICATION OF INPATIENT ADMISSION   AUTHORIZATION REQUEST   SERVICING FACILITY:   [facility]  Address: [address] [place] [address]  Tax ID: [id number]  NPI: [id number] ATTENDING PROVIDER:  Attending Name and NPI#: Venancio Cuenca Md [[id number]]  Address: [address] [place] [address]  Phone: [phone]   ADMISSION INFORMATION:  Place of Service: Inpatient [facility]  Place of Service Code: 21  Inpatient Admission Date/Time: 10/25/24  8:20 AM  Discharge Date/Time: No discharge date for patient encounter.  Admitting Diagnosis Code/Description:  Lumbar disc herniation [M51.26]  Abnormal MRI, lumbar spine [R93.7]  Morbid obesity (HCC) [E66.01]  Lumbar radiculopathy [M54.16]     UTILIZATION REVIEW CONTACT:  Jas Keane Utilization   Network Utilization Review Department  Phone: [phone]  Fax: [phone]  Email: [email or web address]  Contact for approvals/pending authorizations, clinical reviews, and discharge.     PHYSICIAN ADVISORY SERVICES:  Medical Necessity Denial & [id number]-fg-[id number] Review  Phone: [phone]  Fax: [phone]  Email: [email or web address]     DISCHARGE SUPPORT TEAM:  For Patients Discharge Needs & Updates  Phone: [phone] opt. 2 Fax: [phone]  Email: [email or web address]

## 2024-10-27 NOTE — ASSESSMENT & PLAN NOTE
Lab Results   Component Value Date    HGBA1C 7.2 (H) 09/28/2024       Recent Labs     10/25/24  0625 10/25/24  1255 10/27/24  0744 10/27/24  1202   POCGLU 159* 199* 158* 170*       Blood Sugar Average: Last 72 hrs:  (P) 171.5

## 2024-10-28 ENCOUNTER — APPOINTMENT (INPATIENT)
Dept: RADIOLOGY | Facility: HOSPITAL | Age: 59
DRG: 460 | End: 2024-10-28
Payer: COMMERCIAL

## 2024-10-28 LAB
GLUCOSE SERPL-MCNC: 151 MG/DL (ref 65–140)
GLUCOSE SERPL-MCNC: 166 MG/DL (ref 65–140)

## 2024-10-28 PROCEDURE — 82948 REAGENT STRIP/BLOOD GLUCOSE: CPT

## 2024-10-28 PROCEDURE — 97535 SELF CARE MNGMENT TRAINING: CPT

## 2024-10-28 PROCEDURE — 72100 X-RAY EXAM L-S SPINE 2/3 VWS: CPT

## 2024-10-28 PROCEDURE — 99232 SBSQ HOSP IP/OBS MODERATE 35: CPT | Performed by: NURSE PRACTITIONER

## 2024-10-28 PROCEDURE — 97530 THERAPEUTIC ACTIVITIES: CPT

## 2024-10-28 PROCEDURE — 97116 GAIT TRAINING THERAPY: CPT

## 2024-10-28 PROCEDURE — 99024 POSTOP FOLLOW-UP VISIT: CPT | Performed by: NEUROLOGICAL SURGERY

## 2024-10-28 RX ORDER — GABAPENTIN 100 MG/1
100 CAPSULE ORAL ONCE AS NEEDED
Status: COMPLETED | OUTPATIENT
Start: 2024-10-28 | End: 2024-10-28

## 2024-10-28 RX ORDER — HYDROMORPHONE HCL/PF 1 MG/ML
0.5 SYRINGE (ML) INJECTION EVERY 2 HOUR PRN
Status: DISCONTINUED | OUTPATIENT
Start: 2024-10-28 | End: 2024-10-28

## 2024-10-28 RX ORDER — INSULIN LISPRO 100 [IU]/ML
1-5 INJECTION, SOLUTION INTRAVENOUS; SUBCUTANEOUS
Status: DISCONTINUED | OUTPATIENT
Start: 2024-10-28 | End: 2024-10-29 | Stop reason: HOSPADM

## 2024-10-28 RX ORDER — INSULIN LISPRO 100 [IU]/ML
2-12 INJECTION, SOLUTION INTRAVENOUS; SUBCUTANEOUS
Status: DISCONTINUED | OUTPATIENT
Start: 2024-10-28 | End: 2024-10-29 | Stop reason: HOSPADM

## 2024-10-28 RX ADMIN — OXYCODONE HYDROCHLORIDE 5 MG: 5 TABLET ORAL at 17:49

## 2024-10-28 RX ADMIN — DOCUSATE SODIUM 100 MG: 100 CAPSULE, LIQUID FILLED ORAL at 17:45

## 2024-10-28 RX ADMIN — SENNOSIDES 8.6 MG: 8.6 TABLET, FILM COATED ORAL at 09:27

## 2024-10-28 RX ADMIN — METHOCARBAMOL 750 MG: 750 TABLET ORAL at 17:45

## 2024-10-28 RX ADMIN — LISINOPRIL 20 MG: 20 TABLET ORAL at 17:45

## 2024-10-28 RX ADMIN — LIDOCAINE 2 PATCH: 50 PATCH TOPICAL at 09:27

## 2024-10-28 RX ADMIN — OXYCODONE HYDROCHLORIDE 5 MG: 5 TABLET ORAL at 22:31

## 2024-10-28 RX ADMIN — HEPARIN SODIUM 5000 UNITS: 5000 INJECTION, SOLUTION INTRAVENOUS; SUBCUTANEOUS at 05:22

## 2024-10-28 RX ADMIN — HYDROCHLOROTHIAZIDE 12.5 MG: 12.5 TABLET ORAL at 17:45

## 2024-10-28 RX ADMIN — METHOCARBAMOL 750 MG: 750 TABLET ORAL at 05:22

## 2024-10-28 RX ADMIN — HEPARIN SODIUM 5000 UNITS: 5000 INJECTION, SOLUTION INTRAVENOUS; SUBCUTANEOUS at 13:04

## 2024-10-28 RX ADMIN — ACETAMINOPHEN 975 MG: 325 TABLET, FILM COATED ORAL at 22:31

## 2024-10-28 RX ADMIN — CARVEDILOL 12.5 MG: 12.5 TABLET, FILM COATED ORAL at 09:27

## 2024-10-28 RX ADMIN — CARVEDILOL 12.5 MG: 12.5 TABLET, FILM COATED ORAL at 17:45

## 2024-10-28 RX ADMIN — GLIMEPIRIDE 2 MG: 2 TABLET ORAL at 09:27

## 2024-10-28 RX ADMIN — HYDROCHLOROTHIAZIDE 12.5 MG: 12.5 TABLET ORAL at 09:27

## 2024-10-28 RX ADMIN — LISINOPRIL 20 MG: 20 TABLET ORAL at 09:27

## 2024-10-28 RX ADMIN — ACETAMINOPHEN 975 MG: 325 TABLET, FILM COATED ORAL at 05:22

## 2024-10-28 RX ADMIN — METHOCARBAMOL 750 MG: 750 TABLET ORAL at 23:27

## 2024-10-28 RX ADMIN — OXYCODONE HYDROCHLORIDE 10 MG: 10 TABLET ORAL at 09:28

## 2024-10-28 RX ADMIN — METHOCARBAMOL 750 MG: 750 TABLET ORAL at 13:04

## 2024-10-28 RX ADMIN — GABAPENTIN 100 MG: 100 CAPSULE ORAL at 22:31

## 2024-10-28 RX ADMIN — ACETAMINOPHEN 975 MG: 325 TABLET, FILM COATED ORAL at 13:04

## 2024-10-28 RX ADMIN — DOCUSATE SODIUM 100 MG: 100 CAPSULE, LIQUID FILLED ORAL at 09:27

## 2024-10-28 RX ADMIN — OXYCODONE HYDROCHLORIDE 5 MG: 5 TABLET ORAL at 00:20

## 2024-10-28 RX ADMIN — GABAPENTIN 100 MG: 100 CAPSULE ORAL at 01:05

## 2024-10-28 RX ADMIN — GABAPENTIN 100 MG: 100 CAPSULE ORAL at 17:45

## 2024-10-28 RX ADMIN — METHOCARBAMOL 750 MG: 750 TABLET ORAL at 00:18

## 2024-10-28 RX ADMIN — HEPARIN SODIUM 5000 UNITS: 5000 INJECTION, SOLUTION INTRAVENOUS; SUBCUTANEOUS at 22:31

## 2024-10-28 RX ADMIN — GABAPENTIN 100 MG: 100 CAPSULE ORAL at 09:27

## 2024-10-28 RX ADMIN — METHADONE HYDROCHLORIDE 5 MG: 5 TABLET ORAL at 09:30

## 2024-10-28 NOTE — OCCUPATIONAL THERAPY NOTE
Occupational Therapy Treatment Note:      10/28/24 1430   OT Last Visit   OT Visit Date 10/28/24   Note Type   Note Type Treatment   Pain Assessment   Pain Assessment Tool 0-10   Pain Score 7  (declined to stand from chair, recently had p.t.)   Cognition   Overall Cognitive Status WFL   Activity Tolerance   Activity Tolerance Patient tolerated treatment well   Assessment   Assessment pt participated in pm ot session and was seen focusing on adls seated in chiar, grooming and activity tolerence. pt with increased pain since p.t. sessoin and was holding ice packs agains back and cair thus declined bathing in bathroom. pt requires sba ub and mod asst lb. he will continue to have asst from wife vs use lhae which she purchased a while ago. pt denied needing any dme or lhae. he declined return to bed at this time stating he has 45 more minutes in chair. pt states his wife will be taking off work for a while to assist him. spoke to otr/l , plan to d/c acute ot services   Plan   Treatment Interventions ADL retraining;Functional transfer training;Patient/family training;Activityengagement   Goal Expiration Date 11/09/24   OT Treatment Day 1   OT Frequency 3-5x/wk   Discharge Recommendation   Rehab Resource Intensity Level, OT No post-acute rehabilitation needs   AM-PAC Daily Activity Inpatient   Lower Body Dressing 3   Bathing 3   Toileting 3   Upper Body Dressing 4   Grooming 4   Eating 4   Daily Activity Raw Score 21   Daily Activity Standardized Score (Calc for Raw Score >=11) 44.27   AM-PAC Applied Cognition Inpatient   Following a Speech/Presentation 4   Understanding Ordinary Conversation 4   Taking Medications 4   Remembering Where Things Are Placed or Put Away 4   Remembering List of 4-5 Errands 4   Taking Care of Complicated Tasks 4   Applied Cognition Raw Score 24   Applied Cognition Standardized Score 62.21     April A Storm

## 2024-10-28 NOTE — PROGRESS NOTES
Progress Note - Neurosurgery   Name: Aaron Snyder 59 y.o. male I MRN: [id number]  [facility] I Date of Admission: 10/25/2024   Date of Service: 10/28/2024 I Hospital Day: 3    Assessment & Plan  Lumbar radiculopathy  3 Days Post-Op Procedure(s):  ROBOTIC ASSISTED DECOMPRESSIVE LAMINECTOMY , DISKECTOMY AND OSTEOPHYTECTOMY l1/2, AND l2.3 AND TRANSVERSE LUMBAR  INTERBODY FUSION L1/2 AND l2/3 - LEFT SIDED APPROACH AND PEDICLE SCREW FIXATION L1-3 (Dr. Ritchie, 10/25/24)    Imaging:   Upright lumbar Xrays 10/28/24: Final report pending.  Stable alignment and hardware placement compared to intraoperative imaging.    Plan:   Continue regular neurologic checks  No drains were inserted.  Pain control: patient received IntraOp methadone.  Appreciate APS recommendations for multimodal pain regimen.  Acetaminophen 975 mg p.o. every 8 hours scheduled.  Gabapentin 100 mg 3 times daily -new this admission  Lidocaine patch x 2, on for 12 hours and off for 12 hours.  Methocarbamol 750 mg p.o. every 6 hours scheduled.  Continues on oxycodone to 5 and 10 mg p.o. every 4 hours as needed for moderate to severe pain (increased 10/27).    Dilaudid 0.2 mg IV every 2 hours as needed breakthrough pain - anticipate dc today  Methadone taper as ordered.  Patient will complete his methadone taper tomorrow morning 10/28/2024.  Bowel regimen with Colace and senna.  Eval and mobilize per PT/OT - cleared for level 3 with HD bariatric wheeled walker   Patient is concerned he cannot do as well as he did with PT initial eval - requested reeval to ensure appropriate level of care at dc  No brace required.  DVT ppx: SCDs, SQH    Neurosurgery will continue to follow as primary. Discussed possible dc tomorrow pending progress. Please contact us with any questions or concerns.     Type 2 diabetes mellitus with stage 2 chronic kidney disease, without long-term current use of insulin  (HCC)  Lab Results   Component Value Date    HGBA1C 7.2 (H)  09/28/2024       Recent Labs     10/25/24  1255 10/27/24  0744 10/27/24  1202 10/28/24  0019   POCGLU 199* 158* 170* 166*       Blood Sugar Average: Last 72 hrs:  (P) 170.4    Accu-Cheks and insulin sliding scale        Subjective   Sitting up in bed.  NAD.    Patient admits to making progress.  He continues to have difficulty with standing with complaints of burning into his thighs.  He endorses an episode of left thigh numbness which resolved without recurrence.  He states he is able to get out of bed and get to the bathroom with less pain than prior.  Continues with gait instability and endorses leg weakness which was present preoperatively.  Tolerating p.o.  Voiding well.  Admits to bowel movement.  No chest pain, shortness of breath, nausea or vomiting.    Objective :  Temp:  [97.3 °F (36.3 °C)-97.8 °F (36.6 °C)] 97.8 °F (36.6 °C)  HR:  [67-86] 84  BP: (147-154)/(87-91) 147/89  Resp:  [20-23] 23  SpO2:  [88 %-95 %] 91 %  O2 Device: None (Room air)    I/O         10/26 0701  10/27 0700 10/27 0701  10/28 0700 10/28 0701  10/29 0700    P.O. 530 598     I.V. (mL/kg)       IV Piggyback       Total Intake(mL/kg) 530 (3.4) 598 (3.8)     Urine (mL/kg/hr) 200 (0.1) 150 (0)     Stool 0 0     Total Output 200 150     Net +330 +448            Unmeasured Urine Occurrence 2 x 4 x     Unmeasured Stool Occurrence 1 x 2 x           Physical Exam Neurological Exam    General appearance: alert, appears stated age, cooperative and no distress  Head: Normocephalic, without obvious abnormality  Eyes: Conjugate gaze and tracks appropriately in room  Neck: supple, symmetrical, trachea midline  Back: Incision CDI with staples  Lungs: non labored breathing  Heart: regular heart rate  Neurologic:   Mental status: Alert, oriented, thought content appropriate  Cranial nerves: grossly intact   Sensory: normal to crude touch x 4  Motor: moving all extremities with mild proximal weakness      Lab Results: I have reviewed the following  results:  Recent Labs     10/26/24  0549   WBC 13.41*   HGB 14.2   HCT 42.5      SODIUM 138   K 3.6   CL 99   CO2 27   BUN 24   CREATININE 1.06   GLUC 177*   PTT 26   INR 0.99       Imaging Results Review: I personally reviewed the following image studies in PACS and associated radiology reports: xray(s). My interpretation of the radiology images/reports is: as above.      VTE Pharmacologic Prophylaxis: Sequential compression device (Venodyne)  and Heparin

## 2024-10-28 NOTE — ASSESSMENT & PLAN NOTE
Lab Results   Component Value Date    HGBA1C 7.2 (H) 09/28/2024       Recent Labs     10/25/24  1255 10/27/24  0744 10/27/24  1202 10/28/24  0019   POCGLU 199* 158* 170* 166*       Blood Sugar Average: Last 72 hrs:  (P) 170.4    Accu-Cheks and insulin sliding scale

## 2024-10-28 NOTE — PROGRESS NOTES
Progress Note - Acute Pain   Name: Aaron Snyder 59 y.o. male I MRN: [id number]  [facility] I Date of Admission: 10/25/2024   Date of Service: 10/28/2024 I Hospital Day: 3    Assessment & Plan  Lumbar radiculopathy  Acetaminophen 975 mg p.o. every 8 hours scheduled.  Lidocaine patch x 2, on for 12 hours and off for 12 hours.  Methocarbamol 750 mg p.o. every 6 hours scheduled can continue on discharge q6hrs PRN   Oxycodone 5 mg p.o. every 4 hours as needed moderate pain or 10 mg p.o. every 4 hours as needed severe pain.  - If opioids required for discharge would recommend oxycodone 5 mg q4hrs PRN for moderate/severe pain x 72 hrs   Stop Dilaudid 0.2 mg IV every 4 hours as needed breakthrough pain.  Methadone taper completed as follows:  Methadone 5 mg p.o. every 8 hours scheduled x 5 doses (to end of day 10/26/2024) followed by  Methadone 5 mg p.o. every 12 hours x 3 doses, last dose was this AM  Bowel regimen to avoid opioid-induced constipation while on opioid pain medication.  Add Narcan as needed in the event that opioid reversal becomes necessary.  Type 2 diabetes mellitus with stage 2 chronic kidney disease, without long-term current use of insulin  ([facility])  Lab Results   Component Value Date    HGBA1C 7.2 (H) 09/28/2024       Recent Labs     10/25/24  1255 10/27/24  0744 10/27/24  1202 10/28/24  0019   POCGLU 199* 158* 170* 166*       Blood Sugar Average: Last 72 hrs:  (P) 170.4      APS will sign off at this time. Thank you for the consult. All opioids and other analgesics to be written at discretion of primary team. Please contact Acute Pain Service - via SecureChat from [phone] with additional questions or concerns. See [employer] or [employer] for additional contacts and after hours information.    Subjective   Aaron Snyder is a 59 y.o. male presents for planned robotic assisted decompression and transverse lumbar interbody fusion of L1-3 due to lumbar disc herniation as evidenced by abnormal MRI  performed by Dr. PELON Dorsey on 10/25/2024.  The patient received IntraOp methadone followed by p.o. taper which he completed this morning.  On my assessment the patient was preparing to ambulate to the bathroom. He reports minimal discomfort at rest in his lower back and rates it at 2/10 however, with ambulation patient reports that the pain shoots up to an 8/10.  We discussed utilizing p.o. pain medication prior to ambulation and working with physical therapy to ease the discomfort.  Patient verbalized understanding.      Pain History  Current pain location(s):  Pain Score: 8  Pain Location/Orientation: Location: Back  Pain Scale: Pain Assessment Tool: 0-10  24 hour history: No acute events overnight patient completed his methadone taper this morning.     Opioid requirement previous 24 hours:   20 mg po oxycodone     Meds/Allergies   all current active meds have been reviewed  Allergies   Allergen Reactions    Ozempic (0.25 Or 0.5 Mg-Dose) [Semaglutide(0.25 Or 0.5mg-Dos)] Diarrhea    Glipizide Diarrhea    Metformin Diarrhea     Objective :  Temp:  [97.3 °F (36.3 °C)-97.8 °F (36.6 °C)] 97.8 °F (36.6 °C)  HR:  [67-86] 84  BP: (147-154)/(87-91) 147/89  Resp:  [20-23] 23  SpO2:  [88 %-95 %] 91 %  O2 Device: None (Room air)    Physical Exam  Constitutional:       Appearance: Normal appearance.   HENT:      Head: Normocephalic and atraumatic.      Right Ear: External ear normal.      Left Ear: External ear normal.      Nose: Nose normal.      Mouth/Throat:      Mouth: Mucous membranes are moist.      Pharynx: Oropharynx is clear.   Eyes:      Conjunctiva/sclera: Conjunctivae normal.   Cardiovascular:      Rate and Rhythm: Normal rate and regular rhythm.      Pulses: Normal pulses.      Heart sounds: Normal heart sounds.   Pulmonary:      Effort: Pulmonary effort is normal.      Breath sounds: Normal breath sounds.   Abdominal:      General: Bowel sounds are normal.      Palpations: Abdomen is soft.   Musculoskeletal:          General: Normal range of motion.      Cervical back: Normal range of motion.      Comments: Ambulates with walker.   Skin:     General: Skin is warm and dry.      Capillary Refill: Capillary refill takes less than 2 seconds.      Comments: Midline lumbar dressing present over surgical incision. Appears clean and dry.    Neurological:      General: No focal deficit present.      Mental Status: He is alert and oriented to person, place, and time.   Psychiatric:         Behavior: Behavior normal.         Thought Content: Thought content normal.         Lab Results: I have reviewed the following results:  Estimated Creatinine Clearance: 112.8 mL/min (by C-G formula based on SCr of 1.06 mg/dL).  Lab Results   Component Value Date    WBC 13.41 (H) 10/26/2024    HGB 14.2 10/26/2024    HCT 42.5 10/26/2024     10/26/2024         Component Value Date/Time    K 3.6 10/26/2024 0549    CL 99 10/26/2024 0549    CO2 27 10/26/2024 0549    BUN 24 10/26/2024 0549    CREATININE 1.06 10/26/2024 0549         Component Value Date/Time    CALCIUM 8.2 (L) 10/26/2024 0549    ALKPHOS 42 09/28/2024 0740    AST 25 09/28/2024 0740    ALT 35 09/28/2024 0740    TP 7.3 09/28/2024 0740    ALB 4.3 09/28/2024 0740       Imaging Results Review: I reviewed radiology reports from this admission including: xray(s).  Other Study Results Review: No additional pertinent studies reviewed.     Administrative Statements   I have spent a total time of 20 minutes in caring for this patient on the day of the visit/encounter including Instructions for management, Patient and family education, Risk factor reductions, Counseling / Coordination of care, Documenting in the medical record, Reviewing / ordering tests, medicine, procedures  , Obtaining or reviewing history  , and Communicating with other healthcare professionals .

## 2024-10-28 NOTE — PLAN OF CARE
Problem: PHYSICAL THERAPY ADULT  Goal: Performs mobility at highest level of function for planned discharge setting.  See evaluation for individualized goals.  Description: Treatment/Interventions: Functional transfer training, LE strengthening/ROM, Therapeutic exercise, Endurance training, Equipment eval/education, Bed mobility, Gait training, Spoke to nursing, OT  Equipment Recommended: Walker       See flowsheet documentation for full assessment, interventions and recommendations.  Outcome: Progressing  Note: Prognosis: Good  Problem List: Decreased strength, Decreased endurance, Impaired balance, Decreased mobility, Obesity, Pain, Decreased range of motion, Orthopedic restrictions  Assessment: Pt seen for PT treatment session w/ interventions consisting of bed mobility training, t/f training, gait training, + pt education. Pt demonstrated progress this session w/ decreased assistance required for t/f and ambulation. Cues required for knee extension + heel strike w/ initial contact. All questions answered. From a PT standpoint continue to recommend OPPT upon d/c.  Barriers to Discharge: None     Rehab Resource Intensity Level, PT: III (Minimum Resource Intensity)    See flowsheet documentation for full assessment.

## 2024-10-28 NOTE — ASSESSMENT & PLAN NOTE
3 Days Post-Op Procedure(s):  ROBOTIC ASSISTED DECOMPRESSIVE LAMINECTOMY , DISKECTOMY AND OSTEOPHYTECTOMY l1/2, AND l2.3 AND TRANSVERSE LUMBAR  INTERBODY FUSION L1/2 AND l2/3 - LEFT SIDED APPROACH AND PEDICLE SCREW FIXATION L1-3 (Dr. Ritchie, 10/25/24)    Imaging:   Upright lumbar Xrays 10/28/24: Final report pending.  Stable alignment and hardware placement compared to intraoperative imaging.    Plan:   Continue regular neurologic checks  No drains were inserted.  Pain control: patient received IntraOp methadone.  Appreciate APS recommendations for multimodal pain regimen.  Acetaminophen 975 mg p.o. every 8 hours scheduled.  Gabapentin 100 mg 3 times daily -new this admission  Lidocaine patch x 2, on for 12 hours and off for 12 hours.  Methocarbamol 750 mg p.o. every 6 hours scheduled.  Continues on oxycodone to 5 and 10 mg p.o. every 4 hours as needed for moderate to severe pain (increased 10/27).    Dilaudid 0.2 mg IV every 2 hours as needed breakthrough pain - anticipate dc today  Methadone taper as ordered.  Patient will complete his methadone taper tomorrow morning 10/28/2024.  Bowel regimen with Colace and senna.  Eval and mobilize per PT/OT - cleared for level 3 with HD bariatric wheeled walker   Patient is concerned he cannot do as well as he did with PT initial eval - requested reeval to ensure appropriate level of care at dc  No brace required.  DVT ppx: SCDs, SQH    Neurosurgery will continue to follow as primary. Discussed possible dc tomorrow pending progress. Please contact us with any questions or concerns.

## 2024-10-28 NOTE — CASE MANAGEMENT
Case Management Discharge Planning Note    Patient name Aaron Snyder  Location [facility] 628/[facility] 628-01 MRN [id number]  : 1965 Date 10/28/2024       Current Admission Date: 10/25/2024  Current Admission Diagnosis:Lumbar radiculopathy   Patient Active Problem List    Diagnosis Date Noted Date Diagnosed    Preoperative clearance 10/17/2024     Spasticity 2024     Lumbar radiculopathy 2024     Lumbar disc herniation 2024     Abnormal MRI, lumbar spine 2024     Right hip pain 2024     Primary osteoarthritis of right hip 2024     Encounter for immunization 10/23/2023     Vitamin D deficiency 10/23/2023     Screening for prostate cancer 10/23/2023     Encounter for diabetic foot exam (HCC) 2022     History of Legionnaire's disease 10/26/2022     Abnormal CT of the abdomen 10/18/2022     Encounter for vitamin deficiency screening 2022     Mixed hyperlipidemia 2022     Diabetic eye exam (HCC) 2021     DDD (degenerative disc disease), lumbar 10/19/2021     Type 2 diabetes mellitus with stage 2 chronic kidney disease, without long-term current use of insulin  (HCC) 10/19/2021     Colon cancer screening 2021     Seasonal allergies 2021     Generalized muscle ache 2021     Annual physical exam 2021     Essential hypertension      Stage 2 chronic kidney disease      Class 3 severe obesity due to excess calories with serious comorbidity and body mass index (BMI) of 45.0 to 49.9 in adult ([facility])        LOS (days): 3  Geometric Mean LOS (GMLOS) (days): 3.2  Days to GMLOS:-0.1     OBJECTIVE:  Risk of Unplanned Readmission Score: 9.82   Current admission status: Inpatient   Preferred Pharmacy:   [facility] - [address] [address]  Phone: [phone] Fax: [phone]    [id number] - [place] - [address]  [address]  [place] [address]  Phone: [phone] Fax: [phone]    [facility] - [place] - [address]  [address]  [place] [address]  Phone: [phone] Fax: [phone]    Primary Care Provider: LAURA Campbell    Primary Insurance: [employer]  Secondary Insurance:     DISCHARGE DETAILS:    Discharge planning discussed with:: Patient and spouse at bedside  Freedom of Choice: Yes  Comments - Freedom of Choice: Discussed FOC  CM contacted family/caregiver?: No- see comments (Declined)  Were Treatment Team discharge recommendations reviewed with patient/caregiver?: Yes  Did patient/caregiver verbalize understanding of patient care needs?: Yes  Were patient/caregiver advised of the risks associated with not following Treatment Team discharge recommendations?: Yes    Other Referral/Resources/Interventions Provided:  Interventions: DME, Outpatient OT, Outpatient PT  Referral Comments: Therapy recs OP PT/OT, DME order placed for Beri Rolling walker. Order placed in parachute via [employer]. Pt is unsure if insurance will cover, if not covered pt does not want walker, If there is a copayment pt would like to know cost before delivery.    Treatment Team Recommendation: Outpatient Rehab  Discharge Destination Plan:: Outpatient Rehab

## 2024-10-28 NOTE — ASSESSMENT & PLAN NOTE
Acetaminophen 975 mg p.o. every 8 hours scheduled.  Lidocaine patch x 2, on for 12 hours and off for 12 hours.  Methocarbamol 750 mg p.o. every 6 hours scheduled can continue on discharge q6hrs PRN   Oxycodone 5 mg p.o. every 4 hours as needed moderate pain or 10 mg p.o. every 4 hours as needed severe pain.  - If opioids required for discharge would recommend oxycodone 5 mg q4hrs PRN for moderate/severe pain x 72 hrs   Stop Dilaudid 0.2 mg IV every 4 hours as needed breakthrough pain.  Methadone taper completed as follows:  Methadone 5 mg p.o. every 8 hours scheduled x 5 doses (to end of day 10/26/2024) followed by  Methadone 5 mg p.o. every 12 hours x 3 doses, last dose was this AM  Bowel regimen to avoid opioid-induced constipation while on opioid pain medication.  Add Narcan as needed in the event that opioid reversal becomes necessary.

## 2024-10-28 NOTE — PHYSICAL THERAPY NOTE
Physical Therapy Treatment Note    Patient's Name: Aaron Snyder  : 1965     10/28/24 7047   PT Last Visit   PT Visit Date 10/28/24   Note Type   Note Type Treatment for insurance authorization   Pain Assessment   Pain Assessment Tool 0-10   Pain Score 6   Pain Location/Orientation Location: Back   Hospital Pain Intervention(s) Cold applied;Ambulation/increased activity   Precautions   Spinal Precautions yes - instructed pt in spinal pxn   Restrictions/Precautions   Weight Bearing Precautions Per Order No   Braces or Orthoses   (no brace per NSGY)   Other Precautions Fall Risk;Pain;Spinal precautions   General   Chart Reviewed Yes   Response to Previous Treatment Patient reporting fatigue but able to participate.   Family/Caregiver Present No   Subjective   Subjective Agreeable to mobilize.   Bed Mobility   Rolling L 5  Supervision   Additional items HOB elevated;Bedrails;Increased time required;Verbal cues   Supine to Sit 5  Supervision   Additional items Bedrails;HOB elevated;Increased time required;Verbal cues   Additional Comments Pt greeted in supine. Instructed in log roll technique.   Transfers   Sit to Stand 5  Supervision   Additional items Increased time required;Verbal cues   Stand to Sit 5  Supervision   Additional items Increased time required;Verbal cues   Toilet transfer 5  Supervision   Additional items Increased time required;Verbal cues;Standard toilet  (grab bar)   Additional items   (Verbally instructed in car t/f.)   Additional Comments lou RW   Ambulation/Elevation   Gait pattern Excessively slow;Short stride;Decreased heel strike;Decreased foot clearance   Gait Assistance 5  Supervision   Additional items Verbal cues   Assistive Device Bariatric Rolling walker   Distance 40'x2   Ambulation/Elevation Additional Comments Instructed pt to ambulate q hour at home to assist in preventing blood clots, PNA, and stiffness.   Balance   Static Sitting Fair +   Dynamic Sitting Fair   Static  Standing Fair -   Dynamic Standing Fair -   Ambulatory Fair -  (RW)   Endurance Deficit   Endurance Deficit Yes   Endurance Deficit Description LE weakness (RLE>LLE), fatigue   Activity Tolerance   Activity Tolerance Patient limited by fatigue;Patient limited by pain   Assessment   Prognosis Good   Problem List Decreased strength;Decreased endurance;Impaired balance;Decreased mobility;Obesity;Pain;Decreased range of motion;Orthopedic restrictions   Assessment Pt seen for PT treatment session w/ interventions consisting of bed mobility training, t/f training, gait training, + pt education. Pt demonstrated progress this session w/ decreased assistance required for t/f and ambulation. Cues required for knee extension + heel strike w/ initial contact. All questions answered. From a PT standpoint continue to recommend OPPT upon d/c.   Barriers to Discharge None   Goals   Patient Goals get better   Plan   Treatment/Interventions Functional transfer training;LE strengthening/ROM;Therapeutic exercise;Endurance training;Patient/family training;Equipment eval/education;Bed mobility;Gait training;Compensatory technique education   Progress Progressing toward goals   PT Frequency 3-5x/wk   Discharge Recommendation   Rehab Resource Intensity Level, PT III (Minimum Resource Intensity)   Equipment Recommended Walker  (+ BSC)   Walker Package Recommended HD Bariatric wheeled walker   AM-PAC Basic Mobility Inpatient   Turning in Flat Bed Without Bedrails 3   Lying on Back to Sitting on Edge of Flat Bed Without Bedrails 3   Moving Bed to Chair 3   Standing Up From Chair Using Arms 3   Walk in Room 3   Climb 3-5 Stairs With Railing 3   Basic Mobility Inpatient Raw Score 18   Basic Mobility Standardized Score 41.05   [facility] Highest Level Of Mobility   JH-HLM Goal 6: Walk 10 steps or more   JH-HLM Achieved 7: Walk 25 feet or more   Education   Education Provided Mobility training;Assistive device  (spinal pxn)   End of Consult    Patient Position at End of Consult Bedside chair;All needs within reach;Bed/Chair alarm activated  (on waffle cushion, CP to low back)     Kamryn Marie, PT, DPT

## 2024-10-28 NOTE — ASSESSMENT & PLAN NOTE
Lab Results   Component Value Date    HGBA1C 7.2 (H) 09/28/2024       Recent Labs     10/25/24  1255 10/27/24  0744 10/27/24  1202 10/28/24  0019   POCGLU 199* 158* 170* 166*       Blood Sugar Average: Last 72 hrs:  (P) 170.4

## 2024-10-29 VITALS
WEIGHT: 315 LBS | SYSTOLIC BLOOD PRESSURE: 138 MMHG | HEIGHT: 72 IN | TEMPERATURE: 98.4 F | HEART RATE: 79 BPM | RESPIRATION RATE: 19 BRPM | DIASTOLIC BLOOD PRESSURE: 91 MMHG | BODY MASS INDEX: 42.66 KG/M2 | OXYGEN SATURATION: 92 %

## 2024-10-29 LAB
GLUCOSE SERPL-MCNC: 158 MG/DL (ref 65–140)
GLUCOSE SERPL-MCNC: 163 MG/DL (ref 65–140)

## 2024-10-29 PROCEDURE — NC001 PR NO CHARGE: Performed by: NEUROLOGICAL SURGERY

## 2024-10-29 PROCEDURE — 99024 POSTOP FOLLOW-UP VISIT: CPT | Performed by: NEUROLOGICAL SURGERY

## 2024-10-29 PROCEDURE — 82948 REAGENT STRIP/BLOOD GLUCOSE: CPT

## 2024-10-29 RX ORDER — LIDOCAINE 50 MG/G
2 PATCH TOPICAL DAILY
Qty: 28 PATCH | Refills: 0 | Status: SHIPPED | OUTPATIENT
Start: 2024-10-30 | End: 2024-11-13

## 2024-10-29 RX ORDER — SENNOSIDES 8.6 MG
8.6 TABLET ORAL DAILY
Qty: 7 TABLET | Refills: 0 | Status: SHIPPED | OUTPATIENT
Start: 2024-10-30 | End: 2024-11-06

## 2024-10-29 RX ORDER — GABAPENTIN 100 MG/1
100 CAPSULE ORAL 3 TIMES DAILY
Qty: 90 CAPSULE | Refills: 0 | Status: SHIPPED | OUTPATIENT
Start: 2024-10-29 | End: 2024-11-08

## 2024-10-29 RX ORDER — ACETAMINOPHEN 325 MG/1
975 TABLET ORAL EVERY 8 HOURS SCHEDULED
Start: 2024-10-29

## 2024-10-29 RX ORDER — METHOCARBAMOL 750 MG/1
750 TABLET, FILM COATED ORAL EVERY 6 HOURS PRN
Qty: 30 TABLET | Refills: 0 | Status: SHIPPED | OUTPATIENT
Start: 2024-10-29 | End: 2024-11-04 | Stop reason: SDUPTHER

## 2024-10-29 RX ORDER — OXYCODONE HYDROCHLORIDE 5 MG/1
5 TABLET ORAL EVERY 6 HOURS PRN
Qty: 30 TABLET | Refills: 0 | Status: SHIPPED | OUTPATIENT
Start: 2024-10-29 | End: 2024-11-04 | Stop reason: SDUPTHER

## 2024-10-29 RX ADMIN — ACETAMINOPHEN 975 MG: 325 TABLET, FILM COATED ORAL at 13:59

## 2024-10-29 RX ADMIN — HEPARIN SODIUM 5000 UNITS: 5000 INJECTION, SOLUTION INTRAVENOUS; SUBCUTANEOUS at 05:33

## 2024-10-29 RX ADMIN — GABAPENTIN 100 MG: 100 CAPSULE ORAL at 08:42

## 2024-10-29 RX ADMIN — GLIMEPIRIDE 2 MG: 2 TABLET ORAL at 08:44

## 2024-10-29 RX ADMIN — ACETAMINOPHEN 975 MG: 325 TABLET, FILM COATED ORAL at 05:33

## 2024-10-29 RX ADMIN — OXYCODONE HYDROCHLORIDE 5 MG: 5 TABLET ORAL at 04:04

## 2024-10-29 RX ADMIN — HYDROCHLOROTHIAZIDE 12.5 MG: 12.5 TABLET ORAL at 08:41

## 2024-10-29 RX ADMIN — CARVEDILOL 12.5 MG: 12.5 TABLET, FILM COATED ORAL at 08:42

## 2024-10-29 RX ADMIN — LIDOCAINE 2 PATCH: 50 PATCH TOPICAL at 08:38

## 2024-10-29 RX ADMIN — LISINOPRIL 20 MG: 20 TABLET ORAL at 08:45

## 2024-10-29 RX ADMIN — SENNOSIDES 8.6 MG: 8.6 TABLET, FILM COATED ORAL at 08:41

## 2024-10-29 RX ADMIN — METHOCARBAMOL 750 MG: 750 TABLET ORAL at 12:49

## 2024-10-29 RX ADMIN — OXYCODONE HYDROCHLORIDE 10 MG: 10 TABLET ORAL at 08:43

## 2024-10-29 RX ADMIN — HEPARIN SODIUM 5000 UNITS: 5000 INJECTION, SOLUTION INTRAVENOUS; SUBCUTANEOUS at 14:00

## 2024-10-29 RX ADMIN — METHOCARBAMOL 750 MG: 750 TABLET ORAL at 05:33

## 2024-10-29 RX ADMIN — DOCUSATE SODIUM 100 MG: 100 CAPSULE, LIQUID FILLED ORAL at 08:43

## 2024-10-29 NOTE — PROGRESS NOTES
Progress Note - Neurosurgery   Name: Aaron Snyder 59 y.o. male I MRN: [id number]  [facility] I Date of Admission: 10/25/2024   Date of Service: 10/29/2024 I Hospital Day: 4    Assessment & Plan  Lumbar radiculopathy  POD#4 - s/p robotic assisted L1-3 decompression and TLIF (DKO 10/25)     Imaging:   10/28 pot-op upright lumbar x-rays: Expected immediate postoperative findings after L1-L3 discectomies and fixation without acute or unexpected findings.     Plan:   Continue to closely monitor neuro exam   Frequent neuro checks per primary team   Maintain normotensive BP goals, SBP < 160, MAP > 65   No drains   No indication for brace   Continue local wound care to incision   Keep clean and dry   May be left open to air   Labs/vitals:   VSS overnight, afebrile, tolerating room air   No indication to repeat labs at this time, post-op labs reviewed and within normal limits/expected results   Pain control:   Received intra-op methadone and  APS was following  Completed taper on 10/28 and APS has since signed off, off IV pain meds   Continue scheduled:   Acetaminophen 975 mg PO q 8hrs   Gabapentin 100 mg PO TID (new this admission)   Lidocaine patch x 2 qd   Methocarbamol 750 mg PO q 6hrs   Continue PRN:   Oxycodone 5mg/10mg PO q 4hrs PRN mod-severe pain   Bowel regimen: colace, senna, prn dulcolax   Advance as needed   DVT ppx: SCDs, SQH   Home medications re-ordered   PT/OT --> cleared for level III home w/ bariatric rolling walker and outpatient PT  Social work following for assistance with dispo once medically cleared     Neurosurgery will continue to follow as primary team. Plan for d/c home today. Please reach out with any further questions or concerns.       Please contact the SecureChat role for the Neurosurgery service with any questions/concerns.    Subjective   Pt seen and examined this am on arsalan LINCOLN.  Patient states his pain is overall well-controlled on just oral medications.  He has been  "mobilizing without difficulty.  Patient states he is eating, drinking, urinating and having bowel movements without difficulty.  Patient states he is ready for DC home.  His wife should arrive at the bedside this afternoon to take him home.  A rolling walker was delivered to his room.    Objective :  Temp:  [97.2 °F (36.2 °C)-98.4 °F (36.9 °C)] 98.4 °F (36.9 °C)  HR:  [79-91] 79  BP: (138-154)/() 138/91  Resp:  [12-19] 19  SpO2:  [89 %-93 %] 92 %  O2 Device: None (Room air)    I/O         10/27 0701  10/28 0700 10/28 0701  10/29 0700 10/29 0701  10/30 0700    P.O. 598 120     Total Intake(mL/kg) 598 (3.8) 120 (0.8)     Urine (mL/kg/hr) 150 (0) 100 (0)     Stool 0 0     Total Output 150 100     Net +448 +20            Unmeasured Urine Occurrence 4 x 2 x     Unmeasured Stool Occurrence 2 x 1 x           Physical Exam  General appearance: alert, appears stated age, cooperative and no distress  Head: Normocephalic, without obvious abnormality, atraumatic  Eyes: EOMI, PERRL  Neck: supple, symmetrical, trachea midline and NT  Back:   -Midline lumbar incisions are clean, dry, intact with staples.  -No surrounding erythema, edema, significant tenderness or drainage appreciated.  Lungs: non labored breathing, no respiratory distress on room air  Heart: regular heart rate  Neurologic:   Mental status: Alert, oriented x3, thought content appropriate  Cranial nerves: grossly intact (Cranial nerves II-XII)  Sensory: normal to light touch bilaterally throughout  Motor: moving all extremities without focal weakness   -Pain limited weakness noted at bilateral hips, but otherwise strength is intact throughout bilateral lower extremities      Lab Results: I have reviewed the following results:  No results for input(s): \"WBC\", \"HGB\", \"HCT\", \"PLT\", \"BANDSPCT\", \"SODIUM\", \"K\", \"CL\", \"CO2\", \"BUN\", \"CREATININE\", \"GLUC\", \"CAIONIZED\", \"MG\", \"PHOS\", \"AST\", \"ALT\", \"ALB\", \"TBILI\", \"DBILI\", \"ALKPHOS\", \"PTT\", \"INR\", \"HSTNI0\", \"HSTNI2\", " "\"BNP\", \"LACTICACID\" in the last 72 hours.    VTE Pharmacologic Prophylaxis: Sequential compression device (Venodyne)  and Heparin    "

## 2024-10-29 NOTE — PLAN OF CARE
Problem: Prexisting or High Potential for Compromised Skin Integrity  Goal: Skin integrity is maintained or improved  Description: INTERVENTIONS:  - Identify patients at risk for skin breakdown  - Assess and monitor skin integrity  - Assess and monitor nutrition and hydration status  - Monitor labs   - Assess for incontinence   - Turn and reposition patient  - Assist with mobility/ambulation  - Relieve pressure over bony prominences  - Avoid friction and shearing  - Provide appropriate hygiene as needed including keeping skin clean and dry  - Evaluate need for skin moisturizer/barrier cream  - Collaborate with interdisciplinary team   - Patient/family teaching  - Consider wound care consult   Outcome: Progressing     Problem: PAIN - ADULT  Goal: Verbalizes/displays adequate comfort level or baseline comfort level  Description: Interventions:  - Encourage patient to monitor pain and request assistance  - Assess pain using appropriate pain scale  - Administer analgesics based on type and severity of pain and evaluate response  - Implement non-pharmacological measures as appropriate and evaluate response  - Consider cultural and social influences on pain and pain management  - Notify physician/advanced practitioner if interventions unsuccessful or patient reports new pain  Outcome: Progressing     Problem: INFECTION - ADULT  Goal: Absence or prevention of progression during hospitalization  Description: INTERVENTIONS:  - Assess and monitor for signs and symptoms of infection  - Monitor lab/diagnostic results  - Monitor all insertion sites, i.e. indwelling lines, tubes, and drains  - Monitor endotracheal if appropriate and nasal secretions for changes in amount and color  - [place] appropriate cooling/warming therapies per order  - Administer medications as ordered  - Instruct and encourage patient and family to use good hand hygiene technique  - Identify and instruct in appropriate isolation precautions for  identified infection/condition  Outcome: Progressing  Goal: Absence of fever/infection during neutropenic period  Description: INTERVENTIONS:  - Monitor WBC    Outcome: Progressing     Problem: SAFETY ADULT  Goal: Patient will remain free of falls  Description: INTERVENTIONS:  - Educate patient/family on patient safety including physical limitations  - Instruct patient to call for assistance with activity   - Consult OT/PT to assist with strengthening/mobility   - Keep Call bell within reach  - Keep bed low and locked with side rails adjusted as appropriate  - Keep care items and personal belongings within reach  - Initiate and maintain comfort rounds  - Make Fall Risk Sign visible to staff  - Offer Toileting every 2 Hours, in advance of need  - Initiate/Maintain bed alarm  - Obtain necessary fall risk management equipment  - Apply yellow socks and bracelet for high fall risk patients  - Consider moving patient to room near nurses station  Outcome: Progressing  Goal: Maintain or return to baseline ADL function  Description: INTERVENTIONS:  -  Assess patient's ability to carry out ADLs; assess patient's baseline for ADL function and identify physical deficits which impact ability to perform ADLs (bathing, care of mouth/teeth, toileting, grooming, dressing, etc.)  - Assess/evaluate cause of self-care deficits   - Assess range of motion  - Assess patient's mobility; develop plan if impaired  - Assess patient's need for assistive devices and provide as appropriate  - Encourage maximum independence but intervene and supervise when necessary  - Involve family in performance of ADLs  - Assess for home care needs following discharge   - Consider OT consult to assist with ADL evaluation and planning for discharge  - Provide patient education as appropriate  Outcome: Progressing  Goal: Maintains/Returns to pre admission functional level  Description: INTERVENTIONS:  - Perform AM-PAC 6 Click Basic Mobility/ Daily Activity  assessment daily.  - Set and communicate daily mobility goal to care team and patient/family/caregiver.   - Collaborate with rehabilitation services on mobility goals if consulted  - Reposition patient   - Out of bed for toileting  - Record patient progress and toleration of activity level   Outcome: Progressing     Problem: DISCHARGE PLANNING  Goal: Discharge to home or other facility with appropriate resources  Description: INTERVENTIONS:  - Identify barriers to discharge w/patient and caregiver  - Arrange for needed discharge resources and transportation as appropriate  - Identify discharge learning needs (meds, wound care, etc.)  - Arrange for interpretive services to assist at discharge as needed  - Refer to Case Management Department for coordinating discharge planning if the patient needs post-hospital services based on physician/advanced practitioner order or complex needs related to functional status, cognitive ability, or social support system  Outcome: Progressing     Problem: Knowledge Deficit  Goal: Patient/family/caregiver demonstrates understanding of disease process, treatment plan, medications, and discharge instructions  Description: Complete learning assessment and assess knowledge base.  Interventions:  - Provide teaching at level of understanding  - Provide teaching via preferred learning methods  Outcome: Progressing     Problem: NEUROSENSORY - ADULT  Goal: Achieves maximal functionality and self care  Description: INTERVENTIONS  - Monitor swallowing and airway patency with patient fatigue and changes in neurological status  - Encourage and assist patient to increase activity and self care.   - Encourage visually impaired, hearing impaired and aphasic patients to use assistive/communication devices  Outcome: Progressing     Problem: MUSCULOSKELETAL - ADULT  Goal: Maintain or return mobility to safest level of function  Description: INTERVENTIONS:  - Assess patient's ability to carry out ADLs;  assess patient's baseline for ADL function and identify physical deficits which impact ability to perform ADLs (bathing, care of mouth/teeth, toileting, grooming, dressing, etc.)  - Assess/evaluate cause of self-care deficits   - Assess range of motion  - Assess patient's mobility  - Assess patient's need for assistive devices and provide as appropriate  - Encourage maximum independence but intervene and supervise when necessary  - Involve family in performance of ADLs  - Assess for home care needs following discharge   - Consider OT consult to assist with ADL evaluation and planning for discharge  - Provide patient education as appropriate  Outcome: Progressing  Goal: Maintain proper alignment of affected body part  Description: INTERVENTIONS:  - Support, maintain and protect limb and body alignment  - Provide patient/ family with appropriate education  Outcome: Progressing

## 2024-10-29 NOTE — DISCHARGE SUMMARY
Discharge Summary - Neurosurgery   Name: Aaron Snyder 59 y.o. male I MRN: [id number]  Unit/Bed#: [facility] 628-01 I Date of Admission: 10/25/2024   Date of Service: 10/29/2024 I Hospital Day: 4    Admission Date:   Admission Orders (From admission, onward)       Ordered        10/25/24 0820  Inpatient Admission  Once                           Discharge Date:  10/29/2024    Admitting Diagnosis: Lumbar disc herniation [M51.26]  Abnormal MRI, lumbar spine [R93.7]  Morbid obesity (HCC) [E66.01]  Lumbar radiculopathy [M54.16]  Discharge Diagnosis: s/p robotic assisted L1-3 decompression and TLIF (DKO 10/25)     Medical Problems       Resolved Problems  Date Reviewed: 10/17/2024   None       Attending: Dr. Ritchie     Consulting Physician(s):   - PT/OT   - APS   - CM     Procedures Performed: No orders of the defined types were placed in this encounter.    Pathology: N/A    Hospital Course:   Pt is a 58yo M who has been following in the [facility] neurosurgery office is a patient of Dr. Ritchie for a history of chronic low back pain and bilateral lower extremity radiculopathy with a history of multiple recurrent disc herniations and severe lumbar arthrosis.  Ultimately elected to undergo an L1-3 decompression and TLIF with Dr. Ritchie on 10/25/2024.  Patient was admitted to the hospital postoperatively for continued ongoing management and close monitoring.  The case was completed robotically without complication IntraOp.  No drains were placed in the OR.  Case was completed without complication.  Patient did receive intraoperative methadone per APS for assistance with pain control.  He was extubated in the OR and admitted to [facility] postoperatively.  APS continue to follow for assistance with pain control.  By postop day 3, the patient had completed his methadone taper and stopped the IV pain medications.  He reported ongoing good pain control.  He was evaluated by PT/OT and ultimately cleared for home with a rolling  walker that was ordered for him and covered by his insurance through case management assistance.  Patient did well medically throughout his hospital stay.  He remained hemodynamically stable with expected changes noted on postop labs.  He had no concern for infection.  He was eating and drinking without difficulty.  Patient was having bowel movements and urinating without difficulty.  Patient was ultimately medically cleared for discharge.  By postop day 4, the patient stated his pain was well-controlled with an oral pain medication regimen and he was ready for discharge.  His wife arrived at the bedside to take him home.  Medications were sent to the patient's preferred pharmacy.  All questions were answered by both patient and his wife.  Discharge instructions were explained at length.  Outpatient follow-up appointments are scheduled.  Patient was ultimately discharged in good condition.    Condition at Discharge: good     Discharge instructions/Information to patient and family:   See after visit summary for information provided to patient and family.      Provisions for Follow-Up Care:  See after visit summary for information related to follow-up care and any pertinent home health orders.      Disposition: Home      Planned Readmission: No    Discharge Statement:  I have spent a total time of 30 minutes in caring for this patient on the day of the visit/encounter.    Discharge Medications:  See after visit summary for reconciled discharge medications provided to patient and family.

## 2024-10-29 NOTE — CASE MANAGEMENT
Case Management Progress Note    Patient name Aaron Snyder  Location [facility] 628/[facility] 628-01 MRN [id number]  : 1965 Date 10/29/2024       LOS (days): 4  Geometric Mean LOS (GMLOS) (days): 3.2  Days to GMLOS:-0.9        OBJECTIVE:        Current admission status: Inpatient  Preferred Pharmacy:   [facility] - [address] [address]  Phone: [phone] Fax: [phone]    [id number] - [place] - [address]  [address]  [place] [address]  Phone: [phone] Fax: [phone]    [facility] - [place] - [address]  [address] [facility] [address]  [place] [address]  Phone: [phone] Fax: [phone]    Primary Care Provider: LAURA Campbell    Primary Insurance: [employer]  Secondary Insurance:     PROGRESS NOTE:    Walker delivered to room. Home with OP PT/OT. Pt in agreement with dcp

## 2024-10-29 NOTE — DISCHARGE INSTR - AVS FIRST PAGE
Discharge Instructions  Lumbar decompression and fusion    Surgical incisional care:  Incision may be left open to air.  Keep incision clean and dry. Avoid applying creams, lotion or antiseptic to incision area.  Check the wound daily. If the incision becomes red, swollen, tender, warm, or has increased drainage please notify physician immediately.  If steri-strips are in place, allow them to fall off. If still in place at two week postoperative visit, we will remove them.  May shower 3 days after surgery, but do not soak in a tub and no swimming.  Incision may be cleaned with water and a mild antimicrobial soap using a clean washcloth. Incision is to be gently patted dry with a clean towel.   Continue to change bed linens and pajamas more frequently. Wear clean clothes daily.     Activity Restrictions:  No heavy lifting greater than 10lbs and no strenuous activities until cleared.   No bending or twisting. No BLTs (bending, lifting, twisting). Avoid pushing/pulling movements.  May walk as tolerated. Encourage at least 4 short walks per day.   No driving for at least 2 weeks or until cleared by physician.    Postoperative medication:  Take pain medications to relieve incision pain, and muscle relaxants to prevent spasms as directed. Please see after visit summary (AVS) for details.   Do not operate heavy machinery or vehicles while taking sedating medications.  Use a bowel regimen while on opioids as they induce constipation. Ie. Senokot-S, Miralax, Colace, etc. Increase fiber and water intake.   Do not take ibuprofen, Naproxen/Aleve or any NSAID until cleared by surgeon. May take Tylenol instead.  If taking Coumadin, Aspirin, or Plavix, you may resume these medications when cleared by Neurosurgery.    Follow-up as scheduled for a 2 week incision check. Follow-up as scheduled for 6 week postoperative visit.     **Please notify MD if you experience a fever 101F, chills or have increased pain, numbness, tingling, or  weakness in your legs and/or bowel/bladder dysfunction/incontinence**

## 2024-10-29 NOTE — ASSESSMENT & PLAN NOTE
POD#4 - s/p robotic assisted L1-3 decompression and TLIF (DKO 10/25)     Imaging:   10/28 pot-op upright lumbar x-rays: Expected immediate postoperative findings after L1-L3 discectomies and fixation without acute or unexpected findings.     Plan:   Continue to closely monitor neuro exam   Frequent neuro checks per primary team   Maintain normotensive BP goals, SBP < 160, MAP > 65   No drains   No indication for brace   Continue local wound care to incision   Keep clean and dry   May be left open to air   Labs/vitals:   VSS overnight, afebrile, tolerating room air   No indication to repeat labs at this time, post-op labs reviewed and within normal limits/expected results   Pain control:   Received intra-op methadone and  APS was following  Completed taper on 10/28 and APS has since signed off, off IV pain meds   Continue scheduled:   Acetaminophen 975 mg PO q 8hrs   Gabapentin 100 mg PO TID (new this admission)   Lidocaine patch x 2 qd   Methocarbamol 750 mg PO q 6hrs   Continue PRN:   Oxycodone 5mg/10mg PO q 4hrs PRN mod-severe pain   Bowel regimen: colace, senna, prn dulcolax   Advance as needed   DVT ppx: SCDs, SQH   Home medications re-ordered   PT/OT --> cleared for level III home w/ bariatric rolling walker and outpatient PT  Social work following for assistance with dispo once medically cleared     Neurosurgery will continue to follow as primary team. Plan for d/c home today. Please reach out with any further questions or concerns.

## 2024-10-30 ENCOUNTER — TRANSITIONAL CARE MANAGEMENT (OUTPATIENT)
Dept: FAMILY MEDICINE CLINIC | Facility: CLINIC | Age: 59
End: 2024-10-30

## 2024-10-30 ENCOUNTER — TELEPHONE (OUTPATIENT)
Dept: NEUROSURGERY | Facility: CLINIC | Age: 59
End: 2024-10-30

## 2024-10-30 LAB
DME PARACHUTE DELIVERY DATE ACTUAL: NORMAL
DME PARACHUTE DELIVERY DATE REQUESTED: NORMAL
DME PARACHUTE ITEM DESCRIPTION: NORMAL
DME PARACHUTE ORDER STATUS: NORMAL
DME PARACHUTE SUPPLIER NAME: NORMAL
DME PARACHUTE SUPPLIER PHONE: NORMAL

## 2024-10-30 NOTE — UTILIZATION REVIEW
NOTIFICATION OF ADMISSION DISCHARGE   This is a Notification of Discharge from [facility]. Please be advised that this patient has been discharge from our facility. Below you will find the admission and discharge date and time including the patient’s disposition.   UTILIZATION REVIEW CONTACT:  Jas Keane  Utilization   Network Utilization Review Department  Phone: [phone] x carefully listen to the prompts. All voicemails are confidential.  Email: [email or web address]     ADMISSION INFORMATION  PRESENTATION DATE: 10/25/2024  5:57 AM  OBERVATION ADMISSION DATE: N/A  INPATIENT ADMISSION DATE: 10/25/24  8:20 AM   DISCHARGE DATE: 10/29/2024  3:04 PM   DISPOSITION:Home/Self Care    Network Utilization Review Department  ATTENTION: Please call with any questions or concerns to [phone] and carefully listen to the prompts so that you are directed to the right person. All voicemails are confidential.   For Discharge needs, contact Care Management DC Support Team at [phone] opt. 2  Send all requests for admission clinical reviews, approved or denied determinations and any other requests to dedicated fax number below belonging to the campus where the patient is receiving treatment. List of dedicated fax numbers for the Facilities:  FACILITY NAME UR FAX NUMBER   ADMISSION DENIALS (Administrative/Medical Necessity) [phone]   DISCHARGE SUPPORT TEAM ([facility]) [phone]   PARENT CHILD HEALTH (Maternity/NICU/Pediatrics) [phone]   [facility] [phone]   [facility] [phone]   [facility] [phone]   General acute hospital [phone]   [facility] [phone]   [facility] [phone]   [facility] [phone]   [facility] [phone]    [facility] [phone]   [facility] [phone]   [facility] [phone]   [facility] [phone]

## 2024-11-01 NOTE — TELEPHONE ENCOUNTER
1st attempt - Called Aaron Snyder on primary contact number after surgery  10/25/2024  to check in on recovery and provide post surgical instructions. Got voicemail, left message to callback. Will make another attempt if no callback is received.

## 2024-11-01 NOTE — TELEPHONE ENCOUNTER
Patient returned post op call.  Patient reports he is doing well overall and denies any incisional issues or fevers. Patient is able to ambulate around the house and complete ADLs. Educated the patient about the importance of preventing blood clots and provided measures how to prevent them.     Patient has moved his bowels since the surgery. Encouraged patient to take an over the counter stool softener, if he is taking narcotic pain medication. Encouraged fiber intake and fluids.    Reviewed incision care with the patient. Advised that after three days he may take a shower and gently wash the surgical site with soap and water. Use clean wash cloth, towels, and clothing. Do not submerge in water until cleared by the surgeon. Do not apply any creams, ointments, or lotions to the site.  Patient is aware to call the office if any redness, swelling, drainage, dehiscence of incision, or fever >100 F occurs.    Patient is aware to call the office if any concerns or questions may arise. Reminded patient of his upcoming appointments with the date/time/location. Patient was appreciative for the call.

## 2024-11-04 ENCOUNTER — TELEMEDICINE (OUTPATIENT)
Dept: FAMILY MEDICINE CLINIC | Facility: CLINIC | Age: 59
End: 2024-11-04
Payer: COMMERCIAL

## 2024-11-04 VITALS
DIASTOLIC BLOOD PRESSURE: 85 MMHG | BODY MASS INDEX: 42.66 KG/M2 | WEIGHT: 315 LBS | HEIGHT: 72 IN | SYSTOLIC BLOOD PRESSURE: 149 MMHG

## 2024-11-04 DIAGNOSIS — N18.2 TYPE 2 DIABETES MELLITUS WITH STAGE 2 CHRONIC KIDNEY DISEASE, WITHOUT LONG-TERM CURRENT USE OF INSULIN  (HCC): Chronic | ICD-10-CM

## 2024-11-04 DIAGNOSIS — E11.22 TYPE 2 DIABETES MELLITUS WITH STAGE 2 CHRONIC KIDNEY DISEASE, WITHOUT LONG-TERM CURRENT USE OF INSULIN  (HCC): Chronic | ICD-10-CM

## 2024-11-04 DIAGNOSIS — Z09 HOSPITAL DISCHARGE FOLLOW-UP: Primary | ICD-10-CM

## 2024-11-04 DIAGNOSIS — Z98.890 POST-OPERATIVE STATE: ICD-10-CM

## 2024-11-04 DIAGNOSIS — M54.16 LUMBAR RADICULOPATHY: Chronic | ICD-10-CM

## 2024-11-04 PROCEDURE — 99496 TRANSJ CARE MGMT HIGH F2F 7D: CPT | Performed by: NURSE PRACTITIONER

## 2024-11-04 RX ORDER — METHOCARBAMOL 750 MG/1
750 TABLET, FILM COATED ORAL EVERY 6 HOURS PRN
Qty: 30 TABLET | Refills: 0 | Status: SHIPPED | OUTPATIENT
Start: 2024-11-05 | End: 2024-11-11 | Stop reason: SDUPTHER

## 2024-11-04 RX ORDER — OXYCODONE HYDROCHLORIDE 5 MG/1
5 TABLET ORAL EVERY 6 HOURS PRN
Qty: 28 TABLET | Refills: 0 | Status: SHIPPED | OUTPATIENT
Start: 2024-11-05 | End: 2024-11-12

## 2024-11-04 NOTE — ASSESSMENT & PLAN NOTE
Lab Results   Component Value Date    HGBA1C 7.2 (H) 09/28/2024     Patient with tight control of diabetes  We will continue current regimen  Will monitor for any increases as he knows this can mean worsening healing of his low back spine procedure

## 2024-11-04 NOTE — PROGRESS NOTES
Transition of Care Visit  Name: Aaron Snyder      : 1965      MRN: [id number]  Encounter Provider: LAURA Campbell  Encounter Date: 2024   Encounter department: [facility]    Assessment & Plan  Type 2 diabetes mellitus with stage 2 chronic kidney disease, without long-term current use of insulin  (HCC)    Lab Results   Component Value Date    HGBA1C 7.2 (H) 2024     Patient with tight control of diabetes  We will continue current regimen  Will monitor for any increases as he knows this can mean worsening healing of his low back spine procedure         Lumbar radiculopathy  Patient has follow-up appointment with neurosurgery on 2024  Patient will start physical therapy after this appointment  Patient states he feels more improvement with combination medication of gabapentin and Robaxin than with his oxycodone  Discussed the importance of pain control  We discussed using heat and ice  We discussed sleeping in bed versus sleeping in recliner chair  He has a rollator walker and is using that appropriately.       Hospital discharge follow-up  The patient was hospitalized from 10/25/2024 to 10/29/2024.  Hospital Course:   Pt is a 60yo M who has been following in the [facility]'s neurosurgery office is a patient of Dr. Ritchie for a history of chronic low back pain and bilateral lower extremity radiculopathy with a history of multiple recurrent disc herniations and severe lumbar arthrosis.  Ultimately elected to undergo an L1-3 decompression and TLIF with Dr. Ritchie on 10/25/2024.  Patient was admitted to the hospital postoperatively for continued ongoing management and close monitoring.  The case was completed robotically without complication IntraOp.  No drains were placed in the OR.  Case was completed without complication.  Patient did receive intraoperative methadone per APS for assistance with pain control.  He was extubated in the OR and admitted to  [facility] postoperatively.  APS continue to follow for assistance with pain control.  By postop day 3, the patient had completed his methadone taper and stopped the IV pain medications.  He reported ongoing good pain control.  He was evaluated by PT/OT and ultimately cleared for home with a rolling walker that was ordered for him and covered by his insurance through case management assistance.  Patient did well medically throughout his hospital stay.  He remained hemodynamically stable with expected changes noted on postop labs.  He had no concern for infection.  He was eating and drinking without difficulty.  Patient was having bowel movements and urinating without difficulty.  Patient was ultimately medically cleared for discharge.  By postop day 4, the patient stated his pain was well-controlled with an oral pain medication regimen and he was ready for discharge.  His wife arrived at the bedside to take him home.  Medications were sent to the patient's preferred pharmacy.  All questions were answered by both patient and his wife.  Discharge instructions were explained at length.  Outpatient follow-up appointments are scheduled.  Patient was ultimately discharged in good condition.              History of Present Illness     Transitional Care Management Review:   Aaron Snyder is a 59 y.o. male here for TCM follow up.     During the TCM phone call patient stated:  TCM Call       Date and time call was made  10/30/2024  7:19 AM    Hospital care reviewed  Records reviewed    Patient was hospitialized at  [facility]    Date of Admission  10/25/24    Date of discharge  10/29/24    Diagnosis  Lumbar radiculopathy    Disposition  Home; Home health services    Were the patients medications reviewed and updated  Yes    Current Symptoms  None          TCM Call       Post hospital issues  None    Should patient be enrolled in anticoag monitoring?  No    Scheduled for follow up?  --  left .    Patients specialists   Neurologist    Nephrologist name  Chelsey Heath    Nephrologist contact #  [phone]    Neurologist name  Venancio Ritchie MD    Neurologist contact #  Dept directions for [facility]: [facility] [place], [address] [facility] [address], [place], [place], [id number] [phone]    Other specialists names  MARIA E Gil    Other specialists contcat #  [phone]    Did you obtain your prescribed medications  Yes    Do you need help managing your prescriptions or medications  No    Is transportation to your appointment needed  No    I have advised the patient to call PCP with any new or worsening symptoms  PIETRO Ken    Living Arrangements  Spouse or Significiant other    Are you recieving any outpatient services  No    Are you recieving home care services  No    Are you using any community resources  No    Current waiver services  No    Have you fallen in the last 12 months  No    Interperter language line needed  No    Counseling  Patient; Family          The patient is here today virtually to discuss his recent hospitalization.   I reviewed their medical conditions, medications, laboratory and radiology studies.  I reviewed their scheduled future lab studies with the patient.   The patient's current treatment plan is effective.   There is no change in the current therapy.   I ask the patient to continue their current therapy.   The patient voiced their understanding and agreement.   Follow up as scheduled.  Please continue to the PORCH section of the note for details of today's visit.             Review of Systems   Constitutional:  Positive for fatigue. Negative for activity change, appetite change, chills and fever.   HENT:  Negative for congestion, ear pain, postnasal drip, rhinorrhea, sinus pressure, sinus pain, sore throat and trouble swallowing.    Eyes:  Negative for pain.   Respiratory:  Negative for cough, chest tightness and shortness of breath.     Cardiovascular:  Negative for chest pain, palpitations and leg swelling.   Gastrointestinal:  Negative for abdominal pain, constipation, diarrhea, nausea and vomiting.   Genitourinary:  Negative for difficulty urinating, flank pain, frequency and urgency.   Musculoskeletal:  Positive for arthralgias, back pain, gait problem and myalgias. Negative for joint swelling.   Skin:  Negative for color change.   Neurological:  Positive for weakness. Negative for dizziness, light-headedness and headaches.   Psychiatric/Behavioral:  Positive for sleep disturbance. The patient is not nervous/anxious.      Objective     /85   Ht 6' (1.829 m)   Wt (!) 156 kg (345 lb)   BMI 46.79 kg/m²     Physical Exam  Vitals reviewed.   Constitutional:       General: He is awake.      Appearance: Normal appearance. He is well-developed.   HENT:      Head: Normocephalic.      Nose: Nose normal.      Mouth/Throat:      Mouth: Mucous membranes are moist.   Eyes:      Extraocular Movements: Extraocular movements intact.   Cardiovascular:      Rate and Rhythm: Normal rate.   Pulmonary:      Effort: Pulmonary effort is normal. No respiratory distress.   Abdominal:      Palpations: Abdomen is soft.   Musculoskeletal:         General: Normal range of motion.      Cervical back: Normal range of motion.        Back:    Skin:     General: Skin is warm and dry.   Neurological:      Mental Status: He is alert and oriented to person, place, and time.   Psychiatric:         Attention and Perception: Attention normal.         Mood and Affect: Mood normal.         Speech: Speech normal.         Behavior: Behavior normal. Behavior is cooperative.       Medications have been reviewed by provider in current encounter    Administrative Statements   I have spent a total time of 45 minutes in caring for this patient on the day of the visit/encounter including Diagnostic results, Prognosis, Risks and benefits of tx options, Instructions for management,  Patient and family education, Importance of tx compliance, Risk factor reductions, Impressions, Counseling / Coordination of care, Documenting in the medical record, Reviewing / ordering tests, medicine, procedures  , and Obtaining or reviewing history  .    Verification of patient location:    Patient is located at Home in the following state in which I hold an active license [place]    Encounter provider LAURA Campbell    The patient was identified by name and date of birth and was informed that this is a telemedicine visit and that the visit is being conducted through the Epic Embedded platform. He agrees to proceed.  My office door was closed. No one else was in the room.  The patient acknowledged consent and understanding of privacy and security of the video platform. The patient has agreed to participate and understands they can discontinue the visit at any time.    Patient is aware this is a billable service.

## 2024-11-04 NOTE — ASSESSMENT & PLAN NOTE
Patient has follow-up appointment with neurosurgery on 11/8/2024  Patient will start physical therapy after this appointment  Patient states he feels more improvement with combination medication of gabapentin and Robaxin than with his oxycodone  Discussed the importance of pain control  We discussed using heat and ice  We discussed sleeping in bed versus sleeping in recliner chair  He has a rollator walker and is using that appropriately.

## 2024-11-04 NOTE — ASSESSMENT & PLAN NOTE
The patient was hospitalized from 10/25/2024 to 10/29/2024.  Hospital Course:   Pt is a 58yo M who has been following in the [facility] neurosurgery office is a patient of Dr. Ritchie for a history of chronic low back pain and bilateral lower extremity radiculopathy with a history of multiple recurrent disc herniations and severe lumbar arthrosis.  Ultimately elected to undergo an L1-3 decompression and TLIF with Dr. Ritchie on 10/25/2024.  Patient was admitted to the hospital postoperatively for continued ongoing management and close monitoring.  The case was completed robotically without complication IntraOp.  No drains were placed in the OR.  Case was completed without complication.  Patient did receive intraoperative methadone per APS for assistance with pain control.  He was extubated in the OR and admitted to [facility] postoperatively.  APS continue to follow for assistance with pain control.  By postop day 3, the patient had completed his methadone taper and stopped the IV pain medications.  He reported ongoing good pain control.  He was evaluated by PT/OT and ultimately cleared for home with a rolling walker that was ordered for him and covered by his insurance through case management assistance.  Patient did well medically throughout his hospital stay.  He remained hemodynamically stable with expected changes noted on postop labs.  He had no concern for infection.  He was eating and drinking without difficulty.  Patient was having bowel movements and urinating without difficulty.  Patient was ultimately medically cleared for discharge.  By postop day 4, the patient stated his pain was well-controlled with an oral pain medication regimen and he was ready for discharge.  His wife arrived at the bedside to take him home.  Medications were sent to the patient's preferred pharmacy.  All questions were answered by both patient and his wife.  Discharge instructions were explained at length.  Outpatient follow-up  appointments are scheduled.  Patient was ultimately discharged in good condition.

## 2024-11-04 NOTE — TELEPHONE ENCOUNTER
Received a call from Aaron regarding need for refill of pain medication and muscle relaxer he said he has about 6-7 tabs remaining. This should cover him until tomorrow. He has been taking 1 tab every 6 hours of the oxycodone.     Will route refill request to providers to sign if in agreement. He was appreciative.

## 2024-11-08 ENCOUNTER — CLINICAL SUPPORT (OUTPATIENT)
Dept: NEUROSURGERY | Facility: CLINIC | Age: 59
End: 2024-11-08

## 2024-11-08 VITALS — DIASTOLIC BLOOD PRESSURE: 80 MMHG | TEMPERATURE: 98.5 F | SYSTOLIC BLOOD PRESSURE: 148 MMHG

## 2024-11-08 DIAGNOSIS — Z98.890 POST-OPERATIVE STATE: ICD-10-CM

## 2024-11-08 DIAGNOSIS — G89.18 POST-OP PAIN: ICD-10-CM

## 2024-11-08 DIAGNOSIS — Z98.1 S/P LUMBAR FUSION: Primary | ICD-10-CM

## 2024-11-08 PROCEDURE — 99024 POSTOP FOLLOW-UP VISIT: CPT | Performed by: NEUROLOGICAL SURGERY

## 2024-11-08 RX ORDER — GABAPENTIN 100 MG/1
CAPSULE ORAL
Start: 2024-11-08

## 2024-11-08 RX ORDER — GABAPENTIN 300 MG/1
300 CAPSULE ORAL
Qty: 30 CAPSULE | Refills: 0 | Status: SHIPPED | OUTPATIENT
Start: 2024-11-08

## 2024-11-08 NOTE — PROGRESS NOTES
Post-Op Visit- Neurosurgery    Aaron Snyder 59 y.o. male MRN: [id number]    Chief Complaint:  Patient presents post: ROBOTIC ASSISTED DECOMPRESSIVE LAMINECTOMY , DISKECTOMY AND OSTEOPHYTECTOMY l1/2, AND l2.3 AND TRANSVERSE LUMBAR  INTERBODY FUSION L1/2 AND l2/3 - LEFT SIDED APPROACH AND PEDICLE SCREW FIXATION L1-3 - Left    History of Present Illness:  Patient presents for 2 week POV for incision check.    Aaron arrived accompanied by his wife and ambulated with rolling walker. He reports pain is 2/10 and admits it is worse with ambulatory or certain activities. He admits his left leg numbness seems worse than prior to surgery.     Assessment:   Vitals:    11/08/24 1053   BP: 148/80   Temp: 98.5 °F (36.9 °C)       Wound Exam: Incisions are clean and dry. Mild erythema observed along both surgical incisions and ecchymosis seen at the left incision. No s/s of infection observed. See below:         Procedure:  Staple/suture removal.   Procedure Note: Cleansed surgical site with CHG swab before all staples removed. Covered loosely with ABD for comfort.    Patient Status: the patient tolerated the procedure well.  Complications: None.       Discussion/Summary:  Reviewed incision care with patient including daily observation for s/s infection including: increased erythema, edema, drainage, dehiscence of incision or fever >101.  Should these be observed, he understands that he is to call and/or return immediately for reassessment.  Advised patient to continue cleansing area with mild soap and water and pat dry. Not to apply any lotions, creams, or ointments, & not to submerge in any water for 4 more weeks.     He is to maintain activity restrictions until cleared by the surgeon. Activity levels were also reviewed with the patient in detail, he is to lift no greater than 10 pounds and ambulation is encouraged as tolerated.    Verified  date/time/location of upcoming POV and reminded him to complete x-rays prior to his next appointment. He is to call the office with any further questions or concerns, or if any incisional issues or fevers would arise.     Since Aaron reported increased, likely nerve related, pain discuss his current medication regimen with providers in the office. Increased gabapentin 100mg, 100mg, 300mg. Sent med order to confirmed pharmacy.

## 2024-11-08 NOTE — PATIENT INSTRUCTIONS
1. Wash incision gently in the shower. Avoid submerging in tub, hot tub, or pool.  2. Leave incision open to air when ever possible, may cover loosely with gauze and paper tape for comfort. Do not seal incision under bandages.    3. Do not apply any creams, ointments, or lotions directly to incision.   4. Maintain activity restrictions of lifting, pushing, pulling no more than 5-10 pounds.   5. Ambulate as tolerated.   6. Return for follow-up visit on 12/5/2024 3:15 pm   7. Complete any necessary imaging 2-3 days prior to upcoming appointment. X-ray can be completed as a walk in and does not require and appointment.   8. Contact the office with any questions or concerns.

## 2024-11-11 DIAGNOSIS — Z98.890 POST-OPERATIVE STATE: ICD-10-CM

## 2024-11-11 NOTE — TELEPHONE ENCOUNTER
Reason for call:   [x] Refill   [] Prior Auth  [] Other:     Office:   [] PCP/Provider -   [x] Specialty/Provider - neurosurg/Debra Meier PA-C     Medication: methocarbamol (ROBAXIN) 750 mg tablet     Dose/Frequency:     Take 1 tablet (750 mg total) by mouth every 6 (six) hours as needed for muscle spasms Do not start before November 5, 2024.       Quantity: 30    Pharmacy: [id number] - [address] [facility] [address] [phone]     Does the patient have enough for 3 days?   [x] Yes   [] No - Send as HP to POD

## 2024-11-12 RX ORDER — METHOCARBAMOL 750 MG/1
750 TABLET, FILM COATED ORAL EVERY 6 HOURS PRN
Qty: 30 TABLET | Refills: 0 | Status: SHIPPED | OUTPATIENT
Start: 2024-11-12

## 2024-11-12 NOTE — TELEPHONE ENCOUNTER
Okay to fill? Just over 2 weeks post  ROBOTIC ASSISTED DECOMPRESSIVE LAMINECTOMY , DISKECTOMY AND OSTEOPHYTECTOMY l1/2, AND l2.3 AND TRANSVERSE LUMBAR  INTERBODY FUSION L1/2 AND l2/3 - LEFT SIDED APPROACH AND PEDICLE SCREW FIXATION L1-3 by KISHORE Perez

## 2024-11-15 DIAGNOSIS — I10 ESSENTIAL HYPERTENSION: ICD-10-CM

## 2024-11-15 RX ORDER — LISINOPRIL AND HYDROCHLOROTHIAZIDE 12.5; 2 MG/1; MG/1
1 TABLET ORAL 2 TIMES DAILY
Qty: 180 TABLET | Refills: 1 | Status: SHIPPED | OUTPATIENT
Start: 2024-11-15
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
## 2024-11-25 ENCOUNTER — EVALUATION (OUTPATIENT)
Dept: PHYSICAL THERAPY | Facility: CLINIC | Age: 59
End: 2024-11-25
Payer: COMMERCIAL

## 2024-11-25 DIAGNOSIS — Z74.09 IMPAIRED FUNCTIONAL MOBILITY, BALANCE, GAIT, AND ENDURANCE: ICD-10-CM

## 2024-11-25 DIAGNOSIS — Z98.890 POST-OPERATIVE STATE: Primary | ICD-10-CM

## 2024-11-25 DIAGNOSIS — Z98.1 S/P LUMBAR FUSION: ICD-10-CM

## 2024-11-25 PROCEDURE — 97110 THERAPEUTIC EXERCISES: CPT | Performed by: PHYSICAL THERAPIST

## 2024-11-25 PROCEDURE — 97161 PT EVAL LOW COMPLEX 20 MIN: CPT | Performed by: PHYSICAL THERAPIST

## 2024-11-25 PROCEDURE — 97535 SELF CARE MNGMENT TRAINING: CPT | Performed by: PHYSICAL THERAPIST

## 2024-11-27 ENCOUNTER — APPOINTMENT (OUTPATIENT)
Dept: RADIOLOGY | Facility: CLINIC | Age: 59
End: 2024-11-27
Payer: COMMERCIAL

## 2024-11-27 DIAGNOSIS — Z98.1 S/P LUMBAR FUSION: ICD-10-CM

## 2024-11-27 DIAGNOSIS — Z98.890 POST-OPERATIVE STATE: ICD-10-CM

## 2024-11-27 PROCEDURE — 72100 X-RAY EXAM L-S SPINE 2/3 VWS: CPT

## 2024-12-02 ENCOUNTER — OFFICE VISIT (OUTPATIENT)
Dept: PHYSICAL THERAPY | Facility: CLINIC | Age: 59
End: 2024-12-02
Payer: COMMERCIAL

## 2024-12-02 DIAGNOSIS — Z74.09 IMPAIRED FUNCTIONAL MOBILITY, BALANCE, GAIT, AND ENDURANCE: ICD-10-CM

## 2024-12-02 DIAGNOSIS — E11.22 TYPE 2 DIABETES MELLITUS WITH STAGE 2 CHRONIC KIDNEY DISEASE, WITHOUT LONG-TERM CURRENT USE OF INSULIN  (HCC): ICD-10-CM

## 2024-12-02 DIAGNOSIS — Z98.1 S/P LUMBAR FUSION: ICD-10-CM

## 2024-12-02 DIAGNOSIS — N18.2 TYPE 2 DIABETES MELLITUS WITH STAGE 2 CHRONIC KIDNEY DISEASE, WITHOUT LONG-TERM CURRENT USE OF INSULIN  (HCC): ICD-10-CM

## 2024-12-02 DIAGNOSIS — Z98.890 POST-OPERATIVE STATE: Primary | ICD-10-CM

## 2024-12-02 PROCEDURE — 97110 THERAPEUTIC EXERCISES: CPT

## 2024-12-02 PROCEDURE — 97112 NEUROMUSCULAR REEDUCATION: CPT

## 2024-12-02 NOTE — PROGRESS NOTES
"Daily Note     Today's date: 2024  Patient name: Aaron Snyder  : 1965  MRN: [id number]  Referring provider: Eliana Khalil*  Dx:   Encounter Diagnosis     ICD-10-CM    1. Post-operative state  Z98.890       2. S/P lumbar fusion  Z98.1       3. Impaired functional mobility, balance, gait, and endurance  Z74.09           Start Time: 0830  Stop Time: 915  Total time in clinic (min): 45 minutes    Subjective: Patient reports that he still is having pain in his right hip.  Patient states more so with weight bearing on it.      Objective: See treatment diary below      Assessment: Tolerated treatment well.   First session after initial evaluation.  Patient participated in skilled PT session focused on strengthening, stretching, and ROM.  Patient able to complete exercise program with no  relief in pain and per protocol.  Patient work on core stabilization exercises this session.  PPatient would continue to benefit from skilled PT interventions to address strengthening, stretching, and ROM. Patient demonstrated fatigue post treatment      Plan: Continue per plan of care.         Precautions: DOS - 10/25/24.  No bending, twisting, lifting > 10#  PMH includes: HTN, DM, chronic pain     Manuals  12/      BLE - Hams, Calf, Quads                                Neuro Re-Ed         MTP/LTP  RTB 10x ea      PPT  5\" 2x10      PPT w/march  10x      PPT w/SLR        PPT w/BKFO  5\" 10x ea      Palloff Press  RTB 5\" 10x ea      Sidestepping                                        Ther Ex        NuStep for LE Strength Seat 12  L4 10 min  No UE Seat 12 L2 x 5 min No UE      Leg Press        Knee Ext Machine        Knee Flex Machine        Bridges        SKTC        LTR        S/L Hip Abd        Clamshells  Supine No TB 2x10      HR HEP 15x      SLR x 3 HEP 10x ea B/L      LAQ HEP 5\" 2x10 ea                      Ther Activity        STS        Stepups F/L        Stepdowns                Gait Training            " "            Modalities        HP/CP prn                     Access Code: [email or web address]  URL: [email or web address]  Date: 11/25/2024  Prepared by: Deisy    Exercises  - Standing Hip Flexion AROM  - 1 x daily - 7 x weekly - 2 sets - 10 reps  - Standing Hip Abduction AROM  - 1 x daily - 7 x weekly - 2 sets - 10 reps  - Standing Hip Extension  - 1 x daily - 7 x weekly - 2 sets - 10 reps  - Standing Heel Raise with Support  - 1 x daily - 7 x weekly - 2 sets - 10 reps  - Seated Long Arc Quad  - 1 x daily - 7 x weekly - 2 sets - 10 reps - 3-5\" hold       "

## 2024-12-03 ENCOUNTER — RA CDI HCC (OUTPATIENT)
Dept: OTHER | Facility: HOSPITAL | Age: 59
End: 2024-12-03

## 2024-12-03 RX ORDER — GLIMEPIRIDE 2 MG/1
2 TABLET ORAL
Qty: 90 TABLET | Refills: 1 | Status: SHIPPED | OUTPATIENT
Start: 2024-12-03

## 2024-12-04 ENCOUNTER — OFFICE VISIT (OUTPATIENT)
Dept: PHYSICAL THERAPY | Facility: CLINIC | Age: 59
End: 2024-12-04
Payer: COMMERCIAL

## 2024-12-04 DIAGNOSIS — Z98.890 POST-OPERATIVE STATE: Primary | ICD-10-CM

## 2024-12-04 DIAGNOSIS — Z74.09 IMPAIRED FUNCTIONAL MOBILITY, BALANCE, GAIT, AND ENDURANCE: ICD-10-CM

## 2024-12-04 DIAGNOSIS — Z98.1 S/P LUMBAR FUSION: ICD-10-CM

## 2024-12-04 PROCEDURE — 97110 THERAPEUTIC EXERCISES: CPT

## 2024-12-04 PROCEDURE — 97112 NEUROMUSCULAR REEDUCATION: CPT

## 2024-12-04 NOTE — PROGRESS NOTES
"Daily Note     Today's date: 2024  Patient name: Aaron Snyder  : 1965  MRN: [id number]  Referring provider: Eliana Khalil*  Dx:   Encounter Diagnosis     ICD-10-CM    1. Post-operative state  Z98.890       2. S/P lumbar fusion  Z98.1       3. Impaired functional mobility, balance, gait, and endurance  Z74.09           Start Time: 0830  Stop Time: 915  Total time in clinic (min): 45 minutes    Subjective: Patient reports no relief in his R hip pain.  Patient states he is in a lot of pain.      Objective: See treatment diary below      Assessment: Tolerated treatment well.   Patient participated in skilled PT session focused on strengthening, stretching, and ROM.  Patient able to complete exercise program with no increase in pain.  Modified patient's program for seated exercises and no standing.  Patient able to tolerate the seated exercises, experiencing no pain in R hip while seated.  Patient continues to experience pain in R hip/groin area with increased wt bearing activities. Patient demonstrated decreased core stability with exercises.  Patient would continue to benefit from skilled PT interventions to address strengthening, stretching, and ROM.  Patient demonstrated fatigue post treatment      Plan: Continue per plan of care.      Precautions: DOS - 10/25/24.  No bending, twisting, lifting > 10#  PMH includes: HTN, DM, chronic pain     Manuals      BLE - Hams, Calf, Quads                                Neuro Re-Ed         MTP/LTP  RTB 10x ea Seated RTB   2x10 ea     PPT  5\" 2x10 5\" 2x10     PPT w/march  10x      PPT w/SLR        PPT w/BKFO  5\" 10x ea 5\" 10x ea     Palloff Press  RTB 5\" 10x ea Seated RTB 5\" 10x ea     Sidestepping                                        Ther Ex        NuStep for LE Strength Seat 12  L4 10 min  No UE Seat 12 L2 x 5 min No UE Seat 12 L2 x 10 min No UE     Leg Press        Knee Ext Machine        Knee Flex Machine        Bridges        SKTC      " "  LTR        S/L Hip Abd        Clamshells  Supine No TB 2x10 Supine no TB 2x10     HR HEP 15x held     SLR x 3 HEP 10x ea B/L held     LAQ HEP 5\" 2x10 ea 5\" 2x10 ea                     Ther Activity        STS        Stepups F/L        Stepdowns                Gait Training                        Modalities        HP/CP prn                     Access Code: [email or web address]  URL: [email or web address]  Date: 11/25/2024  Prepared by: Deisy    Exercises  - Standing Hip Flexion AROM  - 1 x daily - 7 x weekly - 2 sets - 10 reps  - Standing Hip Abduction AROM  - 1 x daily - 7 x weekly - 2 sets - 10 reps  - Standing Hip Extension  - 1 x daily - 7 x weekly - 2 sets - 10 reps  - Standing Heel Raise with Support  - 1 x daily - 7 x weekly - 2 sets - 10 reps  - Seated Long Arc Quad  - 1 x daily - 7 x weekly - 2 sets - 10 reps - 3-5\" hold         "

## 2024-12-05 ENCOUNTER — TELEPHONE (OUTPATIENT)
Age: 59
End: 2024-12-05

## 2024-12-05 ENCOUNTER — OFFICE VISIT (OUTPATIENT)
Dept: NEUROSURGERY | Facility: CLINIC | Age: 59
End: 2024-12-05

## 2024-12-05 VITALS
HEART RATE: 88 BPM | BODY MASS INDEX: 42.66 KG/M2 | WEIGHT: 315 LBS | OXYGEN SATURATION: 94 % | SYSTOLIC BLOOD PRESSURE: 136 MMHG | HEIGHT: 72 IN | DIASTOLIC BLOOD PRESSURE: 78 MMHG | TEMPERATURE: 97.2 F

## 2024-12-05 DIAGNOSIS — M21.70 LEG LENGTH DISCREPANCY: ICD-10-CM

## 2024-12-05 DIAGNOSIS — M46.1 SACROILIITIS, NOT ELSEWHERE CLASSIFIED (HCC): Primary | ICD-10-CM

## 2024-12-05 DIAGNOSIS — Z98.1 ARTHRODESIS STATUS: ICD-10-CM

## 2024-12-05 PROCEDURE — 99024 POSTOP FOLLOW-UP VISIT: CPT | Performed by: NEUROLOGICAL SURGERY

## 2024-12-05 RX ORDER — METHYLPREDNISOLONE 4 MG/1
TABLET ORAL
Qty: 1 EACH | Refills: 1 | Status: SHIPPED | OUTPATIENT
Start: 2024-12-05 | End: 2024-12-16 | Stop reason: ALTCHOICE

## 2024-12-05 NOTE — ASSESSMENT & PLAN NOTE
SI joint pain can be caused by leg length discrepancy.  There is a personal family history of this and therefore is indicated to be evaluated  Orders:    CT leg length evaluation (scanogram); Future    methylPREDNISolone 4 MG tablet therapy pack; Use as directed on package    Ambulatory referral to Spine & Pain Management; Future    Ambulatory referral to Physical Therapy; Future

## 2024-12-05 NOTE — ASSESSMENT & PLAN NOTE
This patient complains of hip related pain which may indeed be his SI joint.  Pain is reproduced by palpation of the SI joint.  Orders:    CT leg length evaluation (scanogram); Future    methylPREDNISolone 4 MG tablet therapy pack; Use as directed on package    Ambulatory referral to Spine & Pain Management; Future    Ambulatory referral to Physical Therapy; Future

## 2024-12-05 NOTE — PROGRESS NOTES
Name: Aaron Snyder      : 1965      MRN: [id number]  Encounter Provider: Venancio Ritchie MD  Encounter Date: 2024   Encounter department: [facility]  :  Assessment & Plan  Sacroiliitis, not elsewhere classified (HCC)  This patient complains of hip related pain which may indeed be his SI joint.  Pain is reproduced by palpation of the SI joint.  Orders:  •  CT leg length evaluation (scanogram); Future  •  methylPREDNISolone 4 MG tablet therapy pack; Use as directed on package  •  Ambulatory referral to Spine & Pain Management; Future  •  Ambulatory referral to Physical Therapy; Future    Arthrodesis status  Instrumentation appears to be in good position but this will need to be followed  Orders:  •  CT leg length evaluation (scanogram); Future  •  methylPREDNISolone 4 MG tablet therapy pack; Use as directed on package  •  Ambulatory referral to Spine & Pain Management; Future  •  Ambulatory referral to Physical Therapy; Future    Leg length discrepancy  SI joint pain can be caused by leg length discrepancy.  There is a personal family history of this and therefore is indicated to be evaluated  Orders:  •  CT leg length evaluation (scanogram); Future  •  methylPREDNISolone 4 MG tablet therapy pack; Use as directed on package  •  Ambulatory referral to Spine & Pain Management; Future  •  Ambulatory referral to Physical Therapy; Future        History of Present Illness   Back pain much improved.  Right hip pain is severe.  This is an old problem which has been present for some time  Not present unless he stands on it.  There is a family history of mother and brother with leg length discrepancy and similar problems  Still not able to stand long enough to go back to work  Review of Systems   Constitutional: Negative.    HENT: Negative.     Eyes: Negative.    Respiratory: Negative.     Cardiovascular: Negative.    Gastrointestinal: Negative.    Endocrine: Negative.     Genitourinary: Negative.    Musculoskeletal:  Positive for gait problem (problems walking for long periods of time, will get pain).        6 wk pov  Surg: 10/25 DKO Decompression and TLIF  XR lumbar: 11/27 12/5 Pt presents with right hip and right lbp. Reports intermittent burning sensation from left hip/thigh to left knee, tremors in right leg, weakness R>L. Currently in PT.   Skin: Negative.    Allergic/Immunologic: Negative.    Neurological:  Positive for tremors (legs jump at night b/l, occa during the day), weakness (BLE) and numbness (intermittent in b/l feet).   Hematological: Negative.    Psychiatric/Behavioral:  Positive for sleep disturbance.     I have personally reviewed the MA's review of systems and made changes as necessary.         Objective   /78 (BP Location: Right arm, Patient Position: Sitting, Cuff Size: Adult)   Pulse 88   Temp (!) 97.2 °F (36.2 °C) (Temporal)   Ht 6' (1.829 m)   Wt (!) 156 kg (345 lb)   SpO2 94%   BMI 46.79 kg/m²     Physical Exam  Vitals and nursing note reviewed.   Constitutional:       General: He is not in acute distress.     Appearance: Normal appearance. He is normal weight. He is not ill-appearing, toxic-appearing or diaphoretic.   HENT:      Head: Normocephalic and atraumatic.      Nose: Nose normal.   Eyes:      Extraocular Movements: Extraocular movements intact.      Pupils: Pupils are equal, round, and reactive to light.   Musculoskeletal:         General: No swelling, tenderness, deformity or signs of injury.      Cervical back: Normal range of motion and neck supple.      Right lower leg: No edema.      Left lower leg: No edema.      Comments: Negative Kenan's  Pain to palpation directly over the SI joint   Skin:     General: Skin is warm and dry.      Comments: Incisions are clean and dry and healing well   Neurological:      Mental Status: He is alert and oriented to person, place, and time. Mental status is at baseline.      Cranial Nerves:  No cranial nerve deficit.      Sensory: No sensory deficit.      Motor: No weakness.      Coordination: Coordination normal.      Gait: Gait ( ) normal.      Deep Tendon Reflexes: Reflexes normal.   Psychiatric:         Mood and Affect: Mood normal.         Behavior: Behavior normal.         Thought Content: Thought content normal.         Judgment: Judgment normal.   Neurological Exam  Mental Status  Alert. Oriented to person, place, and time.    Cranial Nerves  CN III, IV, VI: Extraocular movements intact bilaterally. Pupils equal round and reactive to light bilaterally.    Gait   Normal gait ( ).

## 2024-12-05 NOTE — LETTER
December 8, 2024     Patient: Aaron Snyder  YOB: 1965  Date of Visit: 12/5/2024      To Whom it May Concern:    Aaron Snyder is under my professional care. Aaron was seen in my office on 12/5/2024. Aaron should not return to work at this point as he recovers from surgery and hip pain.  He will be reevaluated in 4 wks.    If you have any questions or concerns, please don't hesitate to call.         Sincerely,          Venancio Ritchie MD        CC: No Recipients   normal...

## 2024-12-05 NOTE — ASSESSMENT & PLAN NOTE
Instrumentation appears to be in good position but this will need to be followed  Orders:    CT leg length evaluation (scanogram); Future    methylPREDNISolone 4 MG tablet therapy pack; Use as directed on package    Ambulatory referral to Spine & Pain Management; Future    Ambulatory referral to Physical Therapy; Future

## 2024-12-06 ENCOUNTER — OFFICE VISIT (OUTPATIENT)
Dept: PAIN MEDICINE | Facility: CLINIC | Age: 59
End: 2024-12-06
Payer: COMMERCIAL

## 2024-12-06 VITALS
BODY MASS INDEX: 46.79 KG/M2 | SYSTOLIC BLOOD PRESSURE: 132 MMHG | DIASTOLIC BLOOD PRESSURE: 74 MMHG | HEART RATE: 74 BPM | HEIGHT: 72 IN

## 2024-12-06 DIAGNOSIS — M51.369 DEGENERATION OF INTERVERTEBRAL DISC OF LUMBAR REGION, UNSPECIFIED WHETHER PAIN PRESENT: ICD-10-CM

## 2024-12-06 DIAGNOSIS — M53.3 PAIN OF RIGHT SACROILIAC JOINT: Primary | ICD-10-CM

## 2024-12-06 DIAGNOSIS — Z98.1 S/P LUMBAR FUSION: ICD-10-CM

## 2024-12-06 DIAGNOSIS — G89.4 CHRONIC PAIN SYNDROME: ICD-10-CM

## 2024-12-06 PROCEDURE — 99214 OFFICE O/P EST MOD 30 MIN: CPT | Performed by: STUDENT IN AN ORGANIZED HEALTH CARE EDUCATION/TRAINING PROGRAM

## 2024-12-06 NOTE — PROGRESS NOTES
"Assessment:  1. Pain of right sacroiliac joint    2. S/P lumbar fusion    3. Degeneration of intervertebral disc of lumbar region, unspecified whether pain present    4. Chronic pain syndrome        Portions of the record may have been created with voice recognition software. Occasional wrong word or \"sound a like\" substitutions may have occurred due to the inherent limitations of voice recognition software. Read the chart carefully and recognize, using context, where substitutions have occurred. Contact me with any questions.       Plan:  59-year-old male here for follow-up visit with regards to chronic low back pain symptoms.  Since last visit, he underwent a L1-L3 fusion by Dr. Ritchie on 10/25/24.  He returns to discuss right-sided low back pain symptoms that interfere with ambulation and function.  Denies new or progressive weakness, saddle anesthesia, BBI.  Low back pain symptoms likely multifactorial in the setting of sacroiliac joint pain, myofascial pain, lumbar DDD.    Discussed option of right sacroiliac joint injection for symptom management.    Continue physical therapy, home exercise program.    Follow-up in 1 month after injection.      Complete risks and benefits including bleeding, infection, tissue reaction, nerve injury and allergic reaction were discussed. The approach was demonstrated using models and literature was provided. Verbal and written consent was obtained.          History of Present Illness:  The patient is a 59 y.o. male who presents for a follow up office visit in regards to Hip Pain (right) and Back Pain.      I have personally reviewed and/or updated the patient's past medical history, past surgical history, family history, social history, current medications, allergies, and vital signs today.       Review of Systems  Review of Systems   Constitutional:  Negative for fever.   HENT:  Negative for sinus pain.    Eyes:  Negative for redness.   Respiratory:  Negative for shortness of " breath.    Cardiovascular:  Negative for palpitations.   Gastrointestinal:  Negative for abdominal pain and nausea.   Endocrine: Negative for polydipsia.   Genitourinary:  Negative for difficulty urinating.   Musculoskeletal:  Positive for gait problem and myalgias.        Decreased Range of motion   Skin:  Negative for rash.   Neurological:  Negative for seizures and headaches.   Psychiatric/Behavioral:  Negative for decreased concentration. The patient is not nervous/anxious.    All other systems reviewed and are negative.          Past Medical History:   Diagnosis Date    Allergic     Benign arteriolar nephrosclerosis     Colon polyp     Diabetes mellitus (HCC)     Essential hypertension     History of transfusion 2013    Hyperparathyroidism due to renal insufficiency (HCC)     Legionnaire's disease (HCC)     Pneumonia     s/p VDRF        Past Surgical History:   Procedure Laterality Date    COLONOSCOPY      OTHER SURGICAL HISTORY      Dialysis catheter     RI ARTHRODESIS COMBINED TQ 1NTRSPC LUMBAR Left 10/25/2024    Procedure: ROBOTIC ASSISTED DECOMPRESSIVE LAMINECTOMY , DISKECTOMY AND OSTEOPHYTECTOMY l1/2, AND l2.3 AND TRANSVERSE LUMBAR  INTERBODY FUSION L1/2 AND l2/3 - LEFT SIDED APPROACH AND PEDICLE SCREW FIXATION L1-3;  Surgeon: Venancio Ritchie MD;  Location:  MAIN OR;  Service: Neurosurgery    RI LAMNOTMY INCL W/DCMPRSN NRV ROOT 1 INTRSPC LUMBR Left 11/15/2021    Procedure: Left sided L1-2 microdiscectomy;  Surgeon: Andi Campoverde MD;  Location:  MAIN OR;  Service: Neurosurgery       Family History   Problem Relation Age of Onset    Hyperlipidemia Mother     Lung cancer Father     Stomach cancer Father     Cancer Father     Diabetes type II Father     Diabetes type II Brother        Social History     Occupational History    Occupation:     Tobacco Use    Smoking status: Never     Passive exposure: Never    Smokeless tobacco: Never   Vaping Use    Vaping status: Never Used    Substance and Sexual Activity    Alcohol use: Not Currently     Comment: Denies alcohol use - As per [employer]     Drug use: Never    Sexual activity: Yes     Partners: Female     Birth control/protection: None         Current Outpatient Medications:     acetaminophen (TYLENOL) 325 mg tablet, Take 3 tablets (975 mg total) by mouth every 8 (eight) hours, Disp: , Rfl:     carvedilol (COREG) 12.5 mg tablet, TAKE 1 TABLET TWICE A DAY, Disp: 180 tablet, Rfl: 3    cholecalciferol (VITAMIN D3) 400 units tablet, Take 400 Units by mouth daily, Disp: , Rfl:     fexofenadine (ALLEGRA) 180 MG tablet, Take 180 mg by mouth daily, Disp: , Rfl:     gabapentin (NEURONTIN) 100 mg capsule, Take one tab in the morning and one tab in the afternoon., Disp: , Rfl:     gabapentin (Neurontin) 300 mg capsule, Take 1 capsule (300 mg total) by mouth daily at bedtime, Disp: 30 capsule, Rfl: 0    glimepiride (AMARYL) 2 mg tablet, TAKE 1 TABLET DAILY WITH BREAKFAST, Disp: 90 tablet, Rfl: 1    lisinopril-hydrochlorothiazide (PRINZIDE,ZESTORETIC) 20-12.5 MG per tablet, TAKE 1 TABLET TWICE A DAY, Disp: 180 tablet, Rfl: 1    methylPREDNISolone 4 MG tablet therapy pack, Use as directed on package, Disp: 1 each, Rfl: 1    Multiple Vitamins-Minerals (multivitamin with minerals) tablet, Take 1 tablet by mouth daily, Disp: , Rfl:     Allergies   Allergen Reactions    Ozempic (0.25 Or 0.5 Mg-Dose) [Semaglutide(0.25 Or 0.5mg-Dos)] Diarrhea    Glipizide Diarrhea    Metformin Diarrhea       Physical Exam:    /74   Pulse 74   Ht 6' (1.829 m)   BMI 46.79 kg/m²     Constitutional:normal, well developed, well nourished, alert, in no distress and non-toxic and no overt pain behavior.  Eyes:anicteric  HEENT:grossly intact  Neck:supple, symmetric, trachea midline and no masses   Pulmonary:even and unlabored  Cardiovascular:No edema or pitting edema present  Skin:Normal without rashes or lesions and well hydrated  Psychiatric:Mood and affect  appropriate  Neurologic:Cranial Nerves II-XII grossly intact  Musculoskeletal:ambulates with cane, + R sacral compression, + R LILIA, + R thigh thrust      Imaging  FL spine and pain procedure    (Results Pending)         LUMBAR SPINE     INDICATION:   Arthrodesis status. Other specified postprocedural states.      COMPARISON: 10/20/2024     VIEWS:  XR SPINE LUMBAR 2 OR 3 VIEWS INJURY  Images: 3     FINDINGS:     There are 5 non rib bearing lumbar vertebral bodies.      There is no evidence of acute fracture or destructive osseous lesion.     Stable retrolisthesis L1-2.     Stable L1-L3 posterior instrumented fusion and intervertebral spacer placement. Stable degenerative change at the nonsurgical levels..     The pedicles appear intact.     Soft tissues are unremarkable.     IMPRESSION:        Stable postsurgical changes.  Orders Placed This Encounter   Procedures    FL spine and pain procedure

## 2024-12-09 ENCOUNTER — OFFICE VISIT (OUTPATIENT)
Dept: PHYSICAL THERAPY | Facility: CLINIC | Age: 59
End: 2024-12-09
Payer: COMMERCIAL

## 2024-12-09 DIAGNOSIS — Z98.890 POST-OPERATIVE STATE: Primary | ICD-10-CM

## 2024-12-09 DIAGNOSIS — Z74.09 IMPAIRED FUNCTIONAL MOBILITY, BALANCE, GAIT, AND ENDURANCE: ICD-10-CM

## 2024-12-09 DIAGNOSIS — Z98.1 S/P LUMBAR FUSION: ICD-10-CM

## 2024-12-09 LAB
LEFT EYE DIABETIC RETINOPATHY: NORMAL
RIGHT EYE DIABETIC RETINOPATHY: NORMAL

## 2024-12-09 PROCEDURE — 97112 NEUROMUSCULAR REEDUCATION: CPT

## 2024-12-09 PROCEDURE — 97110 THERAPEUTIC EXERCISES: CPT

## 2024-12-09 NOTE — PROGRESS NOTES
"Daily Note     Today's date: 2024  Patient name: Aaron Snyder  : 1965  MRN: [id number]  Referring provider: Eliana Khalil*  Dx:   Encounter Diagnosis     ICD-10-CM    1. Post-operative state  Z98.890       2. S/P lumbar fusion  Z98.1       3. Impaired functional mobility, balance, gait, and endurance  Z74.09           Start Time: 828  Stop Time: 908  Total time in clinic (min): 40 minutes    Subjective: Reports that he is doing well. No new problems to report at the moment. Does note that his neurosurgeon gave him a steroid taper and this has been beneficial with his SI joint/hip pain. \"I want to work on my core muscles, my doctor showed me my x-ray and said my back muscles are very strong, but I have like no core muscles\". Does have any order in for SIJ symptoms per last office visit - may warrant a re-evaluation next visit upcoming. Going to be getting SIJ injection on  (in one week). Is going to get imaging regarding leg length discrepancy.       Objective: See treatment diary below. R LLE seems to be ~ 1cm shorter than the L. Trialed a heel lift in R shoe, much better gait pattern, more fluid with reduced antalgia noted.       Assessment: Tolerated treatment well. Continued per primary therapist POC. continued with some lumbar stabilization that patient tolerated well. Did some supine activities to accommodate as well. Patient tolerated session well today, seemingly much less aggravation today following prescription of steroid taper. Poor TrA isolation, frequently has abdominal overflow involved. Had improved patterning following heel lift trial, encouraged patient to continue with heel lift, if any further discomfort in the coming days until next appointment to take it out of shoe. Patient demonstrated fatigue post treatment, exhibited good technique with therapeutic exercises, and would benefit from continued PT      Plan: Continue per plan of care.  Progress treatment as " "tolerated.       Precautions: DOS - 10/25/24.  No bending, twisting, lifting > 10#  PMH includes: HTN, DM, chronic pain     Manuals 11/25 12/2 12/4 12/9    BLE - Hams, Calf, Quads                                Neuro Re-Ed         MTP/LTP  RTB 10x ea Seated RTB   2x10 ea     PPT  5\" 2x10 5\" 2x10 2x10 5\"     PPT w/march  10x      PPT w/SLR        PPT w/BKFO  5\" 10x ea 5\" 10x ea     Palloff Press  RTB 5\" 10x ea Seated RTB 5\" 10x ea     Sidestepping                TrA     20x5\"     TrA with march     20x ea     TrA with add squeeze     20x5\" kickball     Quad set TKE ball     Supine 20x5\"             Ther Ex        NuStep for LE Strength Seat 12  L4 10 min  No UE Seat 12 L2 x 5 min No UE Seat 12 L2 x 10 min No UE Seat 12 L3 x10 mins     Leg Press        Knee Ext Machine        Knee Flex Machine        Bridges        SKTC        LTR        S/L Hip Abd        Clamshells  Supine No TB 2x10 Supine no TB 2x10 Supine red TB 2x10 unilateral    HR HEP 15x held     SLR x 3 HEP 10x ea B/L held     LAQ HEP 5\" 2x10 ea 5\" 2x10 ea                     Ther Activity        STS        Stepups F/L        Stepdowns                Gait Training    Heel lift R shoe                     Modalities        HP/CP prn                     Access Code: [email or web address]  URL: [email or web address]  Date: 11/25/2024  Prepared by: Deisy    Exercises  - Standing Hip Flexion AROM  - 1 x daily - 7 x weekly - 2 sets - 10 reps  - Standing Hip Abduction AROM  - 1 x daily - 7 x weekly - 2 sets - 10 reps  - Standing Hip Extension  - 1 x daily - 7 x weekly - 2 sets - 10 reps  - Standing Heel Raise with Support  - 1 x daily - 7 x weekly - 2 sets - 10 reps  - Seated Long Arc Quad  - 1 x daily - 7 x weekly - 2 sets - 10 reps - 3-5\" hold           "

## 2024-12-10 ENCOUNTER — OFFICE VISIT (OUTPATIENT)
Dept: FAMILY MEDICINE CLINIC | Facility: CLINIC | Age: 59
End: 2024-12-10
Payer: COMMERCIAL

## 2024-12-10 VITALS
HEART RATE: 76 BPM | DIASTOLIC BLOOD PRESSURE: 76 MMHG | HEIGHT: 72 IN | TEMPERATURE: 97.2 F | OXYGEN SATURATION: 93 % | WEIGHT: 315 LBS | SYSTOLIC BLOOD PRESSURE: 152 MMHG | BODY MASS INDEX: 42.66 KG/M2 | RESPIRATION RATE: 20 BRPM

## 2024-12-10 DIAGNOSIS — E66.01 CLASS 3 SEVERE OBESITY DUE TO EXCESS CALORIES WITH SERIOUS COMORBIDITY AND BODY MASS INDEX (BMI) OF 45.0 TO 49.9 IN ADULT (HCC): Chronic | ICD-10-CM

## 2024-12-10 DIAGNOSIS — N18.2 TYPE 2 DIABETES MELLITUS WITH STAGE 2 CHRONIC KIDNEY DISEASE, WITHOUT LONG-TERM CURRENT USE OF INSULIN  (HCC): Chronic | ICD-10-CM

## 2024-12-10 DIAGNOSIS — E78.2 MIXED HYPERLIPIDEMIA: Chronic | ICD-10-CM

## 2024-12-10 DIAGNOSIS — I10 ESSENTIAL HYPERTENSION: Chronic | ICD-10-CM

## 2024-12-10 DIAGNOSIS — E11.22 TYPE 2 DIABETES MELLITUS WITH STAGE 2 CHRONIC KIDNEY DISEASE, WITHOUT LONG-TERM CURRENT USE OF INSULIN  (HCC): Chronic | ICD-10-CM

## 2024-12-10 DIAGNOSIS — Z00.00 ANNUAL PHYSICAL EXAM: Primary | Chronic | ICD-10-CM

## 2024-12-10 DIAGNOSIS — E55.9 VITAMIN D DEFICIENCY: Chronic | ICD-10-CM

## 2024-12-10 DIAGNOSIS — Z23 ENCOUNTER FOR IMMUNIZATION: ICD-10-CM

## 2024-12-10 DIAGNOSIS — Z12.5 SCREENING FOR PROSTATE CANCER: Chronic | ICD-10-CM

## 2024-12-10 DIAGNOSIS — M54.16 LUMBAR RADICULOPATHY: Chronic | ICD-10-CM

## 2024-12-10 DIAGNOSIS — F32.2 SEVERE MAJOR DEPRESSIVE DISORDER (HCC): ICD-10-CM

## 2024-12-10 DIAGNOSIS — N18.2 STAGE 2 CHRONIC KIDNEY DISEASE: Chronic | ICD-10-CM

## 2024-12-10 DIAGNOSIS — E66.813 CLASS 3 SEVERE OBESITY DUE TO EXCESS CALORIES WITH SERIOUS COMORBIDITY AND BODY MASS INDEX (BMI) OF 45.0 TO 49.9 IN ADULT (HCC): Chronic | ICD-10-CM

## 2024-12-10 PROCEDURE — 99396 PREV VISIT EST AGE 40-64: CPT | Performed by: NURSE PRACTITIONER

## 2024-12-10 NOTE — PATIENT INSTRUCTIONS
"_________________________________________________________________  YOU ARE DUE FOR YOUR ANNUAL PHYSICAL EXAM AFTER 12/10/2025.  REPEAT LABS ORDERED, PLEASE HAVE THEM DRAWN THE WEEK PRIOR TO YOUR VISIT (APPROXIMATELY 12/3/2025).  LABS HAVE BEEN ORDERED FOR YOU.   THESE LABS SHOULD BE DRAWN PRIOR TO YOUR NEXT VISIT.  YOU CAN HAVE THEM DRAWN UP TO 1 WEEK PRIOR TO YOUR NEXT VISIT.  HAVING YOUR BLOOD WORK WHEN YOU COME TO YOUR NEXT VISIT WILL ALLOW ME TO PROVIDE THE BEST MEDICAL CARE FOR YOU WITHOUT HAVING EITHER OF US PERFORM MORE WORK THAN NECESSARY.  PLEASE BE COMPLIANT WITH THIS REQUEST.   _____________________________________________________________________  Patient Education     Routine physical for adults   The Basics   Written by the doctors and editors at [facility]   What is a physical? -- A physical is a routine visit, or \"check-up,\" with your doctor. You might also hear it called a \"wellness visit\" or \"preventive visit.\"  During each visit, the doctor will:   Ask about your physical and mental health   Ask about your habits, behaviors, and lifestyle   Do an exam   Give you vaccines if needed   Talk to you about any medicines you take   Give advice about your health   Answer your questions  Getting regular check-ups is an important part of taking care of your health. It can help your doctor find and treat any problems you have. But it's also important for preventing health problems.  A routine physical is different from a \"sick visit.\" A sick visit is when you see a doctor because of a health concern or problem. Since physicals are scheduled ahead of time, you can think about what you want to ask the doctor.  How often should I get a physical? -- It depends on your age and health. In general, for people age 21 years and older:   If you are younger than 50 years, you might be able to get a physical every 3 years.   If you are 50 years or older, your doctor might recommend a physical every year.  If you have an " "ongoing health condition, like diabetes or high blood pressure, your doctor will probably want to see you more often.  What happens during a physical? -- In general, each visit will include:   Physical exam - The doctor or nurse will check your height, weight, heart rate, and blood pressure. They will also look at your eyes and ears. They will ask about how you are feeling and whether you have any symptoms that bother you.   Medicines - It's a good idea to bring a list of all the medicines you take to each doctor visit. Your doctor will talk to you about your medicines and answer any questions. Tell them if you are having any side effects that bother you. You should also tell them if you are having trouble paying for any of your medicines.   Habits and behaviors - This includes:   Your diet   Your exercise habits   Whether you smoke, drink alcohol, or use drugs   Whether you are sexually active   Whether you feel safe at home  Your doctor will talk to you about things you can do to improve your health and lower your risk of health problems. They will also offer help and support. For example, if you want to quit smoking, they can give you advice and might prescribe medicines. If you want to improve your diet or get more physical activity, they can help you with this, too.   Lab tests, if needed - The tests you get will depend on your age and situation. For example, your doctor might want to check your:   Cholesterol   Blood sugar   Iron level   Vaccines - The recommended vaccines will depend on your age, health, and what vaccines you already had. Vaccines are very important because they can prevent certain serious or deadly infections.   Discussion of screening - \"Screening\" means checking for diseases or other health problems before they cause symptoms. Your doctor can recommend screening based on your age, risk, and preferences. This might include tests to check for:   Cancer, such as breast, prostate, cervical, " ovarian, colorectal, prostate, lung, or skin cancer   Sexually transmitted infections, such as chlamydia and gonorrhea   Mental health conditions like depression and anxiety  Your doctor will talk to you about the different types of screening tests. They can help you decide which screenings to have. They can also explain what the results might mean.   Answering questions - The physical is a good time to ask the doctor or nurse questions about your health. If needed, they can refer you to other doctors or specialists, too.  Adults older than 65 years often need other care, too. As you get older, your doctor will talk to you about:   How to prevent falling at home   Hearing or vision tests   Memory testing   How to take your medicines safely   Making sure that you have the help and support you need at home  All topics are updated as new evidence becomes available and our peer review process is complete.  This topic retrieved from [employer] on: May 02, 2024.  Topic 788933 Version 1.0  Release: 32.4.3 - C32.122  © 2024 UpToDate, Inc. and/or its affiliates. All rights reserved.  Consumer Information Use and Disclaimer   Disclaimer: This generalized information is a limited summary of diagnosis, treatment, and/or medication information. It is not meant to be comprehensive and should be used as a tool to help the user understand and/or assess potential diagnostic and treatment options. It does NOT include all information about conditions, treatments, medications, side effects, or risks that may apply to a specific patient. It is not intended to be medical advice or a substitute for the medical advice, diagnosis, or treatment of a health care provider based on the health care provider's examination and assessment of a patient's specific and unique circumstances. Patients must speak with a health care provider for complete information about their health, medical questions, and treatment options, including any risks or  benefits regarding use of medications. This information does not endorse any treatments or medications as safe, effective, or approved for treating a specific patient. UpToDate, Inc. and its affiliates disclaim any warranty or liability relating to this information or the use thereof.The use of this information is governed by the Terms of Use, available at https://www.[employer].com/en/know/clinical-effectiveness-terms. 2024© UpToDate, Inc. and its affiliates and/or licensors. All rights reserved.  Copyright   © 2024 UpToDate, Inc. and/or its affiliates. All rights reserved.

## 2024-12-10 NOTE — ASSESSMENT & PLAN NOTE
Lab Results   Component Value Date    EGFR 75 10/26/2024    EGFR 76 09/28/2024    EGFR 79 11/27/2023    CREATININE 1.06 10/26/2024    CREATININE 1.05 09/28/2024    CREATININE 1.02 11/27/2023

## 2024-12-10 NOTE — LETTER
December 10, 2024     Patient: Aaron Snyder   YOB: 1965   Date of Visit: 12/10/2024       To Whom it May Concern:    Aaron Snyder was seen in my clinic on 12/10/2024. His last [facility] physical was completed on 12/13/2023. Since he is continuing with physical therapy for his lumbar disc herniation repair, he will not be able to perform an appropriate [facility] physical. He is following with Neurosurgery on 1/2/2024. He is following with Pain Management on 12/16/2024 and 1/15/2025. I will schedule him for an appointment after 1/15/2025 for a follow up and to complete his [facility] form.    If you have any questions or concerns, please don't hesitate to call.         Sincerely,          LAURA Campbell        CC: No Recipients

## 2024-12-10 NOTE — ASSESSMENT & PLAN NOTE
Lab Results   Component Value Date    HGBA1C 7.2 (H) 09/28/2024   This is a chronic disease process.   The patient is stable at this time.  We discussed diet and exercise.  Will monitor labs.  Continue  Gabapentin  Glimepiride     Orders:  •  Hemoglobin A1C; Future  •  Albumin / creatinine urine ratio; Future  •  Hemoglobin A1C; Future  •  Albumin / creatinine urine ratio; Future

## 2024-12-10 NOTE — ASSESSMENT & PLAN NOTE
Orders:  •  Comprehensive metabolic panel; Future  •  CBC and differential; Future  •  Comprehensive metabolic panel; Future  •  CBC and differential; Future

## 2024-12-10 NOTE — PROGRESS NOTES
Adult Annual Physical  Name: Aaron Snyder      : 1965      MRN: [id number]  Encounter Provider: LAURA Campbell  Encounter Date: 12/10/2024   Encounter department: [facility]    Assessment & Plan  Encounter for immunization         Essential hypertension  This is a chronic disease process.   The patient is stable at this time.  We discussed diet and exercise.  We discussed a low salt diet.  Will monitor labs.  Continue  Coreg  Lisinopril-hydrochlorothiazide        Type 2 diabetes mellitus with stage 2 chronic kidney disease, without long-term current use of insulin  ([facility])    Lab Results   Component Value Date    HGBA1C 7.2 (H) 2024   This is a chronic disease process.   The patient is stable at this time.  We discussed diet and exercise.  Will monitor labs.  Continue  Gabapentin  Glimepiride     Orders:  •  Hemoglobin A1C; Future  •  Albumin / creatinine urine ratio; Future  •  Hemoglobin A1C; Future  •  Albumin / creatinine urine ratio; Future    Lumbar radiculopathy         Stage 2 chronic kidney disease  Lab Results   Component Value Date    EGFR 75 10/26/2024    EGFR 76 2024    EGFR 79 2023    CREATININE 1.06 10/26/2024    CREATININE 1.05 2024    CREATININE 1.02 2023          Annual physical exam    Orders:  •  Comprehensive metabolic panel; Future  •  CBC and differential; Future  •  Comprehensive metabolic panel; Future  •  CBC and differential; Future    Class 3 severe obesity due to excess calories with serious comorbidity and body mass index (BMI) of 45.0 to 49.9 in adult ([facility])  Lifestyle modification, diet and exercise discussed       Mixed hyperlipidemia    Orders:  •  Lipid panel; Future  •  Lipid panel; Future    Vitamin D deficiency    Orders:  •  Vitamin D 25 hydroxy; Future  •  Vitamin D 25 hydroxy; Future    Screening for prostate cancer    Orders:  •  PSA, Total Screen; Future  •  PSA, Total Screen; Future    Severe major  depressive disorder (HCC)           Immunizations and preventive care screenings were discussed with patient today. Appropriate education was printed on patient's after visit summary.    Discussed risks and benefits of prostate cancer screening. We discussed the controversial history of PSA screening for prostate cancer in the [place] as well as the risk of over detection and over treatment of prostate cancer by way of PSA screening.  The patient understands that PSA blood testing is an imperfect way to screen for prostate cancer and that elevated PSA levels in the blood may also be caused by infection, inflammation, prostatic trauma or manipulation, urological procedures, or by benign prostatic enlargement.    The role of the digital rectal examination in prostate cancer screening was also discussed and I discussed with him that there is large interobserver variability in the findings of digital rectal examination.    Counseling:  Alcohol/drug use: discussed moderation in alcohol intake, the recommendations for healthy alcohol use, and avoidance of illicit drug use.  Dental Health: discussed importance of regular tooth brushing, flossing, and dental visits.  Injury prevention: discussed safety/seat belts, safety helmets, smoke detectors, carbon monoxide detectors, and smoking near bedding or upholstery.  Sexual health: discussed sexually transmitted diseases, partner selection, use of condoms, avoidance of unintended pregnancy, and contraceptive alternatives.  Exercise: the importance of regular exercise/physical activity was discussed. Recommend exercise 3-5 times per week for at least 30 minutes.       Depression Screening and Follow-up Plan: Patient assessed for underlying major depression. Brief counseling provided and recommend additional follow-up/re-evaluation next office visit. Patient with underlying depression and was advised to continue current medications as prescribed. Patient advised to  follow-up with PCP for further management.         History of Present Illness     Adult Annual Physical:  Patient presents for annual physical.     Diet and Physical Activity:  - Diet/Nutrition: well balanced diet and diabetic diet.  - Exercise: no formal exercise. currently working with physical therapy    General Health:  - Sleep: sleeps well.  - Hearing: normal hearing bilateral ears.  - Vision: wears glasses.  - Dental: regular dental visits.     Health:  - History of STDs: no.   - Urinary symptoms: none.     Advanced Care Planning:  - Has an advanced directive?: no    - Has a durable medical POA?: no    - ACP document given to patient?: no      Review of Systems   Constitutional:  Negative for activity change, chills, fatigue and fever.   HENT:  Negative for rhinorrhea and sore throat.    Eyes:  Negative for pain.   Respiratory:  Negative for cough and shortness of breath.    Cardiovascular:  Negative for chest pain, palpitations and leg swelling.   Gastrointestinal:  Negative for abdominal pain, constipation, diarrhea, nausea and vomiting.   Genitourinary:  Negative for difficulty urinating, flank pain, frequency and urgency.   Musculoskeletal:  Positive for arthralgias, back pain, gait problem and myalgias. Negative for joint swelling.   Skin:  Negative for color change.   Neurological:  Negative for dizziness, weakness, light-headedness and headaches.   Psychiatric/Behavioral:  Negative for sleep disturbance. The patient is not nervous/anxious.    All other systems reviewed and are negative.    Current Outpatient Medications on File Prior to Visit   Medication Sig Dispense Refill   • acetaminophen (TYLENOL) 325 mg tablet Take 3 tablets (975 mg total) by mouth every 8 (eight) hours     • carvedilol (COREG) 12.5 mg tablet TAKE 1 TABLET TWICE A  tablet 3   • cholecalciferol (VITAMIN D3) 400 units tablet Take 400 Units by mouth daily     • fexofenadine (ALLEGRA) 180 MG tablet Take 180 mg by mouth daily      • glimepiride (AMARYL) 2 mg tablet TAKE 1 TABLET DAILY WITH BREAKFAST 90 tablet 1   • lisinopril-hydrochlorothiazide (PRINZIDE,ZESTORETIC) 20-12.5 MG per tablet TAKE 1 TABLET TWICE A  tablet 1   • methylPREDNISolone 4 MG tablet therapy pack Use as directed on package 1 each 1   • Multiple Vitamins-Minerals (multivitamin with minerals) tablet Take 1 tablet by mouth daily     • [DISCONTINUED] gabapentin (NEURONTIN) 100 mg capsule Take one tab in the morning and one tab in the afternoon. (Patient not taking: Reported on 12/10/2024)     • [DISCONTINUED] gabapentin (Neurontin) 300 mg capsule Take 1 capsule (300 mg total) by mouth daily at bedtime (Patient not taking: Reported on 12/10/2024) 30 capsule 0     No current facility-administered medications on file prior to visit.        Objective   /76 (Patient Position: Sitting, Cuff Size: Large)   Pulse 76   Temp (!) 97.2 °F (36.2 °C) (Tympanic)   Resp 20   Ht 6' (1.829 m)   Wt (!) 152 kg (335 lb)   SpO2 93%   BMI 45.43 kg/m²     Physical Exam  Vitals and nursing note reviewed.   Constitutional:       General: He is awake.      Appearance: Normal appearance. He is well-developed.   HENT:      Head: Normocephalic and atraumatic.      Nose: Nose normal.      Mouth/Throat:      Mouth: Mucous membranes are moist.   Eyes:      Conjunctiva/sclera: Conjunctivae normal.   Cardiovascular:      Rate and Rhythm: Normal rate and regular rhythm.      Pulses: Normal pulses.      Heart sounds: Normal heart sounds. No murmur heard.  Pulmonary:      Effort: Pulmonary effort is normal. No respiratory distress.      Breath sounds: Normal breath sounds.   Abdominal:      General: Bowel sounds are normal.      Palpations: Abdomen is soft.      Tenderness: There is no abdominal tenderness.   Musculoskeletal:      Cervical back: Neck supple.      Right lower leg: No edema.      Left lower leg: No edema.      Comments: Generalized low back pain secondary to recent surgery    Skin:     General: Skin is warm and dry.   Neurological:      Mental Status: He is alert and oriented to person, place, and time.      Motor: Motor function is intact.      Coordination: Coordination is intact.      Gait: Gait is intact.   Psychiatric:         Attention and Perception: Attention normal.         Mood and Affect: Mood normal.         Speech: Speech normal.         Behavior: Behavior normal. Behavior is cooperative.         Thought Content: Thought content normal.         Cognition and Memory: Cognition normal.         Judgment: Judgment normal.       Administrative Statements   I have spent a total time of 45 minutes in caring for this patient on the day of the visit/encounter including Diagnostic results, Prognosis, Risks and benefits of tx options, Instructions for management, Patient and family education, Importance of tx compliance, Risk factor reductions, Impressions, Counseling / Coordination of care, Documenting in the medical record, Reviewing / ordering tests, medicine, procedures  , and Obtaining or reviewing history  . Topics discussed with the patient / family include symptom assessment and management and medication review.

## 2024-12-10 NOTE — ASSESSMENT & PLAN NOTE
This is a chronic disease process.   The patient is stable at this time.  We discussed diet and exercise.  We discussed a low salt diet.  Will monitor labs.  Continue  Coreg  Lisinopril-hydrochlorothiazide

## 2024-12-11 ENCOUNTER — OFFICE VISIT (OUTPATIENT)
Dept: PHYSICAL THERAPY | Facility: CLINIC | Age: 59
End: 2024-12-11
Payer: COMMERCIAL

## 2024-12-11 DIAGNOSIS — Z98.1 S/P LUMBAR FUSION: ICD-10-CM

## 2024-12-11 DIAGNOSIS — Z74.09 IMPAIRED FUNCTIONAL MOBILITY, BALANCE, GAIT, AND ENDURANCE: ICD-10-CM

## 2024-12-11 DIAGNOSIS — Z98.890 POST-OPERATIVE STATE: Primary | ICD-10-CM

## 2024-12-11 PROCEDURE — 97112 NEUROMUSCULAR REEDUCATION: CPT

## 2024-12-11 PROCEDURE — 97140 MANUAL THERAPY 1/> REGIONS: CPT

## 2024-12-11 PROCEDURE — 97110 THERAPEUTIC EXERCISES: CPT

## 2024-12-11 NOTE — PROGRESS NOTES
"Daily Note     Today's date: 2024  Patient name: Aaron Snyder  : 1965  MRN: [id number]  Referring provider: Eliana Khalil*  Dx:   Encounter Diagnosis     ICD-10-CM    1. Post-operative state  Z98.890       2. S/P lumbar fusion  Z98.1       3. Impaired functional mobility, balance, gait, and endurance  Z74.09           Start Time: 08  Stop Time: 915  Total time in clinic (min): 45 minutes    Subjective: Patient reports that he feels worse since last session.  Patient states he is in a lot of pain.  Patient states that the leg exercises from last session increased his hip pain.  Patient states that the heel lift in his R shoe did not help.  Patient not wearing heel lift today.      Objective: See treatment diary below      Assessment: Tolerated treatment well.   Patient participated in skilled PT session focused on strengthening, stretching, and ROM.  Patient focused on core stability exercises, which he was able to complete exercise program with no increase in pain.  Patient focused on core stability exercises this session and declined any B/L hip strengthening exercises till after his SI injection on .  Patient with increased tightness in B/L HS and Gastrocs.  Patient would continue to benefit from skilled PT interventions to address strengthening, stretching, and ROM. Patient demonstrated fatigue post treatment      Plan: Continue per plan of care.      Precautions: DOS - 10/25/24.  No bending, twisting, lifting > 10#  PMH includes: HTN, DM, chronic pain     Manuals    BLE - Hams, Calf, Quads     B/L HS, calf   X 10 min                           Neuro Re-Ed         MTP/LTP  RTB 10x ea Seated RTB   2x10 ea     PPT  5\" 2x10 5\" 2x10 2x10 5\"  5\" 20x   PPT w/march  10x      PPT w/SLR        PPT w/BKFO  5\" 10x ea 5\" 10x ea  5\" 10x ea   Palloff Press  RTB 5\" 10x ea Seated RTB 5\" 10x ea     Sidestepping                TrA     20x5\"  5\" 20x   TrA with march     " "20x ea  20x ea   TrA with add squeeze     20x5\" kickball     Quad set TKE ball     Supine 20x5\"  Supine 5\" 20x ea           Ther Ex        NuStep for LE Strength Seat 12  L4 10 min  No UE Seat 12 L2 x 5 min No UE Seat 12 L2 x 10 min No UE Seat 12 L3 x10 mins  Seat 12 L3 x 10 mins   Leg Press        Knee Ext Machine        Knee Flex Machine        Bridges        SKTC     5\" 10x ea   LTR        S/L Hip Abd        Clamshells  Supine No TB 2x10 Supine no TB 2x10 Supine red TB 2x10 unilateral held   HR HEP 15x held     SLR x 3 HEP 10x ea B/L held     LAQ HEP 5\" 2x10 ea 5\" 2x10 ea                     Ther Activity        STS        Stepups F/L        Stepdowns                Gait Training    Heel lift R shoe  Not wearing heel lift today                    Modalities        HP/CP prn                     Access Code: [email or web address]  URL: [email or web address]  Date: 11/25/2024  Prepared by: Deisy    Exercises  - Standing Hip Flexion AROM  - 1 x daily - 7 x weekly - 2 sets - 10 reps  - Standing Hip Abduction AROM  - 1 x daily - 7 x weekly - 2 sets - 10 reps  - Standing Hip Extension  - 1 x daily - 7 x weekly - 2 sets - 10 reps  - Standing Heel Raise with Support  - 1 x daily - 7 x weekly - 2 sets - 10 reps  - Seated Long Arc Quad  - 1 x daily - 7 x weekly - 2 sets - 10 reps - 3-5\" hold             "

## 2024-12-12 ENCOUNTER — HOSPITAL ENCOUNTER (OUTPATIENT)
Dept: CT IMAGING | Facility: HOSPITAL | Age: 59
End: 2024-12-12
Attending: NEUROLOGICAL SURGERY
Payer: COMMERCIAL

## 2024-12-12 ENCOUNTER — TELEPHONE (OUTPATIENT)
Dept: NEUROSURGERY | Facility: CLINIC | Age: 59
End: 2024-12-12

## 2024-12-12 DIAGNOSIS — Z98.1 ARTHRODESIS STATUS: ICD-10-CM

## 2024-12-12 DIAGNOSIS — M21.70 LEG LENGTH DISCREPANCY: ICD-10-CM

## 2024-12-12 DIAGNOSIS — M46.1 SACROILIITIS, NOT ELSEWHERE CLASSIFIED (HCC): ICD-10-CM

## 2024-12-12 PROCEDURE — 77073 BONE LENGTH STUDIES: CPT

## 2024-12-12 NOTE — TELEPHONE ENCOUNTER
Patient called the RX Refill Line. Message is being forwarded to the office.     Patient is requesting a refill of Gabapentin 100 mg 1 capsule twice daily to be sent to [id number] - [place] - [address]  Patient is out of medication.     Please contact patient at [phone]

## 2024-12-12 NOTE — TELEPHONE ENCOUNTER
Good morning! The neurosurgery office received a refill request for gabapentin. We initiated this therapy during the post op timeframe but once outside the 6 week post op timeframe defer to PCP or PM. Are you able to assist this patient with a refill of gabapentin? Thanks!

## 2024-12-13 DIAGNOSIS — M51.26 LUMBAR DISC HERNIATION: ICD-10-CM

## 2024-12-13 DIAGNOSIS — M54.16 LUMBAR RADICULOPATHY: Primary | Chronic | ICD-10-CM

## 2024-12-13 RX ORDER — GABAPENTIN 100 MG/1
100 CAPSULE ORAL 2 TIMES DAILY
Qty: 180 CAPSULE | Refills: 3 | Status: SHIPPED | OUTPATIENT
Start: 2024-12-13

## 2024-12-16 ENCOUNTER — HOSPITAL ENCOUNTER (OUTPATIENT)
Dept: RADIOLOGY | Facility: HOSPITAL | Age: 59
Discharge: HOME | End: 2024-12-16
Attending: STUDENT IN AN ORGANIZED HEALTH CARE EDUCATION/TRAINING PROGRAM
Payer: COMMERCIAL

## 2024-12-16 ENCOUNTER — OFFICE VISIT (OUTPATIENT)
Dept: PHYSICAL THERAPY | Facility: CLINIC | Age: 59
End: 2024-12-16
Payer: COMMERCIAL

## 2024-12-16 VITALS
RESPIRATION RATE: 18 BRPM | SYSTOLIC BLOOD PRESSURE: 154 MMHG | HEART RATE: 84 BPM | DIASTOLIC BLOOD PRESSURE: 83 MMHG | TEMPERATURE: 95.7 F | OXYGEN SATURATION: 94 %

## 2024-12-16 DIAGNOSIS — Z74.09 IMPAIRED FUNCTIONAL MOBILITY, BALANCE, GAIT, AND ENDURANCE: ICD-10-CM

## 2024-12-16 DIAGNOSIS — M53.3 PAIN OF RIGHT SACROILIAC JOINT: ICD-10-CM

## 2024-12-16 DIAGNOSIS — Z98.890 POST-OPERATIVE STATE: Primary | ICD-10-CM

## 2024-12-16 DIAGNOSIS — Z98.1 S/P LUMBAR FUSION: ICD-10-CM

## 2024-12-16 PROCEDURE — 97110 THERAPEUTIC EXERCISES: CPT

## 2024-12-16 PROCEDURE — 97140 MANUAL THERAPY 1/> REGIONS: CPT

## 2024-12-16 PROCEDURE — 27096 INJECT SACROILIAC JOINT: CPT | Performed by: STUDENT IN AN ORGANIZED HEALTH CARE EDUCATION/TRAINING PROGRAM

## 2024-12-16 PROCEDURE — 97112 NEUROMUSCULAR REEDUCATION: CPT

## 2024-12-16 RX ORDER — LIDOCAINE HYDROCHLORIDE 10 MG/ML
3 INJECTION, SOLUTION EPIDURAL; INFILTRATION; INTRACAUDAL; PERINEURAL ONCE
Status: COMPLETED | OUTPATIENT
Start: 2024-12-16 | End: 2024-12-16

## 2024-12-16 RX ORDER — ROPIVACAINE HYDROCHLORIDE 2 MG/ML
1 INJECTION, SOLUTION EPIDURAL; INFILTRATION; PERINEURAL ONCE
Status: COMPLETED | OUTPATIENT
Start: 2024-12-16 | End: 2024-12-16

## 2024-12-16 RX ORDER — METHYLPREDNISOLONE ACETATE 40 MG/ML
40 INJECTION, SUSPENSION INTRA-ARTICULAR; INTRALESIONAL; INTRAMUSCULAR; PARENTERAL; SOFT TISSUE ONCE
Status: COMPLETED | OUTPATIENT
Start: 2024-12-16 | End: 2024-12-16

## 2024-12-16 RX ADMIN — LIDOCAINE HYDROCHLORIDE 3 ML: 10 INJECTION, SOLUTION EPIDURAL; INFILTRATION; INTRACAUDAL; PERINEURAL at 10:24

## 2024-12-16 RX ADMIN — METHYLPREDNISOLONE ACETATE 40 MG: 40 INJECTION, SUSPENSION INTRA-ARTICULAR; INTRALESIONAL; INTRAMUSCULAR; PARENTERAL; SOFT TISSUE at 10:25

## 2024-12-16 RX ADMIN — ROPIVACAINE HYDROCHLORIDE 1 ML: 2 INJECTION, SOLUTION EPIDURAL; INFILTRATION; PERINEURAL at 10:25

## 2024-12-16 RX ADMIN — IOHEXOL 0.5 ML: 300 INJECTION, SOLUTION INTRAVENOUS at 10:24

## 2024-12-16 NOTE — PROGRESS NOTES
"Daily Note     Today's date: 2024  Patient name: Aaron Snyder  : 1965  MRN: [id number]  Referring provider: Eliana Khalil*  Dx:   Encounter Diagnosis     ICD-10-CM    1. Post-operative state  Z98.890       2. S/P lumbar fusion  Z98.1       3. Impaired functional mobility, balance, gait, and endurance  Z74.09           Start Time: 827  Stop Time: 905  Total time in clinic (min): 38 minutes    Subjective: continues with pain. Unchanged symptom intensity of frequency. Has SI injection later today, is hopeful that this will be helpful for diminishing his discomfort.    Per EMR CT scan leg length measurements:     \"FINDINGS:     Bone length measurements are as follows:     Right femur from femoral head to medial femoral condyle = 47.2 cm     Left femur from femoral head to medial femoral condyle = 47.6 cm     Right tibia from medial tibial plateau to the tibial plafond = 36.5 cm     Left tibia from medial tibial plateau to the tibial plafond = 36.9 cm.     Total right leg length including knee joint space from femoral head to tibial plafond = 94.6 cm.     Total left leg length including knee joint space from femoral head to tibial plafond = 95.1 cm.     IMPRESSION:     Leg length measurements as described\"     Objective: See treatment diary below      Assessment: Tolerated treatment well. Continued per primary therapist POC. Continued per last therapist noting patient's apprehension towards hip strengthening, focusing on supine lumbar stabilization due to pain in weight bearing positions as well. Ideally, patient will have reduced symptomology following SI injection that will allow for further rehabilitation.load, volume. Patient demonstrated fatigue post treatment, exhibited good technique with therapeutic exercises, and would benefit from continued PT      Plan: Continue per plan of care.  Progress treatment as tolerated.       Precautions: DOS - 10/25/24.  No bending, twisting, lifting > " "10#  PMH includes: HTN, DM, chronic pain     Manuals 12/16 12/2 12/4 12/9 12/11   BLE - Hams, Calf, Quads B/S HS, calf x8 mins     B/L HS, calf   X 10 min                           Neuro Re-Ed         MTP/LTP  RTB 10x ea Seated RTB   2x10 ea     PPT 5\" 20x  5\" 2x10 5\" 2x10 2x10 5\"  5\" 20x   PPT w/march  10x      PPT w/SLR        PPT w/BKFO  5\" 10x ea 5\" 10x ea  5\" 10x ea   Palloff Press  RTB 5\" 10x ea Seated RTB 5\" 10x ea     Sidestepping                TrA  5\" 30x    20x5\"  5\" 20x   TrA with march     20x ea  20x ea   TrA with add squeeze  20x5\" kickball    20x5\" kickball     Quad set TKE ball  30x5\" supine    Supine 20x5\"  Supine 5\" 20x ea           Ther Ex        NuStep for LE Strength Seat 12 l2 x10 mins  Seat 12 L2 x 5 min No UE Seat 12 L2 x 10 min No UE Seat 12 L3 x10 mins  Seat 12 L3 x 10 mins   Leg Press        Knee Ext Machine        Knee Flex Machine        Bridges        SKTC < 90 deg 5\" 15x ea     5\" 10x ea   LTR        S/L Hip Abd        Clamshells  Supine No TB 2x10 Supine no TB 2x10 Supine red TB 2x10 unilateral held   HR  15x held     SLR x 3  10x ea B/L held     LAQ  5\" 2x10 ea 5\" 2x10 ea                     Ther Activity        STS        Stepups F/L        Stepdowns                Gait Training    Heel lift R shoe  Not wearing heel lift today                    Modalities        HP/CP prn                     Access Code: [email or web address]  URL: [email or web address]  Date: 11/25/2024  Prepared by: Deisy    Exercises  - Standing Hip Flexion AROM  - 1 x daily - 7 x weekly - 2 sets - 10 reps  - Standing Hip Abduction AROM  - 1 x daily - 7 x weekly - 2 sets - 10 reps  - Standing Hip Extension  - 1 x daily - 7 x weekly - 2 sets - 10 reps  - Standing Heel Raise with Support  - 1 x daily - 7 x weekly - 2 sets - 10 reps  - Seated Long Arc Quad  - 1 x daily - 7 x weekly - 2 sets - 10 reps - 3-5\" hold               "

## 2024-12-16 NOTE — H&P
History of Present Illness: The patient is a 59 y.o. male who presents with complaints of low back pain.    Past Medical History:   Diagnosis Date    Allergic     Benign arteriolar nephrosclerosis     Colon polyp     Diabetes mellitus (HCC)     Essential hypertension     History of transfusion 2013    Hyperparathyroidism due to renal insufficiency (HCC)     Legionnaire's disease (HCC)     Pneumonia     s/p VDRF        Past Surgical History:   Procedure Laterality Date    COLONOSCOPY      OTHER SURGICAL HISTORY      Dialysis catheter     OK ARTHRODESIS COMBINED TQ 1NTRSPC LUMBAR Left 10/25/2024    Procedure: ROBOTIC ASSISTED DECOMPRESSIVE LAMINECTOMY , DISKECTOMY AND OSTEOPHYTECTOMY l1/2, AND l2.3 AND TRANSVERSE LUMBAR  INTERBODY FUSION L1/2 AND l2/3 - LEFT SIDED APPROACH AND PEDICLE SCREW FIXATION L1-3;  Surgeon: Venancio Ritchie MD;  Location: [facility];  Service: Neurosurgery    OK LAMNOTMY INCL W/DCMPRSN NRV ROOT 1 INTRSPC LUMBR Left 11/15/2021    Procedure: Left sided L1-2 microdiscectomy;  Surgeon: Andi Campoverde MD;  Location:  MAIN OR;  Service: Neurosurgery         Current Outpatient Medications:     acetaminophen (TYLENOL) 325 mg tablet, Take 3 tablets (975 mg total) by mouth every 8 (eight) hours, Disp: , Rfl:     carvedilol (COREG) 12.5 mg tablet, TAKE 1 TABLET TWICE A DAY, Disp: 180 tablet, Rfl: 3    cholecalciferol (VITAMIN D3) 400 units tablet, Take 400 Units by mouth daily, Disp: , Rfl:     fexofenadine (ALLEGRA) 180 MG tablet, Take 180 mg by mouth daily, Disp: , Rfl:     gabapentin (NEURONTIN) 100 mg capsule, Take 1 capsule (100 mg total) by mouth 2 (two) times a day, Disp: 180 capsule, Rfl: 3    glimepiride (AMARYL) 2 mg tablet, TAKE 1 TABLET DAILY WITH BREAKFAST, Disp: 90 tablet, Rfl: 1    lisinopril-hydrochlorothiazide (PRINZIDE,ZESTORETIC) 20-12.5 MG per tablet, TAKE 1 TABLET TWICE A DAY, Disp: 180 tablet, Rfl: 1    Multiple Vitamins-Minerals (multivitamin with minerals) tablet,  Take 1 tablet by mouth daily, Disp: , Rfl:   No current facility-administered medications for this encounter.    Allergies   Allergen Reactions    Ozempic (0.25 Or 0.5 Mg-Dose) [Semaglutide(0.25 Or 0.5mg-Dos)] Diarrhea    Glipizide Diarrhea    Metformin Diarrhea       Physical Exam:   Vitals:    12/16/24 1030   BP: 154/83   Pulse: 84   Resp: 18   Temp:    SpO2: 94%     General: Awake, Alert, Oriented x 3, Mood and affect appropriate  Respiratory: Respirations even and unlabored  Cardiovascular: Peripheral pulses intact; no edema  Musculoskeletal Exam: ambulates w/ SPC    ASA Score: 2    Patient/Chart Verification  Patient ID Verified: Verbal  ID Band Applied: No  Consents Confirmed: Procedural, To be obtained in the Pre-Procedure area  H&P( within 30 days) Verified: To be obtained in the Procedural area  Interval H&P(within 24 hr) Complete (required for Outpatients and Surgery Admit only): To be obtained in the Procedural area  Allergies Reviewed: Yes  Anticoag/NSAID held?: NA  Currently on antibiotics?: No    Assessment:   1. Pain of right sacroiliac joint        Plan: right sacroiliac joint injection

## 2024-12-16 NOTE — DISCHARGE INSTRUCTIONS

## 2024-12-18 ENCOUNTER — OFFICE VISIT (OUTPATIENT)
Dept: PHYSICAL THERAPY | Facility: CLINIC | Age: 59
End: 2024-12-18
Payer: COMMERCIAL

## 2024-12-18 DIAGNOSIS — Z98.1 S/P LUMBAR FUSION: ICD-10-CM

## 2024-12-18 DIAGNOSIS — Z74.09 IMPAIRED FUNCTIONAL MOBILITY, BALANCE, GAIT, AND ENDURANCE: ICD-10-CM

## 2024-12-18 DIAGNOSIS — Z98.890 POST-OPERATIVE STATE: Primary | ICD-10-CM

## 2024-12-18 PROCEDURE — 97112 NEUROMUSCULAR REEDUCATION: CPT

## 2024-12-18 PROCEDURE — 97110 THERAPEUTIC EXERCISES: CPT

## 2024-12-18 NOTE — PROGRESS NOTES
"Daily Note     Today's date: 2024  Patient name: Aaron Snyder  : 1965  MRN: [id number]  Referring provider: Eliana Khalil*  Dx:   Encounter Diagnosis     ICD-10-CM    1. Post-operative state  Z98.890       2. S/P lumbar fusion  Z98.1       3. Impaired functional mobility, balance, gait, and endurance  Z74.09                      Subjective: I got good relief from injection, < pain LB/R hip  Still have pain but able WB in my Rleg today  Not using SPC right now  I am feeling more sensation into my feet since surgery  ~ 50% improvement        Objective: See treatment diary below      Assessment: Tolerated treatment well. Patient would benefit from continued PT      Plan: Continue per plan of care.      Precautions: DOS - 10/25/24.  No bending, twisting, lifting > 10#  PMH includes: HTN, DM, chronic pain       Re-eval Date:     Date        Visit Count        FOTO        Pain In        Pain Out              Manuals    BLE - Hams, Calf, Quads B/S HS, calf x8 mins  Pt self stretch  30\" stand  30\" seated  BL LE   B/L HS, calf   X 10 min                           Neuro Re-Ed         MTP/LTP  [place]  7#  20x ea 5\" Cues TA  seated Seated RTB   2x10 ea     PPT 5\" 20x  5\" x10 5\" 2x10 2x10 5\"  5\" 20x   PPT w/march  10x 10x     PPT w/SLR   5x RLE AA  5x LLE       PPT w/BKFO  5\" 10x ea 5\" 10x ea  5\" 10x ea   Palloff Press  RTB 5\" 10x ea Seated RTB 5\" 10x ea     Sidestepping                TrA  5\" 30x    20x5\"  5\" 20x   TrA with march     20x ea  20x ea   TrA with add squeeze  20x5\" kickball  20x 5\" w/bolster  20x5\" kickball     Quad set TKE ball  30x5\" supine  30x5\" supine   Supine 20x5\"  Supine 5\" 20x ea           Ther Ex        NuStep for LE Strength Seat 12 l2 x10 mins  Seat 12 L2 x 5 min No UE Seat 12 L2 x 10 min No UE Seat 12 L3 x10 mins  Seat 12 L3 x 10 mins   Leg Press        Knee Ext Machine        Knee Flex Machine        Bridges        SKTC < 90 deg 5\" 15x ea     5\" " "10x ea   LTR        S/L Hip Abd        Clamshells  Supine No TB 2x10 Supine no TB 2x10 Supine red TB 2x10 unilateral held   HR  15x held     SLR x 3  10x ea B/L held     LAQ  5\" 2x10 ea 5\" 2x10 ea                     Ther Activity        STS        Stepups F/L        Stepdowns                Gait Training    Heel lift R shoe  Not wearing heel lift today                    Modalities        HP/CP prn                     Access Code: [email or web address]  URL: [email or web address]  Date: 11/25/2024  Prepared by: Deisy    Exercises  - Standing Hip Flexion AROM  - 1 x daily - 7 x weekly - 2 sets - 10 reps  - Standing Hip Abduction AROM  - 1 x daily - 7 x weekly - 2 sets - 10 reps  - Standing Hip Extension  - 1 x daily - 7 x weekly - 2 sets - 10 reps  - Standing Heel Raise with Support  - 1 x daily - 7 x weekly - 2 sets - 10 reps  - Seated Long Arc Quad  - 1 x daily - 7 x weekly - 2 sets - 10 reps - 3-5\" hold                 "

## 2024-12-20 NOTE — PROGRESS NOTES
"Daily Note     Today's date: 2024  Patient name: Aaron Snyder  : 1965  MRN: [id number]  Referring provider: Eliana Khalil*  Dx:   Encounter Diagnosis     ICD-10-CM    1. Post-operative state  Z98.890       2. S/P lumbar fusion  Z98.1       3. Impaired functional mobility, balance, gait, and endurance  Z74.09                      Subjective: The patient states that he continues to do better since getting the injection, still with pain when standing, up to 2-3/10.  He will be going back to see the doctor on 25 for his follow up appointment.          Objective: See treatment diary below      Assessment: Tolerated treatment well.  Continue to focus on strengthening in supine and seated 2* increased pain while in Wb'ing position.  He continues to demonstrate tightness in BLE with stretching.  Patient demonstrated fatigue post treatment and would benefit from continued PT      Plan: Continue per plan of care.   Progress note NV.      Precautions: DOS - 10/25/24.  No bending, twisting, lifting > 10#  PMH includes: HTN, DM, chronic pain       Re-eval Date:     Date       Visit Count        FOTO        Pain In        Pain Out              Manuals    BLE - Hams, Calf, Quads B/S HS, calf x8 mins  Pt self stretch  30\" stand  30\" seated  BL LE Alex Calf and HS  10 mins  B/L HS, calf   X 10 min                           Neuro Re-Ed         MTP/LTP  [place]  7#  20x ea 5\" Cues TA  seated [place] 9#  20x ea 5\"   Cues TA seated     PPT 5\" 20x  5\" x10 5\"x10 2x10 5\"  5\" 20x   PPT w/march  10x 10x     PPT w/SLR        PPT w/BKFO  5\" 10x ea 5\"x10 ea  5\" 10x ea   Palloff Press  RTB 5\" 10x ea      Sidestepping                TrA  5\" 30x    20x5\"  5\" 20x   TrA with march     20x ea  20x ea   TrA with add squeeze  20x5\" kickball  20x 5\" w/bolster 5\"x20 with bolster 20x5\" kickball     Quad set TKE ball  30x5\" supine  30x5\" supine  5\"x30 supine Supine 20x5\"  Supine 5\" " "20x ea           Ther Ex        NuStep for LE Strength Seat 12 l2 x10 mins  Seat 12 L2 x 5 min No UE Seat 12 L2 x 10 min No UE Seat 12 L3 x10 mins  Seat 12 L3 x 10 mins   Leg Press        Knee Ext Machine        Knee Flex Machine        Bridges        SKTC < 90 deg 5\" 15x ea     5\" 10x ea   LTR        S/L Hip Abd        Clamshells  Supine No TB 2x10 Supine RTB 2x10 Supine red TB 2x10 unilateral held   HR  15x      SLR x 3  10x ea B/L      LAQ  5\" 2x10 ea 5\" 2x10 ea                     Ther Activity        STS        Stepups F/L        Stepdowns                Gait Training    Heel lift R shoe  Not wearing heel lift today                    Modalities        HP/CP prn                     Access Code: [email or web address]  URL: [email or web address]  Date: 11/25/2024  Prepared by: Deisy    Exercises  - Standing Hip Flexion AROM  - 1 x daily - 7 x weekly - 2 sets - 10 reps  - Standing Hip Abduction AROM  - 1 x daily - 7 x weekly - 2 sets - 10 reps  - Standing Hip Extension  - 1 x daily - 7 x weekly - 2 sets - 10 reps  - Standing Heel Raise with Support  - 1 x daily - 7 x weekly - 2 sets - 10 reps  - Seated Long Arc Quad  - 1 x daily - 7 x weekly - 2 sets - 10 reps - 3-5\" hold                   "

## 2024-12-23 ENCOUNTER — OFFICE VISIT (OUTPATIENT)
Dept: PHYSICAL THERAPY | Facility: CLINIC | Age: 59
End: 2024-12-23
Payer: COMMERCIAL

## 2024-12-23 DIAGNOSIS — Z98.1 S/P LUMBAR FUSION: ICD-10-CM

## 2024-12-23 DIAGNOSIS — Z74.09 IMPAIRED FUNCTIONAL MOBILITY, BALANCE, GAIT, AND ENDURANCE: ICD-10-CM

## 2024-12-23 DIAGNOSIS — Z98.890 POST-OPERATIVE STATE: Primary | ICD-10-CM

## 2024-12-23 PROCEDURE — 97110 THERAPEUTIC EXERCISES: CPT | Performed by: PHYSICAL THERAPIST

## 2024-12-23 PROCEDURE — 97112 NEUROMUSCULAR REEDUCATION: CPT | Performed by: PHYSICAL THERAPIST

## 2024-12-23 PROCEDURE — 97140 MANUAL THERAPY 1/> REGIONS: CPT | Performed by: PHYSICAL THERAPIST

## 2024-12-26 ENCOUNTER — EVALUATION (OUTPATIENT)
Dept: PHYSICAL THERAPY | Facility: CLINIC | Age: 59
End: 2024-12-26
Payer: COMMERCIAL

## 2024-12-26 DIAGNOSIS — Z98.1 S/P LUMBAR FUSION: ICD-10-CM

## 2024-12-26 DIAGNOSIS — Z98.890 POST-OPERATIVE STATE: Primary | ICD-10-CM

## 2024-12-26 DIAGNOSIS — Z74.09 IMPAIRED FUNCTIONAL MOBILITY, BALANCE, GAIT, AND ENDURANCE: ICD-10-CM

## 2024-12-26 PROCEDURE — 97112 NEUROMUSCULAR REEDUCATION: CPT

## 2024-12-26 PROCEDURE — 97110 THERAPEUTIC EXERCISES: CPT

## 2024-12-26 PROCEDURE — 97164 PT RE-EVAL EST PLAN CARE: CPT
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
## 2024-12-30 ENCOUNTER — TELEPHONE (OUTPATIENT)
Dept: PAIN MEDICINE | Facility: CLINIC | Age: 59
End: 2024-12-30

## 2024-12-30 ENCOUNTER — OFFICE VISIT (OUTPATIENT)
Dept: PHYSICAL THERAPY | Facility: CLINIC | Age: 59
End: 2024-12-30
Payer: COMMERCIAL

## 2024-12-30 DIAGNOSIS — Z98.1 S/P LUMBAR FUSION: ICD-10-CM

## 2024-12-30 DIAGNOSIS — Z74.09 IMPAIRED FUNCTIONAL MOBILITY, BALANCE, GAIT, AND ENDURANCE: ICD-10-CM

## 2024-12-30 DIAGNOSIS — Z98.890 POST-OPERATIVE STATE: Primary | ICD-10-CM

## 2024-12-30 PROCEDURE — 97110 THERAPEUTIC EXERCISES: CPT

## 2024-12-30 PROCEDURE — 97112 NEUROMUSCULAR REEDUCATION: CPT

## 2024-12-30 NOTE — PROGRESS NOTES
"Daily Note     Today's date: 2024  Patient name: Aaron Snyder  : 1965  MRN: [id number]  Referring provider: Eliana Khalil*  Dx:   Encounter Diagnosis     ICD-10-CM    1. Post-operative state  Z98.890       2. S/P lumbar fusion  Z98.1       3. Impaired functional mobility, balance, gait, and endurance  Z74.09           Start Time: 0830  Stop Time: 915  Total time in clinic (min): 45 minutes    Subjective: Patient reports 0/10 in hip after SI injection.  Patient states he is having pain in B/L knees today.      Objective: See treatment diary below      Assessment: Tolerated treatment well.   Patient participated in skilled PT session focused on strengthening, stretching, and ROM.  Patient able to complete exercise program with no issues.  Patient able to progress to standing with [place] exercises.  Patient continues with decreased B/L LE strength, but able to tolerate some standing exercises this session.  Patient worked on core stabilization with exercises this session.  Patient would continue to benefit from skilled PT interventions to address strengthening, stretching, and ROM. Patient demonstrated fatigue post treatment      Plan: Continue per plan of care.      Precautions: DOS - 10/25/24.  No bending, twisting, lifting > 10#  PMH includes: HTN, DM, chronic pain       Re-eval Date:     Date     Visit Count   8/11 9/11 10/11   FOTO        Pain In    1-2/10     Pain Out              Manuals  progress note     BLE - Hams, Calf, Quads B/S HS, calf x8 mins  Pt self stretch  30\" stand  30\" seated  BL LE Alex Calf and HS  10 mins Calf and HS 7x mins Calf/HS B/L   8 min                           Neuro Re-Ed         MTP/LTP  [place]  7#  20x ea 5\" Cues TA  seated [place] 9#  20x ea 5\"   Cues TA seated K 9# 20x5\" ea TA seated     Seated LPD same as above  [place] 9# 5\" 20x  ea  standing     Palloff press  9# 5\" 10x ea   PPT 5\" 20x  5\" x10 5\"x10 5\" " "x10  5\" 20x   PPT w/march  10x 10x Seated 10x Supine 20x    PPT w/SLR        PPT w/BKFO  5\" 10x ea 5\"x10 ea 5\" x10     Palloff Press  RTB 5\" 10x ea   See above Prabhjot   Sidestepping                TrA  5\" 30x        TrA with march         TrA with add squeeze  20x5\" kickball  20x 5\" w/bolster 5\"x20 with bolster 20x5\" kickball  5\" 20x    Quad set TKE ball  30x5\" supine  30x5\" supine  5\"x30 supine 30x5\" supine Supine 5\" 30xea           Ther Ex        NuStep for LE Strength Seat 12 l2 x10 mins  Seat 12 L2 x 5 min No UE Seat 12 L2 x 10 min No UE Seat 12 L2 x10 mins no UE  Seat 12 L3 x 10 min No UE   Leg Press        Knee Ext Machine        Knee Flex Machine        Bridges        SKTC < 90 deg 5\" 15x ea        LTR        S/L Hip Abd        Clamshells  Supine No TB 2x10 Supine RTB 2x10 Supine RTB 2x10  Supine RTB 5\" 20x   HR  15x      SLR x 3  10x ea B/L      LAQ  5\" 2x10 ea 5\" 2x10 ea 5\" 2x10 5\" 2x10 ea                   Ther Activity        STS        Stepups F/L        Stepdowns                Gait Training                        Modalities        HP/CP prn                     Access Code: [email or web address]  URL: [email or web address]  Date: 11/25/2024  Prepared by: Deisy    Exercises  - Standing Hip Flexion AROM  - 1 x daily - 7 x weekly - 2 sets - 10 reps  - Standing Hip Abduction AROM  - 1 x daily - 7 x weekly - 2 sets - 10 reps  - Standing Hip Extension  - 1 x daily - 7 x weekly - 2 sets - 10 reps  - Standing Heel Raise with Support  - 1 x daily - 7 x weekly - 2 sets - 10 reps  - Seated Long Arc Quad  - 1 x daily - 7 x weekly - 2 sets - 10 reps - 3-5\" hold                       "

## 2025-01-02 ENCOUNTER — OFFICE VISIT (OUTPATIENT)
Dept: NEUROSURGERY | Facility: CLINIC | Age: 60
End: 2025-01-02

## 2025-01-02 VITALS
OXYGEN SATURATION: 95 % | WEIGHT: 315 LBS | BODY MASS INDEX: 42.66 KG/M2 | DIASTOLIC BLOOD PRESSURE: 78 MMHG | HEART RATE: 44 BPM | HEIGHT: 72 IN | TEMPERATURE: 97.5 F | SYSTOLIC BLOOD PRESSURE: 146 MMHG

## 2025-01-02 DIAGNOSIS — Z48.89 ENCOUNTER FOR POSTOPERATIVE CARE RELATED TO SURGICAL JOINT FUSION: Primary | ICD-10-CM

## 2025-01-02 DIAGNOSIS — Z98.1 ENCOUNTER FOR POSTOPERATIVE CARE RELATED TO SURGICAL JOINT FUSION: Primary | ICD-10-CM

## 2025-01-02 PROCEDURE — 99024 POSTOP FOLLOW-UP VISIT: CPT | Performed by: NEUROLOGICAL SURGERY

## 2025-01-02 NOTE — PROGRESS NOTES
Name: Aaron Snyder      : 1965      MRN: [id number]  Encounter Provider: Venancio Ritchie MD  Encounter Date: 2025   Encounter department: [facility] [place]  :  Assessment & Plan  Encounter for postoperative care related to surgical joint fusion  Slowly improving  Needs physical therapy especially for lower extremity power function  5 mm right hel left for leg length discrepancy           History of Present Illness   Improvement following injection in SI joint  Since then no pain  Improving slowly  Walking improving  Wants to go back to work    Review of Systems   Constitutional: Negative.    HENT: Negative.     Eyes: Negative.    Respiratory: Negative.     Cardiovascular: Negative.    Gastrointestinal: Negative.    Endocrine: Negative.    Genitourinary: Negative.    Musculoskeletal:  Positive for gait problem (problems walking for long periods of time, will get pain).        6 wk pov  Surg: 10/25 DKO Decompression and TLIF  XR lumbar:  Pt presents with right hip and right lbp. Reports intermittent burning sensation from left hip/thigh to left knee, tremors in right leg, weakness R>L. Currently in PT.    / no hip pain since inj, still gets burning sensation in left leg, left leg jumps when laying too long   Skin: Negative.    Allergic/Immunologic: Negative.    Neurological:  Positive for tremors (legs jump at night b/l, occa during the day) and weakness (BLE). Numbness: intermittent in b/l feet.  Hematological: Negative.     I have personally reviewed the MA's review of systems and made changes as necessary.         Objective   /78 (BP Location: Left arm, Patient Position: Sitting, Cuff Size: Adult)   Pulse (!) 44   Temp 97.5 °F (36.4 °C) (Temporal)   Ht 6' (1.829 m)   Wt (!) 152 kg (335 lb)   SpO2 95%   BMI 45.43 kg/m²     Physical Exam  Neurological:      Mental Status: He is alert.      Motor: Weakness (Cannot do lunges with either leg)  present.      Gait: Gait (ambulation improved intra heel distance is equal bilaterally) normal.     Neurological Exam  Mental Status  Alert.    Gait   Normal gait (ambulation improved intra heel distance is equal bilaterally).      Radiology Results Review: I personally reviewed the following image studies in PACS and associated radiology reports: CT legs. My interpretation of the radiology images/reports is: Likely discrepancy of 5 mm with the right leg being shorter than the left.

## 2025-01-02 NOTE — LETTER
January 2, 2025     Patient: Aaron Snyder  YOB: 1965  Date of Visit: 1/2/2025      To Whom it May Concern:    Aaron Snyder is under my professional care. Aaron was seen in my office on 1/2/2025. Aaron may return to work on 1/6/2024 .    If you have any questions or concerns, please don't hesitate to call.         Sincerely,          Venancio Ritchie MD        CC: No Recipients

## 2025-01-03 ENCOUNTER — OFFICE VISIT (OUTPATIENT)
Dept: FAMILY MEDICINE CLINIC | Facility: CLINIC | Age: 60
End: 2025-01-03
Payer: COMMERCIAL

## 2025-01-03 VITALS
SYSTOLIC BLOOD PRESSURE: 128 MMHG | WEIGHT: 315 LBS | RESPIRATION RATE: 16 BRPM | DIASTOLIC BLOOD PRESSURE: 68 MMHG | HEART RATE: 46 BPM | OXYGEN SATURATION: 96 % | HEIGHT: 72 IN | TEMPERATURE: 97.6 F | BODY MASS INDEX: 42.66 KG/M2

## 2025-01-03 DIAGNOSIS — E66.813 CLASS 3 SEVERE OBESITY DUE TO EXCESS CALORIES WITH SERIOUS COMORBIDITY AND BODY MASS INDEX (BMI) OF 45.0 TO 49.9 IN ADULT (HCC): Chronic | ICD-10-CM

## 2025-01-03 DIAGNOSIS — M54.16 LUMBAR RADICULOPATHY: Chronic | ICD-10-CM

## 2025-01-03 DIAGNOSIS — E11.22 TYPE 2 DIABETES MELLITUS WITH STAGE 2 CHRONIC KIDNEY DISEASE, WITHOUT LONG-TERM CURRENT USE OF INSULIN  (HCC): Chronic | ICD-10-CM

## 2025-01-03 DIAGNOSIS — N18.2 TYPE 2 DIABETES MELLITUS WITH STAGE 2 CHRONIC KIDNEY DISEASE, WITHOUT LONG-TERM CURRENT USE OF INSULIN  (HCC): Chronic | ICD-10-CM

## 2025-01-03 DIAGNOSIS — Z98.1 S/P LUMBAR FUSION: ICD-10-CM

## 2025-01-03 DIAGNOSIS — E66.01 CLASS 3 SEVERE OBESITY DUE TO EXCESS CALORIES WITH SERIOUS COMORBIDITY AND BODY MASS INDEX (BMI) OF 45.0 TO 49.9 IN ADULT (HCC): Chronic | ICD-10-CM

## 2025-01-03 DIAGNOSIS — Z02.89 ENCOUNTER FOR PHYSICAL EXAMINATION RELATED TO EMPLOYMENT: Primary | ICD-10-CM

## 2025-01-03 DIAGNOSIS — E55.9 VITAMIN D DEFICIENCY: Chronic | ICD-10-CM

## 2025-01-03 DIAGNOSIS — I10 ESSENTIAL HYPERTENSION: Chronic | ICD-10-CM

## 2025-01-03 LAB — SL AMB POCT HEMOGLOBIN AIC: 7.4 (ref ?–6.5)

## 2025-01-03 PROCEDURE — 99212 OFFICE O/P EST SF 10 MIN: CPT | Performed by: NURSE PRACTITIONER

## 2025-01-03 PROCEDURE — 83036 HEMOGLOBIN GLYCOSYLATED A1C: CPT | Performed by: NURSE PRACTITIONER

## 2025-01-03 NOTE — ASSESSMENT & PLAN NOTE
Lab Results   Component Value Date    HGBA1C 7.4 (A) 01/03/2025       This is a chronic disease process.   The patient is stable at this time.  We discussed diet and exercise.  In office hemoglobin A1c: 7.4  With recent steroid injection  Will monitor labs.  Continue  Glimepiride   Orders:  •  POCT hemoglobin A1c

## 2025-01-03 NOTE — PROGRESS NOTES
Name: Aaron Snyder      : 1965      MRN: [id number]  Encounter Provider: LAURA Campbell  Encounter Date: 1/3/2025   Encounter department: [facility]  :  Assessment & Plan  Encounter for physical examination related to employment  The patient is here for a [employer]  physical examination.  Per the requirements of the [facility], the patient is able to perform all duties as explained and discussed on the attached job description.        Essential hypertension  This is a chronic disease process.   The patient is stable at this time.  We discussed diet and exercise.  We discussed a low salt diet.  Will monitor labs.  Continue  Coreg  Lisinopril-hydrochlorothiazide        Type 2 diabetes mellitus with stage 2 chronic kidney disease, without long-term current use of insulin  ([facility])    Lab Results   Component Value Date    HGBA1C 7.4 (A) 2025       This is a chronic disease process.   The patient is stable at this time.  We discussed diet and exercise.  In office hemoglobin A1c: 7.4  With recent steroid injection  Will monitor labs.  Continue  Glimepiride   Orders:  •  POCT hemoglobin A1c    Lumbar radiculopathy  Doing well since surgery        Class 3 severe obesity due to excess calories with serious comorbidity and body mass index (BMI) of 45.0 to 49.9 in adult ([facility])  Lifestyle modification, diet and exercise discussed           Vitamin D deficiency         S/P lumbar fusion                History of Present Illness     The patient is here today for his [facility])  physical.   I reviewed their medical conditions, medications, laboratory and radiology studies.  I reviewed their scheduled future lab studies with the patient.   The patient's current treatment plan is effective.   There is no change in the current therapy.   I ask the patient to continue their current therapy.   The patient voiced their understanding and agreement.   Follow up in  for  6-month follow-up related to his diabetes.  Please continue to the PORCH section of the note for details of today's visit.             Review of Systems   Constitutional:  Negative for activity change, chills, fatigue and fever.   HENT:  Negative for rhinorrhea and sore throat.    Eyes:  Negative for pain.   Respiratory:  Negative for cough and shortness of breath.    Cardiovascular:  Negative for chest pain, palpitations and leg swelling.   Gastrointestinal:  Negative for abdominal pain, constipation, diarrhea, nausea and vomiting.   Genitourinary:  Negative for difficulty urinating, flank pain, frequency and urgency.   Musculoskeletal:  Positive for arthralgias and myalgias. Negative for gait problem and joint swelling.   Skin:  Negative for color change.   Neurological:  Negative for dizziness, weakness, light-headedness and headaches.   Psychiatric/Behavioral:  Negative for sleep disturbance. The patient is not nervous/anxious.    All other systems reviewed and are negative.      Objective   /68 (BP Location: Left arm, Patient Position: Sitting, Cuff Size: Large)   Pulse (!) 46   Temp 97.6 °F (36.4 °C) (Tympanic)   Resp 16   Ht 6' (1.829 m)   Wt (!) 156 kg (343 lb 6.4 oz)   SpO2 96%   BMI 46.57 kg/m²      Physical Exam  Vitals and nursing note reviewed.   Constitutional:       General: He is awake.      Appearance: Normal appearance. He is well-developed.   HENT:      Head: Normocephalic and atraumatic.      Nose: Nose normal.      Mouth/Throat:      Mouth: Mucous membranes are moist.   Eyes:      Conjunctiva/sclera: Conjunctivae normal.   Cardiovascular:      Rate and Rhythm: Normal rate and regular rhythm.      Pulses: Normal pulses.      Heart sounds: Normal heart sounds. No murmur heard.  Pulmonary:      Effort: Pulmonary effort is normal. No respiratory distress.      Breath sounds: Normal breath sounds.   Abdominal:      General: Bowel sounds are normal.      Palpations: Abdomen is soft.       "Tenderness: There is no abdominal tenderness.   Musculoskeletal:      Cervical back: Neck supple.      Right lower leg: No edema.      Left lower leg: No edema.      Comments: Continued low back \"weakness\" will continue with physical therapy    Skin:     General: Skin is warm and dry.   Neurological:      Mental Status: He is alert and oriented to person, place, and time.      Motor: Motor function is intact.      Coordination: Coordination is intact.      Gait: Gait is intact.   Psychiatric:         Attention and Perception: Attention normal.         Mood and Affect: Mood normal.         Speech: Speech normal.         Behavior: Behavior normal. Behavior is cooperative.         Thought Content: Thought content normal.         Cognition and Memory: Cognition normal.         Judgment: Judgment normal.       Administrative Statements   I have spent a total time of 35 minutes in caring for this patient on the day of the visit/encounter including Diagnostic results, Prognosis, Risks and benefits of tx options, Instructions for management, Patient and family education, Importance of tx compliance, Risk factor reductions, Impressions, Counseling / Coordination of care, Documenting in the medical record, Reviewing / ordering tests, medicine, procedures  , and Obtaining or reviewing history  . Topics discussed with the patient / family include symptom assessment and management, medication review, psychosocial support, and supportive listening.  "

## 2025-01-03 NOTE — ASSESSMENT & PLAN NOTE
The patient is here for a [facility]  physical examination.  Per the requirements of the [facility], the patient is able to perform all duties as explained and discussed on the attached job description.

## 2025-01-08 ENCOUNTER — OFFICE VISIT (OUTPATIENT)
Dept: PHYSICAL THERAPY | Facility: CLINIC | Age: 60
End: 2025-01-08
Payer: COMMERCIAL

## 2025-01-08 DIAGNOSIS — Z98.1 S/P LUMBAR FUSION: ICD-10-CM

## 2025-01-08 DIAGNOSIS — Z98.890 POST-OPERATIVE STATE: Primary | ICD-10-CM

## 2025-01-08 DIAGNOSIS — Z74.09 IMPAIRED FUNCTIONAL MOBILITY, BALANCE, GAIT, AND ENDURANCE: ICD-10-CM

## 2025-01-08 PROCEDURE — 97112 NEUROMUSCULAR REEDUCATION: CPT

## 2025-01-08 PROCEDURE — 97110 THERAPEUTIC EXERCISES: CPT

## 2025-01-08 NOTE — PROGRESS NOTES
"Daily Note     Today's date: 2025  Patient name: Aaron Snyder  : 1965  MRN: [id number]  Referring provider: Eliana Khalil*  Dx:   Encounter Diagnosis     ICD-10-CM    1. Post-operative state  Z98.890       2. S/P lumbar fusion  Z98.1       3. 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  Progressed POC to pt jona,  adding wt machines / exercises with monitor of sx's t/o rx session  Patient provided min verbal/tactile cues prn for proper form and technique with exercises completed  Pt fatigue easily with step up exer indicated weakness BL LE  Pt reports RLE has > weakness than his LLE   Pt has RTW with currently no restrictions to perform work tasksxq  Pt defer bridges 2* co's of mm spasms in LB with trial in the past    Patient would benefit from continued PT      Plan: Continue per plan of care.      Precautions: DOS - 10/25/24.  No bending, twisting, lifting > 10#  PMH includes: HTN, DM, chronic pain       Re-eval Date:     Date        Visit Count        FOTO        Pain In        Pain Out              Manuals        BLE - Hams, Calf, Quads Calf/HS B/L   Review self stretches                               Neuro Re-Ed         MTP/LTP [place] 9# 5\" 20x  ea  standing     Palloff press  9# 5\" 10x ea       PPT 5\" 20x       PPT w/march Supine 20x        PPT w/SLR        PPT w/BKFO        Palloff Press See above Prabhjot       Sidestepping                         TrA with march         TrA with add squeeze  5\" 20x        Quad set TKE ball  Supine 5\" " "30xea               Ther Ex        NuStep for LE Strength Seat 12 L3 x 10 min No UE       Leg Press 60# 2x10       Knee Ext Machine 11# 2x10 5\"         Knee Flex Machine 22# 2x10       Bridges        SKTC        LTR        S/L Hip Abd        Clamshells Supine RTB 5\" 20x       HR        SLR x 3 Flex w/ TA  2x10 BL         LAQ                        Ther Activity        STS        Stepups F/L 6\" 2x10  0-1 UE  BL         Stepdowns                Gait Training                        Modalities        HP/CP prn                     Access Code: [email or web address]  URL: [email or web address]  Date: 11/25/2024  Prepared by: Deisy    Exercises  - Standing Hip Flexion AROM  - 1 x daily - 7 x weekly - 2 sets - 10 reps  - Standing Hip Abduction AROM  - 1 x daily - 7 x weekly - 2 sets - 10 reps  - Standing Hip Extension  - 1 x daily - 7 x weekly - 2 sets - 10 reps  - Standing Heel Raise with Support  - 1 x daily - 7 x weekly - 2 sets - 10 reps  - Seated Long Arc Quad  - 1 x daily - 7 x weekly - 2 sets - 10 reps - 3-5\" hold                         "

## 2025-01-15 ENCOUNTER — OFFICE VISIT (OUTPATIENT)
Dept: PHYSICAL THERAPY | Facility: CLINIC | Age: 60
End: 2025-01-15
Payer: COMMERCIAL

## 2025-01-15 DIAGNOSIS — Z98.890 POST-OPERATIVE STATE: Primary | ICD-10-CM

## 2025-01-15 DIAGNOSIS — Z98.1 S/P LUMBAR FUSION: ICD-10-CM

## 2025-01-15 DIAGNOSIS — Z74.09 IMPAIRED FUNCTIONAL MOBILITY, BALANCE, GAIT, AND ENDURANCE: ICD-10-CM

## 2025-01-15 PROCEDURE — 97112 NEUROMUSCULAR REEDUCATION: CPT | Performed by: PHYSICAL MEDICINE & REHABILITATION

## 2025-01-15 PROCEDURE — 97530 THERAPEUTIC ACTIVITIES: CPT | Performed by: PHYSICAL MEDICINE & REHABILITATION

## 2025-01-15 PROCEDURE — 97110 THERAPEUTIC EXERCISES: CPT | Performed by: PHYSICAL MEDICINE & REHABILITATION

## 2025-01-15 NOTE — PROGRESS NOTES
"Daily Note     Today's date: 1/15/2025  Patient name: Aaron Snyder  : 1965  MRN: [id number]  Referring provider: Eliana Khalil*  Dx:   Encounter Diagnosis     ICD-10-CM    1. Post-operative state  Z98.890       2. S/P lumbar fusion  Z98.1       3. Impaired functional mobility, balance, gait, and endurance  Z74.09                      Subjective: Pt notes he \"slept weird\"; R hip discomfort today; R hip also \"gave out\" on him a few times this morning. No new pain/sx otherwise. No n/t. Dull ache R lower L/s/Hip that is chronic; /10 pain R hip \"when it radha\". Wants to know what he can do \"about the leg length difference\"; notes a recent CT demonstrated 5 mm difference with R LE < LLE.       Objective: See treatment diary below      Assessment: Tolerated treatment well. Decreased reps/sets prn 2* pt with R hip soreness since waking today. No adverse reaction to tx noted. Pt cont with noted deficits in trunk/core strength/endurance and R hip strength. Cont with trendelenburg gait. Noted LLD with R LE < LLE. Trialed 1/4in heel lift on R this date. Pt noted comfort with heel lift in place with no increase in pain/sx. Instructed pt in weaning into lift with standing/walking gradually and instructed pt to remove lift if increase pain/sx occur; understanding noted. Educated pt in options with heel lifts including OTC vs CMO.  No change in sx after tx. Pt may benefit from OTC heel lift vs CMO with heel lift upcoming. Patient demonstrated fatigue post treatment and would benefit from continued OT      Plan: Continue per plan of care.  Progress treatment as tolerated.       Precautions: DOS - 10/25/24.  No bending, twisting, lifting > 10#  PMH includes: HTN, DM, chronic pain       Re-eval Date:     Date 1/8 1/15      Visit Count        FOTO        Pain In  2      Pain Out  2            Manuals        BLE - Hams, Calf, Quads Calf/HS B/L   Review self stretches                               Neuro Re-Ed    " "     MTP/LTP [place] 9# 5\" 20x  ea  standing     Palloff press  9# 5\" 10x ea [place] 9# 5\" 20x  ea  standing     Palloff press  9# 5\" 10x ea      PPT 5\" 20x 5\" 20x      PPT w/march Supine 20x  Supine 20x       PPT w/SLR        PPT w/BKFO        Palloff Press See above [place]       Sidestepping                TrA         TrA with march         TrA with add squeeze  5\" 20x  5\" 20x       Quad set TKE ball  Supine 5\" 30xea               Ther Ex        NuStep for LE Strength Seat 12 L3 x 10 min No UE Seat 12 L3 x 10 min No UE      Leg Press 60# 2x10 60# 2x10      Knee Ext Machine 11# 2x10 5\"   11# 2x10 5\"        Knee Flex Machine 22# 2x10 22# 2x10      Bridges        SKTC        LTR        S/L Hip Abd        Clamshells Supine RTB 5\" 20x Resume NV       HR        SLR x 3 Flex w/ TA  2x10 BL   Flex w/ TA  1x10 BL    R hip       LAQ                        Ther Activity        STS        Stepups F/L 6\" 2x10  0-1 UE  BL   6\" 2x10 ea  0-1 UE  BL        Stepdowns          1/4 in heel lift added to R shoe to improve standing postural symmetry and gait stability with R vs L stance phases       Gait Training                        Modalities        HP/CP prn                     Access Code: [email or web address]  URL: [email or web address]  Date: 11/25/2024  Prepared by: Deisy    Exercises  - Standing Hip Flexion AROM  - 1 x daily - 7 x weekly - 2 sets - 10 reps  - Standing Hip Abduction AROM  - 1 x daily - 7 x weekly - 2 sets - 10 reps  - Standing Hip Extension  - 1 x daily - 7 x weekly - 2 sets - 10 reps  - Standing Heel Raise with Support  - 1 x daily - 7 x weekly - 2 sets - 10 reps  - Seated Long Arc Quad  - 1 x daily - 7 x weekly - 2 sets - 10 reps - 3-5\" hold                           "

## 2025-01-17 ENCOUNTER — APPOINTMENT (OUTPATIENT)
Dept: PHYSICAL THERAPY | Facility: CLINIC | Age: 60
End: 2025-01-17
Payer: COMMERCIAL

## 2025-01-20 ENCOUNTER — OFFICE VISIT (OUTPATIENT)
Dept: PHYSICAL THERAPY | Facility: CLINIC | Age: 60
End: 2025-01-20
Payer: COMMERCIAL

## 2025-01-20 DIAGNOSIS — Z74.09 IMPAIRED FUNCTIONAL MOBILITY, BALANCE, GAIT, AND ENDURANCE: ICD-10-CM

## 2025-01-20 DIAGNOSIS — Z98.1 S/P LUMBAR FUSION: ICD-10-CM

## 2025-01-20 DIAGNOSIS — Z98.890 POST-OPERATIVE STATE: Primary | ICD-10-CM

## 2025-01-20 PROCEDURE — 97110 THERAPEUTIC EXERCISES: CPT

## 2025-01-20 PROCEDURE — 97112 NEUROMUSCULAR REEDUCATION: CPT

## 2025-01-20 NOTE — PROGRESS NOTES
"Daily Note     Today's date: 2025  Patient name: Aaron Snyder  : 1965  MRN: [id number]  Referring provider: Eliana Khalil*  Dx:   Encounter Diagnosis     ICD-10-CM    1. Post-operative state  Z98.890       2. S/P lumbar fusion  Z98.1       3. Impaired functional mobility, balance, gait, and endurance  Z74.09                      Subjective: I dont have constant LBP R> L  discomfort more with standing/walking  sx's come and go  Mm are sore no change since fusion      Objective: See treatment diary below      Assessment: Tolerated treatment well. Pt progressed with POC with > reps as jona  Pt unable to perform bridges 2* weakness /pain indicated   Pt trial of standing hip ext/GS  Pt demon < trunk mobility/inflex   Patient would benefit from continued PT to improve core/lumbar strength stability/ > trunk mobility        Plan: Continue per plan of care.      Precautions: DOS - 10/25/24.  No bending, twisting, lifting > 10#  PMH includes: HTN, DM, chronic pain       Re-eval Date:     Date 1/8 1/15 1/20     Visit Count        FOTO        Pain In  2 2     Pain Out  2 2           Manuals        BLE - Hams, Calf, Quads Calf/HS B/L   Review self stretches                               Neuro Re-Ed         MTP/LTP [place] 9# 5\" 20x  ea  standing     Palloff press  9# 5\" 10x ea Prabhjot 9# 5\" 20x  ea  standing     Palloff press  9# 5\" 10x ea Prabhjot 9# 5\" 20x  ea  standing     Palloff press  9# 5\" 10x ea     PPT 5\" 20x 5\" 20x 5\" 20x     PPT w/march Supine 20x  Supine 20x  Supine 20x      PPT w/SLR        PPT w/BKFO        Palloff Press See above Prabhjot       Sidestepping                         TrA with march         TrA with add squeeze  5\" 20x  5\" 20x  Review DC HEP     Quad set TKE ball  Supine 5\" 30xea               Ther Ex        NuStep for LE Strength Seat 12 L3 x 10 min No UE Seat 12 L3 x 10 min No UE Seat 12 L4 x 10 min No UE     Leg Press 60# 2x10 60# 2x10 60# 3x10     Knee Ext Machine 11# 2x10 " "5\"   11# 2x10 5\"   11# 3x10 5\"       Knee Flex Machine 22# 2x10 22# 2x10 22# 3x10     Bridges   Attempted /pain  GS 10x 5\"  Stand hip abd with DF   10x ea BL       SKTC        LTR        S/L Hip Abd        Clamshells Supine RTB 5\" 20x Resume NV       HR        SLR x 3 Flex w/ TA  2x10 BL   Flex w/ TA  1x10 BL    R hip  Flex w/ TA  1x10 BL    R hip      LAQ                        Ther Activity        STS   5x 21\"     Stepups F/L 6\" 2x10  0-1 UE  BL   6\" 2x10 ea  0-1 UE  BL   6\" 3x10 ea  0-1 UE  BL       Stepdowns          1/4 in heel lift added to R shoe to improve standing postural symmetry and gait stability with R vs L stance phases       Gait Training                        Modalities        HP/CP prn                     Access Code: [email or web address]  URL: [email or web address]  Date: 11/25/2024  Prepared by: Deisy    Exercises  - Standing Hip Flexion AROM  - 1 x daily - 7 x weekly - 2 sets - 10 reps  - Standing Hip Abduction AROM  - 1 x daily - 7 x weekly - 2 sets - 10 reps  - Standing Hip Extension  - 1 x daily - 7 x weekly - 2 sets - 10 reps  - Standing Heel Raise with Support  - 1 x daily - 7 x weekly - 2 sets - 10 reps  - Seated Long Arc Quad  - 1 x daily - 7 x weekly - 2 sets - 10 reps - 3-5\" hold                             "

## 2025-01-22 ENCOUNTER — APPOINTMENT (OUTPATIENT)
Dept: PHYSICAL THERAPY | Facility: CLINIC | Age: 60
End: 2025-01-22
Payer: COMMERCIAL

## 2025-01-22 NOTE — PROGRESS NOTES
"Daily Note     Today's date: 2025  Patient name: Aaron Snyder  : 1965  MRN: [id number]  Referring provider: Eliana Khalil*  Dx: No diagnosis found.               Subjective: ***      Objective: See treatment diary below      Assessment: Tolerated treatment {Tolerated treatment :6399847717}. Patient {assessment:3871995312}      Plan: {PLAN:5717683581}     Precautions: DOS - 10/25/24.  No bending, twisting, lifting > 10#  PMH includes: HTN, DM, chronic pain       Re-eval Date:     Date 1/8 1/15 1/20     Visit Count        FOTO        Pain In  2 2     Pain Out  2 2           Manuals        BLE - Hams, Calf, Quads Calf/HS B/L   Review self stretches                               Neuro Re-Ed         MTP/LTP [place] 9# 5\" 20x  ea  standing     Palloff press  9# 5\" 10x ea [place] 9# 5\" 20x  ea  standing     Palloff press  9# 5\" 10x ea Prabhjot 9# 5\" 20x  ea  standing     Palloff press  9# 5\" 10x ea     PPT 5\" 20x 5\" 20x 5\" 20x     PPT w/march Supine 20x  Supine 20x  Supine 20x      PPT w/SLR        PPT w/BKFO        Palloff Press See above [place]       Sidestepping                         TrA with march         TrA with add squeeze  5\" 20x  5\" 20x  Review DC HEP     Quad set TKE ball  Supine 5\" 30xea               Ther Ex        NuStep for LE Strength Seat 12 L3 x 10 min No UE Seat 12 L3 x 10 min No UE Seat 12 L4 x 10 min No UE     Leg Press 60# 2x10 60# 2x10 60# 3x10     Knee Ext Machine 11# 2x10 5\"   11# 2x10 5\"   11# 3x10 5\"       Knee Flex Machine 22# 2x10 22# 2x10 22# 3x10     Bridges   Attempted /pain  GS 10x 5\"  Stand hip abd with DF   10x ea BL       SKTC        LTR        S/L Hip Abd        Clamshells Supine RTB 5\" 20x Resume NV       HR        SLR x 3 Flex w/ TA  2x10 BL   Flex w/ TA  1x10 BL    R hip  Flex w/ TA  1x10 BL    R hip      LAQ                        Ther Activity        STS   5x 21\"     Stepups F/L 6\" 2x10  0-1 UE  BL   6\" 2x10 ea  0-1 UE  BL   6\" 3x10 ea  0-1 UE  BL     " "  Stepdowns          1/4 in heel lift added to R shoe to improve standing postural symmetry and gait stability with R vs L stance phases       Gait Training                        Modalities        HP/CP prn                     Access Code: [email or web address]  URL: [email or web address]  Date: 11/25/2024  Prepared by: Deisy    Exercises  - Standing Hip Flexion AROM  - 1 x daily - 7 x weekly - 2 sets - 10 reps  - Standing Hip Abduction AROM  - 1 x daily - 7 x weekly - 2 sets - 10 reps  - Standing Hip Extension  - 1 x daily - 7 x weekly - 2 sets - 10 reps  - Standing Heel Raise with Support  - 1 x daily - 7 x weekly - 2 sets - 10 reps  - Seated Long Arc Quad  - 1 x daily - 7 x weekly - 2 sets - 10 reps - 3-5\" hold                               "

## 2025-01-27 ENCOUNTER — OFFICE VISIT (OUTPATIENT)
Dept: PHYSICAL THERAPY | Facility: CLINIC | Age: 60
End: 2025-01-27
Payer: COMMERCIAL

## 2025-01-27 DIAGNOSIS — Z98.1 S/P LUMBAR FUSION: ICD-10-CM

## 2025-01-27 DIAGNOSIS — Z98.890 POST-OPERATIVE STATE: Primary | ICD-10-CM

## 2025-01-27 DIAGNOSIS — Z74.09 IMPAIRED FUNCTIONAL MOBILITY, BALANCE, GAIT, AND ENDURANCE: ICD-10-CM

## 2025-01-27 PROCEDURE — 97112 NEUROMUSCULAR REEDUCATION: CPT

## 2025-01-27 PROCEDURE — 97110 THERAPEUTIC EXERCISES: CPT

## 2025-01-27 NOTE — PROGRESS NOTES
"Daily Note     Today's date: 2025  Patient name: Aaron Snyder  : 1965  MRN: [id number]  Referring provider: Eliana Khalil*  Dx:   Encounter Diagnosis     ICD-10-CM    1. Post-operative state  Z98.890       2. S/P lumbar fusion  Z98.1       3. Impaired functional mobility, balance, gait, and endurance  Z74.09                      Subjective: Everything is the same   Have the same sx's I had prior to surgery, overall pain is same , trunk mobility same, balance is same - walking on uneven ground is really back  Denies any falls states I am very careful        Objective: See treatment diary below      Assessment: Tolerated treatment well. Progressed POC to pt jona, > reps / resistance  Pt indicates it takes a couple of days after PT session to recovery  Pt reports min carryover with HEP  pt reported > mm tightness LB region after prabhjot exer  Pt progressed POC adding self stretches as jona   Pt provided 1 brief seated rest with standing exer 2* LB mm fatigue  Pt reported > left knee pain with STS exer  held step ups x 1 visit  pt reports struggles with stair nego at home  Pt reports overall body fatigue end of day  pt reported no change LB discomfort end rx session   Patient would benefit from continued PT to improve core / trunk mobility/stability        Plan: Continue per plan of care.      Precautions: DOS - 10/25/24.  No bending, twisting, lifting > 10#  PMH includes: HTN, DM, chronic pain       Re-eval Date: 25  Insurance HBS (11 visits / exp 25)    Date 1/8 1/15 1/20 1/27    Visit Count    3/10    FOTO        Pain In  2 2 2    Pain Out  2 2           Manuals        BLE - Hams, Calf, Quads Calf/HS B/L   Review self stretches                               Neuro Re-Ed         MTP/LTP [place] 9# 5\" 20x  ea  standing     Palloff press  9# 5\" 10x ea Prabhjot 9# 5\" 20x  ea  standing     Palloff press  9# 5\" 10x ea [place] 9# 5\" 20x  ea  standing     Palloff press  9# 5\" 10x ea Prabhjot  10# " "5\"  3x10 ea    Palloff press  9# 15x 5\"    PPT 5\" 20x 5\" 20x 5\" 20x review    PPT w/march Supine 20x  Supine 20x  Supine 20x  20x    PPT w/SLR        PPT w/BKFO    10x 5\"      Palloff Press See above [place]       Sidestepping                TrA         TrA with march         TrA with add squeeze  5\" 20x  5\" 20x  Review DC HEP     Quad set TKE ball  Supine 5\" 30xea               Ther Ex        NuStep for LE Strength Seat 12 L3 x 10 min No UE Seat 12 L3 x 10 min No UE Seat 12 L4 x 10 min No UE Seat 12 L4 x 10 min No UE    Leg Press 60# 2x10 60# 2x10 60# 3x10 80# 3x10    Knee Ext Machine 11# 2x10 5\"   11# 2x10 5\"   11# 3x10 5\"   11# 2x10 5\"    22# 10x    Knee Flex Machine 22# 2x10 22# 2x10 22# 3x10 33# 3x10    Bridges   Attempted /pain  GS 10x 5\"  Stand hip abd with DF   10x ea BL       SKTC    10x 5\" w/ strap BL    LTR    10x 5\"    S/L Hip Abd        Clamshells Supine RTB 5\" 20x Resume NV   SL 10x ea     HR        SLR x 3 Flex w/ TA  2x10 BL   Flex w/ TA  1x10 BL    R hip  Flex w/ TA  1x10 BL    R hip  Stand hip  Abd 10xea w/ AH    LAQ                        Ther Activity        STS   5x 21\" 10x /low mat  Co's L knee Pain    Stepups F/L 6\" 2x10  0-1 UE  BL   6\" 2x10 ea  0-1 UE  BL   6\" 3x10 ea  0-1 UE  BL   Held    Stepdowns          1/4 in heel lift added to R shoe to improve standing postural symmetry and gait stability with R vs L stance phases       Gait Training                        Modalities        HP/CP prn                     Access Code: [email or web address]  URL: [email or web address]  Date: 11/25/2024  Prepared by: Deisy    Exercises  - Standing Hip Flexion AROM  - 1 x daily - 7 x weekly - 2 sets - 10 reps  - Standing Hip Abduction AROM  - 1 x daily - 7 x weekly - 2 sets - 10 reps  - Standing Hip Extension  - 1 x daily - 7 x weekly - 2 sets - 10 reps  - Standing Heel Raise with Support  - 1 x daily - 7 x weekly - 2 sets - 10 reps  - Seated Long Arc Quad  - 1 x daily - 7 x weekly - 2 sets - 10 reps - " "3-5\" hold                                 "

## 2025-01-29 ENCOUNTER — OFFICE VISIT (OUTPATIENT)
Dept: PHYSICAL THERAPY | Facility: CLINIC | Age: 60
End: 2025-01-29
Payer: COMMERCIAL

## 2025-01-29 DIAGNOSIS — Z98.1 S/P LUMBAR FUSION: ICD-10-CM

## 2025-01-29 DIAGNOSIS — Z98.890 POST-OPERATIVE STATE: Primary | ICD-10-CM

## 2025-01-29 DIAGNOSIS — Z74.09 IMPAIRED FUNCTIONAL MOBILITY, BALANCE, GAIT, AND ENDURANCE: ICD-10-CM

## 2025-01-29 PROCEDURE — 97112 NEUROMUSCULAR REEDUCATION: CPT

## 2025-01-29 PROCEDURE — 97110 THERAPEUTIC EXERCISES: CPT

## 2025-01-29 NOTE — PROGRESS NOTES
"Daily Note     Today's date: 2025  Patient name: Aaron Snyder  : 1965  MRN: [id number]  Referring provider: Eliana Khalil*  Dx:   Encounter Diagnosis     ICD-10-CM    1. Post-operative state  Z98.890       2. S/P lumbar fusion  Z98.1       3. Impaired functional mobility, balance, gait, and endurance  Z74.09                      Subjective: Overall mm ache BL legs  still have pain R hip area  worse in am, constant- always there  Sometime pain worse than other time but seems to get better when I move  around  Have difficulty with putting on my shoes R> L and need to sit down to put pants on/balance is not good  Dont notice any difference with lift in my shoe        Objective: See treatment diary below      Assessment: Tolerated treatment well. Progress POC > resistance as jona  pt fatigues easily   modified rx session x1 visit 2* > mm tightness.  pt provided brief rest between exer  Reviewed self stretches and encourage carryover to improve flexibility/ trunk mobility    Pt demon rigid /guarded posture, limited trunk flexibility/mobility   Challenged with getting on/off LE wt machines   Patient would benefit from continued PT      Plan: Continue per plan of care.      Precautions: DOS - 10/25/24.  No bending, twisting, lifting > 10#  PMH includes: HTN, DM, chronic pain       Re-eval Date: 25  Insurance HBS (11 visits / exp 25)    Date 1/8 1/15 1/20 1/27 1/29   Visit Count    3/11 4/11   FOTO        Pain In  2 2 2 2   Pain Out  2 2           Manuals        BLE - Hams, Calf, Quads Calf/HS B/L   Review self stretches                               Neuro Re-Ed         MTP/LTP [place] 9# 5\" 20x  ea  standing     Palloff press  9# 5\" 10x ea [place] 9# 5\" 20x  ea  standing     Palloff press  9# 5\" 10x ea [place] 9# 5\" 20x  ea  standing     Palloff press  9# 5\" 10x ea [place]  10# 5\"  3x10 ea    Palloff press  9# 15x 5\" [place]  12# 5\"  3x10 ea    Palloff press  10# 15x 5\"   PPT 5\" 20x 5\" 20x 5\" 20x " "review    PPT w/march Supine 20x  Supine 20x  Supine 20x  20x review   PPT w/SLR        PPT w/BKFO    10x 5\"   Pt def  review   Palloff Press See above [place]       Sidestepping                TrA         TrA with march         TrA with add squeeze  5\" 20x  5\" 20x  Review DC HEP     Quad set TKE ball  Supine 5\" 30xea               Ther Ex        NuStep for LE Strength Seat 12 L3 x 10 min No UE Seat 12 L3 x 10 min No UE Seat 12 L4 x 10 min No UE Seat 12 L4 x 10 min No UE Seat 12 L4 x 10 min No UE   Leg Press 60# 2x10 60# 2x10 60# 3x10 80# 3x10 85# 3x10   Knee Ext Machine 11# 2x10 5\"   11# 2x10 5\"   11# 3x10 5\"   11# 2x10 5\"    22# 10x 11# 1x10 5\"    22# 10x2   Knee Flex Machine 22# 2x10 22# 2x10 22# 3x10 33# 3x10 33# 3x10   Bridges   Attempted /pain  GS 10x 5\"  Stand hip abd with DF   10x ea BL       SKTC    10x 5\" w/ strap BL review   LTR    10x 5\" review   S/L Hip Abd        Clamshells Supine RTB 5\" 20x Resume NV   SL 10x ea  review   HR        SLR x 3 Flex w/ TA  2x10 BL   Flex w/ TA  1x10 BL    R hip  Flex w/ TA  1x10 BL    R hip  Stand hip  Abd 10xea w/ AH Stand hip  Abd 10xea w/ AH   LAQ                        Ther Activity        STS   5x 21\" 10x /low mat  Co's L knee Pain held   Stepups F/L 6\" 2x10  0-1 UE  BL   6\" 2x10 ea  0-1 UE  BL   6\" 3x10 ea  0-1 UE  BL   Held    Stepdowns          1/4 in heel lift added to R shoe to improve standing postural symmetry and gait stability with R vs L stance phases       Gait Training                        Modalities        HP/CP prn                     Access Code: [email or web address]  URL: [email or web address]  Date: 11/25/2024  Prepared by: Deisy    Exercises  - Standing Hip Flexion AROM  - 1 x daily - 7 x weekly - 2 sets - 10 reps  - Standing Hip Abduction AROM  - 1 x daily - 7 x weekly - 2 sets - 10 reps  - Standing Hip Extension  - 1 x daily - 7 x weekly - 2 sets - 10 reps  - Standing Heel Raise with Support  - 1 x daily - 7 x weekly - 2 sets - 10 reps  - " "Seated Long Arc Quad  - 1 x daily - 7 x weekly - 2 sets - 10 reps - 3-5\" hold                                   "

## 2025-02-04 ENCOUNTER — OFFICE VISIT (OUTPATIENT)
Dept: PHYSICAL THERAPY | Facility: CLINIC | Age: 60
End: 2025-02-04
Payer: COMMERCIAL

## 2025-02-04 DIAGNOSIS — Z98.1 S/P LUMBAR FUSION: ICD-10-CM

## 2025-02-04 DIAGNOSIS — Z98.890 POST-OPERATIVE STATE: Primary | ICD-10-CM

## 2025-02-04 DIAGNOSIS — Z74.09 IMPAIRED FUNCTIONAL MOBILITY, BALANCE, GAIT, AND ENDURANCE: ICD-10-CM

## 2025-02-04 PROCEDURE — 97112 NEUROMUSCULAR REEDUCATION: CPT | Performed by: PHYSICAL MEDICINE & REHABILITATION

## 2025-02-04 PROCEDURE — 97110 THERAPEUTIC EXERCISES: CPT | Performed by: PHYSICAL MEDICINE & REHABILITATION

## 2025-02-04 NOTE — PROGRESS NOTES
"Daily Note     Today's date: 2025  Patient name: Aaron Snyder  : 1965  MRN: [id number]  Referring provider: Eliana Khalil*  Dx:   Encounter Diagnosis     ICD-10-CM    1. Post-operative state  Z98.890       2. S/P lumbar fusion  Z98.1       3. Impaired functional mobility, balance, gait, and endurance  Z74.09                      Subjective: Pt states he does not want to use the leg machines today. Pt notes that after his PT sessions, his hips and knees hurt so much he can barely walk until the end of the day. Notes he also has burning in the thighs all the time. Notes his symptoms are the same as prior to surgery. Reports he has had no relief with the surgery.      Objective: See treatment diary below      Assessment: Tolerated treatment fair. Patient demonstrated fatigue post treatment, exhibited good technique with therapeutic exercises, and would benefit from continued PT  Treatment modified to accommodate pt's c/o pain and symptoms.  Pt noted no difference in symptoms upon departure from PT today as compared to other days, even though tx program modified to improve pt comfort.      Plan: Continue per plan of care.      Precautions: DOS - 10/25/24.  No bending, twisting, lifting > 10#  PMH includes: HTN, DM, chronic pain       Re-eval Date: 25  Insurance HBS (11 visits / exp 25)    Date 2/3 1/15 1/20 1/27 1/29   Visit Count    3/11 4/11   FOTO        Pain In  2 2 2 2   Pain Out  2 2           Manuals 2/3       BLE - Hams, Calf, Quads                                Neuro Re-Ed         MTP/LTP [place] 10#  5\" 30x  ea  standing     Palloff press  10# 5\" 15x ea [place] 9# 5\" 20x  ea  standing     Palloff press  9# 5\" 10x ea [place] 9# 5\" 20x  ea  standing     Palloff press  9# 5\" 10x ea [place]  10# 5\"  3x10 ea    Palloff press  9# 15x 5\" [place]  12# 5\"  3x10 ea    Palloff press  10# 15x 5\"   PPT 5\" 20x 5\" 20x 5\" 20x review    PPT /march Supine 20x  Supine 20x  Supine 20x  20x review " "  PPT w/SLR        PPT w/BKFO 20x   10x 5\"   Pt def  review           Sidestepping                TrA         TrA with march         TrA with add squeeze   5\" 20x  Review DC HEP     Quad set TKE ball                 Ther Ex        NuStep for LE Strength Seat 12 L4 x 10 min No UE Seat 12 L3 x 10 min No UE Seat 12 L4 x 10 min No UE Seat 12 L4 x 10 min No UE Seat 12 L4 x 10 min No UE   Leg Press held 60# 2x10 60# 3x10 80# 3x10 85# 3x10   Knee Ext Machine held 11# 2x10 5\"   11# 3x10 5\"   11# 2x10 5\"    22# 10x 11# 1x10 5\"    22# 10x2   Knee Flex Machine held 22# 2x10 22# 3x10 33# 3x10 33# 3x10   Bridges 10 x 3\"  Attempted /pain  GS 10x 5\"  Stand hip abd with DF   10x ea BL       SKTC 10 x 5\"   10x 5\" w/ strap BL review   LTR 10 x 5\"   10x 5\" review   S/L Hip Abd        Clamshells Supine GTB 5\" 20x Resume NV   SL 10x ea  review   HR        SLR x 3 Supine  flex TA  2 x 10 Flex w/ TA  1x10 BL    R hip  Flex w/ TA  1x10 BL    R hip  Stand hip  Abd 10xea w/ AH Stand hip  Abd 10xea w/ AH   LAQ        Incline calf stretch 3 x 30\"               Ther Activity        STS   5x 21\" 10x /low mat  Co's L knee Pain held   Stepups F/L held 6\" 2x10 ea  0-1 UE  BL   6\" 3x10 ea  0-1 UE  BL   Held    Stepdowns          1/4 in heel lift added to R shoe to improve standing postural symmetry and gait stability with R vs L stance phases       Gait Training                        Modalities        HP/CP prn                     Access Code: [email or web address]  URL: [email or web address]  Date: 11/25/2024  Prepared by: Deisy    Exercises  - Standing Hip Flexion AROM  - 1 x daily - 7 x weekly - 2 sets - 10 reps  - Standing Hip Abduction AROM  - 1 x daily - 7 x weekly - 2 sets - 10 reps  - Standing Hip Extension  - 1 x daily - 7 x weekly - 2 sets - 10 reps  - Standing Heel Raise with Support  - 1 x daily - 7 x weekly - 2 sets - 10 reps  - Seated Long Arc Quad  - 1 x daily - 7 x weekly - 2 sets - 10 reps - 3-5\" " hold

## 2025-02-06 ENCOUNTER — APPOINTMENT (OUTPATIENT)
Dept: PHYSICAL THERAPY | Facility: CLINIC | Age: 60
End: 2025-02-06
Payer: COMMERCIAL

## 2025-02-10 ENCOUNTER — TELEPHONE (OUTPATIENT)
Dept: FAMILY MEDICINE CLINIC | Facility: CLINIC | Age: 60
End: 2025-02-10

## 2025-02-10 ENCOUNTER — APPOINTMENT (OUTPATIENT)
Dept: PHYSICAL THERAPY | Facility: CLINIC | Age: 60
End: 2025-02-10
Payer: COMMERCIAL

## 2025-02-10 NOTE — TELEPHONE ENCOUNTER
Pt had tania scheduled with Goldie on 02/14. Advised pt that he does not need an tania & he can just drop off the form. Pt had physical on 12/10/2025.     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 Usually see Lily Harvey. Need paper for March expiration. No appointments available for her in time.

## 2025-02-11 NOTE — TELEPHONE ENCOUNTER
Patient returned missed call. Patient received the forms today and will give them to the office some time tomorrow. Please advise.

## 2025-02-12 ENCOUNTER — TELEPHONE (OUTPATIENT)
Dept: ADMINISTRATIVE | Facility: OTHER | Age: 60
End: 2025-02-12

## 2025-02-12 ENCOUNTER — APPOINTMENT (OUTPATIENT)
Dept: PHYSICAL THERAPY | Facility: CLINIC | Age: 60
End: 2025-02-12
Payer: COMMERCIAL

## 2025-02-12 NOTE — TELEPHONE ENCOUNTER
Upon review of the In Basket request we have found this is a duplicate request and no further action is needed. This request was completed upon initial request, the patient chart is up to date, and this message will now be closed.  UTD w/ 12-9-24.    Any additional questions or concerns should be emailed to the Practice Liaisons via the appropriate education email address, please do not reply via In Basket.    Thank you  Jenae Snyder   [facility]

## 2025-02-12 NOTE — TELEPHONE ENCOUNTER
----- Message from Melani ELIZALDE sent at 2/12/2025 11:25 AM EST -----  Regarding: Care Gap Request  02/12/25 11:25 AM    Hello, our patient above has had Diabetic Eye Exam completed/performed. Please assist in updating the patient chart by pulling the document from the Media Tab. The date of service is 12/09/24  .     Thank you,  Melani Carrillo  [facility]

## 2025-02-13 DIAGNOSIS — J06.9 ACUTE URI: Primary | ICD-10-CM

## 2025-02-13 RX ORDER — FLUCONAZOLE 100 MG/1
100 TABLET ORAL DAILY
Qty: 7 TABLET | Refills: 0 | Status: SHIPPED | OUTPATIENT
Start: 2025-02-13 | End: 2025-02-20

## 2025-02-13 RX ORDER — OXYMETAZOLINE HCL 0.05 %
30 AEROSOL, MIST NASAL EVERY 4 HOURS PRN
Qty: 355 ML | Refills: 0 | Status: SHIPPED | OUTPATIENT
Start: 2025-02-13

## 2025-02-17 ENCOUNTER — APPOINTMENT (OUTPATIENT)
Dept: PHYSICAL THERAPY | Facility: CLINIC | Age: 60
End: 2025-02-17
Payer: COMMERCIAL

## 2025-02-19 ENCOUNTER — APPOINTMENT (OUTPATIENT)
Dept: PHYSICAL THERAPY | Facility: CLINIC | Age: 60
End: 2025-02-19
Payer: COMMERCIAL

## 2025-02-24 ENCOUNTER — APPOINTMENT (OUTPATIENT)
Dept: PHYSICAL THERAPY | Facility: CLINIC | Age: 60
End: 2025-02-24
Payer: COMMERCIAL

## 2025-02-26 ENCOUNTER — APPOINTMENT (OUTPATIENT)
Dept: PHYSICAL THERAPY | Facility: CLINIC | Age: 60
End: 2025-02-26
Payer: COMMERCIAL

## 2025-04-16 DIAGNOSIS — I10 ESSENTIAL HYPERTENSION: ICD-10-CM

## 2025-04-17 RX ORDER — CARVEDILOL 12.5 MG/1
12.5 TABLET ORAL 2 TIMES DAILY
Qty: 180 TABLET | Refills: 3 | Status: SHIPPED | OUTPATIENT
Start: 2025-04-17

## 2025-04-23 ENCOUNTER — RA CDI HCC (OUTPATIENT)
Dept: OTHER | Facility: HOSPITAL | Age: 60
End: 2025-04-23

## 2025-04-23 NOTE — PROGRESS NOTES
HCC coding opportunities          Chart Reviewed number of suggestions sent to Provider: 2   E66.01  E11.36    Patients Insurance        Commercial Insurance: [employer]

## 2025-04-30 ENCOUNTER — OFFICE VISIT (OUTPATIENT)
Dept: FAMILY MEDICINE CLINIC | Facility: CLINIC | Age: 60
End: 2025-04-30
Payer: COMMERCIAL

## 2025-04-30 VITALS
HEART RATE: 96 BPM | WEIGHT: 315 LBS | SYSTOLIC BLOOD PRESSURE: 130 MMHG | DIASTOLIC BLOOD PRESSURE: 84 MMHG | TEMPERATURE: 97.5 F | BODY MASS INDEX: 42.66 KG/M2 | HEIGHT: 72 IN | RESPIRATION RATE: 18 BRPM | OXYGEN SATURATION: 96 %

## 2025-04-30 DIAGNOSIS — Z23 ENCOUNTER FOR IMMUNIZATION: ICD-10-CM

## 2025-04-30 DIAGNOSIS — M54.16 LUMBAR RADICULOPATHY: Chronic | ICD-10-CM

## 2025-04-30 DIAGNOSIS — E11.22 TYPE 2 DIABETES MELLITUS WITH STAGE 2 CHRONIC KIDNEY DISEASE, WITHOUT LONG-TERM CURRENT USE OF INSULIN  (HCC): Primary | ICD-10-CM

## 2025-04-30 DIAGNOSIS — N18.2 TYPE 2 DIABETES MELLITUS WITH STAGE 2 CHRONIC KIDNEY DISEASE, WITHOUT LONG-TERM CURRENT USE OF INSULIN  (HCC): Primary | ICD-10-CM

## 2025-04-30 DIAGNOSIS — Z98.1 S/P LUMBAR FUSION: Chronic | ICD-10-CM

## 2025-04-30 DIAGNOSIS — I10 ESSENTIAL HYPERTENSION: Chronic | ICD-10-CM

## 2025-04-30 DIAGNOSIS — E66.813 CLASS 3 SEVERE OBESITY DUE TO EXCESS CALORIES WITH SERIOUS COMORBIDITY AND BODY MASS INDEX (BMI) OF 45.0 TO 49.9 IN ADULT: Chronic | ICD-10-CM

## 2025-04-30 DIAGNOSIS — F32.2 SEVERE MAJOR DEPRESSIVE DISORDER (HCC): ICD-10-CM

## 2025-04-30 PROBLEM — E78.00 PURE HYPERCHOLESTEROLEMIA: Chronic | Status: ACTIVE | Noted: 2022-08-23

## 2025-04-30 PROBLEM — E78.00 PURE HYPERCHOLESTEROLEMIA: Status: ACTIVE | Noted: 2022-08-23

## 2025-04-30 LAB — SL AMB POCT HEMOGLOBIN AIC: 7.5 (ref ?–6.5)

## 2025-04-30 PROCEDURE — 99213 OFFICE O/P EST LOW 20 MIN: CPT | Performed by: NURSE PRACTITIONER

## 2025-04-30 PROCEDURE — 83036 HEMOGLOBIN GLYCOSYLATED A1C: CPT | Performed by: NURSE PRACTITIONER

## 2025-04-30 NOTE — PROGRESS NOTES
Name: Aaron Snyder      : 1965      MRN: [id number]  Encounter Provider: LAURA Campbell  Encounter Date: 2025   Encounter department: [facility]  :  Assessment & Plan  Type 2 diabetes mellitus with stage 2 chronic kidney disease, without long-term current use of insulin  (Formerly [facility])    Lab Results   Component Value Date    HGBA1C 7.5 (A) 2025              Component  Ref Range & Units (hover) 24  7:40 AM 24 11:32 AM 23  8:23 AM 10/21/22  9:54 AM 22 12:09 PM 10/16/21  8:21 AM   Hemoglobin A1C 7.2 High  6.4 R 6.5 High  6.5 High  R 8.2 Abnormal  R 8.0 High  R     140 140  183     Discussed diet and exercise  Continue   Glimepiride    Orders:    POCT hemoglobin A1c    Encounter for immunization         Essential hypertension         Lumbar radiculopathy         Severe major depressive disorder (HCC)           S/P lumbar fusion  Still with continued back pain  If he walks one day, it takes him 2-3 days to recover    He completed multiple trips for physical therapy   He failed improvement with this as he had worse pain after each treatment     Expected ADLs to become easier, but they havent     Still has burning sensation of left thigh more often  Still has muscle spasms     Steroids?  Neuropathy medications?  Muscle relaxers?  Pain medications?     Weight loss medications?     Will let us know which path he would like to take.         Class 3 severe obesity due to excess calories with serious comorbidity and body mass index (BMI) of 45.0 to 49.9 in adult                  History of Present Illness   The patient is here today to discuss his medications and treatment plans.   I reviewed their medical conditions, medications, laboratory and radiology studies.  I reviewed their scheduled future lab studies with the patient.   The patient's current treatment plan is effective.   There is no change in the current therapy.   I ask the patient to  continue their current therapy.   The patient voiced their understanding and agreement.   Follow up in December of 2025 for his annual physical .  Please continue to the PORCH section of the note for details of today's visit.             Review of Systems   Constitutional:  Negative for activity change, chills, fatigue and fever.   HENT:  Negative for rhinorrhea and sore throat.    Eyes:  Negative for pain.   Respiratory:  Negative for cough and shortness of breath.    Cardiovascular:  Negative for chest pain, palpitations and leg swelling.   Gastrointestinal:  Negative for abdominal pain, constipation, diarrhea, nausea and vomiting.   Genitourinary:  Negative for difficulty urinating, flank pain, frequency and urgency.   Musculoskeletal:  Positive for arthralgias, back pain, gait problem and myalgias. Negative for joint swelling.   Skin:  Negative for color change.   Neurological:  Negative for dizziness, weakness, light-headedness and headaches.   Psychiatric/Behavioral:  Negative for sleep disturbance. The patient is nervous/anxious.    All other systems reviewed and are negative.      Objective   /84 (BP Location: Left arm, Patient Position: Sitting, Cuff Size: Large)   Pulse 96   Temp 97.5 °F (36.4 °C) (Tympanic)   Resp 18   Ht 6' (1.829 m)   Wt (!) 155 kg (342 lb 3.2 oz)   SpO2 96%   BMI 46.41 kg/m²      Physical Exam  Vitals and nursing note reviewed.   Constitutional:       General: He is awake.      Appearance: Normal appearance. He is well-developed.   HENT:      Head: Normocephalic and atraumatic.      Nose: Nose normal.      Mouth/Throat:      Mouth: Mucous membranes are moist.   Eyes:      Conjunctiva/sclera: Conjunctivae normal.   Cardiovascular:      Rate and Rhythm: Normal rate and regular rhythm.      Heart sounds: No murmur heard.  Pulmonary:      Effort: Pulmonary effort is normal. No respiratory distress.      Breath sounds: Normal breath sounds.   Abdominal:      General: Bowel  sounds are normal.      Palpations: Abdomen is soft.      Tenderness: There is no abdominal tenderness.   Musculoskeletal:      Cervical back: Neck supple.      Lumbar back: Spasms and tenderness present. Decreased range of motion.      Right lower leg: No edema.      Left lower leg: No edema.        Legs:    Skin:     General: Skin is warm and dry.   Neurological:      Mental Status: He is alert and oriented to person, place, and time.      Motor: Motor function is intact.      Coordination: Coordination is intact.      Gait: Gait abnormal.   Psychiatric:         Attention and Perception: Attention normal.         Mood and Affect: Mood is depressed.         Speech: Speech normal.         Behavior: Behavior normal. Behavior is cooperative.         Thought Content: Thought content normal.         Cognition and Memory: Cognition normal.         Judgment: Judgment normal.       Administrative Statements   I have spent a total time of 20 minutes in caring for this patient on the day of the visit/encounter including Diagnostic results, Prognosis, Risks and benefits of tx options, Instructions for management, Patient and family education, Importance of tx compliance, Risk factor reductions, Impressions, Counseling / Coordination of care, Documenting in the medical record, Reviewing/placing orders in the medical record (including tests, medications, and/or procedures), and Obtaining or reviewing history  .

## 2025-04-30 NOTE — ASSESSMENT & PLAN NOTE
Lab Results   Component Value Date    HGBA1C 7.5 (A) 04/30/2025              Component  Ref Range & Units (hover) 9/28/24  7:40 AM 4/23/24 11:32 AM 11/27/23  8:23 AM 10/21/22  9:54 AM 8/23/22 12:09 PM 10/16/21  8:21 AM   Hemoglobin A1C 7.2 High  6.4 R 6.5 High  6.5 High  R 8.2 Abnormal  R 8.0 High  R     140 140  183     Discussed diet and exercise  Continue   Glimepiride    Orders:    POCT hemoglobin A1c

## 2025-04-30 NOTE — ASSESSMENT & PLAN NOTE
Still with continued back pain  If he walks one day, it takes him 2-3 days to recover    He completed multiple trips for physical therapy   He failed improvement with this as he had worse pain after each treatment     Expected ADLs to become easier, but they havent     Still has burning sensation of left thigh more often  Still has muscle spasms     Steroids?  Neuropathy medications?  Muscle relaxers?  Pain medications?     Weight loss medications?     Will let us know which path he would like to take.

## 2025-05-14 DIAGNOSIS — I10 ESSENTIAL HYPERTENSION: ICD-10-CM

## 2025-05-14 RX ORDER — LISINOPRIL AND HYDROCHLOROTHIAZIDE 12.5; 2 MG/1; MG/1
1 TABLET ORAL 2 TIMES DAILY
Qty: 180 TABLET | Refills: 1 | Status: SHIPPED | OUTPATIENT
Start: 2025-05-14

## 2025-06-02 DIAGNOSIS — E11.22 TYPE 2 DIABETES MELLITUS WITH STAGE 2 CHRONIC KIDNEY DISEASE, WITHOUT LONG-TERM CURRENT USE OF INSULIN  (HCC): ICD-10-CM

## 2025-06-02 DIAGNOSIS — N18.2 TYPE 2 DIABETES MELLITUS WITH STAGE 2 CHRONIC KIDNEY DISEASE, WITHOUT LONG-TERM CURRENT USE OF INSULIN  (HCC): ICD-10-CM

## 2025-06-02 RX ORDER — GLIMEPIRIDE 2 MG/1
2 TABLET ORAL
Qty: 90 TABLET | Refills: 0 | Status: SHIPPED | OUTPATIENT
Start: 2025-06-02

## 2025-07-18 ENCOUNTER — APPOINTMENT (OUTPATIENT)
Dept: LAB | Facility: CLINIC | Age: 60
End: 2025-07-18
Attending: NURSE PRACTITIONER
Payer: COMMERCIAL

## 2025-07-18 DIAGNOSIS — E11.22 TYPE 2 DIABETES MELLITUS WITH STAGE 2 CHRONIC KIDNEY DISEASE, WITHOUT LONG-TERM CURRENT USE OF INSULIN  (HCC): Chronic | ICD-10-CM

## 2025-07-18 DIAGNOSIS — E55.9 VITAMIN D DEFICIENCY: Chronic | ICD-10-CM

## 2025-07-18 DIAGNOSIS — Z00.00 ANNUAL PHYSICAL EXAM: Chronic | ICD-10-CM

## 2025-07-18 DIAGNOSIS — N18.2 TYPE 2 DIABETES MELLITUS WITH STAGE 2 CHRONIC KIDNEY DISEASE, WITHOUT LONG-TERM CURRENT USE OF INSULIN  (HCC): Chronic | ICD-10-CM

## 2025-07-18 DIAGNOSIS — Z12.5 SCREENING FOR PROSTATE CANCER: Chronic | ICD-10-CM

## 2025-07-18 DIAGNOSIS — E78.2 MIXED HYPERLIPIDEMIA: Chronic | ICD-10-CM

## 2025-07-18 LAB
25(OH)D3 SERPL-MCNC: 21.2 NG/ML (ref 30–100)
ALBUMIN SERPL BCG-MCNC: 4 G/DL (ref 3.5–5)
ALP SERPL-CCNC: 57 U/L (ref 34–104)
ALT SERPL W P-5'-P-CCNC: 33 U/L (ref 7–52)
ANION GAP SERPL CALCULATED.3IONS-SCNC: 10 MMOL/L (ref 4–13)
AST SERPL W P-5'-P-CCNC: 25 U/L (ref 13–39)
BASOPHILS # BLD AUTO: 0.08 THOUSANDS/ÂΜL (ref 0–0.1)
BASOPHILS NFR BLD AUTO: 1 % (ref 0–1)
BILIRUB SERPL-MCNC: 0.84 MG/DL (ref 0.2–1)
BUN SERPL-MCNC: 22 MG/DL (ref 5–25)
CALCIUM SERPL-MCNC: 9.4 MG/DL (ref 8.4–10.2)
CHLORIDE SERPL-SCNC: 96 MMOL/L (ref 96–108)
CHOLEST SERPL-MCNC: 250 MG/DL (ref ?–200)
CO2 SERPL-SCNC: 31 MMOL/L (ref 21–32)
CREAT SERPL-MCNC: 1.11 MG/DL (ref 0.6–1.3)
CREAT UR-MCNC: 155.2 MG/DL
EOSINOPHIL # BLD AUTO: 0.29 THOUSAND/ÂΜL (ref 0–0.61)
EOSINOPHIL NFR BLD AUTO: 3 % (ref 0–6)
ERYTHROCYTE [DISTWIDTH] IN BLOOD BY AUTOMATED COUNT: 13.5 % (ref 11.6–15.1)
EST. AVERAGE GLUCOSE BLD GHB EST-MCNC: 183 MG/DL
GFR SERPL CREATININE-BSD FRML MDRD: 70 ML/MIN/1.73SQ M
GLUCOSE P FAST SERPL-MCNC: 151 MG/DL (ref 65–99)
HBA1C MFR BLD: 8 %
HCT VFR BLD AUTO: 44.3 % (ref 36.5–49.3)
HDLC SERPL-MCNC: 35 MG/DL
HGB BLD-MCNC: 14.6 G/DL (ref 12–17)
IMM GRANULOCYTES # BLD AUTO: 0.04 THOUSAND/UL (ref 0–0.2)
IMM GRANULOCYTES NFR BLD AUTO: 0 % (ref 0–2)
LDLC SERPL CALC-MCNC: 148 MG/DL (ref 0–100)
LYMPHOCYTES # BLD AUTO: 2.55 THOUSANDS/ÂΜL (ref 0.6–4.47)
LYMPHOCYTES NFR BLD AUTO: 28 % (ref 14–44)
MCH RBC QN AUTO: 28.8 PG (ref 26.8–34.3)
MCHC RBC AUTO-ENTMCNC: 33 G/DL (ref 31.4–37.4)
MCV RBC AUTO: 87 FL (ref 82–98)
MICROALBUMIN UR-MCNC: 48.9 MG/L
MICROALBUMIN/CREAT 24H UR: 32 MG/G CREATININE (ref 0–30)
MONOCYTES # BLD AUTO: 0.51 THOUSAND/ÂΜL (ref 0.17–1.22)
MONOCYTES NFR BLD AUTO: 6 % (ref 4–12)
NEUTROPHILS # BLD AUTO: 5.76 THOUSANDS/ÂΜL (ref 1.85–7.62)
NEUTS SEG NFR BLD AUTO: 62 % (ref 43–75)
NONHDLC SERPL-MCNC: 215 MG/DL
NRBC BLD AUTO-RTO: 0 /100 WBCS
PLATELET # BLD AUTO: 286 THOUSANDS/UL (ref 149–390)
PMV BLD AUTO: 9.4 FL (ref 8.9–12.7)
POTASSIUM SERPL-SCNC: 4 MMOL/L (ref 3.5–5.3)
PROT SERPL-MCNC: 7.2 G/DL (ref 6.4–8.4)
PSA SERPL-MCNC: 0.31 NG/ML (ref 0–4)
RBC # BLD AUTO: 5.07 MILLION/UL (ref 3.88–5.62)
SODIUM SERPL-SCNC: 137 MMOL/L (ref 135–147)
TRIGL SERPL-MCNC: 334 MG/DL (ref ?–150)
WBC # BLD AUTO: 9.23 THOUSAND/UL (ref 4.31–10.16)

## 2025-07-18 PROCEDURE — 80061 LIPID PANEL: CPT

## 2025-07-18 PROCEDURE — G0103 PSA SCREENING: HCPCS

## 2025-07-18 PROCEDURE — 83036 HEMOGLOBIN GLYCOSYLATED A1C: CPT

## 2025-07-18 PROCEDURE — 80053 COMPREHEN METABOLIC PANEL: CPT

## 2025-07-18 PROCEDURE — 85025 COMPLETE CBC W/AUTO DIFF WBC: CPT

## 2025-07-18 PROCEDURE — 82306 VITAMIN D 25 HYDROXY: CPT

## 2025-07-18 PROCEDURE — 82570 ASSAY OF URINE CREATININE: CPT

## 2025-07-18 PROCEDURE — 36415 COLL VENOUS BLD VENIPUNCTURE: CPT

## 2025-07-18 PROCEDURE — 82043 UR ALBUMIN QUANTITATIVE: CPT

## 2025-08-07 ENCOUNTER — OFFICE VISIT (OUTPATIENT)
Dept: FAMILY MEDICINE CLINIC | Facility: CLINIC | Age: 60
End: 2025-08-07
Payer: COMMERCIAL

## 2025-08-07 VITALS
HEIGHT: 72 IN | HEART RATE: 84 BPM | OXYGEN SATURATION: 95 % | WEIGHT: 315 LBS | DIASTOLIC BLOOD PRESSURE: 88 MMHG | TEMPERATURE: 97.2 F | RESPIRATION RATE: 18 BRPM | SYSTOLIC BLOOD PRESSURE: 134 MMHG | BODY MASS INDEX: 42.66 KG/M2

## 2025-08-07 DIAGNOSIS — N18.2 STAGE 2 CHRONIC KIDNEY DISEASE: Chronic | ICD-10-CM

## 2025-08-07 DIAGNOSIS — E66.813 CLASS 3 SEVERE OBESITY DUE TO EXCESS CALORIES WITH SERIOUS COMORBIDITY AND BODY MASS INDEX (BMI) OF 45.0 TO 49.9 IN ADULT: Chronic | ICD-10-CM

## 2025-08-07 DIAGNOSIS — Z12.12 SCREENING FOR COLORECTAL CANCER: ICD-10-CM

## 2025-08-07 DIAGNOSIS — I10 ESSENTIAL HYPERTENSION: Chronic | ICD-10-CM

## 2025-08-07 DIAGNOSIS — M16.11 PRIMARY OSTEOARTHRITIS OF RIGHT HIP: Chronic | ICD-10-CM

## 2025-08-07 DIAGNOSIS — Z79.899 ON LONG TERM DRUG THERAPY: ICD-10-CM

## 2025-08-07 DIAGNOSIS — E11.9 ENCOUNTER FOR DIABETIC FOOT EXAM (HCC): Chronic | ICD-10-CM

## 2025-08-07 DIAGNOSIS — Z98.1 S/P LUMBAR FUSION: Chronic | ICD-10-CM

## 2025-08-07 DIAGNOSIS — N18.2 TYPE 2 DIABETES MELLITUS WITH STAGE 2 CHRONIC KIDNEY DISEASE, WITHOUT LONG-TERM CURRENT USE OF INSULIN  (HCC): Primary | ICD-10-CM

## 2025-08-07 DIAGNOSIS — M54.16 LUMBAR RADICULOPATHY: Chronic | ICD-10-CM

## 2025-08-07 DIAGNOSIS — Z23 ENCOUNTER FOR IMMUNIZATION: ICD-10-CM

## 2025-08-07 DIAGNOSIS — M51.360 DEGENERATION OF INTERVERTEBRAL DISC OF LUMBAR REGION WITH DISCOGENIC BACK PAIN: Chronic | ICD-10-CM

## 2025-08-07 DIAGNOSIS — Z02.89 ENCOUNTER FOR PHYSICAL EXAMINATION RELATED TO EMPLOYMENT: Chronic | ICD-10-CM

## 2025-08-07 DIAGNOSIS — Z86.19 HISTORY OF LEGIONNAIRE'S DISEASE: Chronic | ICD-10-CM

## 2025-08-07 DIAGNOSIS — E55.9 VITAMIN D DEFICIENCY: Chronic | ICD-10-CM

## 2025-08-07 DIAGNOSIS — E78.00 PURE HYPERCHOLESTEROLEMIA: Chronic | ICD-10-CM

## 2025-08-07 DIAGNOSIS — J30.2 SEASONAL ALLERGIES: Chronic | ICD-10-CM

## 2025-08-07 DIAGNOSIS — Z12.11 SCREENING FOR COLORECTAL CANCER: ICD-10-CM

## 2025-08-07 DIAGNOSIS — E11.22 TYPE 2 DIABETES MELLITUS WITH STAGE 2 CHRONIC KIDNEY DISEASE, WITHOUT LONG-TERM CURRENT USE OF INSULIN  (HCC): Primary | ICD-10-CM

## 2025-08-07 PROBLEM — Z12.5 SCREENING FOR PROSTATE CANCER: Status: RESOLVED | Noted: 2023-10-23 | Resolved: 2025-08-07

## 2025-08-07 PROBLEM — F32.2 SEVERE MAJOR DEPRESSIVE DISORDER (HCC): Status: RESOLVED | Noted: 2024-12-10 | Resolved: 2025-08-07

## 2025-08-07 PROBLEM — Z12.5 SCREENING FOR PROSTATE CANCER: Chronic | Status: RESOLVED | Noted: 2023-10-23 | Resolved: 2025-08-07

## 2025-08-07 PROCEDURE — 99214 OFFICE O/P EST MOD 30 MIN: CPT | Performed by: NURSE PRACTITIONER

## 2025-08-07 RX ORDER — EZETIMIBE 10 MG/1
10 TABLET ORAL DAILY
Qty: 90 TABLET | Refills: 3 | Status: SHIPPED | OUTPATIENT
Start: 2025-08-07 | End: 2026-08-02

## (undated) DEVICE — SKIN MARKER DUAL TIP WITH RULER CAP, FLEXIBLE RULER AND LABELS: Brand: DEVON

## (undated) DEVICE — GLOVE SRG BIOGEL 7.5

## (undated) DEVICE — TRAY FOLEY 16FR URIMETER SURESTEP

## (undated) DEVICE — CHLORAPREP HI-LITE 26ML ORANGE

## (undated) DEVICE — SYRINGE 3ML LL

## (undated) DEVICE — DRESSING MEPILEX AG BORDER 4 X 4 IN

## (undated) DEVICE — SNAP KOVER: Brand: UNBRANDED

## (undated) DEVICE — INTENDED FOR TISSUE SEPARATION, AND OTHER PROCEDURES THAT REQUIRE A SHARP SURGICAL BLADE TO PUNCTURE OR CUT.: Brand: BARD-PARKER ® CARBON RIB-BACK BLADES

## (undated) DEVICE — INTENDED FOR TISSUE SEPARATION, AND OTHER PROCEDURES THAT REQUIRE A SHARP SURGICAL BLADE TO PUNCTURE OR CUT.: Brand: BARD-PARKER SAFETY BLADES SIZE 15, STERILE

## (undated) DEVICE — SUT STRATAFIX SPIRAL MONOCRYL PLUS 4-0 PS-2 45CM SXMP1B118

## (undated) DEVICE — ANTIBACTERIAL VIOLET BRAIDED (POLYGLACTIN 910), SYNTHETIC ABSORBABLE SUTURE: Brand: COATED VICRYL

## (undated) DEVICE — INTENDED FOR TISSUE SEPARATION, AND OTHER PROCEDURES THAT REQUIRE A SHARP SURGICAL BLADE TO PUNCTURE OR CUT.: Brand: BARD-PARKER SAFETY BLADES SIZE 10, STERILE

## (undated) DEVICE — PREP SURGICAL PURPREP 26ML

## (undated) DEVICE — ADHESIVE SKIN HIGH VISCOSITY EXOFIN 1ML

## (undated) DEVICE — NEEDLE SPINAL18G X 3.5 IN QUINCKE

## (undated) DEVICE — MARKER REFLECTIVE RADIOPAQUE SPHERE

## (undated) DEVICE — 3M™ STERI-STRIP™ REINFORCED ADHESIVE SKIN CLOSURES, R1547, 1/2 IN X 4 IN (12 MM X 100 MM), 6 STRIPS/ENVELOPE: Brand: 3M™ STERI-STRIP™

## (undated) DEVICE — TOOL MR8-15BA50 MR8 15CM BALL 5MM: Brand: MIDAS REX MR8

## (undated) DEVICE — SPONGE SCRUB 4 PCT CHLORHEXIDINE

## (undated) DEVICE — HEMOSTATIC MATRIX SURGIFLO 8ML W/THROMBIN

## (undated) DEVICE — BETHLEHEM UNIVERSAL SPINE, KIT: Brand: CARDINAL HEALTH

## (undated) DEVICE — JACKSON TABLE FOAM POSITIONING KIT: Brand: CARDINAL HEALTH

## (undated) DEVICE — DRAPE SURGIKIT SADDLE BAG

## (undated) DEVICE — DISPOSABLE EQUIPMENT COVER: Brand: SMALL TOWEL DRAPE

## (undated) DEVICE — DRESSING MEPILEX AG BORDER POST-OP 4 X 6 IN

## (undated) DEVICE — SKN PRP WNG SPNGE PVP SCRB STR: Brand: MEDLINE INDUSTRIES, INC.

## (undated) DEVICE — DRAPE MICROSCOPE OPMI PENTERO

## (undated) DEVICE — ELECTRODE BLADE MOD E-Z CLEAN  2.75IN 7CM -0012AM

## (undated) DEVICE — DRAPE C-ARMOUR

## (undated) DEVICE — ELECTRODE BLADE MOD  E-Z CLEAN 6.5IN -0014M

## (undated) DEVICE — Device

## (undated) DEVICE — DRAPE SHEET X-LG

## (undated) DEVICE — SUT VICRYL PLUS 3-0 RB-1 CR/8 18 IN VCP713D

## (undated) DEVICE — DRAPE ADOLESCENT LAPAROTOMY

## (undated) DEVICE — PLASTIC ADHESIVE BANDAGE: Brand: CURITY

## (undated) DEVICE — PAD GROUNDING DUAL ADULT

## (undated) DEVICE — SPONGE STICK WITH PVP-I: Brand: KENDALL

## (undated) DEVICE — BULB SYRINGE,IRRIGATION WITH PROTECTIVE CAP: Brand: DOVER

## (undated) DEVICE — GLOVE SRG BIOGEL 8

## (undated) DEVICE — TIBURON SPLIT SHEET: Brand: CONVERTORS

## (undated) DEVICE — TELFA ADHESIVE ISLAND DRESSING: Brand: TELFA

## (undated) DEVICE — MAZOR X SPINE DISP KIT

## (undated) DEVICE — GLOVE INDICATOR PI UNDERGLOVE SZ 8.5 BLUE

## (undated) DEVICE — PENCIL ELECTROSURG E-Z CLEAN -0035H

## (undated) DEVICE — NEEDLE 18 G X 1 1/2 SAFETY

## (undated) DEVICE — ELECTRODE BLADE MOD E-Z CLEAN 4IN -0014AM

## (undated) DEVICE — NEEDLE 25G X 1 1/2

## (undated) DEVICE — DRAPE SHEET THREE QUARTER

## (undated) DEVICE — DRESSING MEPILEX FOAM BORDER FLEX 4 X 4IN

## (undated) DEVICE — GAUZE SPONGES,16 PLY: Brand: CURITY

## (undated) DEVICE — MAZOR X SCAN PLAN  DISP KIT K

## (undated) DEVICE — TOOL MR8-31TD3030 MR8 TWST DRIL 3MMX30MM: Brand: MIDAS REX

## (undated) DEVICE — GLOVE INDICATOR PI UNDERGLOVE SZ 7.5 BLUE